# Patient Record
Sex: FEMALE | Race: WHITE | Employment: OTHER | URBAN - METROPOLITAN AREA
[De-identification: names, ages, dates, MRNs, and addresses within clinical notes are randomized per-mention and may not be internally consistent; named-entity substitution may affect disease eponyms.]

---

## 2017-01-19 ENCOUNTER — TELEPHONE (OUTPATIENT)
Dept: FAMILY MEDICINE CLINIC | Age: 77
End: 2017-01-19

## 2017-01-19 NOTE — TELEPHONE ENCOUNTER
Per Sommer Trujillo NP, pt advised that it is ok to try Melatonin OTC. Pt states that she is trying to lose weight and wanted to know if she could just do vegetables. Advised pt that she should incorporate protein, fruits and veggies. Avoid sugary, fried, processed food and decrease carbohydrates. Pt verbalized understanding.

## 2017-01-30 ENCOUNTER — TELEPHONE (OUTPATIENT)
Dept: FAMILY MEDICINE CLINIC | Age: 77
End: 2017-01-30

## 2017-01-30 NOTE — TELEPHONE ENCOUNTER
She called and was concerned that she is having a heart attack. Symptoms were pain in her heart, shortness of breath and headache. I told her to call for an ambulance to come get her now. She said she would and I asked if she needed help calling and she said no. I gave her a few min. Then called back and she said she was feeling the same and the rescue squad was on the way.

## 2017-03-03 ENCOUNTER — OFFICE VISIT (OUTPATIENT)
Dept: FAMILY MEDICINE CLINIC | Age: 77
End: 2017-03-03

## 2017-03-03 VITALS
WEIGHT: 209.6 LBS | TEMPERATURE: 97.8 F | BODY MASS INDEX: 38.34 KG/M2 | DIASTOLIC BLOOD PRESSURE: 71 MMHG | OXYGEN SATURATION: 96 % | SYSTOLIC BLOOD PRESSURE: 146 MMHG | HEART RATE: 56 BPM

## 2017-03-03 DIAGNOSIS — J02.9 SORE THROAT: Primary | ICD-10-CM

## 2017-03-03 DIAGNOSIS — J02.0 STREP THROAT: ICD-10-CM

## 2017-03-03 LAB
S PYO AG THROAT QL: POSITIVE
VALID INTERNAL CONTROL?: YES

## 2017-03-03 RX ORDER — AMOXICILLIN 875 MG/1
875 TABLET, FILM COATED ORAL 2 TIMES DAILY
Qty: 20 TAB | Refills: 0 | Status: SHIPPED | OUTPATIENT
Start: 2017-03-03 | End: 2017-03-13

## 2017-03-03 NOTE — PROGRESS NOTES
Subjective:     Chief Complaint   Patient presents with   Teresa Alarcon is a 64 y.o. female who complains of sore throat, swollen glands and left ear fullness for almost 2 days, gradually worsening since that time. History of strep throat in the past; \"I tend to get strep when I have a sore throat, usually about once per year\". She denies a history of shortness of breath and wheezing. Denies fever but has had some chills, chest pain, dizziness. Patient does not smoke cigarettes. ROS is otherwise negative. No evaluation to date. No recent travel. Sick Contacts? unknown    FLU VACCINE? UTD  Pneumonia Vaccine? UTD  Chart reviewed: immunizations are up to date and documented. Past Medical History:   Diagnosis Date    Atypical chest pain     Long h/o intermittent non-cardiac CP.  Depression with anxiety     Diabetes ([facility] [address]     GERD (gastroesophageal reflux disease)     Hypercholesteremia     Hyperlipidemia 7/3/2013    Hypertension     Inner ear dysfunction     S/P aortic valve replacement     Dr. Carroll Sanchez yearly    Vitamin D deficiency      Social History   Substance Use Topics    Smoking status: Former Smoker     Quit date: 8/27/1999    Smokeless tobacco: Never Used    Alcohol use No     Current Outpatient Prescriptions on File Prior to Visit   Medication Sig Dispense Refill    metFORMIN (GLUCOPHAGE) 500 mg tablet TAKE TWO TABLETS BY MOUTH DAILY WITH BREAKFAST 180 Tab 3    diclofenac EC (VOLTAREN) 75 mg EC tablet Take 1 Tab by mouth two (2) times a day. 20 Tab 0    lovastatin (MEVACOR) 10 mg tablet TAKE ONE TABLET BY MOUTH NIGHTLY 90 Tab 3    lisinopril (PRINIVIL, ZESTRIL) 5 mg tablet TAKE ONE TABLET BY MOUTH ONCE DAILY 90 Tab 4    PARoxetine (PAXIL) 30 mg tablet Take 1 Tab by mouth daily. 90 Tab 4    glimepiride (AMARYL) 1 mg tablet TAKE ONE TABLET BY MOUTH IN THE MORNING 90 Tab 4    clopidogrel (PLAVIX) 75 mg tablet Take 1 Tab by mouth daily.  90 Tab 4    cholecalciferol (VITAMIN D3) 1,000 unit tablet Take 1,000 Units by mouth daily.  MAGNESIUM PO Take 1 Tab by mouth daily.  calcium-cholecalciferol, d3, 600-125 mg-unit tab Take 1 Tab by mouth daily.  meclizine (ANTIVERT) 25 mg tablet Take 1 Tab by mouth three (3) times daily as needed for Dizziness. 15 Tab 0    glucose blood VI test strips (ASCENSIA AUTODISC VI, ONE TOUCH ULTRA TEST VI) strip Test daily. Diagnosis 250.00 3 Package 3    metoprolol succinate (TOPROL-XL) 25 mg XL tablet Take 25 mg by mouth daily.  Lancets misc Test twice daily. Diagnosis 250.00 3 Package 3    vitamin c-vitamin e (CRANBERRY CONCENTRATE) cap Take 1 Cap by mouth daily.  cyanocobalamin (VITAMIN B-12) 1,000 mcg tablet Take 1,000 mcg by mouth daily.  aspirin delayed-release 325 mg tablet Take 1 Tab by mouth daily. 90 Tab 1    PV W-O EDU/FERROUS FUMARATE/FA (M-VIT PO) Take 1 tablet by mouth daily.  omega-3 fatty acids-vitamin e (FISH OIL) 1,000 mg Cap Take 1 capsule by mouth daily. No current facility-administered medications on file prior to visit. Allergies   Allergen Reactions    Naldecon Unknown (comments)    Sulfa (Sulfonamide Antibiotics) Rash        Review of Systems  Pertinent items are noted in HPI. Agree with nurses note. OBJECTIVE:   Visit Vitals    /71    Pulse (!) 56    Temp 97.8 °F (36.6 °C)    Wt 209 lb 9.6 oz (95.1 kg)    SpO2 96%    BMI 38.34 kg/m2     She appears well, vital signs are as noted above   PAIN: No complaints of pain today. HEAD:  Normocephalic. Atraumatic. Non tender sinuses x 4. EYE: PERRLA. EOMs intact. Sclera anicteric without injection. No drainage or discharge. EARS: Hearing intact bilaterally. External ear canals normal without evidence of blood or swelling. Bilateral TM's intact, pearly grey with landmarks visible. No erythema or effusion. NOSE: Patent. Nasal turbinates pink. No polyps noted. No erythema. No discharge. MOUTH: mucous membranes pink and moist. Posterior pharynx normal with cobblestone appearance. No erythema, white exudate or obstruction. NECK: supple. Midline trachea. RESP: Breath sounds are symmetrical bilaterally. Unlabored without SOB. Speaking in full sentences. Clear to auscultation bilaterally anteriorly and posteriorly. No wheezes. No rales or rhonchi. Non-productive cough when elicited. CV: normal rate. Regular rhythm. S1, S2 audible. No murmur noted. No rubs, clicks or gallops noted. HEME/LYMPH: peripheral pulses palpable 2+ x 4 extremities. No peripheral edema is noted. No cervical adenopathy noted. SKIN: clean dry and intact throughout. no rashes, erythema, ecchymosis, lacerations, abrasions, suspicious moles noted      Results for orders placed or performed in visit on 03/03/17   AMB POC RAPID STREP A   Result Value Ref Range    VALID INTERNAL CONTROL POC Yes     Group A Strep Ag Positive Negative       Assessment/Plan:   Differential diagnosis and treatment options reviewed with patient who is in agreement with treatment plan as outlined below. ICD-10-CM ICD-9-CM    1. Sore throat J02.9 462 AMB POC RAPID STREP A   2. Strep throat J02.0 034.0 amoxicillin (AMOXIL) 875 mg tablet     Strep positive today. Will treat with amoxicillin. Advised to increase hydration and take medication with food. Cautioned to watch her blood sugars closer while on antibiotics. Symptomatic therapy suggested: push fluids, rest and gargle warm salt water. Call or return to clinic prn if these symptoms worsen or fail to improve as anticipated. Alternate Ibuprofen with Tylenol every 4 hours as needed for pain and discomfort. OTC nasal saline spray  2-3 sprays per nostril twice a day to clear eustachian tube and assist with post nasal drip symptoms. Encouraged nutrition, hydration and rest; avoid strenuous activity    Verbal and written instructions (see AVS) provided.   Patient expresses understanding and agreement of diagnosis and treatment plan.     F/u if symptoms do not improve or worsen

## 2017-03-03 NOTE — PATIENT INSTRUCTIONS

## 2017-03-03 NOTE — MR AVS SNAPSHOT
Visit Information Date & Time Provider Department Dept. Phone Encounter #  
 3/3/2017 10:30 AM Cassie Dias, [address] [address] [phone] [id number] Upcoming Health Maintenance Date Due Pneumococcal 65+ Low/Medium Risk (2 of 2 - PPSV23) 7/3/2014 FOOT EXAM Q1 5/11/2017 MEDICARE YEARLY EXAM 5/12/2017 HEMOGLOBIN A1C Q6M 6/21/2017 EYE EXAM RETINAL OR DILATED Q1 6/28/2017 MICROALBUMIN Q1 12/21/2017 LIPID PANEL Q1 12/21/2017 GLAUCOMA SCREENING Q2Y 6/28/2018 DTaP/Tdap/Td series (2 - Td) 10/18/2023 Allergies as of 3/3/2017  Review Complete On: 3/3/2017 By: Cassie Dias NP Severity Noted Reaction Type Reactions Naldecon High 11/07/2009    Unknown (comments) Sulfa (Sulfonamide Antibiotics) High 11/07/2009    Rash Current Immunizations  Reviewed on 12/21/2016 Name Date Influenza High Dose Vaccine PF 12/21/2016, 10/1/2014, 10/24/2013 Influenza Vaccine Split 10/9/2012, 11/1/2010 Pneumococcal Vaccine (Unspecified Type) 7/3/2009 Tdap 10/18/2013 Zoster Vaccine, Live 10/6/2009 Not reviewed this visit You Were Diagnosed With   
  
 Codes Comments Sore throat    -  Primary ICD-10-CM: J02.9 ICD-9-CM: 109 Strep throat     ICD-10-CM: J02.0 ICD-9-CM: 034.0 Vitals BP  
  
  
  
  
  
 146/71 BMI and BSA Data Body Mass Index Body Surface Area  
 38.34 kg/m 2 2.04 m 2 Preferred Pharmacy Pharmacy Name Phone [facility] [phone] Your Updated Medication List  
  
   
This list is accurate as of: 3/3/17 11:24 AM.  Always use your most recent med list.  
  
  
  
  
 amoxicillin 875 mg tablet Commonly known as:  AMOXIL Take 1 Tab by mouth two (2) times a day for 10 days. aspirin delayed-release 325 mg tablet Take 1 Tab by mouth daily. calcium-cholecalciferol (d3) 600-125 mg-unit Tab Take 1 Tab by mouth daily. cholecalciferol 1,000 unit tablet Commonly known as:  VITAMIN D3 Take 1,000 Units by mouth daily. clopidogrel 75 mg Tab Commonly known as:  PLAVIX Take 1 Tab by mouth daily. CRANBERRY CONCENTRATE Cap Generic drug:  vitamin c-vitamin e Take 1 Cap by mouth daily. diclofenac EC 75 mg EC tablet Commonly known as:  VOLTAREN Take 1 Tab by mouth two (2) times a day. FISH OIL 1,000 mg Cap Generic drug:  omega-3 fatty acids-vitamin e Take 1 capsule by mouth daily. glimepiride 1 mg tablet Commonly known as:  AMARYL  
TAKE ONE TABLET BY MOUTH IN THE MORNING  
  
 glucose blood VI test strips strip Commonly known as:  ASCENSIA AUTODISC VI, ONE TOUCH ULTRA TEST VI Test daily. Diagnosis 250.00 Lancets Misc Test twice daily. Diagnosis 250.00  
  
 lisinopril 5 mg tablet Commonly known as:  PRINIVIL, ZESTRIL  
TAKE ONE TABLET BY MOUTH ONCE DAILY lovastatin 10 mg tablet Commonly known as:  MEVACOR  
TAKE ONE TABLET BY MOUTH NIGHTLY  
  
 M-VIT PO Take 1 tablet by mouth daily. MAGNESIUM PO Take 1 Tab by mouth daily. meclizine 25 mg tablet Commonly known as:  ANTIVERT Take 1 Tab by mouth three (3) times daily as needed for Dizziness. metFORMIN 500 mg tablet Commonly known as:  GLUCOPHAGE  
TAKE TWO TABLETS BY MOUTH DAILY WITH BREAKFAST  
  
 metoprolol succinate 25 mg XL tablet Commonly known as:  TOPROL-XL Take 25 mg by mouth daily. PARoxetine 30 mg tablet Commonly known as:  PAXIL Take 1 Tab by mouth daily. VITAMIN B-12 1,000 mcg tablet Generic drug:  cyanocobalamin Take 1,000 mcg by mouth daily. Prescriptions Sent to Pharmacy Refills  
 amoxicillin (AMOXIL) 875 mg tablet 0 Sig: Take 1 Tab by mouth two (2) times a day for 10 days. Class: Normal  
 Pharmacy: [address] [place] [address] Ph #: [phone] Route: Oral  
  
We Performed the Following AMB POC RAPID STREP A [36100 CPT(R)] Patient Instructions Strep Throat: Care Instructions Your Care Instructions Strep throat is a bacterial infection that causes sudden, severe sore throat and fever. Strep throat, which is caused by bacteria called streptococcus, is treated with antibiotics. Sometimes a strep test is necessary to tell if the sore throat is caused by strep bacteria. Treatment can help ease symptoms and may prevent future problems. Follow-up care is a key part of your treatment and safety. Be sure to make and go to all appointments, and call your doctor if you are having problems. It's also a good idea to know your test results and keep a list of the medicines you take. How can you care for yourself at home? · Take your antibiotics as directed. Do not stop taking them just because you feel better. You need to take the full course of antibiotics. · Strep throat can spread to others until 24 hours after you begin taking antibiotics. During this time, you should avoid contact with other people at work or home, especially infants and children. Do not sneeze or cough on others, and wash your hands often. Keep your drinking glass and eating utensils separate from those of others, and wash these items well in hot, soapy water. · Gargle with warm salt water at least once each hour to help reduce swelling and make your throat feel better. Use 1 teaspoon of salt mixed in 8 fluid ounces of warm water. · Take an over-the-counter pain medication, such as acetaminophen (Tylenol), ibuprofen (Advil, Motrin), or naproxen (Aleve). Read and follow all instructions on the label. · Try an over-the-counter anesthetic throat spray or throat lozenges, which may help relieve throat pain. · Drink plenty of fluids. Fluids may help soothe an irritated throat. Hot fluids, such as tea or soup, may help your throat feel better. · Eat soft solids and drink plenty of clear liquids. Flavored ice pops, ice cream, scrambled eggs, sherbet, and gelatin dessert (such as Jell-O) may also soothe the throat. · Get lots of rest. 
· Do not smoke, and avoid secondhand smoke. If you need help quitting, talk to your doctor about stop-smoking programs and medicines. These can increase your chances of quitting for good. · Use a vaporizer or humidifier to add moisture to the air in your bedroom. Follow the directions for cleaning the machine. When should you call for help? Call your doctor now or seek immediate medical care if: 
· You have a new or higher fever. · You have a fever with a stiff neck or severe headache. · You have new or worse trouble swallowing. · Your sore throat gets much worse on one side. · Your pain becomes much worse on one side of your throat. Watch closely for changes in your health, and be sure to contact your doctor if: 
· You are not getting better after 2 days (48 hours). · You do not get better as expected. Where can you learn more? Go to http://rose-colleen.info/. Enter K625 in the search box to learn more about \"Strep Throat: Care Instructions. \" Current as of: July 29, 2016 Content Version: 11.1 © 6194-2326 Yahoo!. Care instructions adapted under license by [employer] (which disclaims liability or warranty for this information). If you have questions about a medical condition or this instruction, always ask your healthcare professional. [address] [place] [address] any warranty or liability for your use of this information. Introducing [facility]! Dear Beatriz Olszewski: Thank you for requesting a [employer] account. Our records indicate that you have previously registered for a [employer] account but its currently inactive. Please call our [employer] support line at [phone]. Additional Information If you have questions, please visit the Frequently Asked Questions section of the [employer] website at [email or web address] Remember, [employer] is NOT to be used for urgent needs. For medical emergencies, dial 911. Now available from your iPhone and Android! Please provide this summary of care documentation to your next provider. Your primary care clinician is listed as Estelita Benitez. If you have any questions after today's visit, please call [phone].

## 2017-03-22 ENCOUNTER — HOSPITAL ENCOUNTER (OUTPATIENT)
Dept: CT IMAGING | Age: 77
Discharge: HOME | End: 2017-03-22
Attending: UROLOGY
Payer: COMMERCIAL

## 2017-03-22 ENCOUNTER — OFFICE VISIT (OUTPATIENT)
Dept: FAMILY MEDICINE CLINIC | Age: 77
End: 2017-03-22

## 2017-03-22 VITALS
WEIGHT: 204 LBS | SYSTOLIC BLOOD PRESSURE: 134 MMHG | HEART RATE: 60 BPM | DIASTOLIC BLOOD PRESSURE: 85 MMHG | BODY MASS INDEX: 37.54 KG/M2 | TEMPERATURE: 98 F | RESPIRATION RATE: 18 BRPM | HEIGHT: 62 IN | OXYGEN SATURATION: 95 %

## 2017-03-22 DIAGNOSIS — R39.15 URGENCY OF URINATION: ICD-10-CM

## 2017-03-22 DIAGNOSIS — R33.9 URINARY RETENTION: Primary | ICD-10-CM

## 2017-03-22 DIAGNOSIS — R10.2 SUPRAPUBIC PAIN: ICD-10-CM

## 2017-03-22 DIAGNOSIS — M54.9 CVA TENDERNESS: ICD-10-CM

## 2017-03-22 DIAGNOSIS — N20.0 KIDNEY STONE: ICD-10-CM

## 2017-03-22 PROCEDURE — 74176 CT ABD & PELVIS W/O CONTRAST: CPT

## 2017-03-22 NOTE — PATIENT INSTRUCTIONS
Appointment with [facility] today at 10:40 AM medical office building one [address] at [facility].

## 2017-03-22 NOTE — MR AVS SNAPSHOT
Visit Information Date & Time Provider Department Dept. Phone Encounter #  
 3/22/2017  7:45 AM Calderon Renae NP [address] [address] [phone] [id number] Upcoming Health Maintenance Date Due Pneumococcal 65+ Low/Medium Risk (2 of 2 - PPSV23) 7/3/2014 FOOT EXAM Q1 5/11/2017 MEDICARE YEARLY EXAM 5/12/2017 HEMOGLOBIN A1C Q6M 6/21/2017 EYE EXAM RETINAL OR DILATED Q1 6/28/2017 MICROALBUMIN Q1 12/21/2017 LIPID PANEL Q1 12/21/2017 GLAUCOMA SCREENING Q2Y 6/28/2018 DTaP/Tdap/Td series (2 - Td) 10/18/2023 Allergies as of 3/22/2017  Review Complete On: 3/22/2017 By: Calderon Renae NP Severity Noted Reaction Type Reactions Naldecon High 11/07/2009    Unknown (comments) Sulfa (Sulfonamide Antibiotics) High 11/07/2009    Rash Current Immunizations  Reviewed on 12/21/2016 Name Date Influenza High Dose Vaccine PF 12/21/2016, 10/1/2014, 10/24/2013 Influenza Vaccine Split 10/9/2012, 11/1/2010 Pneumococcal Vaccine (Unspecified Type) 7/3/2009 Tdap 10/18/2013 Zoster Vaccine, Live 10/6/2009 Not reviewed this visit You Were Diagnosed With   
  
 Codes Comments Urinary retention    -  Primary ICD-10-CM: R33.9 ICD-9-CM: 788.20 Urgency of urination     ICD-10-CM: R39.15 ICD-9-CM: 664.69 Suprapubic pain     ICD-10-CM: R10.2 ICD-9-CM: 789.09   
 CVA tenderness     ICD-10-CM: M54.9 ICD-9-CM: 724.5 Vitals BP Pulse Temp Resp Height(growth percentile) Weight(growth percentile) 134/85 (BP 1 Location: Left arm, BP Patient Position: Sitting) 60 98 °F (36.7 °C) 18 5' 2\" (1.575 m) 204 lb (92.5 kg) SpO2 BMI OB Status Smoking Status 95% 37.31 kg/m2 Postmenopausal Former Smoker BMI and BSA Data Body Mass Index Body Surface Area  
 37.31 kg/m 2 2.01 m 2 Preferred Pharmacy Pharmacy Name Phone [address] [phone] Your Updated Medication List  
  
   
This list is accurate as of: 3/22/17  9:36 AM.  Always use your most recent med list.  
  
  
  
  
 aspirin delayed-release 325 mg tablet Take 1 Tab by mouth daily. calcium-cholecalciferol (d3) 600-125 mg-unit Tab Take 1 Tab by mouth daily. cholecalciferol 1,000 unit tablet Commonly known as:  VITAMIN D3 Take 1,000 Units by mouth daily. clopidogrel 75 mg Tab Commonly known as:  PLAVIX Take 1 Tab by mouth daily. CRANBERRY CONCENTRATE Cap Generic drug:  vitamin c-vitamin e Take 1 Cap by mouth daily. diclofenac EC 75 mg EC tablet Commonly known as:  VOLTAREN Take 1 Tab by mouth two (2) times a day. FISH OIL 1,000 mg Cap Generic drug:  omega-3 fatty acids-vitamin e Take 1 capsule by mouth daily. glimepiride 1 mg tablet Commonly known as:  AMARYL  
TAKE ONE TABLET BY MOUTH IN THE MORNING  
  
 glucose blood VI test strips strip Commonly known as:  ASCENSIA AUTODISC VI, ONE TOUCH ULTRA TEST VI Test daily. Diagnosis 250.00 Lancets Misc Test twice daily. Diagnosis 250.00  
  
 lisinopril 5 mg tablet Commonly known as:  PRINIVIL, ZESTRIL  
TAKE ONE TABLET BY MOUTH ONCE DAILY lovastatin 10 mg tablet Commonly known as:  MEVACOR  
TAKE ONE TABLET BY MOUTH NIGHTLY  
  
 M-VIT PO Take 1 tablet by mouth daily. MAGNESIUM PO Take 1 Tab by mouth daily. meclizine 25 mg tablet Commonly known as:  ANTIVERT Take 1 Tab by mouth three (3) times daily as needed for Dizziness. metFORMIN 500 mg tablet Commonly known as:  GLUCOPHAGE  
TAKE TWO TABLETS BY MOUTH DAILY WITH BREAKFAST  
  
 metoprolol succinate 25 mg XL tablet Commonly known as:  TOPROL-XL Take 25 mg by mouth daily. PARoxetine 30 mg tablet Commonly known as:  PAXIL Take 1 Tab by mouth daily. VITAMIN B-12 1,000 mcg tablet Generic drug:  cyanocobalamin Take 1,000 mcg by mouth daily. We Performed the Following AMB POC URINALYSIS DIP STICK AUTO W/O MICRO [10708 CPT(R)] Patient Instructions Appointment with [facility] today at 10:40 AM medical office building one [address] at [address]! Dear Emeli Diez: Thank you for requesting a [employer] account. Our records indicate that you have previously registered for a [employer] account but its currently inactive. Please call our [employer] support line at [phone]. Additional Information If you have questions, please visit the Frequently Asked Questions section of the [employer] website at [email or web address] Remember, [employer] is NOT to be used for urgent needs. For medical emergencies, dial 911. Now available from your iPhone and Android! Please provide this summary of care documentation to your next provider. Your primary care clinician is listed as Last De Los Santos. If you have any questions after today's visit, please call [phone].

## 2017-03-22 NOTE — PROGRESS NOTES
Subjective:      Chief Complaint   Patient presents with    Bladder Infection       Coral Oliveira is a 64 y.o. female that presents today with a chief complaint of urinary symptoms. Began on Friday with some dysuria and right flank/ Right sided abdominal discomfort and says she felt like she had to urinate and could not go. Then she drank a lot of cranberry juice and says that she does not have dysuria but says she has the discomfort in her side still and feels that she can urinate as she normally does. The patient admits to accompanying urgency, back-flank pain and suprapubic pain. She had some chills over the weekend but does not know if she had a fever or not. The patient denies nausea, vomiting, diarrhea, or constipation. She says she only urinated 2 times yesterday and she normally goes \"all the time. I usually have some incontinence and I have not had any of that either\". The patient has no history of recent UTIs. She was recently treated for strep throat, completed antibiotics last Monday. Patient is diabetic. She says that her Blood sugars at home have been normal, low 100s. Past Medical History:   Diagnosis Date    Atypical chest pain     Long h/o intermittent non-cardiac CP.  Depression with anxiety     Diabetes ([facility] [address]     GERD (gastroesophageal reflux disease)     Hypercholesteremia     Hyperlipidemia 7/3/2013    Hypertension     Inner ear dysfunction     S/P aortic valve replacement     Dr. Cory Bruno yearly    Vitamin D deficiency      Current Outpatient Prescriptions   Medication Sig    metFORMIN (GLUCOPHAGE) 500 mg tablet TAKE TWO TABLETS BY MOUTH DAILY WITH BREAKFAST    diclofenac EC (VOLTAREN) 75 mg EC tablet Take 1 Tab by mouth two (2) times a day.  lovastatin (MEVACOR) 10 mg tablet TAKE ONE TABLET BY MOUTH NIGHTLY    lisinopril (PRINIVIL, ZESTRIL) 5 mg tablet TAKE ONE TABLET BY MOUTH ONCE DAILY    PARoxetine (PAXIL) 30 mg tablet Take 1 Tab by mouth daily.     glimepiride (AMARYL) 1 mg tablet TAKE ONE TABLET BY MOUTH IN THE MORNING    clopidogrel (PLAVIX) 75 mg tablet Take 1 Tab by mouth daily.  cholecalciferol (VITAMIN D3) 1,000 unit tablet Take 1,000 Units by mouth daily.  MAGNESIUM PO Take 1 Tab by mouth daily.  calcium-cholecalciferol, d3, 600-125 mg-unit tab Take 1 Tab by mouth daily.  meclizine (ANTIVERT) 25 mg tablet Take 1 Tab by mouth three (3) times daily as needed for Dizziness.  glucose blood VI test strips (ASCENSIA AUTODISC VI, ONE TOUCH ULTRA TEST VI) strip Test daily. Diagnosis 250.00    metoprolol succinate (TOPROL-XL) 25 mg XL tablet Take 25 mg by mouth daily.  Lancets misc Test twice daily. Diagnosis 250.00    vitamin c-vitamin e (CRANBERRY CONCENTRATE) cap Take 1 Cap by mouth daily.  cyanocobalamin (VITAMIN B-12) 1,000 mcg tablet Take 1,000 mcg by mouth daily.  aspirin delayed-release 325 mg tablet Take 1 Tab by mouth daily.  PV W-O EDU/FERROUS FUMARATE/FA (M-VIT PO) Take 1 tablet by mouth daily.  omega-3 fatty acids-vitamin e (FISH OIL) 1,000 mg Cap Take 1 capsule by mouth daily. No current facility-administered medications for this visit. Allergies   Allergen Reactions    Naldecon Unknown (comments)    Sulfa (Sulfonamide Antibiotics) Rash     Social History   Substance Use Topics    Smoking status: Former Smoker     Quit date: 8/27/1999    Smokeless tobacco: Never Used    Alcohol use No       ROS per HPI.     Objective:     Visit Vitals    /85 (BP 1 Location: Left arm, BP Patient Position: Sitting)    Pulse 60    Temp 98 °F (36.7 °C)    Resp 18    Ht 5' 2\" (1.575 m)    Wt 204 lb (92.5 kg)    SpO2 95%    BMI 37.31 kg/m2       Skin: no pallor, normal turgor  HEENT:  Normocephalic, no conjunctival inflammation, pupils equal round and reactive to light, MMM, no oral lesions  Heart: regular rate and rhythm, +murmur (valve replacement history)   Lungs: no increased respiratory effort, clear to ausculation bilaterally  Abdomen: soft, mild suprapubic tenderness, not distended. Normal bowel sounds. No rebound or guarding. No bruits. Back: + mild costovertebral angle tenderness on right side  Extremities:no edema or cyanosis  Neuro awake, alert and oriented      Assessment/Plan:   Differential diagnosis and treatment options reviewed with patient who is in agreement with treatment plan as outlined below. ICD-10-CM ICD-9-CM    1. Urinary retention R33.9 788.20 AMB POC URINALYSIS DIP STICK AUTO W/O MICRO   2. Urgency of urination R39.15 788.63 AMB POC URINALYSIS DIP STICK AUTO W/O MICRO   3. Suprapubic pain R10.2 789.09    4. CVA tenderness M54.9 724.5      She is unable to give urine specimen here in office despite waiting for over an hour and drinking several cups of water. Called and got her an appointment with urology for further evaluation. May need bladder scan or rule out kidney stone. May need cath urine to rule out retention vs UTI. Appointment with [facility] today at 10:40 AM medical office building [address] at [facility].     Verbal and written instructions (see AVS) provided. Patient expresses understanding of diagnosis and treatment plan.

## 2017-03-29 ENCOUNTER — TELEPHONE (OUTPATIENT)
Dept: FAMILY MEDICINE CLINIC | Age: 77
End: 2017-03-29

## 2017-03-29 NOTE — TELEPHONE ENCOUNTER
Needs referral to go to the eye doctor. She does not remember which eye doctor it was, but she says he is from [facility] and has an appt at 1pm today to see him in [place] [address]. She would like this to be sent to [facility] in [address] for processing.  Patient can be reached at [phone]

## 2017-05-10 ENCOUNTER — OFFICE VISIT (OUTPATIENT)
Dept: FAMILY MEDICINE CLINIC | Age: 77
End: 2017-05-10

## 2017-05-10 VITALS
BODY MASS INDEX: 37.98 KG/M2 | SYSTOLIC BLOOD PRESSURE: 138 MMHG | DIASTOLIC BLOOD PRESSURE: 71 MMHG | HEART RATE: 71 BPM | TEMPERATURE: 98.4 F | OXYGEN SATURATION: 96 % | RESPIRATION RATE: 18 BRPM | HEIGHT: 62 IN | WEIGHT: 206.4 LBS

## 2017-05-10 DIAGNOSIS — D22.9 NEVUS: ICD-10-CM

## 2017-05-10 DIAGNOSIS — H61.23 BILATERAL IMPACTED CERUMEN: Primary | ICD-10-CM

## 2017-05-10 NOTE — PROGRESS NOTES
Joe Chirinos is a 64 y.o. female  Chief Complaint   Patient presents with    Wax in Ear    Lesion     Pigmented growth on R lateral brow     1. Have you been to an emergency room, urgent clinic, or hospitalized since your last visit? NO  If yes, where when, and reason for visit? 2. Have seen or consulted any other health care provider since your last visit? NO  Please include any pap smears or colon screening in this section  If yes, where when, and reason for visit? 6. Do you have an Advanced Directive/ Living Will in place?  YES  If yes, do we have a copy on file YES  If no, would you like information NO

## 2017-05-10 NOTE — PROGRESS NOTES
Subjective:   Opal Gentile is a 64 y.o. female who complains of ear fullness for more than 7 days. No pain. No congestion. No fever. Also wants to ask about the mole on her right eyebrow. States it has been there and looked the same for many years. Her daughter recently noted the mole and is worried. Past Medical History:   Diagnosis Date    Atypical chest pain     Long h/o intermittent non-cardiac CP.  Depression with anxiety     Diabetes ([address]     GERD (gastroesophageal reflux disease)     Hypercholesteremia     Hyperlipidemia 7/3/2013    Hypertension     Inner ear dysfunction     S/P aortic valve replacement     Dr. Kerri King yearly    Vitamin D deficiency      Social History   Substance Use Topics    Smoking status: Former Smoker     Quit date: 8/27/1999    Smokeless tobacco: Never Used    Alcohol use No     Outpatient Prescriptions Marked as Taking for the 5/10/17 encounter (Office Visit) with Lalita Quintero MD   Medication Sig Dispense Refill    metFORMIN (GLUCOPHAGE) 500 mg tablet TAKE TWO TABLETS BY MOUTH DAILY WITH BREAKFAST 180 Tab 3    diclofenac EC (VOLTAREN) 75 mg EC tablet Take 1 Tab by mouth two (2) times a day. 20 Tab 0    lovastatin (MEVACOR) 10 mg tablet TAKE ONE TABLET BY MOUTH NIGHTLY 90 Tab 3    lisinopril (PRINIVIL, ZESTRIL) 5 mg tablet TAKE ONE TABLET BY MOUTH ONCE DAILY 90 Tab 4    PARoxetine (PAXIL) 30 mg tablet Take 1 Tab by mouth daily. 90 Tab 4    glimepiride (AMARYL) 1 mg tablet TAKE ONE TABLET BY MOUTH IN THE MORNING 90 Tab 4    clopidogrel (PLAVIX) 75 mg tablet Take 1 Tab by mouth daily. 90 Tab 4    cholecalciferol (VITAMIN D3) 1,000 unit tablet Take 1,000 Units by mouth daily.  MAGNESIUM PO Take 1 Tab by mouth daily.  calcium-cholecalciferol, d3, 600-125 mg-unit tab Take 1 Tab by mouth daily.  meclizine (ANTIVERT) 25 mg tablet Take 1 Tab by mouth three (3) times daily as needed for Dizziness.  15 Tab 0    glucose blood VI test strips (ASCENSIA AUTODISC VI, ONE TOUCH ULTRA TEST VI) strip Test daily. Diagnosis 250.00 3 Package 3    metoprolol succinate (TOPROL-XL) 25 mg XL tablet Take 25 mg by mouth daily.  Lancets misc Test twice daily. Diagnosis 250.00 3 Package 3    vitamin c-vitamin e (CRANBERRY CONCENTRATE) cap Take 1 Cap by mouth daily.  cyanocobalamin (VITAMIN B-12) 1,000 mcg tablet Take 1,000 mcg by mouth daily.  aspirin delayed-release 325 mg tablet Take 1 Tab by mouth daily. 90 Tab 1    PV W-O EDU/FERROUS FUMARATE/FA (M-VIT PO) Take 1 tablet by mouth daily.  omega-3 fatty acids-vitamin e (FISH OIL) 1,000 mg Cap Take 1 capsule by mouth daily. Allergies   Allergen Reactions    Naldecon Unknown (comments)    Sulfa (Sulfonamide Antibiotics) Rash        Review of Systems  A comprehensive review of systems was negative except for that written in the HPI. Objective:     Visit Vitals    /71 (BP 1 Location: Left arm, BP Patient Position: Sitting)    Pulse 71    Temp 98.4 °F (36.9 °C) (Oral)    Resp 18    Ht 5' 2\" (1.575 m)    Wt 206 lb 6.4 oz (93.6 kg)    SpO2 96%    BMI 37.75 kg/m2     General:   alert, cooperative and no distress   Eyes: conjunctivae/scleras clear. PERRL, EOM's intact   Ears: External auditory canals with impacted cerumen   Sinuses/Nose: No maxillary or frontal tenderness. no rhinorrhea present. Mouth:  No oral lesions, mild pharyngeal erythema, no exudates   skin Small ovoid mole with symmetric borders, consistent coloring   Heart: S1 and S2 normal,no murmurs noted    Lungs: Clear to auscultation bilaterally, no increased work of breathing                    Assessment/Plan:   1. Nevus  Reassuring shape, color and size. No reported growth by patient. Recommend dermatology for skin check, but this appears benign. 2. Bilateral impacted cerumen  Lavaged today. Patient to make appt for annual wellness visit with DM and HTN follow up.     Orders Placed This Encounter    REMOVAL IMPACTED CERUMEN IRRIGATION/LVG UNILAT       Verbal and written instructions (see AVS) provided. Patient expresses understanding of diagnosis and treatment plan.

## 2017-05-10 NOTE — PATIENT INSTRUCTIONS
Earwax Blockage: Care Instructions  Your Care Instructions    Earwax is a natural substance that protects the ear canal. Normally, earwax drains from the ears and does not cause problems. Sometimes earwax builds up and hardens. Earwax blockage (also called cerumen impaction) can cause some loss of hearing and pain. When wax is tightly packed, you will need to have your doctor remove it. Follow-up care is a key part of your treatment and safety. Be sure to make and go to all appointments, and call your doctor if you are having problems. Its also a good idea to know your test results and keep a list of the medicines you take. How can you care for yourself at home? · Do not try to remove earwax with cotton swabs, fingers, or other objects. This can make the blockage worse and damage the eardrum. · If your doctor recommends that you try to remove earwax at home:  ¨ Soften and loosen the earwax with warm mineral oil. You also can try hydrogen peroxide mixed with an equal amount of room temperature water. Place 2 drops of the fluid, warmed to body temperature, in the ear two times a day for up to 5 days. ¨ Once the wax is loose and soft, all that is usually needed to remove it from the ear canal is a gentle, warm shower. Direct the water into the ear, then tip your head to let the earwax drain out. Dry your ear thoroughly with a hair dryer set on low. Hold the dryer several inches from your ear. ¨ If the warm mineral oil and shower do not work, use an over-the-counter wax softener followed by gentle flushing with an ear syringe each night for a week or two. Make sure the flushing solution is body temperature. Cool or hot fluids in the ear can cause dizziness. When should you call for help? Call your doctor now or seek immediate medical care if:  · Pus or blood drains from your ear. · Your ears are ringing or feel full. · You have a loss of hearing.   Watch closely for changes in your health, and be sure to contact your doctor if:  · You have pain or reduced hearing after 1 week of home treatment. · You have any new symptoms, such as nausea or balance problems. Where can you learn more? Go to http://rose-colleen.info/. Enter B792 in the search box to learn more about \"Earwax Blockage: Care Instructions. \"  Current as of: May 27, 2016  Content Version: 11.2  © 3880-1793 [email or web address]. Care instructions adapted under license by [employer] (which disclaims liability or warranty for this information). If you have questions about a medical condition or this instruction, always ask your healthcare professional. [address] any warranty or liability for your use of this information.

## 2017-06-12 RX ORDER — GLIMEPIRIDE 1 MG/1
TABLET ORAL
Qty: 90 TAB | Refills: 3 | Status: SHIPPED | OUTPATIENT
Start: 2017-06-12 | End: 2017-10-31 | Stop reason: SDUPTHER

## 2017-06-12 RX ORDER — CLOPIDOGREL BISULFATE 75 MG/1
TABLET ORAL
Qty: 90 TAB | Refills: 3 | Status: SHIPPED | OUTPATIENT
Start: 2017-06-12 | End: 2017-07-26 | Stop reason: SDUPTHER

## 2017-06-19 RX ORDER — METOPROLOL SUCCINATE 25 MG/1
25 TABLET, EXTENDED RELEASE ORAL DAILY
Qty: 90 TAB | Refills: 3 | Status: SHIPPED | OUTPATIENT
Start: 2017-06-19 | End: 2018-01-02 | Stop reason: SDUPTHER

## 2017-06-19 RX ORDER — CLOPIDOGREL BISULFATE 75 MG/1
75 TABLET ORAL DAILY
Qty: 90 TAB | Refills: 3 | Status: CANCELLED | OUTPATIENT
Start: 2017-06-19

## 2017-06-19 RX ORDER — DICLOFENAC SODIUM 75 MG/1
75 TABLET, DELAYED RELEASE ORAL 2 TIMES DAILY
Qty: 180 TAB | Refills: 3 | Status: CANCELLED | OUTPATIENT
Start: 2017-06-19

## 2017-07-03 ENCOUNTER — OFFICE VISIT (OUTPATIENT)
Dept: FAMILY MEDICINE CLINIC | Age: 77
End: 2017-07-03

## 2017-07-03 VITALS
TEMPERATURE: 98.3 F | RESPIRATION RATE: 16 BRPM | SYSTOLIC BLOOD PRESSURE: 110 MMHG | HEIGHT: 62 IN | DIASTOLIC BLOOD PRESSURE: 68 MMHG | HEART RATE: 76 BPM | WEIGHT: 210 LBS | OXYGEN SATURATION: 97 % | BODY MASS INDEX: 38.64 KG/M2

## 2017-07-03 DIAGNOSIS — E11.8 CONTROLLED TYPE 2 DIABETES MELLITUS WITH COMPLICATION, WITHOUT LONG-TERM CURRENT USE OF INSULIN (HCC): ICD-10-CM

## 2017-07-03 DIAGNOSIS — Z00.00 ROUTINE GENERAL MEDICAL EXAMINATION AT A HEALTH CARE FACILITY: ICD-10-CM

## 2017-07-03 DIAGNOSIS — N30.00 ACUTE CYSTITIS WITHOUT HEMATURIA: Primary | ICD-10-CM

## 2017-07-03 LAB
BILIRUB UR QL STRIP: NEGATIVE
GLUCOSE UR-MCNC: NEGATIVE MG/DL
HBA1C MFR BLD HPLC: 6 % (ref 4.8–5.6)
KETONES P FAST UR STRIP-MCNC: NEGATIVE MG/DL
PH UR STRIP: 6 [PH] (ref 4.6–8)
PROT UR QL STRIP: NEGATIVE MG/DL
SP GR UR STRIP: 1.01 (ref 1–1.03)
UA UROBILINOGEN AMB POC: NORMAL (ref 0.2–1)
URINALYSIS CLARITY POC: NORMAL
URINALYSIS COLOR POC: YELLOW
URINE BLOOD POC: NORMAL
URINE LEUKOCYTES POC: NORMAL
URINE NITRITES POC: POSITIVE

## 2017-07-03 RX ORDER — NITROFURANTOIN 25; 75 MG/1; MG/1
100 CAPSULE ORAL 2 TIMES DAILY
Qty: 14 CAP | Refills: 0 | Status: SHIPPED | OUTPATIENT
Start: 2017-07-03 | End: 2017-07-10

## 2017-07-03 NOTE — ACP (ADVANCE CARE PLANNING)
Advance Care Planning (ACP) Provider Conversation Snapshot    Date of ACP Conversation: 07/03/17  Advanced care plan is on file

## 2017-07-03 NOTE — MR AVS SNAPSHOT
Visit Information Date & Time Provider Department Dept. Phone Encounter #  
 7/3/2017  2:40 PM Xi Ruiz MD Eric Guzman [address] [facility] [phone] [id number] Upcoming Health Maintenance Date Due Pneumococcal 65+ Low/Medium Risk (2 of 2 - PPSV23) 7/3/2014 FOOT EXAM Q1 5/11/2017 MEDICARE YEARLY EXAM 5/12/2017 HEMOGLOBIN A1C Q6M 6/21/2017 INFLUENZA AGE 9 TO ADULT 8/1/2017 MICROALBUMIN Q1 12/21/2017 LIPID PANEL Q1 12/21/2017 EYE EXAM RETINAL OR DILATED Q1 3/29/2018 GLAUCOMA SCREENING Q2Y 3/29/2019 DTaP/Tdap/Td series (2 - Td) 10/18/2023 Allergies as of 7/3/2017  Review Complete On: 7/3/2017 By: Xi Ruiz MD  
  
 Severity Noted Reaction Type Reactions Naldecon High 11/07/2009    Unknown (comments) Sulfa (Sulfonamide Antibiotics) High 11/07/2009    Rash Current Immunizations  Reviewed on 12/21/2016 Name Date Influenza High Dose Vaccine PF 12/21/2016, 10/1/2014, 10/24/2013 Influenza Vaccine Split 10/9/2012, 11/1/2010 Pneumococcal Vaccine (Unspecified Type) 7/3/2009 Tdap 10/18/2013 Zoster Vaccine, Live 10/6/2009 Not reviewed this visit You Were Diagnosed With   
  
 Codes Comments Acute cystitis without hematuria    -  Primary ICD-10-CM: N30.00 ICD-9-CM: 595.0 Controlled type 2 diabetes mellitus with complication, without long-term current use of insulin (HCC)     ICD-10-CM: E11.8 ICD-9-CM: 250.90 Routine general medical examination at a health care facility     ICD-10-CM: Z00.00 ICD-9-CM: V70.0 Vitals BP Pulse Temp Resp Height(growth percentile) Weight(growth percentile) 110/68 (BP 1 Location: Left arm, BP Patient Position: Sitting) 76 98.3 °F (36.8 °C) (Oral) 16 5' 2\" (1.575 m) 210 lb (95.3 kg) SpO2 BMI OB Status Smoking Status 97% 38.41 kg/m2 Postmenopausal Former Smoker BMI and BSA Data  Body Mass Index Body Surface Area  
 38.41 kg/m 2 2.04 m 2  
  
  
 Preferred Pharmacy Pharmacy Name Phone [facility] [address], [address] [phone] Your Updated Medication List  
  
   
This list is accurate as of: 7/3/17  5:04 PM.  Always use your most recent med list.  
  
  
  
  
 aspirin delayed-release 325 mg tablet Take 1 Tab by mouth daily. calcium-cholecalciferol (d3) 600-125 mg-unit Tab Take 1 Tab by mouth daily. cholecalciferol 1,000 unit tablet Commonly known as:  VITAMIN D3 Take 1,000 Units by mouth daily. clopidogrel 75 mg Tab Commonly known as:  PLAVIX TAKE ONE TABLET BY MOUTH ONCE DAILY CRANBERRY CONCENTRATE Cap Generic drug:  vitamin c-vitamin e Take 1 Cap by mouth daily. diclofenac EC 75 mg EC tablet Commonly known as:  VOLTAREN Take 1 Tab by mouth two (2) times a day. FISH OIL 1,000 mg Cap Generic drug:  omega-3 fatty acids-vitamin e Take 1 capsule by mouth daily. glimepiride 1 mg tablet Commonly known as:  AMARYL  
TAKE ONE TABLET BY MOUTH IN THE MORNING  
  
 glucose blood VI test strips strip Commonly known as:  ASCENSIA AUTODISC VI, ONE TOUCH ULTRA TEST VI Test daily. Diagnosis 250.00 Lancets Misc Test twice daily. Diagnosis 250.00  
  
 lisinopril 5 mg tablet Commonly known as:  PRINIVIL, ZESTRIL  
TAKE ONE TABLET BY MOUTH ONCE DAILY lovastatin 10 mg tablet Commonly known as:  MEVACOR  
TAKE ONE TABLET BY MOUTH NIGHTLY  
  
 M-VIT PO Take 1 tablet by mouth daily. MAGNESIUM PO Take 1 Tab by mouth daily. meclizine 25 mg tablet Commonly known as:  ANTIVERT Take 1 Tab by mouth three (3) times daily as needed for Dizziness. metFORMIN 500 mg tablet Commonly known as:  GLUCOPHAGE  
TAKE TWO TABLETS BY MOUTH DAILY WITH BREAKFAST  
  
 metoprolol succinate 25 mg XL tablet Commonly known as:  TOPROL-XL Take 1 Tab by mouth daily. nitrofurantoin (macrocrystal-monohydrate) 100 mg capsule Commonly known as:  MACROBID Take 1 Cap by mouth two (2) times a day for 7 days. PARoxetine 30 mg tablet Commonly known as:  PAXIL Take 1 Tab by mouth daily. VITAMIN B-12 1,000 mcg tablet Generic drug:  cyanocobalamin Take 1,000 mcg by mouth daily. Prescriptions Sent to Pharmacy Refills  
 nitrofurantoin, macrocrystal-monohydrate, (MACROBID) 100 mg capsule 0 Sig: Take 1 Cap by mouth two (2) times a day for 7 days. Class: Normal  
 Pharmacy: [facility] [address] [facility], [address] [facility] Ph #: [phone] Route: Oral  
  
We Performed the Following AMB POC HEMOGLOBIN A1C [93257 CPT(R)] AMB POC URINALYSIS DIP STICK AUTO W/O MICRO [23104 CPT(R)]  DIABETES FOOT EXAM [7 Custom] Patient Instructions Medicare Part B Preventive Services Limitations Recommendation Scheduled Bone Mass Measurement 
(age 72 & older, biennial) Requires diagnosis related to osteoporosis or estrogen deficiency. Biennial benefit unless patient has history of long-term glucocorticoid tx or baseline is needed because initial test was by other method Cardiovascular Screening Blood Tests (every 5 years) Total cholesterol, HDL, Triglycerides Order as a panel if possible Colorectal Cancer Screening 
-Fecal occult blood test (annual) -Flexible sigmoidoscopy (5y) 
-Screening colonoscopy (10y) -Barium Enema Counseling to Prevent Tobacco Use (up to 8 sessions per year) - Counseling greater than 3 and up to 10 minutes - Counseling greater than 10 minutes Patients must be asymptomatic of tobacco-related conditions to receive as preventive service Diabetes Screening Tests (at least every 3 years, Medicare covers annually or at 6-month intervals for prediabetic patients) Fasting blood sugar (FBS) or glucose tolerance test (GTT) Patient must be diagnosed with one of the following: -Hypertension, Dyslipidemia, obesity, previous impaired FBS or GTT 
Or any two of the following: overweight, FH of diabetes, age ? 72, history of gestational diabetes, birth of baby weighing more than 9 pounds Diabetes Self-Management Training (DSMT) (no USPSTF recommendation) Requires referral by treating physician for patient with diabetes or renal disease. 10 hours of initial DSMT session of no less than 30 minutes each in a continuous 12-month period. 2 hours of follow-up DSMT in subsequent years. Glaucoma Screening (no USPSTF recommendation) Diabetes mellitus, family history, , age 48 or over,  American, age 72 or over Human Immunodeficiency Virus (HIV) Screening (annually for increased risk patients) HIV-1 and HIV-2 by EIA, KALIA, rapid antibody test, or oral mucosa transudate Patient must be at increased risk for HIV infection per USPSTF guidelines or pregnant. Tests covered annually for patients at increased risk. Pregnant patients may receive up to 3 test during pregnancy. Medical Nutrition Therapy (MNT) (for diabetes or renal disease not recommended schedule) Requires referral by treating physician for patient with diabetes or renal disease. Can be provided in same year as diabetes self-management training (DSMT), and CMS recommends medical nutrition therapy take place after DSMT. Up to 3 hours for initial year and 2 hours in subsequent years. Prostate Cancer Screening (annually up to age 76) - Digital rectal exam (ADARSH) - Prostate specific antigen (PSA) Annually (age 48 or over), ADARSH not paid separately when covered E/M service is provided on same date Seasonal Influenza Vaccination (annually) Pneumococcal Vaccination (once after 65) Hepatitis B Vaccinations (if medium/high risk) Medium/high risk factors:  End-stage renal disease, Hemophiliacs who received Factor VIII or IX concentrates, Clients of institutions for the mentally retarded, Persons who live in the same house as a HepB virus carrier, Homosexual men, Illicit injectable drug abusers. Screening Mammography (biennial age 54-69)? Annually (age 36 or over) Screening Pap Tests and Pelvic Examination (up to age 79 and after 79 if unknown history or abnormal study last 10 years) Every 24 months except high risk Ultrasound Screening for Abdominal Aortic Aneurysm (AAA) (once) Patient must be referred through IPPE and not have had a screening for abdominal aortic aneurysm before under Medicare. Limited to patients who meet one of the following criteria: 
- Men who are 73-68 years old and have smoked more than 100 cigarettes in their lifetime. 
-Anyone with a FH of AAA 
-Anyone recommended for screening by USPSTF Family Practice Management 2011 Health Maintenance Due Topic Date Due  Pneumococcal Vaccine (2 of 2 - PPSV23) 07/03/2014 Ivelisse Najera Diabetic Foot Care  05/11/2017 Ivelisse Njaera Annual Well Visit  05/12/2017  Hemoglobin A1C    06/21/2017 Introducing [facility]! Lalitha Humphery introduces Victorious patient portal. Now you can access parts of your medical record, email your doctor's office, and request medication refills online. 1. In your internet browser, go to [email or web address] 2. Click on the First Time User? Click Here link in the Sign In box. You will see the New Member Sign Up page. 3. Enter your Victorious Access Code exactly as it appears below. You will not need to use this code after youve completed the sign-up process. If you do not sign up before the expiration date, you must request a new code. · Victorious Access Code: [email or web address] Expires: 7/11/2017  9:29 PM 
 
4. Enter the last four digits of your Social Security Number (xxxx) and Date of Birth (mm/dd/yyyy) as indicated and click Submit. You will be taken to the next sign-up page. 5. Create a DigitalVision ID. This will be your DigitalVision login ID and cannot be changed, so think of one that is secure and easy to remember. 6. Create a DigitalVision password. You can change your password at any time. 7. Enter your Password Reset Question and Answer. This can be used at a later time if you forget your password. 8. Enter your e-mail address. You will receive e-mail notification when new information is available in [address] 9. Click Sign Up. You can now view and download portions of your medical record. 10. Click the Download Summary menu link to download a portable copy of your medical information. If you have questions, please visit the Frequently Asked Questions section of the DigitalVision website. Remember, DigitalVision is NOT to be used for urgent needs. For medical emergencies, dial 911. Now available from your iPhone and Android! Please provide this summary of care documentation to your next provider. Your primary care clinician is listed as Cristin Russo. If you have any questions after today's visit, please call [phone].

## 2017-07-03 NOTE — PROGRESS NOTES
Chief Complaint   Patient presents with    Bladder Infection     POSSIBLE      Health Maintenance reviewed

## 2017-07-03 NOTE — PROGRESS NOTES
This is a Subsequent Medicare Annual Wellness Visit providing Personalized Prevention Plan Services (PPPS)     I have reviewed the patient's medical history in detail and updated the computerized patient record. History   Natalie Menendez is a 64 y.o. female who presents for dysuria, urgency and frequency for 3 days ago. Was urinating every 30 minutes but then she cut back on her liquid intake because she was driving back from vacation and only urinated every 3 hours. No fever, no kidney pain. She is occasionally checking her blood sugar and feels like this is going well. It has been a while since her last diabetes check    Took the opportunity to complete a wellness visit    Past Medical History:   Diagnosis Date    Atypical chest pain     Long h/o intermittent non-cardiac CP.  Depression with anxiety     Diabetes ([address]     GERD (gastroesophageal reflux disease)     Hypercholesteremia     Hyperlipidemia 7/3/2013    Hypertension     Inner ear dysfunction     S/P aortic valve replacement     Dr. Fadi Reina yearly    Vitamin D deficiency       Past Surgical History:   Procedure Laterality Date    HX AORTIC VALVE REPLACEMENT  1999    Bovine valve    HX CATARACT REMOVAL      HX CHOLECYSTECTOMY  1999    HX MOHS PROCEDURES Left 2014     Current Outpatient Prescriptions   Medication Sig Dispense Refill    nitrofurantoin, macrocrystal-monohydrate, (MACROBID) 100 mg capsule Take 1 Cap by mouth two (2) times a day for 7 days. 14 Cap 0    metoprolol succinate (TOPROL-XL) 25 mg XL tablet Take 1 Tab by mouth daily. 90 Tab 3    clopidogrel (PLAVIX) 75 mg tab TAKE ONE TABLET BY MOUTH ONCE DAILY 90 Tab 3    glimepiride (AMARYL) 1 mg tablet TAKE ONE TABLET BY MOUTH IN THE MORNING 90 Tab 3    metFORMIN (GLUCOPHAGE) 500 mg tablet TAKE TWO TABLETS BY MOUTH DAILY WITH BREAKFAST 180 Tab 3    diclofenac EC (VOLTAREN) 75 mg EC tablet Take 1 Tab by mouth two (2) times a day.  20 Tab 0    lovastatin (MEVACOR) 10 mg tablet TAKE ONE TABLET BY MOUTH NIGHTLY 90 Tab 3    lisinopril (PRINIVIL, ZESTRIL) 5 mg tablet TAKE ONE TABLET BY MOUTH ONCE DAILY 90 Tab 4    PARoxetine (PAXIL) 30 mg tablet Take 1 Tab by mouth daily. 90 Tab 4    cholecalciferol (VITAMIN D3) 1,000 unit tablet Take 1,000 Units by mouth daily.  MAGNESIUM PO Take 1 Tab by mouth daily.  calcium-cholecalciferol, d3, 600-125 mg-unit tab Take 1 Tab by mouth daily.  meclizine (ANTIVERT) 25 mg tablet Take 1 Tab by mouth three (3) times daily as needed for Dizziness. 15 Tab 0    glucose blood VI test strips (ASCENSIA AUTODISC VI, ONE TOUCH ULTRA TEST VI) strip Test daily. Diagnosis 250.00 3 Package 3    Lancets misc Test twice daily. Diagnosis 250.00 3 Package 3    vitamin c-vitamin e (CRANBERRY CONCENTRATE) cap Take 1 Cap by mouth daily.  cyanocobalamin (VITAMIN B-12) 1,000 mcg tablet Take 1,000 mcg by mouth daily.  aspirin delayed-release 325 mg tablet Take 1 Tab by mouth daily. 90 Tab 1    PV W-O EDU/FERROUS FUMARATE/FA (M-VIT PO) Take 1 tablet by mouth daily.  omega-3 fatty acids-vitamin e (FISH OIL) 1,000 mg Cap Take 1 capsule by mouth daily.        Allergies   Allergen Reactions    Naldecon Unknown (comments)    Sulfa (Sulfonamide Antibiotics) Rash     Family History   Problem Relation Age of Onset    Heart Disease Mother     Heart Failure Father     Heart Failure Sister     Stroke Sister     Diabetes Brother     Heart Disease Brother      Social History   Substance Use Topics    Smoking status: Former Smoker     Quit date: 8/27/1999    Smokeless tobacco: Never Used    Alcohol use No     Patient Active Problem List   Diagnosis Code    Diabetes type 2, controlled ([facility] [address] E11.9    Essential hypertension, benign I10    Palpitations R00.2    Vitamin D deficiency E55.9    S/P aortic valve replacement Z95.2    Hyperlipidemia E78.5    Depression with anxiety F41.8    Advanced care planning/counseling discussion Z71.89 Depression Risk Factor Screening:     PHQ over the last two weeks 3/3/2017   Little interest or pleasure in doing things Not at all   Feeling down, depressed or hopeless Not at all   Total Score PHQ 2 0   Trouble falling or staying asleep, or sleeping too much -   Feeling tired or having little energy -   Poor appetite or overeating -   Feeling bad about yourself - or that you are a failure or have let yourself or your family down -   Trouble concentrating on things such as school, work, reading or watching TV -   Moving or speaking so slowly that other people could have noticed; or the opposite being so fidgety that others notice -   Thoughts of being better off dead, or hurting yourself in some way -   PHQ 9 Score -   How difficult have these problems made it for you to do your work, take care of your home and get along with others -     Alcohol Risk Factor Screening: On any occasion during the past 3 months, have you had more than 3 drinks containing alcohol? No    Do you average more than 7 drinks per week? No      Functional Ability and Level of Safety:     Hearing Loss   none    Activities of Daily Living   Self-care. Requires assistance with: no ADLs    Fall Risk     Fall Risk Assessment, last 12 mths 5/10/2017   Able to walk? Yes   Fall in past 12 months? No   Fall with injury? -   Number of falls in past 12 months -   Fall Risk Score -     Abuse Screen   Patient is not abused    Review of Systems   ROS:  As listed in HPI.  In addition:  Constitutional:   No headache, fever, fatigue, weight loss or weight gain      Eyes:   No redness, pruritis, pain, visual changes, swelling, or discharge      Ears:    No pain, loss or changes in hearing     Cardiac:    No chest pain      Resp:   No cough or shortness of breath      Neuro   No loss of consciousness, dizziness, seizure    Physical Examination     Evaluation of Cognitive Function:  Mood/affect:  happy  Appearance: age appropriate  Family member/caregiver input: Not available    Physical Exam:  Blood pressure 110/68, pulse 76, temperature 98.3 °F (36.8 °C), temperature source Oral, resp. rate 16, height 5' 2\" (1.575 m), weight 210 lb (95.3 kg), SpO2 97 %. GEN: No apparent distress. Alert and oriented and responds to all questions appropriately. LUNGS: Respirations unlabored; clear to auscultation bilaterally  CARDIOVASCULAR: Regular, rate, and rhythm without murmurs   ABDOMEN: Soft; nontender; nondistended; normoactive bowel sounds; no masses or organomegaly  NEUROLOGIC:  No focal neurologic deficits. Strength and sensation grossly intact. Coordination and gait grossly intact. Diabetic foot exam, intact microfiber    Patient Care Team:  Arianna Proctor MD as PCP - General (Internal Medicine)  Darcy Baldwin MD (Cardiology)    Advice/Referrals/Counseling   Education and counseling provided:    Looks like she is due for pneumonia 13    Health maintenance is otherwise up-to-date    Assessment/Plan     Lab:  UA: 3+ leuks and positive nitrates     UTI  History of several UTIs, some resistant bugs. All of them seem to be susceptible to Macrobid though so will start with this  Send urine for culture      Diabetes  A1c  Foot exam is remarkable for callused feet and raynaud phenomenon. But sensation is intact microfiber    Follow-up 6 months      ICD-10-CM ICD-9-CM    1. Acute cystitis without hematuria N30.00 595.0 AMB POC URINALYSIS DIP STICK AUTO W/O MICRO      nitrofurantoin, macrocrystal-monohydrate, (MACROBID) 100 mg capsule   2. Controlled type 2 diabetes mellitus with complication, without long-term current use of insulin ([facility]) E11.8 250.90 AMB POC HEMOGLOBIN A1C       DIABETES FOOT EXAM   3.  Routine general medical examination at a health care facility Z00.00 V70.0

## 2017-07-03 NOTE — PATIENT INSTRUCTIONS
Medicare Part B Preventive Services Limitations Recommendation Scheduled   Bone Mass Measurement  (age 72 & older, biennial) Requires diagnosis related to osteoporosis or estrogen deficiency. Biennial benefit unless patient has history of long-term glucocorticoid tx or baseline is needed because initial test was by other method     Cardiovascular Screening Blood Tests (every 5 years)  Total cholesterol, HDL, Triglycerides Order as a panel if possible     Colorectal Cancer Screening  -Fecal occult blood test (annual)  -Flexible sigmoidoscopy (5y)  -Screening colonoscopy (10y)  -Barium Enema      Counseling to Prevent Tobacco Use (up to 8 sessions per year)  - Counseling greater than 3 and up to 10 minutes  - Counseling greater than 10 minutes Patients must be asymptomatic of tobacco-related conditions to receive as preventive service     Diabetes Screening Tests (at least every 3 years, Medicare covers annually or at 6-month intervals for prediabetic patients)    Fasting blood sugar (FBS) or glucose tolerance test (GTT) Patient must be diagnosed with one of the following:  -Hypertension, Dyslipidemia, obesity, previous impaired FBS or GTT  Or any two of the following: overweight, FH of diabetes, age ? 72, history of gestational diabetes, birth of baby weighing more than 9 pounds     Diabetes Self-Management Training (DSMT) (no USPSTF recommendation) Requires referral by treating physician for patient with diabetes or renal disease. 10 hours of initial DSMT session of no less than 30 minutes each in a continuous 12-month period. 2 hours of follow-up DSMT in subsequent years.      Glaucoma Screening (no USPSTF recommendation) Diabetes mellitus, family history, , age 48 or over,  American, age 72 or over     Human Immunodeficiency Virus (HIV) Screening (annually for increased risk patients)  HIV-1 and HIV-2 by EIA, KALIA, rapid antibody test, or oral mucosa transudate Patient must be at increased risk for HIV infection per USPSTF guidelines or pregnant. Tests covered annually for patients at increased risk. Pregnant patients may receive up to 3 test during pregnancy. Medical Nutrition Therapy (MNT) (for diabetes or renal disease not recommended schedule) Requires referral by treating physician for patient with diabetes or renal disease. Can be provided in same year as diabetes self-management training (DSMT), and CMS recommends medical nutrition therapy take place after DSMT. Up to 3 hours for initial year and 2 hours in subsequent years. Prostate Cancer Screening (annually up to age 76)  - Digital rectal exam (ADARSH)  - Prostate specific antigen (PSA) Annually (age 48 or over), ADARSH not paid separately when covered E/M service is provided on same date     Seasonal Influenza Vaccination (annually)      Pneumococcal Vaccination (once after 72)      Hepatitis B Vaccinations (if medium/high risk) Medium/high risk factors:  End-stage renal disease,  Hemophiliacs who received Factor VIII or IX concentrates, Clients of institutions for the mentally retarded, Persons who live in the same house as a HepB virus carrier, Homosexual men, Illicit injectable drug abusers. Screening Mammography (biennial age 54-69)? Annually (age 36 or over)     Screening Pap Tests and Pelvic Examination (up to age 79 and after 79 if unknown history or abnormal study last 10 years) Every 25 months except high risk     Ultrasound Screening for Abdominal Aortic Aneurysm (AAA) (once) Patient must be referred through IPPE and not have had a screening for abdominal aortic aneurysm before under Medicare.   Limited to patients who meet one of the following criteria:  - Men who are 73-68 years old and have smoked more than 100 cigarettes in their lifetime.  -Anyone with a FH of AAA  -Anyone recommended for screening by USPSTF     Family Practice Management 2011    Health Maintenance Due   Topic Date Due    Pneumococcal Vaccine (2 of 2 - PPSV23) 07/03/2014    Diabetic Foot Care  05/11/2017    Annual Well Visit  05/12/2017    Hemoglobin A1C    06/21/2017

## 2017-07-26 ENCOUNTER — TELEPHONE (OUTPATIENT)
Dept: FAMILY MEDICINE CLINIC | Age: 77
End: 2017-07-26

## 2017-07-26 RX ORDER — CLOPIDOGREL BISULFATE 75 MG/1
75 TABLET ORAL DAILY
Qty: 90 TAB | Refills: 3 | Status: SHIPPED | OUTPATIENT
Start: 2017-07-26 | End: 2017-10-31 | Stop reason: SDUPTHER

## 2017-07-27 NOTE — TELEPHONE ENCOUNTER
Spoke with pharmacy. She states that pt has requested a script for [place] 3. Pharmacy will fax over a request for refill for Dr. Brielle Borjas to review.

## 2017-07-29 RX ORDER — PAROXETINE 30 MG/1
TABLET, FILM COATED ORAL
Qty: 90 TAB | Refills: 3 | Status: SHIPPED | OUTPATIENT
Start: 2017-07-29 | End: 2017-08-15 | Stop reason: SDUPTHER

## 2017-07-31 ENCOUNTER — TELEPHONE (OUTPATIENT)
Dept: FAMILY MEDICINE CLINIC | Age: 77
End: 2017-07-31

## 2017-08-01 ENCOUNTER — TELEPHONE (OUTPATIENT)
Dept: FAMILY MEDICINE CLINIC | Age: 77
End: 2017-08-01

## 2017-08-01 NOTE — TELEPHONE ENCOUNTER
Spoke with [address] [place] [address] and advised them to cancel the request for this medication as patient does not want it and Dr. Juve Kay states she doesn't need it either.

## 2017-08-01 NOTE — TELEPHONE ENCOUNTER
Another message left for pt to return my call. Per Dr. Betzaida Solomon she has not seen anything and believes that this may not be a true request from pt.

## 2017-08-06 RX ORDER — LISINOPRIL 5 MG/1
TABLET ORAL
Qty: 90 TAB | Refills: 3 | Status: SHIPPED | OUTPATIENT
Start: 2017-08-06 | End: 2017-08-15 | Stop reason: SDUPTHER

## 2017-08-11 ENCOUNTER — TELEPHONE (OUTPATIENT)
Dept: FAMILY MEDICINE CLINIC | Age: 77
End: 2017-08-11

## 2017-08-15 RX ORDER — LOVASTATIN 10 MG/1
TABLET ORAL
Qty: 90 TAB | Refills: 3 | Status: SHIPPED | OUTPATIENT
Start: 2017-08-15 | End: 2017-08-29 | Stop reason: SDUPTHER

## 2017-08-15 RX ORDER — LISINOPRIL 5 MG/1
TABLET ORAL
Qty: 90 TAB | Refills: 3 | Status: SHIPPED | OUTPATIENT
Start: 2017-08-15 | End: 2017-08-29 | Stop reason: SDUPTHER

## 2017-08-15 RX ORDER — PAROXETINE 30 MG/1
TABLET, FILM COATED ORAL
Qty: 90 TAB | Refills: 3 | Status: SHIPPED | OUTPATIENT
Start: 2017-08-15 | End: 2017-08-29 | Stop reason: SDUPTHER

## 2017-08-24 RX ORDER — LOVASTATIN 10 MG/1
TABLET ORAL
Qty: 90 TAB | Refills: 3 | Status: CANCELLED | OUTPATIENT
Start: 2017-08-24

## 2017-08-29 RX ORDER — LISINOPRIL 5 MG/1
TABLET ORAL
Qty: 90 TAB | Refills: 3 | Status: SHIPPED | OUTPATIENT
Start: 2017-08-29 | End: 2018-03-14 | Stop reason: SDUPTHER

## 2017-08-29 RX ORDER — PAROXETINE 30 MG/1
TABLET, FILM COATED ORAL
Qty: 90 TAB | Refills: 3 | Status: SHIPPED | OUTPATIENT
Start: 2017-08-29 | End: 2018-03-14 | Stop reason: SDUPTHER

## 2017-08-29 RX ORDER — LOVASTATIN 10 MG/1
TABLET ORAL
Qty: 90 TAB | Refills: 3 | Status: SHIPPED | OUTPATIENT
Start: 2017-08-29 | End: 2018-03-14 | Stop reason: SDUPTHER

## 2017-09-12 ENCOUNTER — APPOINTMENT (OUTPATIENT)
Dept: CT IMAGING | Age: 77
End: 2017-09-12
Attending: EMERGENCY MEDICINE
Payer: MEDICARE

## 2017-09-12 ENCOUNTER — HOSPITAL ENCOUNTER (EMERGENCY)
Age: 77
Discharge: HOME | End: 2017-09-12
Attending: EMERGENCY MEDICINE
Payer: MEDICARE

## 2017-09-12 ENCOUNTER — APPOINTMENT (OUTPATIENT)
Dept: GENERAL RADIOLOGY | Age: 77
End: 2017-09-12
Payer: MEDICARE

## 2017-09-12 VITALS
HEART RATE: 62 BPM | HEIGHT: 62 IN | DIASTOLIC BLOOD PRESSURE: 53 MMHG | TEMPERATURE: 97.5 F | SYSTOLIC BLOOD PRESSURE: 137 MMHG | OXYGEN SATURATION: 98 % | WEIGHT: 213.63 LBS | BODY MASS INDEX: 39.31 KG/M2 | RESPIRATION RATE: 14 BRPM

## 2017-09-12 DIAGNOSIS — R07.9 CHEST PAIN, UNSPECIFIED TYPE: Primary | ICD-10-CM

## 2017-09-12 DIAGNOSIS — N39.0 URINARY TRACT INFECTION WITHOUT HEMATURIA, SITE UNSPECIFIED: ICD-10-CM

## 2017-09-12 LAB
ALBUMIN SERPL-MCNC: 3.8 G/DL (ref 3.5–5)
ALBUMIN/GLOB SERPL: 1.1 {RATIO} (ref 1.1–2.2)
ALP SERPL-CCNC: 60 U/L (ref 45–117)
ALT SERPL-CCNC: 28 U/L (ref 12–78)
ANION GAP SERPL CALC-SCNC: 7 MMOL/L (ref 5–15)
APPEARANCE UR: CLEAR
AST SERPL-CCNC: 23 U/L (ref 15–37)
ATRIAL RATE: 54 BPM
BACTERIA URNS QL MICRO: NEGATIVE /HPF
BASOPHILS # BLD: 0 K/UL (ref 0–0.1)
BASOPHILS NFR BLD: 0 % (ref 0–1)
BILIRUB SERPL-MCNC: 0.3 MG/DL (ref 0.2–1)
BILIRUB UR QL: NEGATIVE
BNP SERPL-MCNC: 374 PG/ML (ref 0–450)
BUN SERPL-MCNC: 16 MG/DL (ref 6–20)
BUN/CREAT SERPL: 25 (ref 12–20)
CALCIUM SERPL-MCNC: 8.7 MG/DL (ref 8.5–10.1)
CALCULATED P AXIS, ECG09: 49 DEGREES
CALCULATED R AXIS, ECG10: -3 DEGREES
CALCULATED T AXIS, ECG11: 74 DEGREES
CHLORIDE SERPL-SCNC: 105 MMOL/L (ref 97–108)
CK SERPL-CCNC: 180 U/L (ref 26–192)
CO2 SERPL-SCNC: 27 MMOL/L (ref 21–32)
COLOR UR: ABNORMAL
CREAT SERPL-MCNC: 0.64 MG/DL (ref 0.55–1.02)
DIAGNOSIS, 93000: NORMAL
EOSINOPHIL # BLD: 0.2 K/UL (ref 0–0.4)
EOSINOPHIL NFR BLD: 4 % (ref 0–7)
EPITH CASTS URNS QL MICRO: ABNORMAL /LPF
ERYTHROCYTE [DISTWIDTH] IN BLOOD BY AUTOMATED COUNT: 13.4 % (ref 11.5–14.5)
GLOBULIN SER CALC-MCNC: 3.4 G/DL (ref 2–4)
GLUCOSE BLD STRIP.AUTO-MCNC: 82 MG/DL (ref 65–100)
GLUCOSE SERPL-MCNC: 106 MG/DL (ref 65–100)
GLUCOSE UR STRIP.AUTO-MCNC: NEGATIVE MG/DL
HCT VFR BLD AUTO: 38 % (ref 35–47)
HGB BLD-MCNC: 12.2 G/DL (ref 11.5–16)
HGB UR QL STRIP: NEGATIVE
HYALINE CASTS URNS QL MICRO: ABNORMAL /LPF (ref 0–5)
INR PPP: 1 (ref 0.9–1.1)
KETONES UR QL STRIP.AUTO: NEGATIVE MG/DL
LEUKOCYTE ESTERASE UR QL STRIP.AUTO: ABNORMAL
LYMPHOCYTES # BLD: 2.3 K/UL (ref 0.8–3.5)
LYMPHOCYTES NFR BLD: 36 % (ref 12–49)
MAGNESIUM SERPL-MCNC: 2 MG/DL (ref 1.6–2.4)
MCH RBC QN AUTO: 28 PG (ref 26–34)
MCHC RBC AUTO-ENTMCNC: 32.1 G/DL (ref 30–36.5)
MCV RBC AUTO: 87.4 FL (ref 80–99)
MONOCYTES # BLD: 0.6 K/UL (ref 0–1)
MONOCYTES NFR BLD: 10 % (ref 5–13)
NEUTS SEG # BLD: 3.3 K/UL (ref 1.8–8)
NEUTS SEG NFR BLD: 50 % (ref 32–75)
NITRITE UR QL STRIP.AUTO: POSITIVE
P-R INTERVAL, ECG05: 186 MS
PH UR STRIP: 5.5 [PH] (ref 5–8)
PLATELET # BLD AUTO: 188 K/UL (ref 150–400)
POTASSIUM SERPL-SCNC: 4.3 MMOL/L (ref 3.5–5.1)
PROT SERPL-MCNC: 7.2 G/DL (ref 6.4–8.2)
PROT UR STRIP-MCNC: NEGATIVE MG/DL
PROTHROMBIN TIME: 10 SEC (ref 9–11.1)
Q-T INTERVAL, ECG07: 442 MS
QRS DURATION, ECG06: 72 MS
QTC CALCULATION (BEZET), ECG08: 419 MS
RBC # BLD AUTO: 4.35 M/UL (ref 3.8–5.2)
RBC #/AREA URNS HPF: ABNORMAL /HPF (ref 0–5)
SERVICE CMNT-IMP: NORMAL
SODIUM SERPL-SCNC: 139 MMOL/L (ref 136–145)
SP GR UR REFRACTOMETRY: 1.01 (ref 1–1.03)
TROPONIN I SERPL-MCNC: <0.04 NG/ML
UA: UC IF INDICATED,UAUC: ABNORMAL
UROBILINOGEN UR QL STRIP.AUTO: 0.2 EU/DL (ref 0.2–1)
VENTRICULAR RATE, ECG03: 54 BPM
WBC # BLD AUTO: 6.5 K/UL (ref 3.6–11)
WBC URNS QL MICRO: ABNORMAL /HPF (ref 0–4)

## 2017-09-12 PROCEDURE — 83880 ASSAY OF NATRIURETIC PEPTIDE: CPT | Performed by: EMERGENCY MEDICINE

## 2017-09-12 PROCEDURE — 74011250636 HC RX REV CODE- 250/636: Performed by: EMERGENCY MEDICINE

## 2017-09-12 PROCEDURE — 83735 ASSAY OF MAGNESIUM: CPT | Performed by: EMERGENCY MEDICINE

## 2017-09-12 PROCEDURE — 96374 THER/PROPH/DIAG INJ IV PUSH: CPT

## 2017-09-12 PROCEDURE — 96375 TX/PRO/DX INJ NEW DRUG ADDON: CPT

## 2017-09-12 PROCEDURE — 93005 ELECTROCARDIOGRAM TRACING: CPT

## 2017-09-12 PROCEDURE — 36415 COLL VENOUS BLD VENIPUNCTURE: CPT | Performed by: EMERGENCY MEDICINE

## 2017-09-12 PROCEDURE — 85610 PROTHROMBIN TIME: CPT | Performed by: EMERGENCY MEDICINE

## 2017-09-12 PROCEDURE — 99285 EMERGENCY DEPT VISIT HI MDM: CPT

## 2017-09-12 PROCEDURE — 80053 COMPREHEN METABOLIC PANEL: CPT | Performed by: EMERGENCY MEDICINE

## 2017-09-12 PROCEDURE — 96361 HYDRATE IV INFUSION ADD-ON: CPT

## 2017-09-12 PROCEDURE — 84484 ASSAY OF TROPONIN QUANT: CPT | Performed by: EMERGENCY MEDICINE

## 2017-09-12 PROCEDURE — 70450 CT HEAD/BRAIN W/O DYE: CPT

## 2017-09-12 PROCEDURE — 81001 URINALYSIS AUTO W/SCOPE: CPT | Performed by: EMERGENCY MEDICINE

## 2017-09-12 PROCEDURE — 74011636320 HC RX REV CODE- 636/320: Performed by: EMERGENCY MEDICINE

## 2017-09-12 PROCEDURE — 71020 XR CHEST PA LAT: CPT

## 2017-09-12 PROCEDURE — 71275 CT ANGIOGRAPHY CHEST: CPT

## 2017-09-12 PROCEDURE — 82962 GLUCOSE BLOOD TEST: CPT

## 2017-09-12 PROCEDURE — 82550 ASSAY OF CK (CPK): CPT | Performed by: EMERGENCY MEDICINE

## 2017-09-12 PROCEDURE — 85025 COMPLETE CBC W/AUTO DIFF WBC: CPT | Performed by: EMERGENCY MEDICINE

## 2017-09-12 RX ORDER — ASPIRIN 325 MG
325 TABLET ORAL
Status: DISCONTINUED | OUTPATIENT
Start: 2017-09-12 | End: 2017-09-12

## 2017-09-12 RX ORDER — SODIUM CHLORIDE 9 MG/ML
50 INJECTION, SOLUTION INTRAVENOUS
Status: COMPLETED | OUTPATIENT
Start: 2017-09-12 | End: 2017-09-12

## 2017-09-12 RX ORDER — LORAZEPAM 2 MG/ML
2 INJECTION INTRAMUSCULAR ONCE
Status: COMPLETED | OUTPATIENT
Start: 2017-09-12 | End: 2017-09-12

## 2017-09-12 RX ORDER — LORAZEPAM 1 MG/1
2 TABLET ORAL
Status: DISCONTINUED | OUTPATIENT
Start: 2017-09-12 | End: 2017-09-12

## 2017-09-12 RX ORDER — MORPHINE SULFATE 2 MG/ML
4 INJECTION, SOLUTION INTRAMUSCULAR; INTRAVENOUS
Status: COMPLETED | OUTPATIENT
Start: 2017-09-12 | End: 2017-09-12

## 2017-09-12 RX ORDER — CEPHALEXIN 500 MG/1
500 CAPSULE ORAL 2 TIMES DAILY
Qty: 14 CAP | Refills: 0 | Status: SHIPPED | OUTPATIENT
Start: 2017-09-12 | End: 2017-09-12

## 2017-09-12 RX ORDER — CEPHALEXIN 500 MG/1
500 CAPSULE ORAL 2 TIMES DAILY
Qty: 14 CAP | Refills: 0 | Status: SHIPPED | OUTPATIENT
Start: 2017-09-12 | End: 2017-09-19

## 2017-09-12 RX ORDER — METOCLOPRAMIDE HYDROCHLORIDE 5 MG/ML
10 INJECTION INTRAMUSCULAR; INTRAVENOUS
Status: COMPLETED | OUTPATIENT
Start: 2017-09-12 | End: 2017-09-12

## 2017-09-12 RX ORDER — SODIUM CHLORIDE 0.9 % (FLUSH) 0.9 %
10 SYRINGE (ML) INJECTION
Status: COMPLETED | OUTPATIENT
Start: 2017-09-12 | End: 2017-09-12

## 2017-09-12 RX ADMIN — SODIUM CHLORIDE 50 ML/HR: 900 INJECTION, SOLUTION INTRAVENOUS at 15:51

## 2017-09-12 RX ADMIN — METOCLOPRAMIDE 10 MG: 5 INJECTION, SOLUTION INTRAMUSCULAR; INTRAVENOUS at 12:19

## 2017-09-12 RX ADMIN — LORAZEPAM 2 MG: 2 INJECTION INTRAMUSCULAR; INTRAVENOUS at 15:12

## 2017-09-12 RX ADMIN — MORPHINE SULFATE 4 MG: 2 INJECTION, SOLUTION INTRAMUSCULAR; INTRAVENOUS at 12:21

## 2017-09-12 RX ADMIN — Medication 10 ML: at 15:51

## 2017-09-12 RX ADMIN — IOPAMIDOL 100 ML: 755 INJECTION, SOLUTION INTRAVENOUS at 15:51

## 2017-09-12 RX ADMIN — SODIUM CHLORIDE 500 ML: 900 INJECTION, SOLUTION INTRAVENOUS at 17:06

## 2017-09-12 NOTE — ED NOTES
Patient reports she took a 325mg ASA this morning. Dr. Nikolay Koch notified and verbal orders given to discontinue ordered ASA.

## 2017-09-12 NOTE — ED NOTES
Dr. Razia Lee reviewed discharge instructions with the patient. The patient verbalized understanding. All questions and concerns were addressed. The patient is discharged via wheelchair in the care of family members with instructions and prescriptions in hand. Pt is alert and oriented x 4. Respirations are clear and unlabored.

## 2017-09-12 NOTE — ED PROVIDER NOTES
HPI Comments: Michelle Miller is a 64 y.o. female with PMhx significant for HTN, GERD and PShx for aortic valve replacement who presents ambulatory with her daughter to the ED with cc of a moderate posterior headache which has migrated into her neck with chest pain that began at 7:30 AM. Pt states that she began experiencing left sided chest pain before her headache onset. The chest pain lasted for approximately 30 minutes and is resolved in the ED. Pt described the chest pain as \"ripping pain\". She still has a current headache. She typically takes Tylenol/ASA for headaches. In the ED, pt's pressure is 173/81. Social Hx: former Tobacco, 0 EtOH, - Illicit Drugs    PCP: James Pop MD    There are no other complaints, changes or physical findings at this time. The history is provided by the patient. No  was used. Past Medical History:   Diagnosis Date    Atypical chest pain     Long h/o intermittent non-cardiac CP.  Depression with anxiety     Diabetes ([address]     GERD (gastroesophageal reflux disease)     Hypercholesteremia     Hyperlipidemia 7/3/2013    Hypertension     Inner ear dysfunction     S/P aortic valve replacement     Dr. Bc Escobar yearly    Vitamin D deficiency      Past Surgical History:   Procedure Laterality Date    HX AORTIC VALVE REPLACEMENT  1999    Bovine valve    HX CATARACT REMOVAL      HX CHOLECYSTECTOMY  1999    HX MOHS PROCEDURES Left 2014     Family History:   Problem Relation Age of Onset    Heart Disease Mother     Heart Failure Father     Heart Failure Sister     Stroke Sister     Diabetes Brother     Heart Disease Brother      Social History     Social History    Marital status:      Spouse name: N/A    Number of children: N/A    Years of education: N/A     Occupational History    Not on file.      Social History Main Topics    Smoking status: Former Smoker     Quit date: 8/27/1999    Smokeless tobacco: Never Used    Alcohol use No    Drug use: No    Sexual activity: Yes     Partners: Male     Other Topics Concern    Not on file     Social History Narrative     ALLERGIES: Naldecon and Sulfa (sulfonamide antibiotics)    Review of Systems   Constitutional: Negative for chills and fever. HENT: Negative for congestion and sore throat. Eyes: Negative for visual disturbance. Respiratory: Negative for cough and shortness of breath. Cardiovascular: Positive for chest pain (resolved). Negative for leg swelling. Gastrointestinal: Negative for abdominal pain, blood in stool, diarrhea and nausea. Endocrine: Negative for polyuria. Genitourinary: Negative for dysuria, flank pain, vaginal bleeding and vaginal discharge. Musculoskeletal: Positive for neck pain. Negative for myalgias. Skin: Negative for rash. Allergic/Immunologic: Negative for immunocompromised state. Neurological: Positive for headaches. Negative for weakness. Psychiatric/Behavioral: Negative for dysphoric mood. Vitals:    09/12/17 1515 09/12/17 1603 09/12/17 1605 09/12/17 1630   BP: 145/60 156/56  163/58   Pulse: 62  79 83   Resp: 16  10 17   Temp:       SpO2: 97%  98% 95%   Weight:       Height:              Physical Exam   Constitutional: She is oriented to person, place, and time. She appears well-developed and well-nourished. HENT:   Head: Normocephalic and atraumatic. Moist mucous membranes   Eyes: Conjunctivae are normal. Pupils are equal, round, and reactive to light. Right eye exhibits no discharge. Left eye exhibits no discharge. Neck: Normal range of motion. Neck supple. No tracheal deviation present. Cardiovascular: Normal rate and regular rhythm. Murmur heard. Systolic murmur is present with a grade of 3/6   Pulmonary/Chest: Effort normal and breath sounds normal. No respiratory distress. She has no wheezes. She has no rales. Abdominal: Soft. Bowel sounds are normal. There is no tenderness.  There is no rebound and no guarding. Musculoskeletal: Normal range of motion. She exhibits no edema, tenderness or deformity. No swelling in bilateral legs   Neurological: She is alert and oriented to person, place, and time. Skin: Skin is warm and dry. No rash noted. No erythema. Psychiatric: Her behavior is normal.   Nursing note and vitals reviewed. MDM  Number of Diagnoses or Management Options  Chest pain, unspecified type:   Urinary tract infection without hematuria, site unspecified:   Diagnosis management comments: DDx: ACS, vertebral dissection, intracranial bleeding, musculoskeletal neck and back pain, complex migraine, doubt PE       Amount and/or Complexity of Data Reviewed  Clinical lab tests: ordered and reviewed  Tests in the radiology section of CPT®: ordered and reviewed  Tests in the medicine section of CPT®: ordered and reviewed  Review and summarize past medical records: yes  Independent visualization of images, tracings, or specimens: yes    Patient Progress  Patient progress: stable    ED Course     Procedures     PROGRESS NOTE:  1:50 PM  Pt is refusing CT scan. Was too anxious, will give Benzo and attempt again. Want to rule out dissection given patient's chest pain/neck pain and headache. Can disposition to home if negative. EKG interpretation: 10:33  Rhythm: sinus bradycardia; and regular . Rate (approx.): 54; Axis: normal; NE interval: normal; QRS interval: 72 ms; QT/QTc: 442/419 ms; ST/T wave: nonspecific T wave fluttering; Other findings: no acute ischemic changes.     LABORATORY TESTS:  Recent Results (from the past 12 hour(s))   EKG, 12 LEAD, INITIAL    Collection Time: 09/12/17 10:33 AM   Result Value Ref Range    Ventricular Rate 54 BPM    Atrial Rate 54 BPM    P-R Interval 186 ms    QRS Duration 72 ms    Q-T Interval 442 ms    QTC Calculation (Bezet) 419 ms    Calculated P Axis 49 degrees    Calculated R Axis -3 degrees    Calculated T Axis 74 degrees    Diagnosis       Sinus bradycardia  Minimal voltage criteria for LVH, may be normal variant  Septal infarct , age undetermined  Abnormal ECG  When compared with ECG of 26-JAN-2015 23:06,  Vent. rate has decreased BY  52 BPM  Septal infarct is now present  T wave inversion no longer evident in Lateral leads     CBC WITH AUTOMATED DIFF    Collection Time: 09/12/17 11:51 AM   Result Value Ref Range    WBC 6.5 3.6 - 11.0 K/uL    RBC 4.35 3.80 - 5.20 M/uL    HGB 12.2 11.5 - 16.0 g/dL    HCT 38.0 35.0 - 47.0 %    MCV 87.4 80.0 - 99.0 FL    MCH 28.0 26.0 - 34.0 PG    MCHC 32.1 30.0 - 36.5 g/dL    RDW 13.4 11.5 - 14.5 %    PLATELET 547 208 - 191 K/uL    NEUTROPHILS 50 32 - 75 %    LYMPHOCYTES 36 12 - 49 %    MONOCYTES 10 5 - 13 %    EOSINOPHILS 4 0 - 7 %    BASOPHILS 0 0 - 1 %    ABS. NEUTROPHILS 3.3 1.8 - 8.0 K/UL    ABS. LYMPHOCYTES 2.3 0.8 - 3.5 K/UL    ABS. MONOCYTES 0.6 0.0 - 1.0 K/UL    ABS. EOSINOPHILS 0.2 0.0 - 0.4 K/UL    ABS. BASOPHILS 0.0 0.0 - 0.1 K/UL   METABOLIC PANEL, COMPREHENSIVE    Collection Time: 09/12/17 11:51 AM   Result Value Ref Range    Sodium 139 136 - 145 mmol/L    Potassium 4.3 3.5 - 5.1 mmol/L    Chloride 105 97 - 108 mmol/L    CO2 27 21 - 32 mmol/L    Anion gap 7 5 - 15 mmol/L    Glucose 106 (H) 65 - 100 mg/dL    BUN 16 6 - 20 MG/DL    Creatinine 0.64 0.55 - 1.02 MG/DL    BUN/Creatinine ratio 25 (H) 12 - 20      GFR est AA >60 >60 ml/min/1.73m2    GFR est non-AA >60 >60 ml/min/1.73m2    Calcium 8.7 8.5 - 10.1 MG/DL    Bilirubin, total 0.3 0.2 - 1.0 MG/DL    ALT (SGPT) 28 12 - 78 U/L    AST (SGOT) 23 15 - 37 U/L    Alk.  phosphatase 60 45 - 117 U/L    Protein, total 7.2 6.4 - 8.2 g/dL    Albumin 3.8 3.5 - 5.0 g/dL    Globulin 3.4 2.0 - 4.0 g/dL    A-G Ratio 1.1 1.1 - 2.2     TROPONIN I    Collection Time: 09/12/17 11:51 AM   Result Value Ref Range    Troponin-I, Qt. <0.04 <0.05 ng/mL   PROTHROMBIN TIME + INR    Collection Time: 09/12/17 11:51 AM   Result Value Ref Range    INR 1.0 0.9 - 1.1      Prothrombin time 10.0 9.0 - 11.1 sec   MAGNESIUM    Collection Time: 09/12/17 11:51 AM   Result Value Ref Range    Magnesium 2.0 1.6 - 2.4 mg/dL   CK W/ REFLX CKMB    Collection Time: 09/12/17 11:51 AM   Result Value Ref Range     26 - 192 U/L   NT-PRO BNP    Collection Time: 09/12/17 11:51 AM   Result Value Ref Range    NT pro- 0 - 450 PG/ML   URINALYSIS W/ REFLEX CULTURE    Collection Time: 09/12/17  2:05 PM   Result Value Ref Range    Color ORANGE      Appearance CLEAR CLEAR      Specific gravity 1.015 1.003 - 1.030      pH (UA) 5.5 5.0 - 8.0      Protein NEGATIVE  NEG mg/dL    Glucose NEGATIVE  NEG mg/dL    Ketone NEGATIVE  NEG mg/dL    Bilirubin NEGATIVE  NEG      Blood NEGATIVE  NEG      Urobilinogen 0.2 0.2 - 1.0 EU/dL    Nitrites POSITIVE (A) NEG      Leukocyte Esterase TRACE (A) NEG      UA:UC IF INDICATED CULTURE NOT INDICATED BY UA RESULT CNI      WBC 0-4 0 - 4 /hpf    RBC 0-5 0 - 5 /hpf    Epithelial cells FEW FEW /lpf    Bacteria NEGATIVE  NEG /hpf    Hyaline cast 0-2 0 - 5 /lpf     IMAGING RESULTS:  CXR Results  (Last 48 hours)               09/12/17 1127  XR CHEST PA LAT Final result    Impression:  IMPRESSION:    No acute cardiopulmonary disease radiographically. .  . Narrative:  INDICATION:  Chest Pain. EXAM: 2 VIEW CHEST RADIOGRAPH. COMPARISON: 2/29/2016. FINDINGS: Frontal and lateral views of the chest show clear lungs. . The heart,   mediastinum and pulmonary vasculature are stable status post median sternotomy. .    The bony thorax is unremarkable for age. ..               CT Results  (Last 48 hours)               09/12/17 1550  CTA CHEST W OR W WO CONT Final result    Impression:  IMPRESSION: No acute disease in the chest.       Narrative:  CT ANGIOGRAPHY CHEST. 9/12/2017 3:50 PM        INDICATION: Aortic disease, nontraumatic. Headache and posterior neck pain   starting this morning. Left-sided chest pain for 30 minutes. COMPARISON: 6/26/2015, 4/21/2012.        TECHNIQUE: CT angiography of the chest was performed after the administration of   100 cc IV contrast (Isovue 370). Coronal and sagittal, and coronal MIP   reconstructions were performed. CT dose reduction was achieved through use of a   standardized protocol tailored for this examination and automatic exposure   control for dose modulation. FINDINGS:   Post aortic valve replacement. Minimal atherosclerotic disease; the thoracic   aorta is otherwise normal. The contrast phase is also sufficient to exclude a   large central pulmonary embolism in the main, lobar, and segmental pulmonary   arteries. The heart size is at the upper limits of normal. Moderate right   coronary artery calcifications. There is minimal dependent atelectasis. Motion limits evaluation of the lungs   somewhat, though there are no large pulmonary nodules and no airspace   consolidation. The central airways are patent. No pneumothorax or pleural   effusion. A calcified left lower lobe granuloma and calcified left hilar lymph   nodes are consistent with old granulomatous disease. No thoracic   lymphadenopathy. The visualized upper abdomen is normal.           09/12/17 1336  CT HEAD WO CONT Final result    Impression:  IMPRESSION: No acute intracranial hemorrhage, mass or infarct. Narrative:      INDICATION: Headache, chest pain, fatigue. Exam: Noncontrast CT of the brain is performed with 5 mm collimation. CT dose reduction was achieved with the use of the standardized protocol   tailored for this examination and automatic exposure control for dose   modulation. Direct comparison is made to prior CT dated June 2010. FINDINGS: There is no acute intracranial hemorrhage, mass, mass effect or   herniation. Ventricular system is normal. The gray-white matter differentiation   is well-preserved. The mastoid air cells are well pneumatized.  The visualized   paranasal sinuses are normal.               MEDICATIONS GIVEN:  Medications   metoclopramide HCl (REGLAN) injection 10 mg (10 mg IntraVENous Given 9/12/17 1219)   morphine injection 4 mg (4 mg IntraVENous Given 9/12/17 1221)   0.9% sodium chloride infusion (0 mL/hr IntraVENous IV Completed 9/12/17 1637)   sodium chloride (NS) flush 10 mL (10 mL IntraVENous Given 9/12/17 1551)   iopamidol (ISOVUE-370) 76 % injection 100 mL (100 mL IntraVENous Given 9/12/17 1551)   LORazepam (ATIVAN) injection 2 mg (2 mg IntraVENous Given 9/12/17 1512)     IMPRESSION:  1. Chest pain, unspecified type    2. Urinary tract infection without hematuria, site unspecified        PLAN:  1. Current Discharge Medication List      START taking these medications    Details   cephALEXin (KEFLEX) 500 mg capsule Take 1 Cap by mouth two (2) times a day for 7 days. Qty: 14 Cap, Refills: 0         CONTINUE these medications which have NOT CHANGED    Details   glucose blood VI test strips (ASCENSIA AUTODISC VI, ONE TOUCH ULTRA TEST VI) strip Test daily. Diagnosis 250.00  Qty: 3 Package, Refills: 3    Associated Diagnoses: Diabetes mellitus type 2, controlled ([facility])      Lancets misc Test twice daily. Diagnosis 250.00  Qty: 3 Package, Refills: 3      vitamin c-vitamin e (CRANBERRY CONCENTRATE) cap Take 1 Cap by mouth daily. cyanocobalamin (VITAMIN B-12) 1,000 mcg tablet Take 1,000 mcg by mouth daily. aspirin delayed-release 325 mg tablet Take 1 Tab by mouth daily. Qty: 90 Tab, Refills: 1    Associated Diagnoses: Diabetes mellitus ([address]      PV W-O EDU/FERROUS FUMARATE/FA (M-VIT PO) Take 1 tablet by mouth daily. omega-3 fatty acids-vitamin e (FISH OIL) 1,000 mg Cap Take 1 capsule by mouth daily. Associated Diagnoses: Type II or unspecified type diabetes mellitus without mention of complication, not stated as uncontrolled; Essential hypertension, benign; HLD (hyperlipidemia); Arthritis           2.    Follow-up Information     Follow up With Details Comments [address], [place] In 3 days  [address], [address] [facility]  [phone]      [facility] EMERGENCY DEPT  If symptoms worsen [address]  [address] [facility]  [phone]        Return to ED if worse     Discharge Note:  4:53 PM  The patient has been re-evaluated and is ready for discharge. Reviewed available results with patient. Counseled patient on diagnosis and care plan. Patient has expressed understanding, and all questions have been answered. Patient agrees with plan and agrees to follow up as recommended, or return to the ED if their symptoms worsen. Discharge instructions have been provided and explained to the patient, along with reasons to return to the ED. Attestation: This note is prepared by Chaka Odonnell, acting as Scribe for DO Dana Kincaid DO: The scribe's documentation has been prepared under my direction and personally reviewed by me in its entirety. I confirm that the note above accurately reflects all work, treatment, procedures, and medical decision making performed by me.

## 2017-09-12 NOTE — ED NOTES
Pt given lean cuisine meal and eating with no difficulty noted. Dr. Ann Potts notified of pt's POC glucose of 82.

## 2017-09-12 NOTE — ED NOTES
Assumed care of pt from triage. Pt c/o headache and posterior neck pain that started this morning. Pt also reports fatigue x 2 days. Pt also reports episode of L sided chest pain that lasted approx 30 minutes PTA. Pt currently denies chest pain. Pt placed on cardiac monitor x3. VSS. Pt in position of comfort with call bell within reach and no signs of acute distress noted.

## 2017-09-12 NOTE — DISCHARGE INSTRUCTIONS
Chest Pain: Care Instructions  Your Care Instructions  There are many things that can cause chest pain. Some are not serious and will get better on their own in a few days. But some kinds of chest pain need more testing and treatment. Your doctor may have recommended a follow-up visit in the next 8 to 12 hours. If you are not getting better, you may need more tests or treatment. Even though your doctor has released you, you still need to watch for any problems. The doctor carefully checked you, but sometimes problems can develop later. If you have new symptoms or if your symptoms do not get better, get medical care right away. If you have worse or different chest pain or pressure that lasts more than 5 minutes or you passed out (lost consciousness), call 911 or seek other emergency help right away. A medical visit is only one step in your treatment. Even if you feel better, you still need to do what your doctor recommends, such as going to all suggested follow-up appointments and taking medicines exactly as directed. This will help you recover and help prevent future problems. How can you care for yourself at home? · Rest until you feel better. · Take your medicine exactly as prescribed. Call your doctor if you think you are having a problem with your medicine. · Do not drive after taking a prescription pain medicine. When should you call for help? Call 911 if:  · You passed out (lost consciousness). · You have severe difficulty breathing. · You have symptoms of a heart attack. These may include:  ¨ Chest pain or pressure, or a strange feeling in your chest.  ¨ Sweating. ¨ Shortness of breath. ¨ Nausea or vomiting. ¨ Pain, pressure, or a strange feeling in your back, neck, jaw, or upper belly or in one or both shoulders or arms. ¨ Lightheadedness or sudden weakness. ¨ A fast or irregular heartbeat.   After you call 911, the  may tell you to chew 1 adult-strength or 2 to 4 low-dose aspirin. Wait for an ambulance. Do not try to drive yourself. Call your doctor today if:  · You have any trouble breathing. · Your chest pain gets worse. · You are dizzy or lightheaded, or you feel like you may faint. · You are not getting better as expected. · You are having new or different chest pain. Where can you learn more? Go to http://rose-colleen.info/. Enter A120 in the search box to learn more about \"Chest Pain: Care Instructions. \"  Current as of: March 20, 2017  Content Version: 11.3  © 9552-9131 [employer]. Care instructions adapted under license by [employer] (which disclaims liability or warranty for this information). If you have questions about a medical condition or this instruction, always ask your healthcare professional. [address] any warranty or liability for your use of this information. Urinary Tract Infection in Female Teens: Care Instructions  Your Care Instructions    A urinary tract infection, or UTI, is a general term for an infection anywhere between the kidneys and the urethra (where urine comes out). Most UTIs are bladder infections. They often cause pain or burning when you urinate. UTIs are caused by bacteria and can be cured with antibiotics. Be sure to complete your treatment so that the infection does not get worse. Follow-up care is a key part of your treatment and safety. Be sure to make and go to all appointments, and call your doctor if you are having problems. It's also a good idea to know your test results and keep a list of the medicines you take. How can you care for yourself at home? · Take your antibiotics as directed. Do not stop taking them just because you feel better. You need to take the full course of antibiotics. · Drink extra water and other fluids for the next day or two.  This will help make the urine less concentrated and help wash out the bacteria that are causing the infection. (If you have kidney, heart, or liver disease and have to limit fluids, talk with your doctor before you increase the amount of fluids you drink.)  · Avoid drinks that are carbonated or have caffeine. They can irritate the bladder. · Urinate often. Try to empty your bladder each time. · To relieve pain, take a hot bath or lay a heating pad set on low over your lower belly or genital area. Never go to sleep with a heating pad in place. To prevent UTIs  · Drink plenty of water each day. This helps you urinate often, which clears bacteria from your system. (If you have kidney, heart, or liver disease and have to limit fluids, talk with your doctor before you increase the amount of fluids you drink.)  · Urinate when you need to. · If you are sexually active, urinate right after you have sex. · Change sanitary pads often. · Avoid douches, bubble baths, feminine hygiene sprays, and other feminine hygiene products that have deodorants. · After going to the bathroom, wipe from front to back. When should you call for help? Call your doctor now or seek immediate medical care if:  · Symptoms such as fever, chills, nausea, or vomiting get worse or appear for the first time. · You have new pain in your back just below your rib cage. This is called flank pain. · There is new blood or pus in your urine. · You have any problems with your antibiotic medicine. Watch closely for changes in your health, and be sure to contact your doctor if:  · You are not getting better after taking an antibiotic for 2 days. · Your symptoms go away but then come back. Where can you learn more? Go to http://rose-colleen.info/. Enter Y747 in the search box to learn more about \"Urinary Tract Infection in Female Teens: Care Instructions. \"  Current as of: November 28, 2016  Content Version: 11.3  © 3657-3801 [employer].  Care instructions adapted under license by [employer] (which disclaims liability or warranty for this information). If you have questions about a medical condition or this instruction, always ask your healthcare professional. [address] any warranty or liability for your use of this information.

## 2017-09-12 NOTE — ED NOTES
Pt anxious and refusing ordered CT. Dr. Jareth Degroot notified and at bedside to speak with patient.

## 2017-09-13 ENCOUNTER — HOSPITAL ENCOUNTER (EMERGENCY)
Age: 77
Discharge: HOME | End: 2017-09-13
Attending: EMERGENCY MEDICINE
Payer: MEDICARE

## 2017-09-13 VITALS
SYSTOLIC BLOOD PRESSURE: 146 MMHG | HEIGHT: 62 IN | OXYGEN SATURATION: 99 % | RESPIRATION RATE: 18 BRPM | HEART RATE: 72 BPM | BODY MASS INDEX: 38.87 KG/M2 | WEIGHT: 211.2 LBS | DIASTOLIC BLOOD PRESSURE: 73 MMHG | TEMPERATURE: 97.5 F

## 2017-09-13 DIAGNOSIS — R59.0 LYMPHADENOPATHY, ANTERIOR CERVICAL: ICD-10-CM

## 2017-09-13 DIAGNOSIS — R22.1 NECK SWELLING: Primary | ICD-10-CM

## 2017-09-13 PROCEDURE — 99282 EMERGENCY DEPT VISIT SF MDM: CPT

## 2017-09-13 NOTE — DISCHARGE INSTRUCTIONS
Swollen Lymph Nodes: Care Instructions  Your Care Instructions  Lymph nodes are small, bean-shaped glands throughout the body. They help your body fight germs and infections. Lymph nodes often swell when there is a problem such as an injury, infection, or tumor. · The nodes in your neck, under your chin, or behind your ears may swell when you have a cold or sore throat. · An injury or infection in a leg or foot can make the nodes in your groin swell. · Sometimes medicine can make lymph nodes swell, but this is rare. Treatment depends on what caused your nodes to swell. Usually the nodes return to normal size without a problem. Follow-up care is a key part of your treatment and safety. Be sure to make and go to all appointments, and call your doctor if you are having problems. Its also a good idea to know your test results and keep a list of the medicines you take. How can you care for yourself at home? · Take your medicines exactly as prescribed. Call your doctor if you think you are having a problem with your medicine. · Avoid irritation. ¨ Do not squeeze or pick at the lump. ¨ Do not stick a needle in it. · Prevent infection. Do not squeeze, drain, or puncture a painful lump. Doing this can irritate or inflame the lump, push any existing infection deeper into the skin, or cause severe bleeding. · Get extra rest. Slow down just a little from your usual routine. · Drink plenty of fluids, enough so that your urine is light yellow or clear like water. If you have kidney, heart, or liver disease and have to limit fluids, talk with your doctor before you increase the amount of fluids you drink. · Take an over-the-counter pain medicine, such as acetaminophen (Tylenol), ibuprofen (Advil, Motrin), or naproxen (Aleve). Read and follow all instructions on the label. · Do not take two or more pain medicines at the same time unless the doctor told you to.  Many pain medicines have acetaminophen, which is Tylenol. Too much acetaminophen (Tylenol) can be harmful. When should you call for help? Watch closely for changes in your health, and be sure to contact your doctor if:  · Your lymph nodes do not get smaller, or they get bigger. · The area becomes red and feels tender. · The nodes feel hard and do not move when you push on them. · You have a fever of 100° F.  · You have night sweats. · You lose weight without trying. Where can you learn more? Go to http://rose-colleen.info/. Enter C035 in the search box to learn more about \"Swollen Lymph Nodes: Care Instructions. \"  Current as of: March 3, 2017  Content Version: 11.3  © 7990-5754 [employer]. Care instructions adapted under license by [employer] (which disclaims liability or warranty for this information). If you have questions about a medical condition or this instruction, always ask your healthcare professional. [place] [address] any warranty or liability for your use of this information.

## 2017-09-13 NOTE — ED NOTES
Pt arrives to the ED for c/c of neck swelling, jaw pain and dry lips. Pt denies SOB, difficulty swallowing or tongue swelling. Pt alert, oriented X4 and ambulatory to room with no distress noted. Family at bedside and call bell within reach.

## 2017-09-13 NOTE — ED NOTES
MD Judd Kim has reviewed discharge instructions with the patient. The patient verbalized understanding. Pt discharged with written instructions. No further concerns at this time. Pt ambulatory to exit with steady gait.

## 2017-09-13 NOTE — ED PROVIDER NOTES
HPI Comments: Vibha Hale is a 64 y.o. female with PMhx significant for HTN, DM, HLD, and S/P aortic valve replacement, who presents ambulatory with family member to the ED [facility] ED for evaluation of neck swelling starting today. Pt chart, pt was seen here yesterday by Dr. Tim Bautista for a headache that resolved; pt reports he called her today to ask how she was doing and recommended she come to the ED to be seen for her new complaint of neck swelling. However, pt reports the neck swelling has already reduced since the phone call. She states yesterday she had CP that caused her to feel \"like I was about to have a heart attack,\" and Dr. Tim Bautista performed labs, EKG, and CT before discharging her. Pt specifically denies trouble swallowing, CP, URI symptoms, and SOB. Social Hx: - Tobacco (former), - EtOH, - Illicit Drugs    PCP: Arianna Proctor MD    There are no other complaints, changes or physical findings at this time. The history is provided by the patient. No  was used. Past Medical History:   Diagnosis Date    Atypical chest pain     Long h/o intermittent non-cardiac CP.     Depression with anxiety     Diabetes ([address]     GERD (gastroesophageal reflux disease)     Hypercholesteremia     Hyperlipidemia 7/3/2013    Hypertension     Inner ear dysfunction     S/P aortic valve replacement     Dr. Temo Mcmanus yearly    Vitamin D deficiency        Past Surgical History:   Procedure Laterality Date    HX AORTIC VALVE REPLACEMENT  1999    Bovine valve    HX CATARACT REMOVAL      HX CHOLECYSTECTOMY  1999    HX MOHS PROCEDURES Left 2014         Family History:   Problem Relation Age of Onset    Heart Disease Mother     Heart Failure Father     Heart Failure Sister     Stroke Sister     Diabetes Brother     Heart Disease Brother        Social History     Social History    Marital status:      Spouse name: N/A    Number of children: N/A    Years of education: N/A Occupational History    Not on file. Social History Main Topics    Smoking status: Former Smoker     Quit date: 8/27/1999    Smokeless tobacco: Never Used    Alcohol use No    Drug use: No    Sexual activity: Yes     Partners: Male     Other Topics Concern    Not on file     Social History Narrative         ALLERGIES: Naldecon and Sulfa (sulfonamide antibiotics)    Review of Systems   Constitutional: Negative for chills and fever. HENT: Negative for trouble swallowing. Respiratory: Negative. Negative for cough and shortness of breath. Cardiovascular: Negative for chest pain. Gastrointestinal: Negative for constipation, diarrhea, nausea and vomiting. Musculoskeletal:        + Neck swelling   Neurological: Negative for weakness and numbness. All other systems reviewed and are negative. Patient Vitals for the past 12 hrs:   Temp Pulse Resp BP SpO2   09/13/17 1409 97.5 °F (36.4 °C) 72 18 146/73 99 %            Physical Exam   Constitutional: She is oriented to person, place, and time. She appears well-developed and well-nourished. HENT:   Head: Normocephalic and atraumatic. Eyes: Conjunctivae and EOM are normal.   Neck: Normal range of motion. Neck supple. BL cervical lymphadenopathy. No tonsillar edema, erythema, exudate, or swelling. Cardiovascular: Normal rate and regular rhythm. Pulmonary/Chest: Effort normal and breath sounds normal. No respiratory distress. Abdominal: Soft. She exhibits no distension. There is no tenderness. Musculoskeletal: Normal range of motion. Neurological: She is alert and oriented to person, place, and time. Skin: Skin is warm and dry. Psychiatric: She has a normal mood and affect. Nursing note and vitals reviewed. MDM  Number of Diagnoses or Management Options  Lymphadenopathy, anterior cervical:   Neck swelling:   Diagnosis management comments:   Pt presents for neck swelling that has improved on its own.  Reviewed labs and imaging from yesterday which are overall normal. No signs of PTA or RPA. Likely due to lymphadenopathy. No indication for further testing as she is otherwise ok. Will have her follow up with PCP. Amount and/or Complexity of Data Reviewed  Review and summarize past medical records: yes    Patient Progress  Patient progress: stable    ED Course       Procedures      IMPRESSION:  1. Neck swelling    2. Lymphadenopathy, anterior cervical        PLAN:  1. Discharge Medication List as of 9/13/2017  4:09 PM        2. Follow-up Information     Follow up With Details Comments [facility], [address] [place] [address] [address], [address] [place] [address]  [phone]          Return to ED if worse     DISCHARGE NOTE  4:10 PM  The patient has been re-evaluated and is ready for discharge. Reviewed available results with patient. Counseled patient on diagnosis and care plan. Patient has expressed understanding, and all questions have been answered. Patient agrees with plan and agrees to follow up as recommended, or return to the ED if their symptoms worsen. Discharge instructions have been provided and explained to the patient, along with reasons to return to the ED. Attestation Note:  This note is prepared by Chau Christie, acting as Scribe for Annette Mcgowan MD.    Annette Mcgowan MD: The scribe's documentation has been prepared under my direction and personally reviewed by me in its entirety. I confirm that the note above accurately reflects all work, treatment, procedures, and medical decision making performed by me.

## 2017-09-15 ENCOUNTER — OFFICE VISIT (OUTPATIENT)
Dept: FAMILY MEDICINE CLINIC | Age: 77
End: 2017-09-15

## 2017-09-15 VITALS
DIASTOLIC BLOOD PRESSURE: 81 MMHG | RESPIRATION RATE: 12 BRPM | HEART RATE: 76 BPM | WEIGHT: 210 LBS | BODY MASS INDEX: 38.64 KG/M2 | HEIGHT: 62 IN | SYSTOLIC BLOOD PRESSURE: 157 MMHG | OXYGEN SATURATION: 96 % | TEMPERATURE: 98.1 F

## 2017-09-15 DIAGNOSIS — R00.2 PALPITATIONS: ICD-10-CM

## 2017-09-15 DIAGNOSIS — F41.8 DEPRESSION WITH ANXIETY: Primary | ICD-10-CM

## 2017-09-15 DIAGNOSIS — J06.9 URI WITH COUGH AND CONGESTION: ICD-10-CM

## 2017-09-15 DIAGNOSIS — I10 ESSENTIAL HYPERTENSION, BENIGN: ICD-10-CM

## 2017-09-15 NOTE — PROGRESS NOTES
.  Chief Complaint   Patient presents with   [facility] Follow Up    Anxiety    Headache    Nausea     . Aruna Reina

## 2017-09-15 NOTE — PROGRESS NOTES
HPI  Brenna Tavares is a 64 y.o. female who presents to follow-up from emergency room visits. Has gone twice so far this week. The first visit was because she felt like her heart was beating out of her chest as though she was having a heart attack. She was also sick to her stomach and having a headache. Had extensive workup including labs, CT head, CTA chest all of which came back normal.  Was discharged and then doctor called her the next day to see how she was doing. Told her that her throat felt like it was swollen and he recommended she come in to be seen. By the time she got to the emergency room the feeling of swelling had gone down. Tells me that she has had some congestion, sore throat for the last 2 days. Also has had urinary frequency and dysuria. She is being treated for UTI with Keflex found incidentally in the emergency room. 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 Also talked about how she feels tired a lot of the times during the day. Her sleep pattern is fairly normal, she will go to bed later depending on the day if the rest of the family is up with her. She always gets up the same time every morning. Gets 8 hours of sleep on good days and 5 hours sleep on bad days but it seems to be behavioral based on when she goes to bed (just wakes up at the same time every morning). Has a history of anxiety since her  . Saw a counselor for a little while after her  . That was 10 years ago and she always has a problem in May which is the month that he  and during the holidays. We are coming up on holidays and family is worried about anxiety    PMHx:  Past Medical History:   Diagnosis Date    Atypical chest pain     Long h/o intermittent non-cardiac CP.     Depression with anxiety     Diabetes ([address]     GERD (gastroesophageal reflux disease)     Hypercholesteremia     Hyperlipidemia 7/3/2013    Hypertension     Inner ear dysfunction     S/P aortic valve replacement     Dr. Nayan Hood yearly    Vitamin D deficiency        Meds:   Current Outpatient Prescriptions   Medication Sig Dispense Refill    cephALEXin (KEFLEX) 500 mg capsule Take 1 Cap by mouth two (2) times a day for 7 days. 14 Cap 0    PARoxetine (PAXIL) 30 mg tablet TAKE ONE TABLET BY MOUTH ONCE DAILY 90 Tab 3    lovastatin (MEVACOR) 10 mg tablet TAKE ONE TABLET BY MOUTH NIGHTLY 90 Tab 3    lisinopril (PRINIVIL, ZESTRIL) 5 mg tablet TAKE 1 TABLET BY MOUTH EVERY DAY 90 Tab 3    clopidogrel (PLAVIX) 75 mg tab Take 1 Tab by mouth daily. 90 Tab 3    metoprolol succinate (TOPROL-XL) 25 mg XL tablet Take 1 Tab by mouth daily. 90 Tab 3    glimepiride (AMARYL) 1 mg tablet TAKE ONE TABLET BY MOUTH IN THE MORNING 90 Tab 3    metFORMIN (GLUCOPHAGE) 500 mg tablet TAKE TWO TABLETS BY MOUTH DAILY WITH BREAKFAST 180 Tab 3    diclofenac EC (VOLTAREN) 75 mg EC tablet Take 1 Tab by mouth two (2) times a day. 20 Tab 0    cholecalciferol (VITAMIN D3) 1,000 unit tablet Take 1,000 Units by mouth daily.  MAGNESIUM PO Take 1 Tab by mouth daily.  calcium-cholecalciferol, d3, 600-125 mg-unit tab Take 1 Tab by mouth daily.  meclizine (ANTIVERT) 25 mg tablet Take 1 Tab by mouth three (3) times daily as needed for Dizziness. 15 Tab 0    glucose blood VI test strips (ASCENSIA AUTODISC VI, ONE TOUCH ULTRA TEST VI) strip Test daily. Diagnosis 250.00 3 Package 3    Lancets misc Test twice daily. Diagnosis 250.00 3 Package 3    vitamin c-vitamin e (CRANBERRY CONCENTRATE) cap Take 1 Cap by mouth daily.  cyanocobalamin (VITAMIN B-12) 1,000 mcg tablet Take 1,000 mcg by mouth daily.  aspirin delayed-release 325 mg tablet Take 1 Tab by mouth daily. 90 Tab 1    PV W-O EDU/FERROUS FUMARATE/FA (M-VIT PO) Take 1 tablet by mouth daily.  omega-3 fatty acids-vitamin e (FISH OIL) 1,000 mg Cap Take 1 capsule by mouth daily. Allergies:    Allergies   Allergen Reactions    Naldecon Unknown (comments)    Sulfa (Sulfonamide Antibiotics) Rash       Smoker:  History   Smoking Status    Former Smoker    Quit date: 8/27/1999   Smokeless Tobacco    Never Used       ETOH:   History   Alcohol Use No       FH:   Family History   Problem Relation Age of Onset    Heart Disease Mother     Heart Failure Father     Heart Failure Sister     Stroke Sister     Diabetes Brother     Heart Disease Brother        ROS:   As listed in HPI. In addition:  Constitutional:   No headache, fever, fatigue, weight loss or weight gain      Cardiac:    No chest pain      Resp:   No cough or shortness of breath      Neuro   No loss of consciousness, dizziness, seizures      Physical Exam:  Blood pressure 157/81, pulse 76, temperature 98.1 °F (36.7 °C), resp. rate 12, height 5' 2\" (1.575 m), weight 210 lb (95.3 kg), SpO2 96 %. GEN: No apparent distress. Alert and oriented and responds to all questions appropriately. NEUROLOGIC:  No focal neurologic deficits. Strength and sensation grossly intact. Coordination and gait grossly intact. EXT: Well perfused. No edema. SKIN: No obvious rashes. HEENT, clear tympanic membranes, mild nasal congestion with postnasal drip, lymphadenopathy  Lungs clear to auscultation  CV regular rate and rhythm  Neck has a tight trapezius bilaterally       Assessment and Plan     Depression/anxiety  Anxiety state anxiety that brought her to the emergency room with a feeling she is having a heart attack. She was sick, has both URI and UTI which could have helped a trigger this but family is worried about heightened states of anxiety going into the holidays. Ativan given in the emergency room is what made patient feel better. She is taking Paxil 30 mg and has been for several years. This is a fairly good dose for a woman her age. Reestablish with counselor, provided with list of counselors in the area.   Return if signs of anxiety becomes a problem going into this holiday season. She feels like it is getting worse every year. As needed benzo may be appropriate but I would like to see what benefit we can derive from counseling. Upper respiratory infection  Expect symptoms to last for 1 week and then turned the corner    UTI is being treated  No culture pending    Due for flu shot and pneumonia 13 but not today while she is sick      ICD-10-CM ICD-9-CM    1. Depression with anxiety F41.8 300.4 REFERRAL TO PSYCHOLOGY   2. Essential hypertension, benign I10 401.1    3. Palpitations R00.2 785.1    4. URI with cough and congestion J06.9 465.9        AVS given.  Pt expressed understanding of instructions

## 2017-09-20 ENCOUNTER — TELEPHONE (OUTPATIENT)
Dept: FAMILY MEDICINE CLINIC | Age: 77
End: 2017-09-20

## 2017-09-20 NOTE — TELEPHONE ENCOUNTER
She called to say she has a headache and wanted to know if she can take aleve. Reviewed her medications and alergies and suggested Tylenol instead. Told her she shouldn't take NSAID's.  She voiced understanding

## 2017-09-21 RX ORDER — CLOPIDOGREL BISULFATE 75 MG/1
75 TABLET ORAL DAILY
Qty: 90 TAB | Refills: 3 | Status: CANCELLED | OUTPATIENT
Start: 2017-09-21

## 2017-10-31 RX ORDER — METFORMIN HYDROCHLORIDE 500 MG/1
TABLET ORAL
Qty: 180 TAB | Refills: 3 | Status: SHIPPED | OUTPATIENT
Start: 2017-10-31 | End: 2018-03-14 | Stop reason: SDUPTHER

## 2017-10-31 RX ORDER — CLOPIDOGREL BISULFATE 75 MG/1
75 TABLET ORAL DAILY
Qty: 90 TAB | Refills: 3 | Status: SHIPPED | OUTPATIENT
Start: 2017-10-31 | End: 2017-11-30 | Stop reason: ALTCHOICE

## 2017-10-31 RX ORDER — GLIMEPIRIDE 1 MG/1
TABLET ORAL
Qty: 90 TAB | Refills: 3 | Status: SHIPPED | OUTPATIENT
Start: 2017-10-31 | End: 2018-03-14 | Stop reason: SDUPTHER

## 2017-11-27 ENCOUNTER — OFFICE VISIT (OUTPATIENT)
Dept: FAMILY MEDICINE CLINIC | Age: 77
End: 2017-11-27

## 2017-11-27 VITALS
OXYGEN SATURATION: 95 % | HEART RATE: 61 BPM | RESPIRATION RATE: 20 BRPM | DIASTOLIC BLOOD PRESSURE: 80 MMHG | SYSTOLIC BLOOD PRESSURE: 137 MMHG | HEIGHT: 62 IN | BODY MASS INDEX: 39.42 KG/M2 | WEIGHT: 214.2 LBS | TEMPERATURE: 97.6 F

## 2017-11-27 DIAGNOSIS — Z23 ENCOUNTER FOR IMMUNIZATION: ICD-10-CM

## 2017-11-27 DIAGNOSIS — N30.00 ACUTE CYSTITIS WITHOUT HEMATURIA: ICD-10-CM

## 2017-11-27 DIAGNOSIS — R30.0 DYSURIA: Primary | ICD-10-CM

## 2017-11-27 LAB
BILIRUB UR QL STRIP: NEGATIVE
GLUCOSE UR-MCNC: NORMAL MG/DL
KETONES P FAST UR STRIP-MCNC: NORMAL MG/DL
PH UR STRIP: 5.5 [PH] (ref 4.6–8)
PROT UR QL STRIP: NORMAL
SP GR UR STRIP: 1.01 (ref 1–1.03)
UA UROBILINOGEN AMB POC: NORMAL (ref 0.2–1)
URINALYSIS CLARITY POC: NORMAL
URINALYSIS COLOR POC: NORMAL
URINE BLOOD POC: NORMAL
URINE LEUKOCYTES POC: NORMAL
URINE NITRITES POC: POSITIVE

## 2017-11-27 RX ORDER — NITROFURANTOIN 25; 75 MG/1; MG/1
100 CAPSULE ORAL 2 TIMES DAILY
Qty: 14 CAP | Refills: 0 | Status: SHIPPED | OUTPATIENT
Start: 2017-11-27 | End: 2017-12-04

## 2017-11-27 NOTE — PATIENT INSTRUCTIONS
Urinary Tract Infection in Women: Care Instructions  Your Care Instructions    A urinary tract infection, or UTI, is a general term for an infection anywhere between the kidneys and the urethra (where urine comes out). Most UTIs are bladder infections. They often cause pain or burning when you urinate. UTIs are caused by bacteria and can be cured with antibiotics. Be sure to complete your treatment so that the infection goes away. Follow-up care is a key part of your treatment and safety. Be sure to make and go to all appointments, and call your doctor if you are having problems. It's also a good idea to know your test results and keep a list of the medicines you take. How can you care for yourself at home? · Take your antibiotics as directed. Do not stop taking them just because you feel better. You need to take the full course of antibiotics. · Drink extra water and other fluids for the next day or two. This may help wash out the bacteria that are causing the infection. (If you have kidney, heart, or liver disease and have to limit fluids, talk with your doctor before you increase your fluid intake.)  · Avoid drinks that are carbonated or have caffeine. They can irritate the bladder. · Urinate often. Try to empty your bladder each time. · To relieve pain, take a hot bath or lay a heating pad set on low over your lower belly or genital area. Never go to sleep with a heating pad in place. To prevent UTIs  · Drink plenty of water each day. This helps you urinate often, which clears bacteria from your system. (If you have kidney, heart, or liver disease and have to limit fluids, talk with your doctor before you increase your fluid intake.)  · Urinate when you need to. · Urinate right after you have sex. · Change sanitary pads often. · Avoid douches, bubble baths, feminine hygiene sprays, and other feminine hygiene products that have deodorants.   · After going to the bathroom, wipe from front to back.  When should you call for help? Call your doctor now or seek immediate medical care if:  ? · Symptoms such as fever, chills, nausea, or vomiting get worse or appear for the first time. ? · You have new pain in your back just below your rib cage. This is called flank pain. ? · There is new blood or pus in your urine. ? · You have any problems with your antibiotic medicine. ? Watch closely for changes in your health, and be sure to contact your doctor if:  ? · You are not getting better after taking an antibiotic for 2 days. ? · Your symptoms go away but then come back. Where can you learn more? Go to http://rose-colleen.info/. Enter P318 in the search box to learn more about \"Urinary Tract Infection in Women: Care Instructions. \"  Current as of: May 12, 2017  Content Version: 11.4  © 9653-8855 [employer]. Care instructions adapted under license by [employer] (which disclaims liability or warranty for this information). If you have questions about a medical condition or this instruction, always ask your healthcare professional. [address] any warranty or liability for your use of this information. Vaccine Information Statement    Influenza (Flu) Vaccine (Inactivated or Recombinant): What you need to know    Many Vaccine Information Statements are available in Salvadorean and other languages. See www.immunize.org/vis  Hojas de Información Sobre Vacunas están disponibles en Español y en muchos otros idiomas. Visite www.immunize.org/vis    1. Why get vaccinated? Influenza (flu) is a contagious disease that spreads around the [place] every year, usually between October and May. Flu is caused by influenza viruses, and is spread mainly by coughing, sneezing, and close contact. Anyone can get flu. Flu strikes suddenly and can last several days.  Symptoms vary by age, but can include:   fever/chills   sore throat   muscle aches   fatigue   cough   headache    runny or stuffy nose    Flu can also lead to pneumonia and blood infections, and cause diarrhea and seizures in children. If you have a medical condition, such as heart or lung disease, flu can make it worse. Flu is more dangerous for some people. Infants and young children, people 72years of age and older, pregnant women, and people with certain health conditions or a weakened immune system are at greatest risk. Each year thousands of people in the [facility] die from flu, and many more are hospitalized. Flu vaccine can:   keep you from getting flu,   make flu less severe if you do get it, and   keep you from spreading flu to your family and other people. 2. Inactivated and recombinant flu vaccines    A dose of flu vaccine is recommended every flu season. Children 6 months through 6years of age may need two doses during the same flu season. Everyone else needs only one dose each flu season. Some inactivated flu vaccines contain a very small amount of a mercury-based preservative called thimerosal. Studies have not shown thimerosal in vaccines to be harmful, but flu vaccines that do not contain thimerosal are available. There is no live flu virus in flu shots. They cannot cause the flu. There are many flu viruses, and they are always changing. Each year a new flu vaccine is made to protect against three or four viruses that are likely to cause disease in the upcoming flu season. But even when the vaccine doesnt exactly match these viruses, it may still provide some protection    Flu vaccine cannot prevent:   flu that is caused by a virus not covered by the vaccine, or   illnesses that look like flu but are not. It takes about 2 weeks for protection to develop after vaccination, and protection lasts through the flu season.      3. Some people should not get this vaccine    Tell the person who is giving you the vaccine:     If you have any severe, life-threatening allergies. If you ever had a life-threatening allergic reaction after a dose of flu vaccine, or have a severe allergy to any part of this vaccine, you may be advised not to get vaccinated. Most, but not all, types of flu vaccine contain a small amount of egg protein.  If you ever had Guillain-Barré Syndrome (also called GBS). Some people with a history of GBS should not get this vaccine. This should be discussed with your doctor.  If you are not feeling well. It is usually okay to get flu vaccine when you have a mild illness, but you might be asked to come back when you feel better. 4. Risks of a vaccine reaction    With any medicine, including vaccines, there is a chance of reactions. These are usually mild and go away on their own, but serious reactions are also possible. Most people who get a flu shot do not have any problems with it. Minor problems following a flu shot include:    soreness, redness, or swelling where the shot was given     hoarseness   sore, red or itchy eyes   cough   fever   aches   headache   itching   fatigue  If these problems occur, they usually begin soon after the shot and last 1 or 2 days. More serious problems following a flu shot can include the following:     There may be a small increased risk of Guillain-Barré Syndrome (GBS) after inactivated flu vaccine. This risk has been estimated at 1 or 2 additional cases per million people vaccinated. This is much lower than the risk of severe complications from flu, which can be prevented by flu vaccine.  Young children who get the flu shot along with pneumococcal vaccine (PCV13) and/or DTaP vaccine at the same time might be slightly more likely to have a seizure caused by fever. Ask your doctor for more information. Tell your doctor if a child who is getting flu vaccine has ever had a seizure.      Problems that could happen after any injected vaccine:      People sometimes faint after a medical procedure, including vaccination. Sitting or lying down for about 15 minutes can help prevent fainting, and injuries caused by a fall. Tell your doctor if you feel dizzy, or have vision changes or ringing in the ears.  Some people get severe pain in the shoulder and have difficulty moving the arm where a shot was given. This happens very rarely.  Any medication can cause a severe allergic reaction. Such reactions from a vaccine are very rare, estimated at about 1 in a million doses, and would happen within a few minutes to a few hours after the vaccination. As with any medicine, there is a very remote chance of a vaccine causing a serious injury or death. The safety of vaccines is always being monitored. For more information, visit: www.cdc.gov/vaccinesafety/    5. What if there is a serious reaction? What should I look for?  Look for anything that concerns you, such as signs of a severe allergic reaction, very high fever, or unusual behavior. Signs of a severe allergic reaction can include hives, swelling of the face and throat, difficulty breathing, a fast heartbeat, dizziness, and weakness - usually within a few minutes to a few hours after the vaccination. What should I do?  If you think it is a severe allergic reaction or other emergency that cant wait, call 9-1-1 and get the person to the nearest hospital. Otherwise, call your doctor.  Reactions should be reported to the Vaccine Adverse Event Reporting System (VAERS). Your doctor should file this report, or you can do it yourself through  the VAERS web site at www.vaers. hhs.gov, or by calling 5-466.986.1687. VAERS does not give medical advice.     6. The National Vaccine Injury Compensation Program    The National Vaccine Injury Compensation Program (VICP) is a federal program that was created to compensate people who may have been injured by certain vaccines. Persons who believe they may have been injured by a vaccine can learn about the program and about filing a claim by calling 5-889.979.2016 or visiting the [facility] website at www.[facility].gov/vaccinecompensation. There is a time limit to file a claim for compensation. 7. How can I learn more?  Ask your healthcare provider. He or she can give you the vaccine package insert or suggest other sources of information.  Call your local or state health department.  Contact the Centers for Disease Control and Prevention (CDC):  - Call 8-881.690.3821 (1-800-CDC-INFO) or  - Visit CDCs website at www.cdc.gov/flu    Vaccine Information Statement   Inactivated Influenza Vaccine   8/7/2015  [address] GALILEO Mendez [id number]    Department of Health and Human Services  Centers for Disease Control and Prevention    Office Use Only

## 2017-11-27 NOTE — MR AVS SNAPSHOT
Visit Information Date & Time Provider Department Dept. Phone Encounter #  
 11/27/2017  2:15 PM Ward Davis, [address] [address] [phone] [id number] Upcoming Health Maintenance Date Due Pneumococcal 65+ Low/Medium Risk (2 of 2 - PCV13) 7/3/2010 Influenza Age 5 to Adult 8/1/2017 MICROALBUMIN Q1 12/21/2017 LIPID PANEL Q1 12/21/2017 HEMOGLOBIN A1C Q6M 1/3/2018 EYE EXAM RETINAL OR DILATED Q1 3/29/2018 FOOT EXAM Q1 7/3/2018 MEDICARE YEARLY EXAM 7/4/2018 GLAUCOMA SCREENING Q2Y 3/29/2019 DTaP/Tdap/Td series (2 - Td) 10/18/2023 Allergies as of 11/27/2017  Review Complete On: 11/27/2017 By: Ward Davis NP Severity Noted Reaction Type Reactions Naldecon High 11/07/2009    Unknown (comments) Sulfa (Sulfonamide Antibiotics) High 11/07/2009    Rash Current Immunizations  Reviewed on 9/15/2017 Name Date Influenza High Dose Vaccine PF  Incomplete, 12/21/2016, 10/1/2014, 10/24/2013 Influenza Vaccine Split 10/9/2012, 11/1/2010 Pneumococcal Polysaccharide (PPSV-23) 7/3/2009 Tdap 10/18/2013 Zoster Vaccine, Live 10/6/2009 Not reviewed this visit You Were Diagnosed With   
  
 Codes Comments Dysuria    -  Primary ICD-10-CM: R30.0 ICD-9-CM: 468. 1 Acute cystitis without hematuria     ICD-10-CM: N30.00 ICD-9-CM: 595.0 Encounter for immunization     ICD-10-CM: N04 ICD-9-CM: V03.89 Vitals BP Pulse Temp Resp Height(growth percentile) Weight(growth percentile) 137/80 (BP 1 Location: Right arm, BP Patient Position: Sitting) 61 97.6 °F (36.4 °C) (Oral) 20 5' 2\" (1.575 m) 214 lb 3.2 oz (97.2 kg) SpO2 BMI OB Status Smoking Status 95% 39.18 kg/m2 Postmenopausal Former Smoker Vitals History BMI and BSA Data Body Mass Index Body Surface Area  
 39.18 kg/m 2 2.06 m 2 Preferred Pharmacy Pharmacy Name Phone [facility] [address] [phone] Your Updated Medication List  
  
   
This list is accurate as of: 11/27/17  3:25 PM.  Always use your most recent med list.  
  
  
  
  
 aspirin delayed-release 325 mg tablet Take 1 Tab by mouth daily. calcium-cholecalciferol (d3) 600-125 mg-unit Tab Take 1 Tab by mouth daily. cholecalciferol 1,000 unit tablet Commonly known as:  VITAMIN D3 Take 1,000 Units by mouth daily. clopidogrel 75 mg Tab Commonly known as:  PLAVIX Take 1 Tab by mouth daily. CRANBERRY CONCENTRATE Cap Generic drug:  vitamin c-vitamin e Take 1 Cap by mouth daily. FISH OIL 1,000 mg Cap Generic drug:  omega-3 fatty acids-vitamin e Take 1 capsule by mouth daily. glimepiride 1 mg tablet Commonly known as:  AMARYL  
TAKE ONE TABLET BY MOUTH IN THE MORNING  
  
 glucose blood VI test strips strip Commonly known as:  ASCENSIA AUTODISC VI, ONE TOUCH ULTRA TEST VI Test daily. Diagnosis 250.00 Lancets Misc Test twice daily. Diagnosis 250.00  
  
 lisinopril 5 mg tablet Commonly known as:  PRINIVIL, ZESTRIL  
TAKE 1 TABLET BY MOUTH EVERY DAY  
  
 lovastatin 10 mg tablet Commonly known as:  MEVACOR  
TAKE ONE TABLET BY MOUTH NIGHTLY  
  
 M-VIT PO Take 1 tablet by mouth daily. MAGNESIUM PO Take 1 Tab by mouth daily. meclizine 25 mg tablet Commonly known as:  ANTIVERT Take 1 Tab by mouth three (3) times daily as needed for Dizziness. metFORMIN 500 mg tablet Commonly known as:  GLUCOPHAGE  
TAKE TWO TABLETS BY MOUTH DAILY WITH BREAKFAST  
  
 metoprolol succinate 25 mg XL tablet Commonly known as:  TOPROL-XL Take 1 Tab by mouth daily. nitrofurantoin (macrocrystal-monohydrate) 100 mg capsule Commonly known as:  MACROBID Take 1 Cap by mouth two (2) times a day for 7 days. PARoxetine 30 mg tablet Commonly known as:  PAXIL TAKE ONE TABLET BY MOUTH ONCE DAILY  
  
 VITAMIN B-12 1,000 mcg tablet Generic drug:  cyanocobalamin Take 1,000 mcg by mouth daily. Prescriptions Sent to Pharmacy Refills  
 nitrofurantoin, macrocrystal-monohydrate, (MACROBID) 100 mg capsule 0 Sig: Take 1 Cap by mouth two (2) times a day for 7 days. Class: Normal  
 Pharmacy: [address] #: [phone] Route: Oral  
  
We Performed the Following ADMIN INFLUENZA VIRUS VAC [ [facility]] AMB POC URINALYSIS DIP STICK AUTO W/O MICRO [89519 CPT(R)] CULTURE, URINE [id number] CPT(R)] INFLUENZA VIRUS VACCINE, HIGH DOSE SEASONAL, PRESERVATIVE FREE [68031 CPT(R)] Patient Instructions Urinary Tract Infection in Women: Care Instructions Your Care Instructions A urinary tract infection, or UTI, is a general term for an infection anywhere between the kidneys and the urethra (where urine comes out). Most UTIs are bladder infections. They often cause pain or burning when you urinate. UTIs are caused by bacteria and can be cured with antibiotics. Be sure to complete your treatment so that the infection goes away. Follow-up care is a key part of your treatment and safety. Be sure to make and go to all appointments, and call your doctor if you are having problems. It's also a good idea to know your test results and keep a list of the medicines you take. How can you care for yourself at home? · Take your antibiotics as directed. Do not stop taking them just because you feel better. You need to take the full course of antibiotics. · Drink extra water and other fluids for the next day or two. This may help wash out the bacteria that are causing the infection. (If you have kidney, heart, or liver disease and have to limit fluids, talk with your doctor before you increase your fluid intake.) · Avoid drinks that are carbonated or have caffeine. They can irritate the bladder. · Urinate often. Try to empty your bladder each time. · To relieve pain, take a hot bath or lay a heating pad set on low over your lower belly or genital area. Never go to sleep with a heating pad in place. To prevent UTIs · Drink plenty of water each day. This helps you urinate often, which clears bacteria from your system. (If you have kidney, heart, or liver disease and have to limit fluids, talk with your doctor before you increase your fluid intake.) · Urinate when you need to. · Urinate right after you have sex. · Change sanitary pads often. · Avoid douches, bubble baths, feminine hygiene sprays, and other feminine hygiene products that have deodorants. · After going to the bathroom, wipe from front to back. When should you call for help? Call your doctor now or seek immediate medical care if: 
? · Symptoms such as fever, chills, nausea, or vomiting get worse or appear for the first time. ? · You have new pain in your back just below your rib cage. This is called flank pain. ? · There is new blood or pus in your urine. ? · You have any problems with your antibiotic medicine. ? Watch closely for changes in your health, and be sure to contact your doctor if: 
? · You are not getting better after taking an antibiotic for 2 days. ? · Your symptoms go away but then come back. Where can you learn more? Go to http://rose-colleen.info/. Enter A479 in the search box to learn more about \"Urinary Tract Infection in Women: Care Instructions. \" Current as of: May 12, 2017 Content Version: 11.4 © 1032-2239 [employer]. Care instructions adapted under license by [employer] (which disclaims liability or warranty for this information).  If you have questions about a medical condition or this instruction, always ask your healthcare professional. [employer] disclaims any warranty or liability for your use of this information. Vaccine Information Statement Influenza (Flu) Vaccine (Inactivated or Recombinant): What you need to know Many Vaccine Information Statements are available in Divehi and other languages. See www.immunize.org/vis Hojas de Información Sobre Vacunas están disponibles en Español y en muchos otros idiomas. Visite www.immunize.org/vis 1. Why get vaccinated? Influenza (flu) is a contagious disease that spreads around the [facility] every year, usually between October and May. Flu is caused by influenza viruses, and is spread mainly by coughing, sneezing, and close contact. Anyone can get flu. Flu strikes suddenly and can last several days. Symptoms vary by age, but can include: 
 fever/chills  sore throat  muscle aches  fatigue  cough  headache  runny or stuffy nose Flu can also lead to pneumonia and blood infections, and cause diarrhea and seizures in children. If you have a medical condition, such as heart or lung disease, flu can make it worse. Flu is more dangerous for some people. Infants and young children, people 72years of age and older, pregnant women, and people with certain health conditions or a weakened immune system are at greatest risk. Each year thousands of people in the [facility] die from flu, and many more are hospitalized. Flu vaccine can: 
 keep you from getting flu, 
 make flu less severe if you do get it, and 
 keep you from spreading flu to your family and other people. 2. Inactivated and recombinant flu vaccines A dose of flu vaccine is recommended every flu season. Children 6 months through 6years of age may need two doses during the same flu season. Everyone else needs only one dose each flu season.   
 
 
Some inactivated flu vaccines contain a very small amount of a mercury-based preservative called thimerosal. Studies have not shown thimerosal in vaccines to be harmful, but flu vaccines that do not contain thimerosal are available. There is no live flu virus in flu shots. They cannot cause the flu. There are many flu viruses, and they are always changing. Each year a new flu vaccine is made to protect against three or four viruses that are likely to cause disease in the upcoming flu season. But even when the vaccine doesnt exactly match these viruses, it may still provide some protection Flu vaccine cannot prevent: 
 flu that is caused by a virus not covered by the vaccine, or 
 illnesses that look like flu but are not. It takes about 2 weeks for protection to develop after vaccination, and protection lasts through the flu season. 3. Some people should not get this vaccine Tell the person who is giving you the vaccine:  If you have any severe, life-threatening allergies. If you ever had a life-threatening allergic reaction after a dose of flu vaccine, or have a severe allergy to any part of this vaccine, you may be advised not to get vaccinated. Most, but not all, types of flu vaccine contain a small amount of egg protein.  If you ever had Guillain-Barré Syndrome (also called GBS). Some people with a history of GBS should not get this vaccine. This should be discussed with your doctor.  If you are not feeling well. It is usually okay to get flu vaccine when you have a mild illness, but you might be asked to come back when you feel better. 4. Risks of a vaccine reaction With any medicine, including vaccines, there is a chance of reactions. These are usually mild and go away on their own, but serious reactions are also possible. Most people who get a flu shot do not have any problems with it. Minor problems following a flu shot include:  
 soreness, redness, or swelling where the shot was given  hoarseness  sore, red or itchy eyes  cough  fever  aches  headache  itching  fatigue If these problems occur, they usually begin soon after the shot and last 1 or 2 days. More serious problems following a flu shot can include the following:  There may be a small increased risk of Guillain-Barré Syndrome (GBS) after inactivated flu vaccine. This risk has been estimated at 1 or 2 additional cases per million people vaccinated. This is much lower than the risk of severe complications from flu, which can be prevented by flu vaccine.  Young children who get the flu shot along with pneumococcal vaccine (PCV13) and/or DTaP vaccine at the same time might be slightly more likely to have a seizure caused by fever. Ask your doctor for more information. Tell your doctor if a child who is getting flu vaccine has ever had a seizure. Problems that could happen after any injected vaccine:  People sometimes faint after a medical procedure, including vaccination. Sitting or lying down for about 15 minutes can help prevent fainting, and injuries caused by a fall. Tell your doctor if you feel dizzy, or have vision changes or ringing in the ears.  Some people get severe pain in the shoulder and have difficulty moving the arm where a shot was given. This happens very rarely.  Any medication can cause a severe allergic reaction. Such reactions from a vaccine are very rare, estimated at about 1 in a million doses, and would happen within a few minutes to a few hours after the vaccination. As with any medicine, there is a very remote chance of a vaccine causing a serious injury or death. The safety of vaccines is always being monitored. For more information, visit: www.cdc.gov/vaccinesafety/ 
 
5. What if there is a serious reaction? What should I look for?  Look for anything that concerns you, such as signs of a severe allergic reaction, very high fever, or unusual behavior.  
 
Signs of a severe allergic reaction can include hives, swelling of the face and throat, difficulty breathing, a fast heartbeat, dizziness, and weakness  usually within a few minutes to a few hours after the vaccination. What should I do?  If you think it is a severe allergic reaction or other emergency that cant wait, call 9-1-1 and get the person to the nearest hospital. Otherwise, call your doctor.  Reactions should be reported to the Vaccine Adverse Event Reporting System (VAERS). Your doctor should file this report, or you can do it yourself through  the VAERS web site at www.vaers. [facility].gov, or by calling 7-832.293.7273. VAERS does not give medical advice. 6. The National Vaccine Injury Compensation Program 
 
The [facility] Vaccine Injury Compensation Program (VICP) is a federal program that was created to compensate people who may have been injured by certain vaccines. Persons who believe they may have been injured by a vaccine can learn about the program and about filing a claim by calling 6-930.408.5279 or visiting the [employer] website at www.[facility].gov/vaccinecompensation. There is a time limit to file a claim for compensation. 7. How can I learn more?  Ask your healthcare provider. He or she can give you the vaccine package insert or suggest other sources of information.  Call your local or state health department.  Contact the Centers for Disease Control and Prevention (CDC): 
- Call 9-824.377.7273 (1-800-CDC-INFO) or 
- Visit CDCs website at www.cdc.gov/flu Vaccine Information Statement Inactivated Influenza Vaccine 8/7/2015 
[address] [facility] High [id number] Department of [facility] and [employer] Centers for Disease Control and Prevention Office Use Only Introducing [facility]! [facility] introduces WHObyYOU patient portal. Now you can access parts of your medical record, email your doctor's office, and request medication refills online. 1. In your internet browser, go to [email or web address] 2. Click on the First Time User? Click Here link in the Sign In box. You will see the New Member Sign Up page. 3. Enter your [employer] Access Code exactly as it appears below. You will not need to use this code after youve completed the sign-up process. If you do not sign up before the expiration date, you must request a new code. · Beauty Booked Access Code: [facility] Expires: 12/11/2017  9:43 AM 
 
4. Enter the last four digits of your Social Security Number (xxxx) and Date of Birth (mm/dd/yyyy) as indicated and click Submit. You will be taken to the next sign-up page. 5. Create a Beauty Booked ID. This will be your [employer] Booked login ID and cannot be changed, so think of one that is secure and easy to remember. 6. Create a [employer] password. You can change your password at any time. 7. Enter your Password Reset Question and Answer. This can be used at a later time if you forget your password. 8. Enter your e-mail address. You will receive e-mail notification when new information is available in [address] 9. Click Sign Up. You can now view and download portions of your medical record. 10. Click the Download Summary menu link to download a portable copy of your medical information. If you have questions, please visit the Frequently Asked Questions section of the [employer] website. Remember, [employer] is NOT to be used for urgent needs. For medical emergencies, dial 911. Now available from your iPhone and Android! Please provide this summary of care documentation to your next provider. Your primary care clinician is listed as Glenn Carballo. If you have any questions after today's visit, please call [phone].

## 2017-11-27 NOTE — PROGRESS NOTES
Chief Complaint   Patient presents with    Dysuria     1. Have you been to the ER, urgent care clinic since your last visit? Hospitalized since your last visit? No    2. Have you seen or consulted any other health care providers outside of the [address] [place] [address] since your last visit? Include any pap smears or colon screening. No     Painful urination started last night. Pt states that she took Azo 2 tablets this morning.        Flu vaccine given today per Wolyoni Daily, NP.

## 2017-11-27 NOTE — PROGRESS NOTES
Subjective:      Chief Complaint   Patient presents with    Dysuria       Taz Bowers is a 76 y.o. female that presents today with a chief complaint of dysuria that began on 11/23/17. The patient admits to accompanying urgency, frequency, fever (on Thanksgiving day but does not know how high it was and has not had fever since), suprapubic pain and nausea . The patient denies vomiting, and diarrhea. Took Azo last night and this AM for urinary symptoms. Diabetes:  Controlled with PO medications. Blood sugars usually 120s but says since Thanksgiving, has had some elevated reading around 180s. Past Medical History:   Diagnosis Date    Atypical chest pain     Long h/o intermittent non-cardiac CP.  Depression with anxiety     Diabetes ([address]     GERD (gastroesophageal reflux disease)     Hypercholesteremia     Hyperlipidemia 7/3/2013    Hypertension     Inner ear dysfunction     S/P aortic valve replacement     Dr. Evita Kaur yearly    Vitamin D deficiency      Current Outpatient Prescriptions   Medication Sig    glimepiride (AMARYL) 1 mg tablet TAKE ONE TABLET BY MOUTH IN THE MORNING    metFORMIN (GLUCOPHAGE) 500 mg tablet TAKE TWO TABLETS BY MOUTH DAILY WITH BREAKFAST    lovastatin (MEVACOR) 10 mg tablet TAKE ONE TABLET BY MOUTH NIGHTLY    lisinopril (PRINIVIL, ZESTRIL) 5 mg tablet TAKE 1 TABLET BY MOUTH EVERY DAY    metoprolol succinate (TOPROL-XL) 25 mg XL tablet Take 1 Tab by mouth daily.  cholecalciferol (VITAMIN D3) 1,000 unit tablet Take 1,000 Units by mouth daily.  MAGNESIUM PO Take 1 Tab by mouth daily.  calcium-cholecalciferol, d3, 600-125 mg-unit tab Take 1 Tab by mouth daily.  meclizine (ANTIVERT) 25 mg tablet Take 1 Tab by mouth three (3) times daily as needed for Dizziness.  glucose blood VI test strips (ASCENSIA AUTODISC VI, ONE TOUCH ULTRA TEST VI) strip Test daily. Diagnosis 250.00    Lancets misc Test twice daily.  Diagnosis 250.00    vitamin c-vitamin e (CRANBERRY CONCENTRATE) cap Take 1 Cap by mouth daily.  cyanocobalamin (VITAMIN B-12) 1,000 mcg tablet Take 1,000 mcg by mouth daily.  aspirin delayed-release 325 mg tablet Take 1 Tab by mouth daily.  PV W-O EDU/FERROUS FUMARATE/FA (M-VIT PO) Take 1 tablet by mouth daily.  omega-3 fatty acids-vitamin e (FISH OIL) 1,000 mg Cap Take 1 capsule by mouth daily.  clopidogrel (PLAVIX) 75 mg tab Take 1 Tab by mouth daily.  PARoxetine (PAXIL) 30 mg tablet TAKE ONE TABLET BY MOUTH ONCE DAILY     No current facility-administered medications for this visit. Allergies   Allergen Reactions    Naldecon Unknown (comments)    Sulfa (Sulfonamide Antibiotics) Rash     Social History   Substance Use Topics    Smoking status: Former Smoker     Quit date: 8/27/1999    Smokeless tobacco: Never Used    Alcohol use No       ROS per HPI. Objective:     Visit Vitals    /80 (BP 1 Location: Right arm, BP Patient Position: Sitting)    Pulse 61    Temp 97.6 °F (36.4 °C) (Oral)    Resp 20    Ht 5' 2\" (1.575 m)    Wt 214 lb 3.2 oz (97.2 kg)    SpO2 95%    BMI 39.18 kg/m2       Skin: no pallor, normal turgor  HEENT:  Normocephalic, no conjunctival inflammation, pupils equal round and reactive to light, MMM, no oral lesions  Heart: regular rate and rhythm, no murmurs, rubs or gallops  Lungs: no increased respiratory effort, clear to ausculation bilaterally  Abdomen: soft, mild suprapubic tenderness, not distended. Normal bowel sounds. No rebound or guarding. No bruits.   Back: no costovertebral angle tenderness  Extremities:no edema or cyanosis  Neuro awake, alert and oriented    Results for orders placed or performed in visit on 11/27/17   AMB POC URINALYSIS DIP STICK AUTO W/O MICRO     Status: None   Result Value Ref Range Status    Color (UA POC) Red  Final    Clarity (UA POC) Turbid  Final    Glucose (UA POC) Trace Negative Final    Bilirubin (UA POC) Negative Negative Final    Ketones (UA POC) Trace Negative Final    Specific gravity (UA POC) 1.015 1.001 - 1.035 Final    Blood (UA POC) 3+ Negative Final    pH (UA POC) 5.5 4.6 - 8.0 Final    Protein (UA POC) 2+ Negative Final    Urobilinogen (UA POC) 2 mg/dL 0.2 - 1 Final    Nitrites (UA POC) Positive Negative Final    Leukocyte esterase (UA POC) 3+ Negative Final   Results for orders placed or performed in visit on 02/04/10   AMB POC URINALYSIS DIP STICK AUTO W/ MICRO     Status: Abnormal   Result Value Ref Range Status    Color (UA POC) Yellow      Clarity (UA POC) Cloudy      pH (UA POC) 5.0 4.6 - 8.0     Specific gravity (UA POC) 1.020 1.001 - 1.035     Glucose (UA POC) Negative Negative     Ketones (UA POC) Trace Negative     Blood (UA POC) Trace Negative     Bilirubin (UA POC) Negative Negative     Nitrites (UA POC) Positive Negative     Leukocyte esterase (UA POC) 4+ Negative     Protein (UA POC) trace Negative mg/dL     Creatinine n mg/dL     Protein-Creat Ratio n           Assessment/plan:   Differential diagnosis and treatment options reviewed with patient who is in agreement with treatment plan as outlined below. ICD-10-CM ICD-9-CM    1. Dysuria R30.0 788.1 AMB POC URINALYSIS DIP STICK AUTO W/O MICRO      nitrofurantoin, macrocrystal-monohydrate, (MACROBID) 100 mg capsule   2. Acute cystitis without hematuria N30.00 595.0 nitrofurantoin, macrocrystal-monohydrate, (MACROBID) 100 mg capsule      CULTURE, URINE   3. Encounter for immunization Z23 V03.89 INFLUENZA VIRUS VACCINE, HIGH DOSE SEASONAL, PRESERVATIVE FREE      ADMIN INFLUENZA VIRUS VAC     Will send urine for culture. Antibiotic prescribed for UTI, will call with culture results. Increase water intake. Flu shot today, no fever and she should have given her risks for infection. VIS discussed and given in AVS.    Advised to go to ER if signs of urosepsis (fever, AMS, inability to keep fluids down).   Encouraged to watch blood sugars closely at home, call if blood sugars remain elevated. Follow up one month for routine check up and labs. Verbal and written instructions (see AVS) provided. Patient expresses understanding of diagnosis and treatment plan.

## 2017-11-28 ENCOUNTER — TELEPHONE (OUTPATIENT)
Dept: FAMILY MEDICINE CLINIC | Age: 77
End: 2017-11-28

## 2017-11-28 NOTE — TELEPHONE ENCOUNTER
Cora Duke from [facility] calling to check on status of order that was faxed [phone].  She can be reached at  [phone]

## 2017-11-29 ENCOUNTER — TELEPHONE (OUTPATIENT)
Dept: FAMILY MEDICINE CLINIC | Age: 77
End: 2017-11-29

## 2017-11-29 LAB
BACTERIA UR CULT: ABNORMAL
BACTERIA UR CULT: ABNORMAL

## 2017-11-30 ENCOUNTER — PATIENT OUTREACH (OUTPATIENT)
Dept: FAMILY MEDICINE CLINIC | Age: 77
End: 2017-11-30

## 2017-11-30 NOTE — PROGRESS NOTES
Please let her know that her UC shows that the bacteria causing her UTI is susceptible to the antibiotic that I put her on and she should complete entire antibiotic.   Thanks  Milton Johnson FNP

## 2017-11-30 NOTE — PROGRESS NOTES
Spoke with daughter Ross Perez who called to state Dr Christoph Dao had discontinued patient's plavix on 10/25/17. Call placed to Dr Anisa Coppola nurse and consult note was faxed to office. Dr Madie Arvizu reviewed and she gave verbaal order to remove plavix from medication list.

## 2017-11-30 NOTE — TELEPHONE ENCOUNTER
Daughter calling to inform our office that we will be receiving a request form [facility] for a list of her current medications.

## 2017-12-19 ENCOUNTER — TELEPHONE (OUTPATIENT)
Dept: FAMILY MEDICINE CLINIC | Age: 77
End: 2017-12-19

## 2017-12-19 NOTE — TELEPHONE ENCOUNTER
Message left for pt, that Malaika Jay is not in the office today but to call back if I can help her, otherwise I will send message to Malaika Jay.

## 2017-12-21 NOTE — TELEPHONE ENCOUNTER
Patient had received Plavix in mail and she wanted to verify if she should take. She thought Dr Jenny Adan had discontinued in October visit. Reveiwed consult note from Dr Jenny Adan and it has been discontinued. Advised patient and daughter Jeronimo Wallis of this. Both verbalized understanding.

## 2017-12-27 ENCOUNTER — OFFICE VISIT (OUTPATIENT)
Dept: FAMILY MEDICINE CLINIC | Age: 77
End: 2017-12-27

## 2017-12-27 ENCOUNTER — TELEPHONE (OUTPATIENT)
Dept: FAMILY MEDICINE CLINIC | Age: 77
End: 2017-12-27

## 2017-12-27 VITALS
SYSTOLIC BLOOD PRESSURE: 187 MMHG | DIASTOLIC BLOOD PRESSURE: 88 MMHG | RESPIRATION RATE: 12 BRPM | TEMPERATURE: 97.7 F | HEIGHT: 62 IN | WEIGHT: 219 LBS | BODY MASS INDEX: 40.3 KG/M2 | OXYGEN SATURATION: 97 % | HEART RATE: 51 BPM

## 2017-12-27 DIAGNOSIS — F41.8 DEPRESSION WITH ANXIETY: ICD-10-CM

## 2017-12-27 DIAGNOSIS — R41.3 MEMORY PROBLEM: Primary | ICD-10-CM

## 2017-12-27 DIAGNOSIS — I10 ESSENTIAL HYPERTENSION, BENIGN: ICD-10-CM

## 2017-12-27 NOTE — TELEPHONE ENCOUNTER
Patient has not been taking lisinopril. Doesn't look like she has been taking her paxil. Taking pro biotics, garcina cambogina, and cinnamon.

## 2017-12-27 NOTE — PROGRESS NOTES
MARKY Barroso is a 76 y.o. female who presents with her daughter because of concern about her memory. Apparently starting about 2 months ago she has been getting the impression that she is not remembering as well as she used to. Specific example she gave me is that she forgets where she puts things. She will forget her medications. She will forget what day it is. She perseverates about insurance problems. Apparently she has been trying to find cheaper insurance and has called around and apparently had for pending policies and canceled her old insurance but has no recollection of doing this. Daughter only found out when she received notification of the cancellation. Daughter notices the patient is aggravated more often, she will get confused about her medications for instance she took Plavix for a year after her doctor told her to stop. This might have been an innocent mistake, i.e. cardiology stops medication but PCP auto refills. Patient gets annoyed with family members when they try to help with her medications. I have seen patient in the past because of concern about anxiety. In fact the last time I saw her in September she volunteered that she was worried about becoming anxious during the winter because it reminds her of when her   8 years ago. Has a primary on the holidays and the month of May which is the month that he . Has seen a counselor in the past but has not recently. PMHx:  Past Medical History:   Diagnosis Date    Atypical chest pain     Long h/o intermittent non-cardiac CP.     Depression with anxiety     Diabetes ([address]     GERD (gastroesophageal reflux disease)     Hypercholesteremia     Hyperlipidemia 7/3/2013    Hypertension     Inner ear dysfunction     S/P aortic valve replacement     Dr. Amelia Baker yearly    Vitamin D deficiency        Meds:   Current Outpatient Prescriptions   Medication Sig Dispense Refill    glimepiride (AMARYL) 1 mg tablet TAKE ONE TABLET BY MOUTH IN THE MORNING 90 Tab 3    metFORMIN (GLUCOPHAGE) 500 mg tablet TAKE TWO TABLETS BY MOUTH DAILY WITH BREAKFAST 180 Tab 3    PARoxetine (PAXIL) 30 mg tablet TAKE ONE TABLET BY MOUTH ONCE DAILY 90 Tab 3    lovastatin (MEVACOR) 10 mg tablet TAKE ONE TABLET BY MOUTH NIGHTLY 90 Tab 3    lisinopril (PRINIVIL, ZESTRIL) 5 mg tablet TAKE 1 TABLET BY MOUTH EVERY DAY 90 Tab 3    cholecalciferol (VITAMIN D3) 1,000 unit tablet Take 1,000 Units by mouth daily.  MAGNESIUM PO Take 1 Tab by mouth daily.  calcium-cholecalciferol, d3, 600-125 mg-unit tab Take 1 Tab by mouth daily.  meclizine (ANTIVERT) 25 mg tablet Take 1 Tab by mouth three (3) times daily as needed for Dizziness. 15 Tab 0    glucose blood VI test strips (ASCENSIA AUTODISC VI, ONE TOUCH ULTRA TEST VI) strip Test daily. Diagnosis 250.00 3 Package 3    Lancets misc Test twice daily. Diagnosis 250.00 3 Package 3    vitamin c-vitamin e (CRANBERRY CONCENTRATE) cap Take 1 Cap by mouth daily.  cyanocobalamin (VITAMIN B-12) 1,000 mcg tablet Take 1,000 mcg by mouth daily.  aspirin delayed-release 325 mg tablet Take 1 Tab by mouth daily. 90 Tab 1    PV W-O EDU/FERROUS FUMARATE/FA (M-VIT PO) Take 1 tablet by mouth daily.  omega-3 fatty acids-vitamin e (FISH OIL) 1,000 mg Cap Take 1 capsule by mouth daily.  metoprolol succinate (TOPROL-XL) 25 mg XL tablet Take 1 Tab by mouth daily. 90 Tab 3       Allergies: Allergies   Allergen Reactions    Naldecon Unknown (comments)    Sulfa (Sulfonamide Antibiotics) Rash       Smoker:  History   Smoking Status    Former Smoker    Quit date: 8/27/1999   Smokeless Tobacco    Never Used       ETOH:   History   Alcohol Use No       FH:   Family History   Problem Relation Age of Onset    Heart Disease Mother     Heart Failure Father     Heart Failure Sister     Stroke Sister     Diabetes Brother     Heart Disease Brother        ROS:   As listed in HPI.  In addition:  Constitutional:   No headache, fever, fatigue, weight loss or weight gain      Cardiac:    No chest pain      Resp:   No cough or shortness of breath      Neuro   No loss of consciousness, dizziness, seizures      Physical Exam:  Blood pressure 187/88, pulse (!) 51, temperature 97.7 °F (36.5 °C), resp. rate 12, height 5' 2\" (1.575 m), weight 219 lb (99.3 kg), SpO2 97 %. GEN: No apparent distress. Alert and oriented and responds to all questions appropriately. NEUROLOGIC:  No focal neurologic deficits. Strength and sensation grossly intact. Coordination and gait grossly intact. EXT: Well perfused. No edema. SKIN: No obvious rashes. [place] cognitive assessment, she did quite well on visual spatial/executive tasks. Her immediate recall of 5 words was okay (4/5) but delayed recall was only 1/5. Did not do a very good job with serial sevens (although she did do the math right, just could not grasp the concept. Word fluency was good. Had trouble with any task that required hearing me clearly (patient is hard of hearing)  Generous scoring gave her a 25/30 which is a ride on the border. This test has a high sensitivity but specificity of the for cognitive issue       Assessment and Plan     Impression from the [facility] cognitive assessment is that her executive function is fine but concentration is lacking. This led to a discussion about her anxiety and depression and what might have changed acutely 2 months ago. Apparently she helps to care for her grandchild. She has a daughter who has serious mental health problems. His grandchild was diagnosed with autism about 2 months ago which has been concerning her. This could explain the perseveration over insurance. I am also not convinced that she is taking her medications. Just some of the things that she said.   I attest daughter with confirm me what medications patient is actually taking, she later called back and said that she is not taking her lisinopril and is not taking her Paxil. Probably stop this medication a few months ago about the same time that the symptoms started. I would like to see her back in 1 month after she started the Paxil. She was complaining of leg pain. 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 Knee joint is stable. Joint line is nontender. Not actually bothering her. Only seems to bother her when she walks for about an hour the store and then it will hurt her for a day and half and then get better. I recommend physical therapy for her arthritis and patellar arthritis      ICD-10-CM ICD-9-CM    1. Memory problem R41.3 780.93    2. Depression with anxiety F41.8 300.4    3. Essential hypertension, benign I10 401.1        AVS given. Pt expressed understanding of instructions    This was a 45-minute visit. Greater than 50% of the time was spent counseling the patient on cognitive function, memory, anxiety and depression.

## 2017-12-27 NOTE — MR AVS SNAPSHOT
Visit Information Date & Time Provider Department Dept. Phone Encounter #  
 12/27/2017 10:00 AM Richy Meza MD [address] [facility] [phone] [id number] Upcoming Health Maintenance Date Due Pneumococcal 65+ Low/Medium Risk (2 of 2 - PCV13) 7/3/2010 MICROALBUMIN Q1 12/21/2017 LIPID PANEL Q1 12/21/2017 HEMOGLOBIN A1C Q6M 1/3/2018 EYE EXAM RETINAL OR DILATED Q1 3/29/2018 FOOT EXAM Q1 7/3/2018 MEDICARE YEARLY EXAM 7/4/2018 GLAUCOMA SCREENING Q2Y 3/29/2019 DTaP/Tdap/Td series (2 - Td) 10/18/2023 Allergies as of 12/27/2017  Review Complete On: 12/27/2017 By: Jason Paz Severity Noted Reaction Type Reactions Naldecon High 11/07/2009    Unknown (comments) Sulfa (Sulfonamide Antibiotics) High 11/07/2009    Rash Current Immunizations  Reviewed on 9/15/2017 Name Date Influenza High Dose Vaccine PF 11/27/2017  3:33 PM, 12/21/2016, 10/1/2014, 10/24/2013 Influenza Vaccine Split 10/9/2012, 11/1/2010 Pneumococcal Polysaccharide (PPSV-23) 7/3/2009 Tdap 10/18/2013 Zoster Vaccine, Live 10/6/2009 Not reviewed this visit Vitals BP Pulse Temp Resp Height(growth percentile) Weight(growth percentile) 187/88 (!) 51 97.7 °F (36.5 °C) 12 5' 2\" (1.575 m) 219 lb (99.3 kg) SpO2 BMI OB Status Smoking Status 97% 40.06 kg/m2 Postmenopausal Former Smoker Vitals History BMI and BSA Data Body Mass Index Body Surface Area 40.06 kg/m 2 2.08 m 2 Preferred Pharmacy Pharmacy Name Phone [facility] [address] [place] [address] Drive [phone] Your Updated Medication List  
  
   
This list is accurate as of: 12/27/17 11:37 AM.  Always use your most recent med list.  
  
  
  
  
 aspirin delayed-release 325 mg tablet Take 1 Tab by mouth daily. calcium-cholecalciferol (d3) 600-125 mg-unit Tab Take 1 Tab by mouth daily. cholecalciferol 1,000 unit tablet Commonly known as:  VITAMIN D3 Take 1,000 Units by mouth daily. CRANBERRY CONCENTRATE Cap Generic drug:  vitamin c-vitamin e Take 1 Cap by mouth daily. FISH OIL 1,000 mg Cap Generic drug:  omega-3 fatty acids-vitamin e Take 1 capsule by mouth daily. glimepiride 1 mg tablet Commonly known as:  AMARYL  
TAKE ONE TABLET BY MOUTH IN THE MORNING  
  
 glucose blood VI test strips strip Commonly known as:  ASCENSIA AUTODISC VI, ONE TOUCH ULTRA TEST VI Test daily. Diagnosis 250.00 Lancets Misc Test twice daily. Diagnosis 250.00  
  
 lisinopril 5 mg tablet Commonly known as:  PRINIVIL, ZESTRIL  
TAKE 1 TABLET BY MOUTH EVERY DAY  
  
 lovastatin 10 mg tablet Commonly known as:  MEVACOR  
TAKE ONE TABLET BY MOUTH NIGHTLY  
  
 M-VIT PO Take 1 tablet by mouth daily. MAGNESIUM PO Take 1 Tab by mouth daily. meclizine 25 mg tablet Commonly known as:  ANTIVERT Take 1 Tab by mouth three (3) times daily as needed for Dizziness. metFORMIN 500 mg tablet Commonly known as:  GLUCOPHAGE  
TAKE TWO TABLETS BY MOUTH DAILY WITH BREAKFAST  
  
 metoprolol succinate 25 mg XL tablet Commonly known as:  TOPROL-XL Take 1 Tab by mouth daily. PARoxetine 30 mg tablet Commonly known as:  PAXIL TAKE ONE TABLET BY MOUTH ONCE DAILY  
  
 VITAMIN B-12 1,000 mcg tablet Generic drug:  cyanocobalamin Take 1,000 mcg by mouth daily. Introducing [facility]! [facility] introduces [employer] patient portal. Now you can access parts of your medical record, email your doctor's office, and request medication refills online. 1. In your internet browser, go to [email or web address] 2. Click on the First Time User? Click Here link in the Sign In box. You will see the New Member Sign Up page. 3. Enter your [employer] Access Code exactly as it appears below.  You will not need to use this code after youve completed the sign-up process. If you do not sign up before the expiration date, you must request a new code. · [email or web address] Access Code: [email or web address] Expires: 3/27/2018 11:37 AM 
 
4. Enter the last four digits of your Social Security Number (xxxx) and Date of Birth (mm/dd/yyyy) as indicated and click Submit. You will be taken to the next sign-up page. 5. Create a [email or web address] ID. This will be your [email or web address] login ID and cannot be changed, so think of one that is secure and easy to remember. 6. Create a [email or web address] password. You can change your password at any time. 7. Enter your Password Reset Question and Answer. This can be used at a later time if you forget your password. 8. Enter your e-mail address. You will receive e-mail notification when new information is available in [address]. 9. Click Sign Up. You can now view and download portions of your medical record. 10. Click the Download Summary menu link to download a portable copy of your medical information. If you have questions, please visit the Frequently Asked Questions section of the [email or web address] website. Remember, [email or web address] is NOT to be used for urgent needs. For medical emergencies, dial 911. Now available from your iPhone and Android! Please provide this summary of care documentation to your next provider. Your primary care clinician is listed as Gisselle Ashby. If you have any questions after today's visit, please call [phone].

## 2018-01-02 RX ORDER — METOPROLOL SUCCINATE 25 MG/1
25 TABLET, EXTENDED RELEASE ORAL DAILY
Qty: 90 TAB | Refills: 3 | Status: SHIPPED | OUTPATIENT
Start: 2018-01-02 | End: 2018-03-14 | Stop reason: SDUPTHER

## 2018-01-04 RX ORDER — METOPROLOL SUCCINATE 25 MG/1
25 TABLET, EXTENDED RELEASE ORAL DAILY
Qty: 90 TAB | Refills: 3 | Status: CANCELLED | OUTPATIENT
Start: 2018-01-04

## 2018-02-13 ENCOUNTER — TELEPHONE (OUTPATIENT)
Dept: FAMILY MEDICINE CLINIC | Age: 78
End: 2018-02-13

## 2018-02-13 ENCOUNTER — OFFICE VISIT (OUTPATIENT)
Dept: FAMILY MEDICINE CLINIC | Age: 78
End: 2018-02-13

## 2018-02-13 VITALS
SYSTOLIC BLOOD PRESSURE: 172 MMHG | RESPIRATION RATE: 18 BRPM | BODY MASS INDEX: 39.01 KG/M2 | WEIGHT: 212 LBS | HEART RATE: 68 BPM | TEMPERATURE: 98.1 F | HEIGHT: 62 IN | OXYGEN SATURATION: 95 % | DIASTOLIC BLOOD PRESSURE: 72 MMHG

## 2018-02-13 DIAGNOSIS — E11.8 CONTROLLED TYPE 2 DIABETES MELLITUS WITH COMPLICATION, WITHOUT LONG-TERM CURRENT USE OF INSULIN (HCC): Primary | ICD-10-CM

## 2018-02-13 DIAGNOSIS — F41.8 DEPRESSION WITH ANXIETY: ICD-10-CM

## 2018-02-13 DIAGNOSIS — Z95.2 S/P AORTIC VALVE REPLACEMENT: ICD-10-CM

## 2018-02-13 DIAGNOSIS — E78.2 MIXED HYPERLIPIDEMIA: ICD-10-CM

## 2018-02-13 DIAGNOSIS — Z23 ENCOUNTER FOR IMMUNIZATION: ICD-10-CM

## 2018-02-13 DIAGNOSIS — I10 ESSENTIAL HYPERTENSION, BENIGN: ICD-10-CM

## 2018-02-13 LAB
ALBUMIN UR QL STRIP: 30 MG/L
CREATININE, URINE POC: 100 MG/DL
HBA1C MFR BLD HPLC: 6.2 % (ref 4.8–5.6)
MICROALBUMIN/CREAT RATIO POC: <30 MG/G

## 2018-02-13 RX ORDER — MELOXICAM 15 MG/1
15 TABLET ORAL DAILY
Qty: 90 TAB | Refills: 0 | Status: SHIPPED | OUTPATIENT
Start: 2018-02-13 | End: 2018-03-14 | Stop reason: SDUPTHER

## 2018-02-13 NOTE — PROGRESS NOTES
Prevnar Immunization administered 2/13/2018 by Price Hardin with patients consent. Patient tolerated procedure well. No reactions noted. Vis given.

## 2018-02-13 NOTE — PATIENT INSTRUCTIONS
Start daily meloxicam, stop taking over the counter advil. Tell PT that your right leg hurts after therapy. Vaccine Information Statement     Pneumococcal Conjugate Vaccine (PCV13): What You Need to Know    Many Vaccine Information Statements are available in [place] and other languages. See www.immunize.org/vis. Hojas de información Sobre Vacunas están disponibles en español y en muchos otros idiomas. Visite www.immunize.org/vis. 1. Why get vaccinated? Vaccination can protect both children and adults from pneumococcal disease. Pneumococcal disease is caused by bacteria that can spread from person to person through close contact. It can cause ear infections, and it can also lead to more serious infections of the:   Lungs (pneumonia),   Blood (bacteremia), and   Covering of the brain and spinal cord (meningitis). Pneumococcal pneumonia is most common among adults. Pneumococcal meningitis can cause deafness and brain damage, and it kills about 1 child in 10 who get it. Anyone can get pneumococcal disease, but children under 3years of age and adults 72 years and older, people with certain medical conditions, and cigarette smokers are at the highest risk. Before there was a vaccine, the [facility] saw:   more than 700 cases of meningitis,   about 13,000 blood infections,   about 5 million ear infections, and   about 200 deaths  in children under 5 each year from pneumococcal disease. Since vaccine became available, severe pneumococcal disease in these children has fallen by 88%. About 18,000 older adults die of pneumococcal disease each year in the [place]. Treatment of pneumococcal infections with penicillin and other drugs is not as effective as it used to be, because some strains of the disease have become resistant to these drugs. This makes prevention of the disease, through vaccination, even more important.     2. PCV13 vaccine    Pneumococcal conjugate vaccine (called PCV13) protects against 13 types of pneumococcal bacteria. PCV13 is routinely given to children at 2, 4, 6, and 1515 months of age. It is also recommended for children and adults 3to 59years of age with certain health conditions, and for all adults 72years of age and older. Your doctor can give you details. 3. Some people should not get this vaccine    Anyone who has ever had a life-threatening allergic reaction to a dose of this vaccine, to an earlier pneumococcal vaccine called PCV7, or to any vaccine containing diphtheria toxoid (for example, DTaP), should not get PCV13. Anyone with a severe allergy to any component of PCV13 should not get the vaccine. Tell your doctor if the person being vaccinated has any severe allergies. If the person scheduled for vaccination is not feeling well, your healthcare provider might decide to reschedule the shot on another day. 4. Risks of a vaccine reaction    With any medicine, including vaccines, there is a chance of reactions. These are usually mild and go away on their own, but serious reactions are also possible. Problems reported following PCV13 varied by age and dose in the series. The most common problems reported among children were:    About half became drowsy after the shot, had a temporary loss of appetite, or had redness or tenderness where the shot was given.  About 1 out of 3 had swelling where the shot was given.  About 1 out of 3 had a mild fever, and about 1 in 20 had a fever over 102.2°F.   Up to about 8 out of 10 became fussy or irritable. Adults have reported pain, redness, and swelling where the shot was given; also mild fever, fatigue, headache, chills, or muscle pain. [place] Rife children who get PCV13 along with inactivated flu vaccine at the same time may be at increased risk for seizures caused by fever. Ask your doctor for more information.      Problems that could happen after any vaccine:     People sometimes faint after a medical procedure, including vaccination. Sitting or lying down for about 15 minutes can help prevent fainting, and injuries caused by a fall. Tell your doctor if you feel dizzy, or have vision changes or ringing in the ears.  Some older children and adults get severe pain in the shoulder and have difficulty moving the arm where a shot was given. This happens very rarely.  Any medication can cause a severe allergic reaction. Such reactions from a vaccine are very rare, estimated at about 1 in a million doses, and would happen within a few minutes to a few hours after the vaccination. As with any medicine, there is a very small chance of a vaccine causing a serious injury or death. The safety of vaccines is always being monitored. For more information, visit: www.cdc.gov/vaccinesafety/     5. What if there is a serious reaction? What should I look for?  Look for anything that concerns you, such as signs of a severe allergic reaction, very high fever, or unusual behavior. Signs of a severe allergic reaction can include hives, swelling of the face and throat, difficulty breathing, a fast heartbeat, dizziness, and weakness  usually within a few minutes to a few hours after the vaccination. What should I do?  If you think it is a severe allergic reaction or other emergency that cant wait, call 9-1-1 or get the person to the nearest hospital. Otherwise, call your doctor. Reactions should be reported to the Vaccine Adverse Event Reporting System (VAERS). Your doctor should file this report, or you can do it yourself through the VAERS web site at www.vaers. hhs.gov, or by calling 1-963.755.2724. VAERS does not give medical advice. 6. The National Vaccine Injury Compensation Program    The [facility] Vaccine Injury Compensation Program (VICP) is a federal program that was created to compensate people who may have been injured by certain vaccines.     Persons who believe they may have been injured by a vaccine can learn about the program and about filing a claim by calling 1-655.171.7021 or visiting the [address] [place] [employer] website at www.[facility].gov/vaccinecompensation. There is a time limit to file a claim for compensation. 7. How can I learn more?  Ask your healthcare provider. He or she can give you the vaccine package insert or suggest other sources of information.  Call your local or state health department.  Contact the Centers for Disease Control and Prevention (CDC):  - Call 5-377.881.7985 (1-800-CDC-INFO) or  - Visit CDCs website at www.cdc.gov/vaccines    Vaccine Information Statement   PCV13 Vaccine   11/5/2015   42 GAL[address] oCta [id number]    Department of Health and Human Services  Centers for Disease Control and Prevention    Office Use Only

## 2018-02-13 NOTE — PROGRESS NOTES
HPI:  76 y.o.  presents for follow up appointment. Right leg pain - has been seeing PT for past month and states knee is no better - in fact may be worse. No history of surgery. Was being treated for arthritis. Patient Active Problem List    Diagnosis    Hyperlipidemia Takes medication at night as directed, no muscle aches. Tries to watch diet.  Depression with anxiety    Vitamin D deficiency    S/P aortic valve replacement     Dr. Juliana Grey,  Open procedure in 2012, transcatheter redo in 2015.  Diabetes type 2, controlled ([address] Takes medications as directed. No polyuria/polydipsia. No vision changes. No unintentional weight loss. Checks feet, and no sores noted. No tingling or numbness in feet.  Essential hypertension, benign -No home monitoring. Compliant with medication. No shortness of breath, no chest pain, no vision change, no headache, no lower extremity edema.  Palpitations - Denies orthopnea, PND, light headedness, palpitations, or dyspnea on exertion. Weight has been stable. Past Medical History:   Diagnosis Date    Atypical chest pain     Long h/o intermittent non-cardiac CP.  Depression with anxiety     Diabetes ([address]     GERD (gastroesophageal reflux disease)     Hypercholesteremia     Hyperlipidemia 7/3/2013    Hypertension     Inner ear dysfunction     S/P aortic valve replacement     Dr. Juliana Grey yearly    Vitamin D deficiency        Social History   Substance Use Topics    Smoking status: Former Smoker     Quit date: 8/27/1999    Smokeless tobacco: Never Used    Alcohol use No       Outpatient Prescriptions Marked as Taking for the 2/13/18 encounter (Office Visit) with Juanita Hills MD   Medication Sig Dispense Refill    meloxicam (MOBIC) 15 mg tablet Take 1 Tab by mouth daily. 90 Tab 0    metoprolol succinate (TOPROL-XL) 25 mg XL tablet Take 1 Tab by mouth daily.  90 Tab 3    glimepiride (AMARYL) 1 mg tablet TAKE ONE TABLET BY MOUTH IN THE MORNING 90 Tab 3    metFORMIN (GLUCOPHAGE) 500 mg tablet TAKE TWO TABLETS BY MOUTH DAILY WITH BREAKFAST 180 Tab 3    lovastatin (MEVACOR) 10 mg tablet TAKE ONE TABLET BY MOUTH NIGHTLY 90 Tab 3    lisinopril (PRINIVIL, ZESTRIL) 5 mg tablet TAKE 1 TABLET BY MOUTH EVERY DAY 90 Tab 3    cholecalciferol (VITAMIN D3) 1,000 unit tablet Take 1,000 Units by mouth daily.  MAGNESIUM PO Take 1 Tab by mouth daily.  calcium-cholecalciferol, d3, 600-125 mg-unit tab Take 1 Tab by mouth daily.  meclizine (ANTIVERT) 25 mg tablet Take 1 Tab by mouth three (3) times daily as needed for Dizziness. 15 Tab 0    glucose blood VI test strips (ASCENSIA AUTODISC VI, ONE TOUCH ULTRA TEST VI) strip Test daily. Diagnosis 250.00 3 Package 3    Lancets misc Test twice daily. Diagnosis 250.00 3 Package 3    vitamin c-vitamin e (CRANBERRY CONCENTRATE) cap Take 1 Cap by mouth daily.  cyanocobalamin (VITAMIN B-12) 1,000 mcg tablet Take 1,000 mcg by mouth daily.  aspirin delayed-release 325 mg tablet Take 1 Tab by mouth daily. 90 Tab 1    PV W-O EDU/FERROUS FUMARATE/FA (M-VIT PO) Take 1 tablet by mouth daily.  omega-3 fatty acids-vitamin e (FISH OIL) 1,000 mg Cap Take 1 capsule by mouth daily. Allergies   Allergen Reactions    Naldecon Unknown (comments)    Sulfa (Sulfonamide Antibiotics) Rash       ROS:  ROS negative except as per HPI.       PE:  Visit Vitals    /72 (BP 1 Location: Left arm, BP Patient Position: Sitting)    Pulse 68    Temp 98.1 °F (36.7 °C) (Oral)    Resp 18    Ht 5' 2\" (1.575 m)    Wt 212 lb (96.2 kg)    SpO2 95%    BMI 38.78 kg/m2     Gen: alert, oriented, no acute distress  Head: normocephalic, atraumatic  Ears: external auditory canals clear, TMs without erythema or effusion  Eyes: pupils equal round reactive to light, sclera clear, conjunctiva clear  Oral: moist mucus membranes, no oral lesions, no pharyngeal inflammation or exudate  Neck: symmetric normal sized thyroid, no carotid bruits, no jugular vein distention  Resp: no increase work of breathing, lungs clear to ausculation bilaterally, no wheezing, rales or rhonchi  CV: S1, S2 normal.  No murmurs, rubs, or gallops. Abd: soft, not tender, not distended. No hepatosplenomegaly. Normal bowel sounds. Neuro: cranial nerves intact, normal strength and movement in all extremities, reflexes and sensation intact and symmetric. Skin: no lesion or rash  Extremities: no cyanosis or edema    A1C=6.2%    Assessment/Plan:    1. Controlled type 2 diabetes mellitus with complication, without long-term current use of insulin (HCC)  At goal on current medications  - AMB POC HEMOGLOBIN A1C  - AMB POC URINE, MICROALBUMIN, SEMIQUANT (3 RESULTS)  - METABOLIC PANEL, COMPREHENSIVE  - LIPID PANEL  - TSH 3RD GENERATION  - CBC WITH AUTOMATED DIFF    2. Essential hypertension, benign  Elevated today, but did not take medications and is currently in pain in right leg. Will return on medicine for further evaluation. 3. S/P aortic valve replacement  Continue care by cardiology. 4. Mixed hyperlipidemia  No changes pending labs    5. Depression with anxiety  At goal on current medications    6. Encounter for immunization  - PNEUMOCOCCAL CONJ VACCINE 13 VALENT IM (Age 48 and over)  - ADMIN PNEUMOCOCCAL VACCINE  (MEDICARE ONLY)    7.  Leg pain  Start meloxicam daily, follow-up with orthopedist as may need to consider knee transplant. Health Maintenance reviewed - updated. Orders Placed This Encounter    PNEUMOCOCCAL CONJ VACCINE 13 VALENT IM (Age 48 and over)    METABOLIC PANEL, COMPREHENSIVE    LIPID PANEL    TSH 3RD GENERATION    CBC WITH AUTOMATED DIFF    CVD REPORT    DIABETES PATIENT EDUCATION    AMB POC HEMOGLOBIN A1C    AMB POC URINE, MICROALBUMIN, SEMIQUANT (3 RESULTS)    ADMIN PNEUMOCOCCAL VACCINE  (MEDICARE ONLY)    meloxicam (MOBIC) 15 mg tablet     Sig: Take 1 Tab by mouth daily.      Dispense:  90 Tab Refill:  0       There are no discontinued medications. Current Outpatient Prescriptions   Medication Sig Dispense Refill    meloxicam (MOBIC) 15 mg tablet Take 1 Tab by mouth daily. 90 Tab 0    metoprolol succinate (TOPROL-XL) 25 mg XL tablet Take 1 Tab by mouth daily. 90 Tab 3    glimepiride (AMARYL) 1 mg tablet TAKE ONE TABLET BY MOUTH IN THE MORNING 90 Tab 3    metFORMIN (GLUCOPHAGE) 500 mg tablet TAKE TWO TABLETS BY MOUTH DAILY WITH BREAKFAST 180 Tab 3    lovastatin (MEVACOR) 10 mg tablet TAKE ONE TABLET BY MOUTH NIGHTLY 90 Tab 3    lisinopril (PRINIVIL, ZESTRIL) 5 mg tablet TAKE 1 TABLET BY MOUTH EVERY DAY 90 Tab 3    cholecalciferol (VITAMIN D3) 1,000 unit tablet Take 1,000 Units by mouth daily.  MAGNESIUM PO Take 1 Tab by mouth daily.  calcium-cholecalciferol, d3, 600-125 mg-unit tab Take 1 Tab by mouth daily.  meclizine (ANTIVERT) 25 mg tablet Take 1 Tab by mouth three (3) times daily as needed for Dizziness. 15 Tab 0    glucose blood VI test strips (ASCENSIA AUTODISC VI, ONE TOUCH ULTRA TEST VI) strip Test daily. Diagnosis 250.00 3 Package 3    Lancets misc Test twice daily. Diagnosis 250.00 3 Package 3    vitamin c-vitamin e (CRANBERRY CONCENTRATE) cap Take 1 Cap by mouth daily.  cyanocobalamin (VITAMIN B-12) 1,000 mcg tablet Take 1,000 mcg by mouth daily.  aspirin delayed-release 325 mg tablet Take 1 Tab by mouth daily. 90 Tab 1    PV W-O EDU/FERROUS FUMARATE/FA (M-VIT PO) Take 1 tablet by mouth daily.  omega-3 fatty acids-vitamin e (FISH OIL) 1,000 mg Cap Take 1 capsule by mouth daily.  PARoxetine (PAXIL) 30 mg tablet TAKE ONE TABLET BY MOUTH ONCE DAILY 90 Tab 3       Recommended healthy diet low in carbohydrates, fats, sodium and cholesterol. Recommended regular cardiovascular exercise 3-6 times per week for 30-60 minutes daily. Verbal and written instructions (see AVS) provided.   Patient expresses understanding of diagnosis and treatment plan.

## 2018-02-13 NOTE — MR AVS SNAPSHOT
[facility] [place] 
 
 
 [address] [place] [address] 
[phone] Patient: Sanjay Bruno MRN:  [id number] Visit Information Date & Time Provider Department Dept. Phone Encounter #  
 2/13/2018  2:20 PM Ashlyn Monge, [address] [facility] [phone] [id number] Upcoming Health Maintenance Date Due Pneumococcal 65+ Low/Medium Risk (2 of 2 - PCV13) 7/3/2010 MICROALBUMIN Q1 12/21/2017 LIPID PANEL Q1 12/21/2017 HEMOGLOBIN A1C Q6M 1/3/2018 EYE EXAM RETINAL OR DILATED Q1 3/29/2018 FOOT EXAM Q1 7/3/2018 MEDICARE YEARLY EXAM 7/4/2018 GLAUCOMA SCREENING Q2Y 3/29/2019 DTaP/Tdap/Td series (2 - Td) 10/18/2023 Allergies as of 2/13/2018  Review Complete On: 2/13/2018 By: Ashlyn Monge MD  
  
 Severity Noted Reaction Type Reactions Naldecon High 11/07/2009    Unknown (comments) Sulfa (Sulfonamide Antibiotics) High 11/07/2009    Rash Current Immunizations  Reviewed on 9/15/2017 Name Date Influenza High Dose Vaccine PF 11/27/2017  3:33 PM, 12/21/2016, 10/1/2014, 10/24/2013 Influenza Vaccine Split 10/9/2012, 11/1/2010 Pneumococcal Conjugate (PCV-13)  Incomplete Pneumococcal Polysaccharide (PPSV-23) 7/3/2009 Tdap 10/18/2013 Zoster Vaccine, Live 10/6/2009 Not reviewed this visit You Were Diagnosed With   
  
 Codes Comments Controlled type 2 diabetes mellitus with complication, without long-term current use of insulin ([facility] [address]    -  Primary ICD-10-CM: E11.8 ICD-9-CM: 250.90 Essential hypertension, benign     ICD-10-CM: I10 
ICD-9-CM: 401.1 S/P aortic valve replacement     ICD-10-CM: Z95.2 ICD-9-CM: V43.3 Mixed hyperlipidemia     ICD-10-CM: E78.2 ICD-9-CM: 272.2 Depression with anxiety     ICD-10-CM: F41.8 ICD-9-CM: 300.4 Encounter for immunization     ICD-10-CM: X59 ICD-9-CM: V03.89 Vitals BP Pulse Temp Resp Height(growth percentile) Weight(growth percentile) 172/72 (BP 1 Location: Left arm, BP Patient Position: Sitting) 68 98.1 °F (36.7 °C) (Oral) 18 5' 2\" (1.575 m) 212 lb (96.2 kg) SpO2 BMI OB Status Smoking Status 95% 38.78 kg/m2 Postmenopausal Former Smoker BMI and BSA Data Body Mass Index Body Surface Area 38.78 kg/m 2 2.05 m 2 Preferred Pharmacy Pharmacy Name Phone [facility] [address] [place] [address] 6[address] [facility] [address] [phone] Your Updated Medication List  
  
   
This list is accurate as of: 2/13/18  3:05 PM.  Always use your most recent med list.  
  
  
  
  
 aspirin delayed-release 325 mg tablet Take 1 Tab by mouth daily. calcium-cholecalciferol (d3) 600-125 mg-unit Tab Take 1 Tab by mouth daily. cholecalciferol 1,000 unit tablet Commonly known as:  VITAMIN D3 Take 1,000 Units by mouth daily. CRANBERRY CONCENTRATE Cap Generic drug:  vitamin c-vitamin e Take 1 Cap by mouth daily. FISH OIL 1,000 mg Cap Generic drug:  omega-3 fatty acids-vitamin e Take 1 capsule by mouth daily. glimepiride 1 mg tablet Commonly known as:  AMARYL  
TAKE ONE TABLET BY MOUTH IN THE MORNING  
  
 glucose blood VI test strips strip Commonly known as:  ASCENSIA AUTODISC VI, ONE TOUCH ULTRA TEST VI Test daily. Diagnosis 250.00 Lancets Misc Test twice daily. Diagnosis 250.00  
  
 lisinopril 5 mg tablet Commonly known as:  PRINIVIL, ZESTRIL  
TAKE 1 TABLET BY MOUTH EVERY DAY  
  
 lovastatin 10 mg tablet Commonly known as:  MEVACOR  
TAKE ONE TABLET BY MOUTH NIGHTLY  
  
 M-VIT PO Take 1 tablet by mouth daily. MAGNESIUM PO Take 1 Tab by mouth daily. meclizine 25 mg tablet Commonly known as:  ANTIVERT Take 1 Tab by mouth three (3) times daily as needed for Dizziness. meloxicam 15 mg tablet Commonly known as:  MOBIC Take 1 Tab by mouth daily. metFORMIN 500 mg tablet Commonly known as:  GLUCOPHAGE  
TAKE TWO TABLETS BY MOUTH DAILY WITH BREAKFAST  
  
 metoprolol succinate 25 mg XL tablet Commonly known as:  TOPROL-XL Take 1 Tab by mouth daily. PARoxetine 30 mg tablet Commonly known as:  PAXIL TAKE ONE TABLET BY MOUTH ONCE DAILY  
  
 VITAMIN B-12 1,000 mcg tablet Generic drug:  cyanocobalamin Take 1,000 mcg by mouth daily. Prescriptions Sent to Pharmacy Refills  
 meloxicam (MOBIC) 15 mg tablet 0 Sig: Take 1 Tab by mouth daily. Class: Normal  
 Pharmacy: [address] [place] [address], [address] [place] [address] #: [phone] Route: Oral  
  
We Performed the Following ADMIN PNEUMOCOCCAL VACCINE [ [facility]] AMB POC HEMOGLOBIN A1C [19910 CPT(R)] AMB POC URINE, MICROALBUMIN, SEMIQUANT (3 RESULTS) [61506 CPT(R)] CBC WITH AUTOMATED DIFF [88372 CPT(R)] LIPID PANEL [85744 CPT(R)] METABOLIC PANEL, COMPREHENSIVE [58179 CPT(R)] PNEUMOCOCCAL CONJ VACCINE 13 VALENT IM X0141964 CPT(R)] TSH 3RD GENERATION [68640 CPT(R)] Patient Instructions Start daily meloxicam, stop taking over the counter advil. Tell PT that your right leg hurts after therapy. Vaccine Information Statement Pneumococcal Conjugate Vaccine (PCV13): What You Need to Know Many Vaccine Information Statements are available in Azeri and other languages. See www.immunize.org/vis. Hojas de información Sobre Vacunas están disponibles en español y en muchos otros idiomas. Visite www.immunize.org/vis. 1. Why get vaccinated? Vaccination can protect both children and adults from pneumococcal disease. Pneumococcal disease is caused by bacteria that can spread from person to person through close contact. It can cause ear infections, and it can also lead to more serious infections of the: 
 Lungs (pneumonia),  Blood (bacteremia), and 
  Covering of the brain and spinal cord (meningitis). Pneumococcal pneumonia is most common among adults. Pneumococcal meningitis can cause deafness and brain damage, and it kills about 1 child in 10 who get it. Anyone can get pneumococcal disease, but children under 3years of age and adults 72 years and older, people with certain medical conditions, and cigarette smokers are at the highest risk. Before there was a vaccine, the [facility] saw: 
 more than 700 cases of meningitis, 
 about 13,000 blood infections, 
 about 5 million ear infections, and 
 about 200 deaths 
in children under 5 each year from pneumococcal disease. Since vaccine became available, severe pneumococcal disease in these children has fallen by 88%. About 18,000 older adults die of pneumococcal disease each year in the [place]. Treatment of pneumococcal infections with penicillin and other drugs is not as effective as it used to be, because some strains of the disease have become resistant to these drugs. This makes prevention of the disease, through vaccination, even more important. 2. PCV13 vaccine Pneumococcal conjugate vaccine (called PCV13) protects against 13 types of pneumococcal bacteria. PCV13 is routinely given to children at 2, 4, 6, and 1515 months of age. It is also recommended for children and adults 3to 59years of age with certain health conditions, and for all adults 72years of age and older. Your doctor can give you details. 3. Some people should not get this vaccine Anyone who has ever had a life-threatening allergic reaction to a dose of this vaccine, to an earlier pneumococcal vaccine called PCV7, or to any vaccine containing diphtheria toxoid (for example, DTaP), should not get PCV13. Anyone with a severe allergy to any component of PCV13 should not get the vaccine. Tell your doctor if the person being vaccinated has any severe allergies. If the person scheduled for vaccination is not feeling well, your healthcare provider might decide to reschedule the shot on another day. 4. Risks of a vaccine reaction With any medicine, including vaccines, there is a chance of reactions. These are usually mild and go away on their own, but serious reactions are also possible. Problems reported following PCV13 varied by age and dose in the series. The most common problems reported among children were:  About half became drowsy after the shot, had a temporary loss of appetite, or had redness or tenderness where the shot was given.  About 1 out of 3 had swelling where the shot was given.  About 1 out of 3 had a mild fever, and about 1 in 20 had a fever over 102.2°F. 
 Up to about 8 out of 10 became fussy or irritable. Adults have reported pain, redness, and swelling where the shot was given; also mild fever, fatigue, headache, chills, or muscle pain. Marni Apa children who get PCV13 along with inactivated flu vaccine at the same time may be at increased risk for seizures caused by fever. Ask your doctor for more information. Problems that could happen after any vaccine:  People sometimes faint after a medical procedure, including vaccination. Sitting or lying down for about 15 minutes can help prevent fainting, and injuries caused by a fall. Tell your doctor if you feel dizzy, or have vision changes or ringing in the ears.  Some older children and adults get severe pain in the shoulder and have difficulty moving the arm where a shot was given. This happens very rarely.  Any medication can cause a severe allergic reaction. Such reactions from a vaccine are very rare, estimated at about 1 in a million doses, and would happen within a few minutes to a few hours after the vaccination. As with any medicine, there is a very small chance of a vaccine causing a serious injury or death. The safety of vaccines is always being monitored. For more information, visit: www.cdc.gov/vaccinesafety/  
 
5. What if there is a serious reaction? What should I look for?  Look for anything that concerns you, such as signs of a severe allergic reaction, very high fever, or unusual behavior. Signs of a severe allergic reaction can include hives, swelling of the face and throat, difficulty breathing, a fast heartbeat, dizziness, and weakness  usually within a few minutes to a few hours after the vaccination. What should I do?  If you think it is a severe allergic reaction or other emergency that cant wait, call 9-1-1 or get the person to the nearest hospital. Otherwise, call your doctor. Reactions should be reported to the Vaccine Adverse Event Reporting System (VAERS). Your doctor should file this report, or you can do it yourself through the VAERS web site at www.vaers. [employer].gov, or by calling 1-146.226.4333. VAERS does not give medical advice. 6. The National Vaccine Injury Compensation Program 
 
The [facility] Vaccine Injury Compensation Program (VICP) is a federal program that was created to compensate people who may have been injured by certain vaccines. Persons who believe they may have been injured by a vaccine can learn about the program and about filing a claim by calling 3-912.861.5146 or visiting the [address] website at www.[facility].gov/vaccinecompensation. There is a time limit to file a claim for compensation. 7. How can I learn more?  Ask your healthcare provider. He or she can give you the vaccine package insert or suggest other sources of information.  Call your local or state health department.  Contact the Centers for Disease Control and Prevention (CDC): 
- Call 6-514.151.7114 (1-800-CDC-INFO) or 
- Visit CDCs website at www.cdc.gov/vaccines Vaccine Information Statement PCV13 Vaccine 11/5/2015  
42 GALILEO Morales [id number] Department of Health and [employer] Centers for Disease Control and Prevention Office Use Only Introducing [facility] [place] [facility]! Yamileth Virgie introduces [employer] patient portal. Now you can access parts of your medical record, email your doctor's office, and request medication refills online. 1. In your internet browser, go to [email or web address]. [email or web address] 2. Click on the First Time User? Click Here link in the Sign In box. You will see the New Member Sign Up page. 3. Enter your [employer] Access Code exactly as it appears below. You will not need to use this code after youve completed the sign-up process. If you do not sign up before the expiration date, you must request a new code. · [employer] Access Code: [email or web address] Expires: 3/27/2018 11:37 AM 
 
4. Enter the last four digits of your Social Security Number (xxxx) and Date of Birth (mm/dd/yyyy) as indicated and click Submit. You will be taken to the next sign-up page. 5. Create a [employer] ID. This will be your [employer] login ID and cannot be changed, so think of one that is secure and easy to remember. 6. Create a [employer] password. You can change your password at any time. 7. Enter your Password Reset Question and Answer. This can be used at a later time if you forget your password. 8. Enter your e-mail address. You will receive e-mail notification when new information is available in [address] 9. Click Sign Up. You can now view and download portions of your medical record. 10. Click the Download Summary menu link to download a portable copy of your medical information. If you have questions, please visit the Frequently Asked Questions section of the [employer] website. Remember, [employer] is NOT to be used for urgent needs. For medical emergencies, dial 911. Now available from your iPhone and Android! Please provide this summary of care documentation to your next provider. Your primary care clinician is listed as Bev Catherine. If you have any questions after today's visit, please call [phone].

## 2018-02-14 LAB
ALBUMIN SERPL-MCNC: 4.6 G/DL (ref 3.5–4.8)
ALBUMIN/GLOB SERPL: 1.9 {RATIO} (ref 1.2–2.2)
ALP SERPL-CCNC: 61 IU/L (ref 39–117)
ALT SERPL-CCNC: 20 IU/L (ref 0–32)
AST SERPL-CCNC: 26 IU/L (ref 0–40)
BASOPHILS # BLD AUTO: 0 X10E3/UL (ref 0–0.2)
BASOPHILS NFR BLD AUTO: 0 %
BILIRUB SERPL-MCNC: 0.3 MG/DL (ref 0–1.2)
BUN SERPL-MCNC: 26 MG/DL (ref 8–27)
BUN/CREAT SERPL: 46 (ref 12–28)
CALCIUM SERPL-MCNC: 9.3 MG/DL (ref 8.7–10.3)
CHLORIDE SERPL-SCNC: 101 MMOL/L (ref 96–106)
CHOLEST SERPL-MCNC: 151 MG/DL (ref 100–199)
CO2 SERPL-SCNC: 27 MMOL/L (ref 18–29)
CREAT SERPL-MCNC: 0.57 MG/DL (ref 0.57–1)
EOSINOPHIL # BLD AUTO: 0.4 X10E3/UL (ref 0–0.4)
EOSINOPHIL NFR BLD AUTO: 3 %
ERYTHROCYTE [DISTWIDTH] IN BLOOD BY AUTOMATED COUNT: 13.6 % (ref 12.3–15.4)
GFR SERPLBLD CREATININE-BSD FMLA CKD-EPI: 102 ML/MIN/1.73
GFR SERPLBLD CREATININE-BSD FMLA CKD-EPI: 88 ML/MIN/1.73
GLOBULIN SER CALC-MCNC: 2.4 G/DL (ref 1.5–4.5)
GLUCOSE SERPL-MCNC: 98 MG/DL (ref 65–99)
HCT VFR BLD AUTO: 39.2 % (ref 34–46.6)
HDLC SERPL-MCNC: 62 MG/DL
HGB BLD-MCNC: 12.9 G/DL (ref 11.1–15.9)
IMM GRANULOCYTES # BLD: 0 X10E3/UL (ref 0–0.1)
IMM GRANULOCYTES NFR BLD: 0 %
INTERPRETATION, 910389: NORMAL
LDLC SERPL CALC-MCNC: 33 MG/DL (ref 0–99)
LYMPHOCYTES # BLD AUTO: 3.5 X10E3/UL (ref 0.7–3.1)
LYMPHOCYTES NFR BLD AUTO: 32 %
Lab: NORMAL
MCH RBC QN AUTO: 28.3 PG (ref 26.6–33)
MCHC RBC AUTO-ENTMCNC: 32.9 G/DL (ref 31.5–35.7)
MCV RBC AUTO: 86 FL (ref 79–97)
MONOCYTES # BLD AUTO: 0.8 X10E3/UL (ref 0.1–0.9)
MONOCYTES NFR BLD AUTO: 7 %
NEUTROPHILS # BLD AUTO: 6.4 X10E3/UL (ref 1.4–7)
NEUTROPHILS NFR BLD AUTO: 58 %
PLATELET # BLD AUTO: 228 X10E3/UL (ref 150–379)
POTASSIUM SERPL-SCNC: 4.5 MMOL/L (ref 3.5–5.2)
PROT SERPL-MCNC: 7 G/DL (ref 6–8.5)
RBC # BLD AUTO: 4.56 X10E6/UL (ref 3.77–5.28)
SODIUM SERPL-SCNC: 143 MMOL/L (ref 134–144)
TRIGL SERPL-MCNC: 281 MG/DL (ref 0–149)
TSH SERPL DL<=0.005 MIU/L-ACNC: 2.93 UIU/ML (ref 0.45–4.5)
VLDLC SERPL CALC-MCNC: 56 MG/DL (ref 5–40)
WBC # BLD AUTO: 11 X10E3/UL (ref 3.4–10.8)

## 2018-03-15 RX ORDER — METFORMIN HYDROCHLORIDE 500 MG/1
TABLET ORAL
Qty: 180 TAB | Refills: 3 | Status: SHIPPED | OUTPATIENT
Start: 2018-03-15 | End: 2018-06-05 | Stop reason: SDUPTHER

## 2018-03-15 RX ORDER — METOPROLOL SUCCINATE 25 MG/1
25 TABLET, EXTENDED RELEASE ORAL DAILY
Qty: 90 TAB | Refills: 3 | Status: SHIPPED | OUTPATIENT
Start: 2018-03-15 | End: 2018-08-22 | Stop reason: SDUPTHER

## 2018-03-15 RX ORDER — LISINOPRIL 5 MG/1
TABLET ORAL
Qty: 90 TAB | Refills: 3 | Status: SHIPPED | OUTPATIENT
Start: 2018-03-15 | End: 2018-05-09

## 2018-03-15 RX ORDER — LOVASTATIN 10 MG/1
TABLET ORAL
Qty: 90 TAB | Refills: 3 | Status: SHIPPED | OUTPATIENT
Start: 2018-03-15

## 2018-03-15 RX ORDER — MELOXICAM 15 MG/1
15 TABLET ORAL DAILY
Qty: 90 TAB | Refills: 3 | Status: SHIPPED | OUTPATIENT
Start: 2018-03-15 | End: 2018-04-19 | Stop reason: SDUPTHER

## 2018-03-15 RX ORDER — GLIMEPIRIDE 1 MG/1
TABLET ORAL
Qty: 90 TAB | Refills: 3 | Status: SHIPPED | OUTPATIENT
Start: 2018-03-15 | End: 2018-06-05 | Stop reason: SDUPTHER

## 2018-03-15 RX ORDER — PAROXETINE 30 MG/1
TABLET, FILM COATED ORAL
Qty: 90 TAB | Refills: 3 | Status: SHIPPED | OUTPATIENT
Start: 2018-03-15 | End: 2018-10-15 | Stop reason: SDUPTHER

## 2018-03-16 ENCOUNTER — TELEPHONE (OUTPATIENT)
Dept: FAMILY MEDICINE CLINIC | Age: 78
End: 2018-03-16

## 2018-03-16 NOTE — TELEPHONE ENCOUNTER
Patient's daughter calling to get new pt script faxed to [address] [facility] for her back issues.

## 2018-04-06 ENCOUNTER — OFFICE VISIT (OUTPATIENT)
Dept: FAMILY MEDICINE CLINIC | Age: 78
End: 2018-04-06

## 2018-04-06 VITALS
DIASTOLIC BLOOD PRESSURE: 78 MMHG | RESPIRATION RATE: 20 BRPM | SYSTOLIC BLOOD PRESSURE: 144 MMHG | HEIGHT: 62 IN | OXYGEN SATURATION: 96 % | WEIGHT: 212.2 LBS | BODY MASS INDEX: 39.05 KG/M2 | HEART RATE: 96 BPM | TEMPERATURE: 98.1 F

## 2018-04-06 DIAGNOSIS — R82.998 LEUKOCYTES IN URINE: ICD-10-CM

## 2018-04-06 DIAGNOSIS — R82.90 ABNORMAL FINDING ON URINALYSIS: ICD-10-CM

## 2018-04-06 DIAGNOSIS — M54.6 ACUTE RIGHT-SIDED THORACIC BACK PAIN: ICD-10-CM

## 2018-04-06 DIAGNOSIS — M54.9 UPPER BACK PAIN ON RIGHT SIDE: Primary | ICD-10-CM

## 2018-04-06 LAB
BILIRUB UR QL STRIP: NEGATIVE
GLUCOSE UR-MCNC: NEGATIVE MG/DL
KETONES P FAST UR STRIP-MCNC: NEGATIVE MG/DL
PH UR STRIP: 7 [PH] (ref 4.6–8)
PROT UR QL STRIP: NEGATIVE
SP GR UR STRIP: 1.02 (ref 1–1.03)
UA UROBILINOGEN AMB POC: NORMAL (ref 0.2–1)
URINALYSIS CLARITY POC: NORMAL
URINALYSIS COLOR POC: YELLOW
URINE BLOOD POC: NEGATIVE
URINE LEUKOCYTES POC: NORMAL
URINE NITRITES POC: NEGATIVE

## 2018-04-06 RX ORDER — CLOPIDOGREL BISULFATE 75 MG/1
TABLET ORAL
COMMUNITY
Start: 2018-03-12 | End: 2018-05-09 | Stop reason: CLARIF

## 2018-04-06 NOTE — PROGRESS NOTES
Chief Complaint   Patient presents with    Flank Pain     SINCE YESTERDAY; DENIES INJURY     1. Have you been to the ER, urgent care clinic since your last visit? Hospitalized since your last visit? No    2. Have you seen or consulted any other health care providers outside of the [address] [place] [address] since your last visit? Include any pap smears or colon screening.  No     Health Maintenance Due   Topic Date Due    EYE EXAM RETINAL OR DILATED Q1  03/29/2018     Eye exam (Dr. Parks Score)

## 2018-04-06 NOTE — MR AVS SNAPSHOT
[address] [facility] 
 
 
 [address] [facility] 
[phone] Patient: Jaky Oliva MRN:  [id number] Visit Information Date & Time Provider Department Dept. Phone Encounter #  
 4/6/2018  2:15 PM Feliciano Hernandez, [address] [address] [phone] [id number] Upcoming Health Maintenance Date Due  
 EYE EXAM RETINAL OR DILATED Q1 3/29/2018 FOOT EXAM Q1 7/3/2018 MEDICARE YEARLY EXAM 7/4/2018 HEMOGLOBIN A1C Q6M 8/13/2018 MICROALBUMIN Q1 2/13/2019 LIPID PANEL Q1 2/13/2019 GLAUCOMA SCREENING Q2Y 3/29/2019 DTaP/Tdap/Td series (2 - Td) 10/18/2023 Allergies as of 4/6/2018  Review Complete On: 4/6/2018 By: Feliciano Hernandez NP Severity Noted Reaction Type Reactions Naldecon High 11/07/2009    Unknown (comments) Sulfa (Sulfonamide Antibiotics) High 11/07/2009    Rash Current Immunizations  Reviewed on 2/13/2018 Name Date Influenza High Dose Vaccine PF 11/27/2017  3:33 PM, 12/21/2016, 10/1/2014, 10/24/2013 Influenza Vaccine Split 10/9/2012, 11/1/2010 Pneumococcal Conjugate (PCV-13) 2/13/2018 Pneumococcal Polysaccharide (PPSV-23) 7/3/2009 Tdap 10/18/2013 Zoster Vaccine, Live 10/6/2009 Not reviewed this visit You Were Diagnosed With   
  
 Codes Comments Upper back pain on right side    -  Primary ICD-10-CM: M54.9 ICD-9-CM: 724.5 Acute right-sided thoracic back pain     ICD-10-CM: M54.6 ICD-9-CM: 724.1 Abnormal finding on urinalysis     ICD-10-CM: R82.90 ICD-9-CM: 791.9 Leukocytes in urine     ICD-10-CM: R82.99 
ICD-9-CM: 791.7 Vitals BP Pulse Temp Resp Height(growth percentile) Weight(growth percentile) 144/78 (BP 1 Location: Right arm, BP Patient Position: Sitting) 96 98.1 °F (36.7 °C) (Oral) 20 5' 2\" (1.575 m) 212 lb 3.2 oz (96.3 kg) SpO2 BMI OB Status Smoking Status 96% 38.81 kg/m2 Postmenopausal Former Smoker Vitals History BMI and BSA Data Body Mass Index Body Surface Area  
 38.81 kg/m 2 2.05 m 2 Preferred Pharmacy Pharmacy Name Phone [facility], [address] Your Updated Medication List  
  
   
This list is accurate as of 4/6/18  3:27 PM.  Always use your most recent med list.  
  
  
  
  
 aspirin delayed-release 325 mg tablet Take 1 Tab by mouth daily. calcium-cholecalciferol (d3) 600-125 mg-unit Tab Take 1 Tab by mouth daily. clopidogrel 75 mg Tab Commonly known as:  PLAVIX CRANBERRY CONCENTRATE Cap Generic drug:  vitamin c-vitamin e Take 1 Cap by mouth daily. FISH OIL 1,000 mg Cap Generic drug:  omega-3 fatty acids-vitamin e Take 1 capsule by mouth daily. glimepiride 1 mg tablet Commonly known as:  AMARYL  
TAKE ONE TABLET BY MOUTH IN THE MORNING  
  
 glucose blood VI test strips strip Commonly known as:  ASCENSIA AUTODISC VI, ONE TOUCH ULTRA TEST VI Test daily. Diagnosis 250.00 Lancets Misc Test twice daily. Diagnosis 250.00  
  
 lisinopril 5 mg tablet Commonly known as:  PRINIVIL, ZESTRIL  
TAKE 1 TABLET BY MOUTH EVERY DAY  
  
 lovastatin 10 mg tablet Commonly known as:  MEVACOR  
TAKE ONE TABLET BY MOUTH NIGHTLY  
  
 M-VIT PO Take 1 tablet by mouth daily. MAGNESIUM PO Take 1 Tab by mouth daily. meclizine 25 mg tablet Commonly known as:  ANTIVERT Take 1 Tab by mouth three (3) times daily as needed for Dizziness. meloxicam 15 mg tablet Commonly known as:  MOBIC Take 1 Tab by mouth daily. metFORMIN 500 mg tablet Commonly known as:  GLUCOPHAGE  
TAKE TWO TABLETS BY MOUTH DAILY WITH BREAKFAST  
  
 metoprolol succinate 25 mg XL tablet Commonly known as:  TOPROL-XL Take 1 Tab by mouth daily. NIACIN (NIACINAMIDE) PO Take  by mouth. PARoxetine 30 mg tablet Commonly known as:  PAXIL TAKE ONE TABLET BY MOUTH ONCE DAILY  
  
 VITAMIN B-12 1,000 mcg tablet Generic drug:  cyanocobalamin Take 1,000 mcg by mouth daily. We Performed the Following AMB POC URINALYSIS DIP STICK AUTO W/O MICRO [83333 CPT(R)] CULTURE, URINE [id number] CPT(R)] Patient Instructions Back Stretches: Exercises Your Care Instructions Here are some examples of exercises for stretching your back. Start each exercise slowly. Ease off the exercise if you start to have pain. Your doctor or physical therapist will tell you when you can start these exercises and which ones will work best for you. How to do the exercises Overhead stretch 1. Stand comfortably with your feet shoulder-width apart. 2. Looking straight ahead, raise both arms over your head and reach toward the ceiling. Do not allow your head to tilt back. 3. Hold for 15 to 30 seconds, then lower your arms to your sides. 4. Repeat 2 to 4 times. Side stretch 1. Stand comfortably with your feet shoulder-width apart. 2. Raise one arm over your head, and then lean to the other side. 3. Slide your hand down your leg as you let the weight of your arm gently stretch your side muscles. Hold for 15 to 30 seconds. 4. Repeat 2 to 4 times on each side. Press-up 1. Lie on your stomach, supporting your body with your forearms. 2. Press your elbows down into the floor to raise your upper back. As you do this, relax your stomach muscles and allow your back to arch without using your back muscles. As your press up, do not let your hips or pelvis come off the floor. 3. Hold for 15 to 30 seconds, then relax. 4. Repeat 2 to 4 times. Relax and rest 
 
1. Lie on your back with a rolled towel under your neck and a pillow under your knees. Extend your arms comfortably to your sides. 2. Relax and breathe normally. 3. Remain in this position for about 10 minutes. 4. If you can, do this 2 or 3 times each day. Follow-up care is a key part of your treatment and safety. Be sure to make and go to all appointments, and call your doctor if you are having problems. It's also a good idea to know your test results and keep a list of the medicines you take. Where can you learn more? Go to http://rose-colleen.info/. Enter N682 in the search box to learn more about \"Back Stretches: Exercises. \" Current as of: March 21, 2017 Content Version: 11.4 © 8404-5186 [employer]. Care instructions adapted under license by [employer] (which disclaims liability or warranty for this information). If you have questions about a medical condition or this instruction, always ask your healthcare professional. [address] any warranty or liability for your use of this information. Healthy Upper Back: Exercises Your Care Instructions Here are some examples of exercises for your upper back. Start each exercise slowly. Ease off the exercise if you start to have pain. Your doctor or physical therapist will tell you when you can start these exercises and which ones will work best for you. How to do the exercises Lower neck and upper back stretch 5. Stretch your arms out in front of your body. Clasp one hand on top of your other hand. 6. Gently reach out so that you feel your shoulder blades stretching away from each other. 7. Gently bend your head forward. 8. Hold for 15 to 30 seconds. 9. Repeat 2 to 4 times. Midback stretch If you have knee pain, do not do this exercise. 5. Kneel on the floor, and sit back on your ankles. 6. Lean forward, place your hands on the floor, and stretch your arms out in front of you. Rest your head between your arms. 7. Gently push your chest toward the floor, reaching as far in front of you as possible. 8. Hold for 15 to 30 seconds. 9. Repeat 2 to 4 times. Shoulder rolls 5. Sit comfortably with your feet shoulder-width apart. You can also do this exercise while standing. 6. Roll your shoulders up, then back, and then down in a smooth, circular motion. 7. Repeat 2 to 4 times. Wall push-up 5. Stand against a wall with your feet about 12 to 24 inches back from the wall. If you feel any pain when you do this exercise, stand closer to the wall. 6. Place your hands on the wall slightly wider apart than your shoulders, and lean forward. 7. Gently lean your body toward the wall. Then push back to your starting position. Keep the motion smooth and controlled. 8. Repeat 8 to 12 times. Resisted shoulder blade squeeze For this exercise, you will need elastic exercise material, such as surgical tubing or Thera-Band. 1. Sit or stand, holding the band in both hands in front of you. Keep your elbows close to your sides, bent at a 90-degree angle. Your palms should face up. 2. Squeeze your shoulder blades together, and move your arms to the outside, stretching the band. Be sure to keep your elbows at your sides while you do this. 3. Relax. 4. Repeat 8 to 12 times. Resisted rows For this exercise, you will need elastic exercise material, such as surgical tubing or Thera-Band. 1. Put the band around a solid object, such as a bedpost, at about waist level. Hold one end of the band in each hand. 2. With your elbows at your sides and bent to 90 degrees, pull the band back to move your shoulder blades toward each other. Return to the starting position. 3. Repeat 8 to 12 times. Follow-up care is a key part of your treatment and safety. Be sure to make and go to all appointments, and call your doctor if you are having problems. It's also a good idea to know your test results and keep a list of the medicines you take. Where can you learn more? Go to http://rose-colleen.info/. Enter E303 in the search box to learn more about \"Healthy Upper Back: Exercises. \" 
 Current as of: March 21, 2017 Content Version: 11.4 © 5413-2300 Healthwise, hiyalife. Care instructions adapted under license by [employer] (which disclaims liability or warranty for this information). If you have questions about a medical condition or this instruction, always ask your healthcare professional. [address] any warranty or liability for your use of this information. Introducing [facility]! [facility] introduces Affine patient portal. Now you can access parts of your medical record, email your doctor's office, and request medication refills online. 1. In your internet browser, go to [email or web address] 2. Click on the First Time User? Click Here link in the Sign In box. You will see the New Member Sign Up page. 3. Enter your Affine Access Code exactly as it appears below. You will not need to use this code after youve completed the sign-up process. If you do not sign up before the expiration date, you must request a new code. · Affine Access Code: [email or web address] Expires: 7/5/2018  2:29 PM 
 
4. Enter the last four digits of your Social Security Number (xxxx) and Date of Birth (mm/dd/yyyy) as indicated and click Submit. You will be taken to the next sign-up page. 5. Create a Affine ID. This will be your Affine login ID and cannot be changed, so think of one that is secure and easy to remember. 6. Create a Affine password. You can change your password at any time. 7. Enter your Password Reset Question and Answer. This can be used at a later time if you forget your password. 8. Enter your e-mail address. You will receive e-mail notification when new information is available in [address] 9. Click Sign Up. You can now view and download portions of your medical record. 10. Click the Download Summary menu link to download a portable copy of your medical information. If you have questions, please visit the Frequently Asked Questions section of the [employer]t website. Remember, Pixplit is NOT to be used for urgent needs. For medical emergencies, dial 911. Now available from your iPhone and Android! Please provide this summary of care documentation to your next provider. Your primary care clinician is listed as Macho Barron. If you have any questions after today's visit, please call [phone].

## 2018-04-06 NOTE — PROGRESS NOTES
Subjective:     Chief Complaint   Patient presents with    Flank Pain     SINCE YESTERDAY; DENIES INJURY        HPI:  Diana Herring is a 76 y.o. female here today for sudden onset or right upper mid back pain. She says that she was driving to [place] [address] and had sudden onset of sharp pain in mid right upper back that lasted about two minutes. She did not have any shortness of breath, no N/V/D. Denies any CP or coughing. She has not had any change in activity lately. No fever. No falls or other injury recalled. No rashes. Described the initial pain as sharp and then the lower pain is dull ache. She says that the pain is gone now but does have a little discomfort in right mid/lower back now. Thinks that it may be her arthritis. No hospital, ER or specialist visits since last primary care visit except as noted above. Past Medical History:   Diagnosis Date    Atypical chest pain     Long h/o intermittent non-cardiac CP.  Depression with anxiety     Diabetes ([address]     GERD (gastroesophageal reflux disease)     Hypercholesteremia     Hyperlipidemia 7/3/2013    Hypertension     Inner ear dysfunction     S/P aortic valve replacement     Dr. Kendal Espinoza yearly    Vitamin D deficiency        Social History   Substance Use Topics    Smoking status: Former Smoker     Quit date: 8/27/1999    Smokeless tobacco: Never Used    Alcohol use No       Outpatient Prescriptions Marked as Taking for the 4/6/18 encounter (Office Visit) with Brina Castillo NP   Medication Sig Dispense Refill    NIACIN, NIACINAMIDE, PO Take  by mouth.  lovastatin (MEVACOR) 10 mg tablet TAKE ONE TABLET BY MOUTH NIGHTLY 90 Tab 3    PARoxetine (PAXIL) 30 mg tablet TAKE ONE TABLET BY MOUTH ONCE DAILY 90 Tab 3    lisinopril (PRINIVIL, ZESTRIL) 5 mg tablet TAKE 1 TABLET BY MOUTH EVERY DAY 90 Tab 3    meloxicam (MOBIC) 15 mg tablet Take 1 Tab by mouth daily.  90 Tab 3    glimepiride (AMARYL) 1 mg tablet TAKE ONE TABLET BY MOUTH IN THE MORNING 90 Tab 3    metFORMIN (GLUCOPHAGE) 500 mg tablet TAKE TWO TABLETS BY MOUTH DAILY WITH BREAKFAST 180 Tab 3    metoprolol succinate (TOPROL-XL) 25 mg XL tablet Take 1 Tab by mouth daily. 90 Tab 3    MAGNESIUM PO Take 1 Tab by mouth daily.  calcium-cholecalciferol, d3, 600-125 mg-unit tab Take 1 Tab by mouth daily.  meclizine (ANTIVERT) 25 mg tablet Take 1 Tab by mouth three (3) times daily as needed for Dizziness. 15 Tab 0    glucose blood VI test strips (ASCENSIA AUTODISC VI, ONE TOUCH ULTRA TEST VI) strip Test daily. Diagnosis 250.00 3 Package 3    Lancets misc Test twice daily. Diagnosis 250.00 3 Package 3    vitamin c-vitamin e (CRANBERRY CONCENTRATE) cap Take 1 Cap by mouth daily.  cyanocobalamin (VITAMIN B-12) 1,000 mcg tablet Take 1,000 mcg by mouth daily.  aspirin delayed-release 325 mg tablet Take 1 Tab by mouth daily. 90 Tab 1    PV W-O EDU/FERROUS FUMARATE/FA (M-VIT PO) Take 1 tablet by mouth daily.  omega-3 fatty acids-vitamin e (FISH OIL) 1,000 mg Cap Take 1 capsule by mouth daily. Allergies   Allergen Reactions    Naldecon Unknown (comments)    Sulfa (Sulfonamide Antibiotics) Rash           ROS:  Gen: no fatigue, no fever, no chills, no unexplained weight loss or weight gain  Eyes: no excessive tearing, itching, or discharge  Nose: no rhinorrhea, no sinus pain  Mouth: no oral lesions, no sore throat, no difficulty swallowing  Resp: no shortness of breath, no wheezing, no cough  CV: no chest pain, no orthopnea, no paroxysmal nocturnal dyspnea, no lower extremity edema, no palpitations  Abd: no nausea, no heartburn, no diarrhea, no constipation, no abdominal pain  Neuro: no headaches, no syncope or presyncopal episodes  Endo: no polyuria, no polydipsia.     : no hematuria, no dysuria, no frequency, no incontinence  Heme: no lymphadenopathy, no easy bruising or bleeding, no night sweats  MSK: no joint pain or swelling    PE:  Visit Vitals    /78 (BP 1 Location: Right arm, BP Patient Position: Sitting)    Pulse 96    Temp 98.1 °F (36.7 °C) (Oral)    Resp 20    Ht 5' 2\" (1.575 m)    Wt 212 lb 3.2 oz (96.3 kg)    SpO2 96%    BMI 38.81 kg/m2     Gen: alert, oriented, no acute distress  Head: normocephalic, atraumatic  Ears: external auditory canals clear, TMs without erythema or effusion  Eyes: pupils equal round reactive to light, sclera clear, conjunctiva clear  Oral: moist mucus membranes, no oral lesions, no pharyngeal inflammation or exudate  Neck: symmetric normal sized thyroid, no carotid bruits, no jugular vein distention  Resp: no increase work of breathing, lungs clear to ausculation bilaterally, no wheezing, rales or rhonchi  CV: S1, S2 normal.  No murmurs, rubs, or gallops. Abd: soft, not tender, not distended. No hepatosplenomegaly. Normal bowel sounds. No hernias. No abdominal or renal bruits. Neuro: cranial nerves intact, normal strength and movement in all extremities, reflexes and sensation intact and symmetric. Skin: no lesion or rash  Extremities: no cyanosis or edema  Back:  Mild tenderness mid/lower right back at paraspinal muscle area. No spinal tenderness. Full ROM of back and shoulder and neck.      Results for orders placed or performed in visit on 04/06/18   AMB POC URINALYSIS DIP STICK AUTO W/O MICRO   Result Value Ref Range    Color (UA POC) Yellow     Clarity (UA POC) Cloudy     Glucose (UA POC) Negative Negative    Bilirubin (UA POC) Negative Negative    Ketones (UA POC) Negative Negative    Specific gravity (UA POC) 1.020 1.001 - 1.035    Blood (UA POC) Negative Negative    pH (UA POC) 7.0 4.6 - 8.0    Protein (UA POC) Negative Negative    Urobilinogen (UA POC) 0.2 mg/dL 0.2 - 1    Nitrites (UA POC) Negative Negative    Leukocyte esterase (UA POC) 1+ Negative       Assessment/Plan:  Differential diagnosis and treatment options reviewed with patient who is in agreement with treatment plan as outlined below.    ICD-10-CM ICD-9-CM    1. Upper back pain on right side M54.9 724.5    2. Acute right-sided thoracic back pain M54.6 724.1 AMB POC URINALYSIS DIP STICK AUTO W/O MICRO   3. Abnormal finding on urinalysis R82.90 791.9 CULTURE, URINE   4. Leukocytes in urine R82.99 791.7 CULTURE, URINE     Rotate tylenol and motrin for pain and use icy hot or biofreeze on muscle of concern. Discussed home stretches. Call on Monday if worsening. Increase water intake. UA sent for UC, has history of UTI and has 1+leukocyte    Discussed BMI and healthy weight. Encouraged patient to work to implement changes including diet high in raw fruits and vegetables, lean protein and good fats. Limit refined, processed carbohydrates and sugar. Encouraged regular exercise. Recommended regular cardiovascular exercise 3-6 times per week for 30-60 minutes daily. I have discussed the diagnosis with the patient and the intended plan as seen in the above orders. The patient has received an after-visit summary and questions were answered concerning future plans. I have discussed medication side effects and warnings with the patient as well. The patient verbalizes understanding and agreement with the plan.

## 2018-04-06 NOTE — PATIENT INSTRUCTIONS
Back Stretches: Exercises  Your Care Instructions  Here are some examples of exercises for stretching your back. Start each exercise slowly. Ease off the exercise if you start to have pain. Your doctor or physical therapist will tell you when you can start these exercises and which ones will work best for you. How to do the exercises  Overhead stretch    1. Stand comfortably with your feet shoulder-width apart. 2. Looking straight ahead, raise both arms over your head and reach toward the ceiling. Do not allow your head to tilt back. 3. Hold for 15 to 30 seconds, then lower your arms to your sides. 4. Repeat 2 to 4 times. Side stretch    1. Stand comfortably with your feet shoulder-width apart. 2. Raise one arm over your head, and then lean to the other side. 3. Slide your hand down your leg as you let the weight of your arm gently stretch your side muscles. Hold for 15 to 30 seconds. 4. Repeat 2 to 4 times on each side. Press-up    1. Lie on your stomach, supporting your body with your forearms. 2. Press your elbows down into the floor to raise your upper back. As you do this, relax your stomach muscles and allow your back to arch without using your back muscles. As your press up, do not let your hips or pelvis come off the floor. 3. Hold for 15 to 30 seconds, then relax. 4. Repeat 2 to 4 times. Relax and rest    1. Lie on your back with a rolled towel under your neck and a pillow under your knees. Extend your arms comfortably to your sides. 2. Relax and breathe normally. 3. Remain in this position for about 10 minutes. 4. If you can, do this 2 or 3 times each day. Follow-up care is a key part of your treatment and safety. Be sure to make and go to all appointments, and call your doctor if you are having problems. It's also a good idea to know your test results and keep a list of the medicines you take. Where can you learn more? Go to http://rose-colleen.info/.   Enter L849 in the search box to learn more about \"Back Stretches: Exercises. \"  Current as of: March 21, 2017  Content Version: 11.4  © 0253-5769 [employer]. Care instructions adapted under license by [employer] (which disclaims liability or warranty for this information). If you have questions about a medical condition or this instruction, always ask your healthcare professional. [address] any warranty or liability for your use of this information. Healthy Upper Back: Exercises  Your Care Instructions  Here are some examples of exercises for your upper back. Start each exercise slowly. Ease off the exercise if you start to have pain. Your doctor or physical therapist will tell you when you can start these exercises and which ones will work best for you. How to do the exercises  Lower neck and upper back stretch    5. Stretch your arms out in front of your body. Clasp one hand on top of your other hand. 6. Gently reach out so that you feel your shoulder blades stretching away from each other. 7. Gently bend your head forward. 8. Hold for 15 to 30 seconds. 9. Repeat 2 to 4 times. Midback stretch    If you have knee pain, do not do this exercise. 5. Kneel on the floor, and sit back on your ankles. 6. Lean forward, place your hands on the floor, and stretch your arms out in front of you. Rest your head between your arms. 7. Gently push your chest toward the floor, reaching as far in front of you as possible. 8. Hold for 15 to 30 seconds. 9. Repeat 2 to 4 times. Shoulder rolls    5. Sit comfortably with your feet shoulder-width apart. You can also do this exercise while standing. 6. Roll your shoulders up, then back, and then down in a smooth, circular motion. 7. Repeat 2 to 4 times. Wall push-up    5. Stand against a wall with your feet about 12 to 24 inches back from the wall.  If you feel any pain when you do this exercise, stand closer to the wall.  6. Place your hands on the wall slightly wider apart than your shoulders, and lean forward. 7. Gently lean your body toward the wall. Then push back to your starting position. Keep the motion smooth and controlled. 8. Repeat 8 to 12 times. Resisted shoulder blade squeeze    For this exercise, you will need elastic exercise material, such as surgical tubing or Thera-Band. 1. Sit or stand, holding the band in both hands in front of you. Keep your elbows close to your sides, bent at a 90-degree angle. Your palms should face up. 2. Squeeze your shoulder blades together, and move your arms to the outside, stretching the band. Be sure to keep your elbows at your sides while you do this. 3. Relax. 4. Repeat 8 to 12 times. Resisted rows    For this exercise, you will need elastic exercise material, such as surgical tubing or Thera-Band. 1. Put the band around a solid object, such as a bedpost, at about waist level. Hold one end of the band in each hand. 2. With your elbows at your sides and bent to 90 degrees, pull the band back to move your shoulder blades toward each other. Return to the starting position. 3. Repeat 8 to 12 times. Follow-up care is a key part of your treatment and safety. Be sure to make and go to all appointments, and call your doctor if you are having problems. It's also a good idea to know your test results and keep a list of the medicines you take. Where can you learn more? Go to http://rose-colleen.info/. Enter A429 in the search box to learn more about \"Healthy Upper Back: Exercises. \"  Current as of: March 21, 2017  Content Version: 11.4  © 0520-9964 [employer]. Care instructions adapted under license by [employer] (which disclaims liability or warranty for this information).  If you have questions about a medical condition or this instruction, always ask your healthcare professional. Preet Cohn disclaims any warranty or liability for your use of this information.

## 2018-04-07 LAB — BACTERIA UR CULT: NORMAL

## 2018-04-10 NOTE — PROGRESS NOTES
UC back. Advise patient to complete antibiotic as prescribed.    Thanks  [facility]

## 2018-04-11 NOTE — PROGRESS NOTES
Ms. Brigitte Hickman notified Andres Lopez NP said UC back. Advise patient to complete antibiotic as prescribed.  She voiced understanding

## 2018-04-12 ENCOUNTER — TELEPHONE (OUTPATIENT)
Dept: FAMILY MEDICINE CLINIC | Age: 78
End: 2018-04-12

## 2018-04-12 RX ORDER — NITROFURANTOIN 25; 75 MG/1; MG/1
100 CAPSULE ORAL 2 TIMES DAILY
Qty: 10 CAP | Refills: 0 | Status: SHIPPED | OUTPATIENT
Start: 2018-04-12 | End: 2018-04-17

## 2018-04-12 NOTE — TELEPHONE ENCOUNTER
Patient notified yesterday to take antibiotic for UTI. Doesn't look like one was prescribed. Please clarify.

## 2018-04-12 NOTE — TELEPHONE ENCOUNTER
Message from [facility]    Pt is stating that she does not have any UTI medication, and she would like an Rx for UTI.  Best contact number [phone]

## 2018-04-20 RX ORDER — MELOXICAM 15 MG/1
TABLET ORAL
Qty: 90 TAB | Refills: 0 | Status: SHIPPED | OUTPATIENT
Start: 2018-04-20 | End: 2018-05-09

## 2018-05-09 ENCOUNTER — HOSPITAL ENCOUNTER (EMERGENCY)
Age: 78
Discharge: HOME | End: 2018-05-09
Attending: EMERGENCY MEDICINE
Payer: MEDICARE

## 2018-05-09 VITALS
OXYGEN SATURATION: 97 % | TEMPERATURE: 97.7 F | DIASTOLIC BLOOD PRESSURE: 90 MMHG | RESPIRATION RATE: 16 BRPM | WEIGHT: 209.22 LBS | HEIGHT: 63 IN | BODY MASS INDEX: 37.07 KG/M2 | SYSTOLIC BLOOD PRESSURE: 181 MMHG | HEART RATE: 72 BPM

## 2018-05-09 DIAGNOSIS — R03.0 ELEVATED BLOOD PRESSURE READING: Primary | ICD-10-CM

## 2018-05-09 LAB
ALBUMIN SERPL-MCNC: 4.1 G/DL (ref 3.5–5)
ALBUMIN/GLOB SERPL: 1.2 {RATIO} (ref 1.1–2.2)
ALP SERPL-CCNC: 65 U/L (ref 45–117)
ALT SERPL-CCNC: 36 U/L (ref 12–78)
ANION GAP SERPL CALC-SCNC: 8 MMOL/L (ref 5–15)
AST SERPL-CCNC: 33 U/L (ref 15–37)
BASOPHILS # BLD: 0 K/UL (ref 0–0.1)
BASOPHILS NFR BLD: 0 % (ref 0–1)
BILIRUB SERPL-MCNC: 0.5 MG/DL (ref 0.2–1)
BNP SERPL-MCNC: 672 PG/ML (ref 0–450)
BUN SERPL-MCNC: 20 MG/DL (ref 6–20)
BUN/CREAT SERPL: 31 (ref 12–20)
CALCIUM SERPL-MCNC: 9.5 MG/DL (ref 8.5–10.1)
CHLORIDE SERPL-SCNC: 103 MMOL/L (ref 97–108)
CK MB CFR SERPL CALC: 4 % (ref 0–2.5)
CK MB SERPL-MCNC: 10.6 NG/ML (ref 5–25)
CK SERPL-CCNC: 266 U/L (ref 26–192)
CO2 SERPL-SCNC: 29 MMOL/L (ref 21–32)
CREAT SERPL-MCNC: 0.65 MG/DL (ref 0.55–1.02)
DIFFERENTIAL METHOD BLD: NORMAL
EOSINOPHIL # BLD: 0.3 K/UL (ref 0–0.4)
EOSINOPHIL NFR BLD: 3 % (ref 0–7)
ERYTHROCYTE [DISTWIDTH] IN BLOOD BY AUTOMATED COUNT: 13 % (ref 11.5–14.5)
GLOBULIN SER CALC-MCNC: 3.5 G/DL (ref 2–4)
GLUCOSE SERPL-MCNC: 106 MG/DL (ref 65–100)
HCT VFR BLD AUTO: 38.7 % (ref 35–47)
HGB BLD-MCNC: 12.8 G/DL (ref 11.5–16)
IMM GRANULOCYTES # BLD: 0 K/UL (ref 0–0.04)
IMM GRANULOCYTES NFR BLD AUTO: 0 % (ref 0–0.5)
LYMPHOCYTES # BLD: 1.9 K/UL (ref 0.8–3.5)
LYMPHOCYTES NFR BLD: 24 % (ref 12–49)
MCH RBC QN AUTO: 28.9 PG (ref 26–34)
MCHC RBC AUTO-ENTMCNC: 33.1 G/DL (ref 30–36.5)
MCV RBC AUTO: 87.4 FL (ref 80–99)
MONOCYTES # BLD: 1 K/UL (ref 0–1)
MONOCYTES NFR BLD: 12 % (ref 5–13)
NEUTS SEG # BLD: 4.7 K/UL (ref 1.8–8)
NEUTS SEG NFR BLD: 60 % (ref 32–75)
NRBC # BLD: 0 K/UL (ref 0–0.01)
NRBC BLD-RTO: 0 PER 100 WBC
PLATELET # BLD AUTO: 187 K/UL (ref 150–400)
PMV BLD AUTO: 10.3 FL (ref 8.9–12.9)
POTASSIUM SERPL-SCNC: 4 MMOL/L (ref 3.5–5.1)
PROT SERPL-MCNC: 7.6 G/DL (ref 6.4–8.2)
RBC # BLD AUTO: 4.43 M/UL (ref 3.8–5.2)
SODIUM SERPL-SCNC: 140 MMOL/L (ref 136–145)
TROPONIN I SERPL-MCNC: <0.04 NG/ML
WBC # BLD AUTO: 7.9 K/UL (ref 3.6–11)

## 2018-05-09 PROCEDURE — 80053 COMPREHEN METABOLIC PANEL: CPT | Performed by: EMERGENCY MEDICINE

## 2018-05-09 PROCEDURE — 99285 EMERGENCY DEPT VISIT HI MDM: CPT

## 2018-05-09 PROCEDURE — 82550 ASSAY OF CK (CPK): CPT | Performed by: EMERGENCY MEDICINE

## 2018-05-09 PROCEDURE — 82553 CREATINE MB FRACTION: CPT | Performed by: EMERGENCY MEDICINE

## 2018-05-09 PROCEDURE — 93005 ELECTROCARDIOGRAM TRACING: CPT

## 2018-05-09 PROCEDURE — 74011250637 HC RX REV CODE- 250/637: Performed by: EMERGENCY MEDICINE

## 2018-05-09 PROCEDURE — 85025 COMPLETE CBC W/AUTO DIFF WBC: CPT | Performed by: EMERGENCY MEDICINE

## 2018-05-09 PROCEDURE — 83880 ASSAY OF NATRIURETIC PEPTIDE: CPT | Performed by: EMERGENCY MEDICINE

## 2018-05-09 PROCEDURE — 84484 ASSAY OF TROPONIN QUANT: CPT | Performed by: EMERGENCY MEDICINE

## 2018-05-09 PROCEDURE — 36415 COLL VENOUS BLD VENIPUNCTURE: CPT | Performed by: EMERGENCY MEDICINE

## 2018-05-09 RX ORDER — IBUPROFEN 400 MG/1
400 TABLET ORAL ONCE
Status: COMPLETED | OUTPATIENT
Start: 2018-05-09 | End: 2018-05-09

## 2018-05-09 RX ORDER — LISINOPRIL 10 MG/1
10 TABLET ORAL DAILY
Qty: 30 TAB | Refills: 0 | Status: SHIPPED | OUTPATIENT
Start: 2018-05-09 | End: 2018-05-19

## 2018-05-09 RX ORDER — HYDROCODONE BITARTRATE AND ACETAMINOPHEN 5; 325 MG/1; MG/1
1 TABLET ORAL ONCE
Status: COMPLETED | OUTPATIENT
Start: 2018-05-09 | End: 2018-05-09

## 2018-05-09 RX ORDER — TRAMADOL HYDROCHLORIDE 50 MG/1
50 TABLET ORAL
Qty: 12 TAB | Refills: 0 | Status: SHIPPED | OUTPATIENT
Start: 2018-05-09 | End: 2019-01-02

## 2018-05-09 RX ADMIN — IBUPROFEN 400 MG: 400 TABLET, FILM COATED ORAL at 22:12

## 2018-05-09 RX ADMIN — HYDROCODONE BITARTRATE AND ACETAMINOPHEN 1 TABLET: 5; 325 TABLET ORAL at 22:11

## 2018-05-10 LAB
ATRIAL RATE: 73 BPM
CALCULATED P AXIS, ECG09: 65 DEGREES
CALCULATED R AXIS, ECG10: 20 DEGREES
CALCULATED T AXIS, ECG11: 73 DEGREES
DIAGNOSIS, 93000: NORMAL
P-R INTERVAL, ECG05: 182 MS
Q-T INTERVAL, ECG07: 390 MS
QRS DURATION, ECG06: 82 MS
QTC CALCULATION (BEZET), ECG08: 429 MS
VENTRICULAR RATE, ECG03: 73 BPM

## 2018-05-10 NOTE — ED NOTES
Patient discharged and given discharge instructions by Anurag Strange MD. Patient had an opportunity to ask questions. Patient verbalized understanding of discharge instructions. Patient d/c from ED ambulatory, discharge instructions and prescriptions in hand. Patient accompanied by daughter.

## 2018-05-10 NOTE — ED PROVIDER NOTES
EMERGENCY DEPARTMENT HISTORY AND PHYSICAL EXAM        Date: 5/9/2018  Patient Name: Myron Sloan    History of Presenting Illness     Chief Complaint   Patient presents with    Hypertension     She went to [facility] for shoulder pain but her BP was high so they sent her over here.  Shoulder Pain     Right shoulder pain today that radiates to her back. History Provided By: Patient and Patient's Daughter    HPI: Myron Sloan, 76 y.o. female with PMHx significant for HTN, DM, HLD, HCL, presents ambulatory to the ED with cc of persistent, productive cough with associated right shoulder pain that radiates to her back x5 days. Of note, the pt was seen at [address] [place] [address] today where she was diagnosed with a UTI and Bronchitis. Pt had a clear CXR and a UA positive for WBC, negative for nitrates. Pt was referred for further evaluation at the ED for elevated blood pressure. Pt denies any sxs secondary to her elevated blood pressure. Pt endorses compliance with her daily medications, including 5 mg of Lisinopril and 25 mg of Metoprolol. Pt reports hx of arthritis, in which she takes Ibuprofen for chronic pain daily; she was taking stronger prescription medication for her pain of which she has finished leading to an increase in her chronic pain. Pt denies any recent injuries or trauma. Pt specifically denies any headache, leg swelling, vision changes, fever, chills, congestion, shortness of breath, chest pain, abdominal pain, nausea, vomiting, diarrhea, dysuria, or urinary frequency as well as weakness of numbness of the arm. PMHx: Significant for HTN, DM, HLD, GERD, HCL  PSHx: Significant for aortic valve replacement (Bovine), cholecystectomy  Social Hx: -tobacco (former), -EtOH, -Illicit Drugs       PCP: Alfredo Calzada MD   Cardiology: Clifford Padron MD    There are no other complaints, changes, or physical findings at this time.     Current Outpatient Prescriptions   Medication Sig Dispense Refill    lisinopril (PRINIVIL) 10 mg tablet Take 1 Tab by mouth daily for 10 days. 30 Tab 0    traMADol (ULTRAM) 50 mg tablet Take 1 Tab by mouth every six (6) hours as needed for Pain for up to 12 doses. Max Daily Amount: 200 mg. 12 Tab 0    NIACIN, NIACINAMIDE, PO Take  by mouth.  lovastatin (MEVACOR) 10 mg tablet TAKE ONE TABLET BY MOUTH NIGHTLY 90 Tab 3    PARoxetine (PAXIL) 30 mg tablet TAKE ONE TABLET BY MOUTH ONCE DAILY 90 Tab 3    glimepiride (AMARYL) 1 mg tablet TAKE ONE TABLET BY MOUTH IN THE MORNING 90 Tab 3    metFORMIN (GLUCOPHAGE) 500 mg tablet TAKE TWO TABLETS BY MOUTH DAILY WITH BREAKFAST 180 Tab 3    metoprolol succinate (TOPROL-XL) 25 mg XL tablet Take 1 Tab by mouth daily. 90 Tab 3    MAGNESIUM PO Take 1 Tab by mouth daily.  calcium-cholecalciferol, d3, 600-125 mg-unit tab Take 1 Tab by mouth daily.  glucose blood VI test strips (ASCENSIA AUTODISC VI, ONE TOUCH ULTRA TEST VI) strip Test daily. Diagnosis 250.00 3 Package 3    Lancets misc Test twice daily. Diagnosis 250.00 3 Package 3    vitamin c-vitamin e (CRANBERRY CONCENTRATE) cap Take 1 Cap by mouth daily.  cyanocobalamin (VITAMIN B-12) 1,000 mcg tablet Take 1,000 mcg by mouth daily.  aspirin delayed-release 325 mg tablet Take 1 Tab by mouth daily. 90 Tab 1    PV W-O EDU/FERROUS FUMARATE/FA (M-VIT PO) Take 1 tablet by mouth daily.  omega-3 fatty acids-vitamin e (FISH OIL) 1,000 mg Cap Take 1 capsule by mouth daily. Past History     Past Medical History:  Past Medical History:   Diagnosis Date    Atypical chest pain     Long h/o intermittent non-cardiac CP.     Depression with anxiety     Diabetes ([address]     GERD (gastroesophageal reflux disease)     Hypercholesteremia     Hyperlipidemia 7/3/2013    Hypertension     Inner ear dysfunction     S/P aortic valve replacement     Dr. Al Mancia yearly    Vitamin D deficiency        Past Surgical History:  Past Surgical History:   Procedure Laterality Date    HX AORTIC VALVE REPLACEMENT  1999    Bovine valve    HX CATARACT REMOVAL      HX CHOLECYSTECTOMY  1999    HX MOHS PROCEDURES Left 2014       Family History:  Family History   Problem Relation Age of Onset    Heart Disease Mother     Heart Failure Father     Heart Failure Sister     Stroke Sister     Diabetes Brother     Heart Disease Brother        Social History:  Social History   Substance Use Topics    Smoking status: Former Smoker     Quit date: 8/27/1999    Smokeless tobacco: Never Used    Alcohol use No       Allergies: Allergies   Allergen Reactions    Naldecon Unknown (comments)    Sulfa (Sulfonamide Antibiotics) Rash         Review of Systems   Review of Systems   Constitutional: Negative for activity change, appetite change, chills, fever and unexpected weight change. HENT: Negative for congestion. Eyes: Negative for pain and visual disturbance. Respiratory: Negative for cough and shortness of breath. Cardiovascular: Negative for chest pain and leg swelling. Hypertension    Gastrointestinal: Negative for abdominal pain, diarrhea, nausea and vomiting. Genitourinary: Negative for dysuria. Musculoskeletal: Positive for back pain and myalgias (R shoulder). Skin: Negative for rash. Neurological: Negative for headaches. Physical Exam   Physical Exam   Constitutional: She is oriented to person, place, and time. She appears well-developed and well-nourished. Well appearing elderly female, [place], in minimal distress    HENT:   Head: Normocephalic and atraumatic. Mouth/Throat: Oropharynx is clear and moist.   Eyes: Conjunctivae and EOM are normal. Pupils are equal, round, and reactive to light. Right eye exhibits no discharge. Left eye exhibits no discharge. Neck: Normal range of motion. Neck supple. Cardiovascular: Normal rate, regular rhythm and normal heart sounds. No murmur heard. Pulmonary/Chest: Effort normal and breath sounds normal. No respiratory distress.  She has no wheezes. She has no rales. Abdominal: Soft. Bowel sounds are normal. She exhibits no distension. There is no tenderness. Musculoskeletal: Normal range of motion. She exhibits no edema or tenderness. Increased pain, ROM rt arm   Neurological: She is alert and oriented to person, place, and time. No cranial nerve deficit. She exhibits normal muscle tone. Skin: Skin is warm and dry. No rash noted. She is not diaphoretic. Nursing note and vitals reviewed. Diagnostic Study Results     Labs -     Recent Results (from the past 12 hour(s))   EKG, 12 LEAD, INITIAL    Collection Time: 05/09/18  8:53 PM   Result Value Ref Range    Ventricular Rate 73 BPM    Atrial Rate 73 BPM    P-R Interval 182 ms    QRS Duration 82 ms    Q-T Interval 390 ms    QTC Calculation (Bezet) 429 ms    Calculated P Axis 65 degrees    Calculated R Axis 20 degrees    Calculated T Axis 73 degrees    Diagnosis       Normal sinus rhythm  Septal infarct (cited on or before 09-MAY-2018)  When compared with ECG of 12-SEP-2017 10:33,  No significant change was found     CBC WITH AUTOMATED DIFF    Collection Time: 05/09/18  9:33 PM   Result Value Ref Range    WBC 7.9 3.6 - 11.0 K/uL    RBC 4.43 3.80 - 5.20 M/uL    HGB 12.8 11.5 - 16.0 g/dL    HCT 38.7 35.0 - 47.0 %    MCV 87.4 80.0 - 99.0 FL    MCH 28.9 26.0 - 34.0 PG    MCHC 33.1 30.0 - 36.5 g/dL    RDW 13.0 11.5 - 14.5 %    PLATELET 313 150 - 852 K/uL    MPV 10.3 8.9 - 12.9 FL    NRBC 0.0 0  WBC    ABSOLUTE NRBC 0.00 0.00 - 0.01 K/uL    NEUTROPHILS 60 32 - 75 %    LYMPHOCYTES 24 12 - 49 %    MONOCYTES 12 5 - 13 %    EOSINOPHILS 3 0 - 7 %    BASOPHILS 0 0 - 1 %    IMMATURE GRANULOCYTES 0 0.0 - 0.5 %    ABS. NEUTROPHILS 4.7 1.8 - 8.0 K/UL    ABS. LYMPHOCYTES 1.9 0.8 - 3.5 K/UL    ABS. MONOCYTES 1.0 0.0 - 1.0 K/UL    ABS. EOSINOPHILS 0.3 0.0 - 0.4 K/UL    ABS. BASOPHILS 0.0 0.0 - 0.1 K/UL    ABS. IMM.  GRANS. 0.0 0.00 - 0.04 K/UL    DF AUTOMATED     METABOLIC PANEL, COMPREHENSIVE Collection Time: 05/09/18  9:33 PM   Result Value Ref Range    Sodium 140 136 - 145 mmol/L    Potassium 4.0 3.5 - 5.1 mmol/L    Chloride 103 97 - 108 mmol/L    CO2 29 21 - 32 mmol/L    Anion gap 8 5 - 15 mmol/L    Glucose 106 (H) 65 - 100 mg/dL    BUN 20 6 - 20 MG/DL    Creatinine 0.65 0.55 - 1.02 MG/DL    BUN/Creatinine ratio 31 (H) 12 - 20      GFR est AA >60 >60 ml/min/1.73m2    GFR est non-AA >60 >60 ml/min/1.73m2    Calcium 9.5 8.5 - 10.1 MG/DL    Bilirubin, total 0.5 0.2 - 1.0 MG/DL    ALT (SGPT) 36 12 - 78 U/L    AST (SGOT) 33 15 - 37 U/L    Alk. phosphatase 65 45 - 117 U/L    Protein, total 7.6 6.4 - 8.2 g/dL    Albumin 4.1 3.5 - 5.0 g/dL    Globulin 3.5 2.0 - 4.0 g/dL    A-G Ratio 1.2 1.1 - 2.2     TROPONIN I    Collection Time: 05/09/18  9:33 PM   Result Value Ref Range    Troponin-I, Qt. <0.04 <0.05 ng/mL   CK W/ REFLX CKMB    Collection Time: 05/09/18  9:33 PM   Result Value Ref Range     (H) 26 - 192 U/L   NT-PRO BNP    Collection Time: 05/09/18  9:33 PM   Result Value Ref Range    NT pro- (H) 0 - 450 PG/ML   CK-MB,QUANT. Collection Time: 05/09/18  9:33 PM   Result Value Ref Range    CK - MB 10.6 (H) <3.6 NG/ML    CK-MB Index 4.0 (H) 0 - 2.5         Radiologic Studies -   No orders to display     CT Results  (Last 48 hours)    None        CXR Results  (Last 48 hours)    None            Medical Decision Making   I am the first provider for this patient. I reviewed the vital signs, available nursing notes, past medical history, past surgical history, family history and social history. Vital Signs-Reviewed the patient's vital signs.   Patient Vitals for the past 12 hrs:   Temp Pulse Resp BP SpO2   05/09/18 2301 - 72 16 - 97 %   05/09/18 2300 - 64 17 181/90 98 %   05/09/18 2240 - 65 13 (!) 205/69 97 %   05/09/18 2200 - 70 14 185/74 97 %   05/09/18 2145 - 68 18 187/80 97 %   05/09/18 2130 - 69 17 (!) 203/74 95 %   05/09/18 2115 - 72 18 198/53 98 %   05/09/18 2046 97.7 °F (36.5 °C) 73 14 (!) 210/97 97 %       Pulse Oximetry Analysis - 97% on RA    Cardiac Monitor:   Rate: 73 bpm  Rhythm: Normal Sinus Rhythm      EKG interpretation: (Preliminary) 8:53 PM  Rhythm: normal sinus rhythm; and regular . Rate (approx.): 73; Axis: normal; KS interval: normal; QRS interval: normal ; ST/T wave: normal; Other findings: non-ischemic. Written by Demetrius Fernandez, ED Scribe, as dictated by Jack Ochoa MD.    Records Reviewed: Nursing Notes, Old Medical Records, Previous electrocardiograms and Previous Laboratory Studies    Provider Notes (Medical Decision Making):   Pt brought for asymptomatic hypertension. Right shoulder pain likely worsening blood pressure elevation. Low suspicion ACS, dissection, as pain appears to be musculoskeletal.      ED Course:   Initial assessment performed. The patients presenting problems have been discussed, and they are in agreement with the care plan formulated and outlined with them. I have encouraged them to ask questions as they arise throughout their visit. Progress note:  10:56 PM  Pt noted to be ready for discharge. Updated pt and/or family on all final lab findings. Will follow up as instructed. All questions have been answered, pt voiced understanding and agreement with plan. Specific return precautions provided as well as instructions to return to the ED should sx worsen at any time. Vital signs stable for discharge. Written by Demetrius Fernandez, ED Scribe, as dictated by Jack Ochoa MD    Critical Care Time:   None    Disposition:  Discharge Note:  10:56 PM  The pt is ready for discharge. The pt's signs, symptoms, diagnosis, and discharge instructions have been discussed and pt has conveyed their understanding. The pt is to follow up as recommended or return to ER should their symptoms worsen. Plan has been discussed and pt is in agreement. PLAN:  1.    Discharge Medication List as of 5/9/2018 10:49 PM      START taking these medications    Details traMADol (ULTRAM) 50 mg tablet Take 1 Tab by mouth every six (6) hours as needed for Pain for up to 12 doses. Max Daily Amount: 200 mg., Print, Disp-12 Tab, R-0         CONTINUE these medications which have CHANGED    Details   lisinopril (PRINIVIL) 10 mg tablet Take 1 Tab by mouth daily for 10 days. , Print, Disp-30 Tab, R-0         CONTINUE these medications which have NOT CHANGED    Details   NIACIN, NIACINAMIDE, PO Take  by mouth., Historical Med      lovastatin (MEVACOR) 10 mg tablet TAKE ONE TABLET BY MOUTH NIGHTLY, Normal, Disp-90 Tab, R-3      PARoxetine (PAXIL) 30 mg tablet TAKE ONE TABLET BY MOUTH ONCE DAILY, Normal, Disp-90 Tab, R-3      glimepiride (AMARYL) 1 mg tablet TAKE ONE TABLET BY MOUTH IN THE MORNING, Normal, Disp-90 Tab, R-3      metFORMIN (GLUCOPHAGE) 500 mg tablet TAKE TWO TABLETS BY MOUTH DAILY WITH BREAKFAST, Normal, Disp-180 Tab, R-3      metoprolol succinate (TOPROL-XL) 25 mg XL tablet Take 1 Tab by mouth daily. , Normal, Disp-90 Tab, R-3      MAGNESIUM PO Take 1 Tab by mouth daily. , Historical Med      calcium-cholecalciferol, d3, 600-125 mg-unit tab Take 1 Tab by mouth daily. , Historical Med      glucose blood VI test strips (ASCENSIA AUTODISC VI, ONE TOUCH ULTRA TEST VI) strip Test daily. Diagnosis 250.00, Normal, Disp-3 Package, R-3      Lancets misc Test twice daily. Diagnosis 250.00, Print, Disp-3 Package, R-3      vitamin c-vitamin e (CRANBERRY CONCENTRATE) cap Take 1 Cap by mouth daily. , Historical Med      cyanocobalamin (VITAMIN B-12) 1,000 mcg tablet Take 1,000 mcg by mouth daily. , Historical Med      aspirin delayed-release 325 mg tablet Take 1 Tab by mouth daily. , No Print, Disp-90 Tab, R-1      PV W-O EDU/FERROUS FUMARATE/FA (M-VIT PO) Take 1 tablet by mouth daily. , Historical Med      omega-3 fatty acids-vitamin e (FISH OIL) 1,000 mg Cap Take 1 capsule by mouth daily. , Historical Med         STOP taking these medications       meloxicam (MOBIC) 15 mg tablet Comments: Reason for Stopping:         meclizine (ANTIVERT) 25 mg tablet Comments:   Reason for Stoppin.   Follow-up Information     Follow up With Details Comments Contact Info    [facility] EMERGENCY DEPT  If symptoms worsen [address] [facility] [address]  [phone]        Return to ED if worse     Diagnosis     Clinical Impression:   1. Elevated blood pressure reading        Attestations: This note is prepared by Mehreen Perez, acting as Scribe for Vera Ram MD      The scribe's documentation has been prepared under my direction and personally reviewed by me in its entirety. I confirm that the note above accurately reflects all work, treatment, procedures, and medical decision making performed by me. Vera Ram MD        This note will not be viewable in [address]

## 2018-05-10 NOTE — ED NOTES
Patient presents to ED with C/O diarrhea, right shoulder and upper back pain that started 12 today while sitting on the couch. The pt denies SOB, N/V, urinary symptoms, and dizziness. Patient is A&Ox3, side rails up, call bell w/in reach, and aware of plan of care. The patient is in NAD. Daughter at the bedside.

## 2018-05-10 NOTE — DISCHARGE INSTRUCTIONS
Elevated Blood Pressure: Care Instructions  Your Care Instructions  Blood pressure is a measure of how hard the blood pushes against the walls of your arteries. It's normal for blood pressure to go up and down throughout the day. But if it stays up over time, you have high blood pressure. Two numbers tell you your blood pressure. The first number is the systolic pressure. It shows how hard the blood pushes when your heart is pumping. The second number is the diastolic pressure. It shows how hard the blood pushes between heartbeats, when your heart is relaxed and filling with blood. An ideal blood pressure in adults is less than 120/80 (say \"120 over 80\"). High blood pressure is 140/90 or higher. You have high blood pressure if your top number is 140 or higher or your bottom number is 90 or higher, or both. The main test for high blood pressure is simple, fast, and painless. To diagnose high blood pressure, your doctor will test your blood pressure at different times. After testing your blood pressure, your doctor may ask you to test it again when you are home. If you are diagnosed with high blood pressure, you can work with your doctor to make a long-term plan to manage it. Follow-up care is a key part of your treatment and safety. Be sure to make and go to all appointments, and call your doctor if you are having problems. It's also a good idea to know your test results and keep a list of the medicines you take. How can you care for yourself at home? · Do not smoke. Smoking increases your risk for heart attack and stroke. If you need help quitting, talk to your doctor about stop-smoking programs and medicines. These can increase your chances of quitting for good. · Stay at a healthy weight. · Try to limit how much sodium you eat to less than 2,300 milligrams (mg) a day. Your doctor may ask you to try to eat less than 1,500 mg a day. · Be physically active.  Get at least 30 minutes of exercise on most days of the week. Walking is a good choice. You also may want to do other activities, such as running, swimming, cycling, or playing tennis or team sports. · Avoid or limit alcohol. Talk to your doctor about whether you can drink any alcohol. · Eat plenty of fruits, vegetables, and low-fat dairy products. Eat less saturated and total fats. · Learn how to check your blood pressure at home. When should you call for help? Call your doctor now or seek immediate medical care if:  ? · Your blood pressure is much higher than normal (such as 180/110 or higher). ? · You think high blood pressure is causing symptoms such as:  ¨ Severe headache. ¨ Blurry vision. ? Watch closely for changes in your health, and be sure to contact your doctor if:  ? · You do not get better as expected. Where can you learn more? Go to http://roseOneloudr Productionscolleen.info/. Enter R658 in the search box to learn more about \"Elevated Blood Pressure: Care Instructions. \"  Current as of: September 21, 2016  Content Version: 11.4  © 0376-2308 [employer]. Care instructions adapted under license by [employer] (which disclaims liability or warranty for this information). If you have questions about a medical condition or this instruction, always ask your healthcare professional. [address] any warranty or liability for your use of this information. Shoulder Arthritis: Exercises  Your Care Instructions  Here are some examples of typical rehabilitation exercises for your condition. Start each exercise slowly. Ease off the exercise if you start to have pain. Your doctor or physical therapist will tell you when you can start these exercises and which ones will work best for you. How to do the exercises  Shoulder flexion (lying down)    To make a wand for this exercise, use a piece of PVC pipe or a broom handle with the broom removed.  Make the wand about a foot wider than your shoulders. 1. Lie on your back, holding a wand with both hands. Your palms should face down as you hold the wand. 2. Keeping your elbows straight, slowly raise your arms over your head. Raise them until you feel a stretch in your shoulders, upper back, and chest.  3. Hold for 15 to 30 seconds. 4. Repeat 2 to 4 times. Shoulder rotation (lying down)    To make a wand for this exercise, use a piece of PVC pipe or a broom handle with the broom removed. Make the wand about a foot wider than your shoulders. 1. Lie on your back. Hold a wand with both hands with your elbows bent and palms up. 2. Keep your elbows close to your body, and move the wand across your body toward the sore arm. 3. Hold for 8 to 12 seconds. 4. Repeat 2 to 4 times. Shoulder internal rotation with towel    1. Hold a towel above and behind your head with the arm that is not sore. 2. With your sore arm, reach behind your back and grasp the towel. 3. With the arm above your head, pull the towel upward. Do this until you feel a stretch on the front and outside of your sore shoulder. 4. Hold 15 to 30 seconds. 5. Repeat 2 to 4 times. Shoulder blade squeeze    1. Stand with your arms at your sides, and squeeze your shoulder blades together. Do not raise your shoulders up as you squeeze. 2. Hold 6 seconds. 3. Repeat 8 to 12 times. Resisted rows    For this exercise, you will need elastic exercise material, such as surgical tubing or Thera-Band. 1. Put the band around a solid object at about waist level. (A bedpost will work well.) Each hand should hold an end of the band. 2. With your elbows at your sides and bent to 90 degrees, pull the band back. Your shoulder blades should move toward each other. Return to the starting position. 3. Repeat 8 to 12 times. External rotator strengthening exercise    1. Start by tying a piece of elastic exercise material to a doorknob. You can use surgical tubing or Thera-Band.  (You may also hold one end of the band in each hand.)  2. Stand or sit with your shoulder relaxed and your elbow bent 90 degrees. Your upper arm should rest comfortably against your side. Squeeze a rolled towel between your elbow and your body for comfort. This will help keep your arm at your side. 3. Hold one end of the elastic band with the hand of the painful arm. 4. Start with your forearm across your belly. Slowly rotate the forearm out away from your body. Keep your elbow and upper arm tucked against the towel roll or the side of your body until you begin to feel tightness in your shoulder. Slowly move your arm back to where you started. 5. Repeat 8 to 12 times. Internal rotator strengthening exercise    1. Start by tying a piece of elastic exercise material to a doorknob. You can use surgical tubing or Thera-Band. 2. Stand or sit with your shoulder relaxed and your elbow bent 90 degrees. Your upper arm should rest comfortably against your side. Squeeze a rolled towel between your elbow and your body for comfort. This will help keep your arm at your side. 3. Hold one end of the elastic band in the hand of the painful arm. 4. Slowly rotate your forearm toward your body until it touches your belly. Slowly move it back to where you started. 5. Keep your elbow and upper arm firmly tucked against the towel roll or at your side. 6. Repeat 8 to 12 times. Pendulum swing    If you have pain in your back, do not do this exercise. 1. Hold on to a table or the back of a chair with your good arm. Then bend forward a little and let your sore arm hang straight down. This exercise does not use the arm muscles. Rather, use your legs and your hips to create movement that makes your arm swing freely. 2. Use the movement from your hips and legs to guide the slightly swinging arm back and forth like a pendulum (or elephant trunk). Then guide it in circles that start small (about the size of a dinner plate).  Make the circles a bit larger each day, as your pain allows. 3. Do this exercise for 5 minutes, 5 to 7 times each day. 4. As you have less pain, try bending over a little farther to do this exercise. This will increase the amount of movement at your shoulder. Follow-up care is a key part of your treatment and safety. Be sure to make and go to all appointments, and call your doctor if you are having problems. It's also a good idea to know your test results and keep a list of the medicines you take. Where can you learn more? Go to http://rose-colleen.info/. Enter H562 in the search box to learn more about \"Shoulder Arthritis: Exercises. \"  Current as of: March 21, 2017  Content Version: 11.4  © 1383-6482 [employer]. Care instructions adapted under license by [employer] (which disclaims liability or warranty for this information). If you have questions about a medical condition or this instruction, always ask your healthcare professional. [address] any warranty or liability for your use of this information.

## 2018-05-15 ENCOUNTER — OFFICE VISIT (OUTPATIENT)
Dept: FAMILY MEDICINE CLINIC | Age: 78
End: 2018-05-15

## 2018-05-15 VITALS
SYSTOLIC BLOOD PRESSURE: 131 MMHG | TEMPERATURE: 98.3 F | WEIGHT: 201 LBS | BODY MASS INDEX: 35.61 KG/M2 | RESPIRATION RATE: 14 BRPM | DIASTOLIC BLOOD PRESSURE: 75 MMHG | OXYGEN SATURATION: 96 % | HEART RATE: 53 BPM | HEIGHT: 63 IN

## 2018-05-15 DIAGNOSIS — M75.101 ROTATOR CUFF SYNDROME OF RIGHT SHOULDER: Primary | ICD-10-CM

## 2018-05-15 DIAGNOSIS — I10 ESSENTIAL HYPERTENSION, BENIGN: ICD-10-CM

## 2018-05-15 RX ORDER — DOXYCYCLINE 100 MG/1
CAPSULE ORAL
COMMUNITY
Start: 2018-05-09 | End: 2019-01-02 | Stop reason: ALTCHOICE

## 2018-05-15 NOTE — MR AVS SNAPSHOT
[address] [facility], [place], operated by [facility] 
 
 
 [address] 
[phone] Patient: Shaye Sams MRN:  [id number] Visit Information Date & Time Provider Department Dept. Phone Encounter #  
 5/15/2018  1:00 PM Erich Cornell MD [address] [address] [facility] [phone] [id number] Upcoming Health Maintenance Date Due  
 FOOT EXAM Q1 7/3/2018 MEDICARE YEARLY EXAM 7/4/2018 Influenza Age 5 to Adult 8/1/2018 HEMOGLOBIN A1C Q6M 8/13/2018 MICROALBUMIN Q1 2/13/2019 LIPID PANEL Q1 2/13/2019 EYE EXAM RETINAL OR DILATED Q1 3/28/2019 GLAUCOMA SCREENING Q2Y 3/28/2020 DTaP/Tdap/Td series (2 - Td) 10/18/2023 Allergies as of 5/15/2018  Review Complete On: 5/9/2018 By: Beatrice Jaeger RN Severity Noted Reaction Type Reactions Naldecon High 11/07/2009    Unknown (comments) Sulfa (Sulfonamide Antibiotics) High 11/07/2009    Rash Current Immunizations  Reviewed on 2/13/2018 Name Date Influenza High Dose Vaccine PF 11/27/2017  3:33 PM, 12/21/2016, 10/1/2014, 10/24/2013 Influenza Vaccine Split 10/9/2012, 11/1/2010 Pneumococcal Conjugate (PCV-13) 2/13/2018 Pneumococcal Polysaccharide (PPSV-23) 7/3/2009 Tdap 10/18/2013 Zoster Vaccine, Live 10/6/2009 Not reviewed this visit You Were Diagnosed With   
  
 Codes Comments Rotator cuff syndrome of right shoulder    -  Primary ICD-10-CM: M75.101 ICD-9-CM: 726.10 Essential hypertension, benign     ICD-10-CM: I10 
ICD-9-CM: 401.1 Vitals BP Pulse Temp Resp Height(growth percentile) Weight(growth percentile) 131/75 (BP 1 Location: Left arm, BP Patient Position: Sitting) (!) 53 98.3 °F (36.8 °C) 14 5' 2.5\" (1.588 m) 201 lb (91.2 kg) SpO2 BMI OB Status Smoking Status 96% 36.18 kg/m2 Postmenopausal Former Smoker BMI and BSA Data Body Mass Index Body Surface Area  
 36.18 kg/m 2 2.01 m 2 Preferred Pharmacy Pharmacy Name Phone [facility], [place] - [address] [phone] Your Updated Medication List  
  
   
This list is accurate as of 5/15/18  1:32 PM.  Always use your most recent med list.  
  
  
  
  
 aspirin delayed-release 325 mg tablet Take 1 Tab by mouth daily. calcium-cholecalciferol (d3) 600-125 mg-unit Tab Take 1 Tab by mouth daily. CRANBERRY CONCENTRATE Cap Generic drug:  vitamin c-vitamin e Take 1 Cap by mouth daily. doxycycline 100 mg capsule Commonly known as:  VIBRAMYCIN  
  
 FISH OIL 1,000 mg Cap Generic drug:  omega-3 fatty acids-vitamin e Take 1 capsule by mouth daily. glimepiride 1 mg tablet Commonly known as:  AMARYL  
TAKE ONE TABLET BY MOUTH IN THE MORNING  
  
 glucose blood VI test strips strip Commonly known as:  ASCENSIA AUTODISC VI, ONE TOUCH ULTRA TEST VI Test daily. Diagnosis 250.00 Lancets Misc Test twice daily. Diagnosis 250.00  
  
 lisinopril 10 mg tablet Commonly known as:  PRINIVIL Take 1 Tab by mouth daily for 10 days. lovastatin 10 mg tablet Commonly known as:  MEVACOR  
TAKE ONE TABLET BY MOUTH NIGHTLY  
  
 M-VIT PO Take 1 tablet by mouth daily. MAGNESIUM PO Take 1 Tab by mouth daily. metFORMIN 500 mg tablet Commonly known as:  GLUCOPHAGE  
TAKE TWO TABLETS BY MOUTH DAILY WITH BREAKFAST  
  
 metoprolol succinate 25 mg XL tablet Commonly known as:  TOPROL-XL Take 1 Tab by mouth daily. NIACIN (NIACINAMIDE) PO Take  by mouth. PARoxetine 30 mg tablet Commonly known as:  PAXIL TAKE ONE TABLET BY MOUTH ONCE DAILY  
  
 traMADol 50 mg tablet Commonly known as:  ULTRAM  
Take 1 Tab by mouth every six (6) hours as needed for Pain for up to 12 doses. Max Daily Amount: 200 mg. VITAMIN B-12 1,000 mcg tablet Generic drug:  cyanocobalamin Take 1,000 mcg by mouth daily. We Performed the Following REFERRAL TO ORTHOPEDICS [AIL034 Custom] Referral Information Referral ID Referred By Referred To  
  
 [id number], [address] [facility] [address] [place], [address] Phone: [phone] Visits Status Start Date End Date 1 New Request 5/15/18 5/15/19 If your referral has a status of pending review or denied, additional information will be sent to support the outcome of this decision. Introducing [facility]! [employer] introduces InterResolve patient portal. Now you can access parts of your medical record, email your doctor's office, and request medication refills online. 1. In your internet browser, go to [email or web address] 2. Click on the First Time User? Click Here link in the Sign In box. You will see the New Member Sign Up page. 3. Enter your InterResolve Access Code exactly as it appears below. You will not need to use this code after youve completed the sign-up process. If you do not sign up before the expiration date, you must request a new code. · InterResolve Access Code: [email or web address] Expires: 7/5/2018  2:29 PM 
 
4. Enter the last four digits of your Social Security Number (xxxx) and Date of Birth (mm/dd/yyyy) as indicated and click Submit. You will be taken to the next sign-up page. 5. Create a InterResolve ID. This will be your InterResolve login ID and cannot be changed, so think of one that is secure and easy to remember. 6. Create a InterResolve password. You can change your password at any time. 7. Enter your Password Reset Question and Answer. This can be used at a later time if you forget your password. 8. Enter your e-mail address. You will receive e-mail notification when new information is available in [address] 9. Click Sign Up. You can now view and download portions of your medical record. 10. Click the Download Summary menu link to download a portable copy of your medical information. If you have questions, please visit the Frequently Asked Questions section of the [employer] website. Remember, Handprint is NOT to be used for urgent needs. For medical emergencies, dial 911. Now available from your iPhone and Android! Please provide this summary of care documentation to your next provider. Your primary care clinician is listed as Cristin Russo. If you have any questions after today's visit, please call [phone].

## 2018-05-15 NOTE — PROGRESS NOTES
MARKY Oliveira is a 76 y.o. female who Presents follow-up on emergency room visit. She was seen last week for shoulder pain and elevated blood pressure. She evidently developed shoulder pain while sitting on the couch and was taken to better med. Her blood pressure was very elevated and as a result she was forwarded to the emergency room. She was treated with Norco and Motrin with relief of her shoulder pain. Did not require anything for her blood pressure. Told to follow-up on her blood pressure. Labs were unremarkable. She has had bilateral shoulder replacements. Has been bothered by the right shoulder on a almost daily basis for the last year. She feels like this is worsening. She does not recall when she had the shoulder replaced but thinks it was decades ago. Activities are worse in her shoulder are \"digging holes\" and sweeping the floor    PMHx:  Past Medical History:   Diagnosis Date    Atypical chest pain     Long h/o intermittent non-cardiac CP.  Depression with anxiety     Diabetes ([facility] [address]     GERD (gastroesophageal reflux disease)     Hypercholesteremia     Hyperlipidemia 7/3/2013    Hypertension     Inner ear dysfunction     S/P aortic valve replacement     Dr. Donahue Blinks yearly    Vitamin D deficiency        Meds:   Current Outpatient Prescriptions   Medication Sig Dispense Refill    doxycycline (VIBRAMYCIN) 100 mg capsule       lisinopril (PRINIVIL) 10 mg tablet Take 1 Tab by mouth daily for 10 days. 30 Tab 0    traMADol (ULTRAM) 50 mg tablet Take 1 Tab by mouth every six (6) hours as needed for Pain for up to 12 doses. Max Daily Amount: 200 mg. 12 Tab 0    NIACIN, NIACINAMIDE, PO Take  by mouth.       lovastatin (MEVACOR) 10 mg tablet TAKE ONE TABLET BY MOUTH NIGHTLY 90 Tab 3    PARoxetine (PAXIL) 30 mg tablet TAKE ONE TABLET BY MOUTH ONCE DAILY 90 Tab 3    glimepiride (AMARYL) 1 mg tablet TAKE ONE TABLET BY MOUTH IN THE MORNING 90 Tab 3    metFORMIN (GLUCOPHAGE) 500 mg tablet TAKE TWO TABLETS BY MOUTH DAILY WITH BREAKFAST 180 Tab 3    metoprolol succinate (TOPROL-XL) 25 mg XL tablet Take 1 Tab by mouth daily. 90 Tab 3    MAGNESIUM PO Take 1 Tab by mouth daily.  calcium-cholecalciferol, d3, 600-125 mg-unit tab Take 1 Tab by mouth daily.  glucose blood VI test strips (ASCENSIA AUTODISC VI, ONE TOUCH ULTRA TEST VI) strip Test daily. Diagnosis 250.00 3 Package 3    Lancets misc Test twice daily. Diagnosis 250.00 3 Package 3    vitamin c-vitamin e (CRANBERRY CONCENTRATE) cap Take 1 Cap by mouth daily.  cyanocobalamin (VITAMIN B-12) 1,000 mcg tablet Take 1,000 mcg by mouth daily.  aspirin delayed-release 325 mg tablet Take 1 Tab by mouth daily. 90 Tab 1    PV W-O EDU/FERROUS FUMARATE/FA (M-VIT PO) Take 1 tablet by mouth daily.  omega-3 fatty acids-vitamin e (FISH OIL) 1,000 mg Cap Take 1 capsule by mouth daily. Allergies: Allergies   Allergen Reactions    Naldecon Unknown (comments)    Sulfa (Sulfonamide Antibiotics) Rash       Smoker:  History   Smoking Status    Former Smoker    Quit date: 8/27/1999   Smokeless Tobacco    Never Used       ETOH:   History   Alcohol Use No       FH:   Family History   Problem Relation Age of Onset    Heart Disease Mother     Heart Failure Father     Heart Failure Sister     Stroke Sister     Diabetes Brother     Heart Disease Brother        ROS:   As listed in HPI. In addition:  Constitutional:   No headache, fever, fatigue, weight loss or weight gain      Cardiac:    No chest pain      Resp:   No cough or shortness of breath      Neuro   No loss of consciousness, dizziness, seizures      Physical Exam:  Blood pressure 131/75, pulse (!) 53, temperature 98.3 °F (36.8 °C), resp. rate 14, height 5' 2.5\" (1.588 m), weight 201 lb (91.2 kg), SpO2 96 %. GEN: No apparent distress. Alert and oriented and responds to all questions appropriately. NEUROLOGIC:  No focal neurologic deficits.  Strength and sensation grossly intact. Coordination and gait grossly intact. EXT: Well perfused. No edema. SKIN: No obvious rashes. Musculoskeletal, pain localized to the lateral aspect of the right shoulder. There is a full range of motion of the shoulder. There is pain on exam of the supraspinatus strength. Supraspinatus has 4/5 limited by pain. Rest the rotator cuff is intact       Assessment and Plan     Supraspinatus pain of the right shoulder.   joint replacement estimated to be decades ago although she cannot recall the date. Will refer her back to Ortho for the shoulder pain that has been going on for over a year and is affecting her daily activities. The tramadol for 2 days but did not like it will take it off her list.  Taking Motrin every morning in lieu of aspirin. Please take an aspirin if you want but only take Motrin as needed. Hypertensive urgency now resolved. Blood pressure is normal today. No change in medications. Labs are unremarkable emergency room. No need to repeat that today. Follow-up in 3 months for her six-month follow-up chronic medical conditions      ICD-10-CM ICD-9-CM    1. Rotator cuff syndrome of right shoulder M75.101 726.10 REFERRAL TO ORTHOPEDICS   2. Essential hypertension, benign I10 401.1        AVS given.  Pt expressed understanding of instructions

## 2018-06-06 RX ORDER — GLIMEPIRIDE 1 MG/1
TABLET ORAL
Qty: 90 TAB | Refills: 3 | Status: SHIPPED | OUTPATIENT
Start: 2018-06-06 | End: 2019-01-02 | Stop reason: ALTCHOICE

## 2018-06-06 RX ORDER — METFORMIN HYDROCHLORIDE 500 MG/1
TABLET ORAL
Qty: 180 TAB | Refills: 3 | Status: SHIPPED | OUTPATIENT
Start: 2018-06-06 | End: 2019-09-26 | Stop reason: SDUPTHER

## 2018-06-06 RX ORDER — CLOPIDOGREL BISULFATE 75 MG/1
TABLET ORAL
Qty: 90 TAB | Refills: 3 | Status: SHIPPED | OUTPATIENT
Start: 2018-06-06 | End: 2018-10-10

## 2018-08-16 ENCOUNTER — OFFICE VISIT (OUTPATIENT)
Dept: FAMILY MEDICINE CLINIC | Age: 78
End: 2018-08-16

## 2018-08-16 VITALS
HEIGHT: 62 IN | SYSTOLIC BLOOD PRESSURE: 151 MMHG | DIASTOLIC BLOOD PRESSURE: 77 MMHG | RESPIRATION RATE: 18 BRPM | WEIGHT: 215 LBS | TEMPERATURE: 98.2 F | HEART RATE: 63 BPM | BODY MASS INDEX: 39.56 KG/M2 | OXYGEN SATURATION: 96 %

## 2018-08-16 DIAGNOSIS — E78.2 MIXED HYPERLIPIDEMIA: ICD-10-CM

## 2018-08-16 DIAGNOSIS — M54.50 CHRONIC LEFT-SIDED LOW BACK PAIN WITHOUT SCIATICA: ICD-10-CM

## 2018-08-16 DIAGNOSIS — E11.8 CONTROLLED TYPE 2 DIABETES MELLITUS WITH COMPLICATION, WITHOUT LONG-TERM CURRENT USE OF INSULIN (HCC): ICD-10-CM

## 2018-08-16 DIAGNOSIS — I10 ESSENTIAL HYPERTENSION, BENIGN: ICD-10-CM

## 2018-08-16 DIAGNOSIS — Z00.00 MEDICARE ANNUAL WELLNESS VISIT, SUBSEQUENT: Primary | ICD-10-CM

## 2018-08-16 DIAGNOSIS — Z95.2 S/P AORTIC VALVE REPLACEMENT: ICD-10-CM

## 2018-08-16 DIAGNOSIS — M48.061 SPINAL STENOSIS OF LUMBAR REGION WITHOUT NEUROGENIC CLAUDICATION: ICD-10-CM

## 2018-08-16 DIAGNOSIS — E66.01 SEVERE OBESITY (BMI 35.0-39.9): ICD-10-CM

## 2018-08-16 DIAGNOSIS — G89.29 CHRONIC LEFT-SIDED LOW BACK PAIN WITHOUT SCIATICA: ICD-10-CM

## 2018-08-16 DIAGNOSIS — F41.8 DEPRESSION WITH ANXIETY: ICD-10-CM

## 2018-08-16 RX ORDER — LISINOPRIL 5 MG/1
TABLET ORAL
COMMUNITY
Start: 2018-07-09 | End: 2018-10-15 | Stop reason: SDUPTHER

## 2018-08-16 RX ORDER — MIRABEGRON 25 MG/1
25 TABLET, FILM COATED, EXTENDED RELEASE ORAL DAILY
COMMUNITY
Start: 2018-08-11 | End: 2021-02-05 | Stop reason: SDUPTHER

## 2018-08-16 RX ORDER — MELOXICAM 15 MG/1
15 TABLET ORAL DAILY
COMMUNITY
Start: 2018-07-09 | End: 2019-01-08

## 2018-08-16 NOTE — MR AVS SNAPSHOT
[facility] 
 
 
 [address] [place] [address] 
[phone] Patient: Manfred Viramontes MRN:  [id number] Visit Information Date & Time Provider Department Dept. Phone Encounter #  
 8/16/2018 10:15 AM Abeba Uribe, [address] [facility] [phone] [id number] Follow-up Instructions Return in about 3 months (around 11/16/2018). Upcoming Health Maintenance Date Due  
 FOOT EXAM Q1 7/3/2018 MEDICARE YEARLY EXAM 7/4/2018 Influenza Age 5 to Adult 8/1/2018 HEMOGLOBIN A1C Q6M 8/13/2018 MICROALBUMIN Q1 2/13/2019 LIPID PANEL Q1 2/13/2019 EYE EXAM RETINAL OR DILATED Q1 3/28/2019 GLAUCOMA SCREENING Q2Y 3/28/2020 DTaP/Tdap/Td series (2 - Td) 10/18/2023 Allergies as of 8/16/2018  Review Complete On: 8/16/2018 By: Abeba Uribe MD  
  
 Severity Noted Reaction Type Reactions Naldecon High 11/07/2009    Unknown (comments) Sulfa (Sulfonamide Antibiotics) High 11/07/2009    Rash Current Immunizations  Reviewed on 2/13/2018 Name Date Influenza High Dose Vaccine PF 11/27/2017  3:33 PM, 12/21/2016, 10/1/2014, 10/24/2013 Influenza Vaccine Split 10/9/2012, 11/1/2010 Pneumococcal Conjugate (PCV-13) 2/13/2018 Pneumococcal Polysaccharide (PPSV-23) 7/3/2009 Tdap 10/18/2013 Zoster Vaccine, Live 10/6/2009 Not reviewed this visit You Were Diagnosed With   
  
 Codes Comments Medicare annual wellness visit, subsequent    -  Primary ICD-10-CM: Z00.00 ICD-9-CM: V70.0 Essential hypertension, benign     ICD-10-CM: I10 
ICD-9-CM: 401.1 Mixed hyperlipidemia     ICD-10-CM: E78.2 ICD-9-CM: 272.2 S/P aortic valve replacement     ICD-10-CM: Z95.2 ICD-9-CM: V43.3 Severe obesity (BMI 35.0-39.9) (HCC)     ICD-10-CM: E66.01 
ICD-9-CM: 278.01  Controlled type 2 diabetes mellitus with complication, without long-term current use of insulin ([facility] [address]     ICD-10-CM: E11.8 ICD-9-CM: 250.90 Depression with anxiety     ICD-10-CM: F41.8 ICD-9-CM: 300.4 Chronic left-sided low back pain without sciatica     ICD-10-CM: M54.5, G89.29 ICD-9-CM: 724.2, 338.29 Spinal stenosis of lumbar region without neurogenic claudication     ICD-10-CM: M48.061 
ICD-9-CM: 724.02 Vitals BP Pulse Temp Resp Height(growth percentile) Weight(growth percentile) 151/77 (BP 1 Location: Left arm, BP Patient Position: Sitting) 63 98.2 °F (36.8 °C) (Oral) 18 5' 2\" (1.575 m) 215 lb (97.5 kg) SpO2 BMI OB Status Smoking Status 96% 39.32 kg/m2 Postmenopausal Former Smoker Vitals History BMI and BSA Data Body Mass Index Body Surface Area  
 39.32 kg/m 2 2.07 m 2 Preferred Pharmacy Pharmacy Name Phone [address] [facility] [phone] Your Updated Medication List  
  
   
This list is accurate as of 8/16/18 10:58 AM.  Always use your most recent med list.  
  
  
  
  
 aspirin delayed-release 325 mg tablet Take 1 Tab by mouth daily. calcium-cholecalciferol (d3) 600-125 mg-unit Tab Take 1 Tab by mouth daily. clopidogrel 75 mg Tab Commonly known as:  PLAVIX TAKE 1 TABLET EVERY DAY  
  
 CRANBERRY CONCENTRATE Cap Generic drug:  vitamin c-vitamin e Take 1 Cap by mouth daily. doxycycline 100 mg capsule Commonly known as:  VIBRAMYCIN  
  
 FISH OIL 1,000 mg Cap Generic drug:  omega-3 fatty acids-vitamin e Take 1 capsule by mouth daily. glimepiride 1 mg tablet Commonly known as:  AMARYL  
TAKE ONE TABLET BY MOUTH IN THE MORNING  
  
 glucose blood VI test strips strip Commonly known as:  ASCENSIA AUTODISC VI, ONE TOUCH ULTRA TEST VI Test daily. Diagnosis 250.00 Lancets Misc Test twice daily. Diagnosis 250.00  
  
 lisinopril 5 mg tablet Commonly known as:  Josie Hines  
  
 lovastatin 10 mg tablet Commonly known as:  MEVACOR  
TAKE ONE TABLET BY MOUTH NIGHTLY  
  
 M-VIT PO Take 1 tablet by mouth daily. MAGNESIUM PO Take 1 Tab by mouth daily. meloxicam 15 mg tablet Commonly known as:  MOBIC  
  
 metFORMIN 500 mg tablet Commonly known as:  GLUCOPHAGE  
TAKE TWO TABLETS BY MOUTH DAILY WITH BREAKFAST  
  
 metoprolol succinate 25 mg XL tablet Commonly known as:  TOPROL-XL Take 1 Tab by mouth daily. MYRBETRIQ 25 mg ER tablet Generic drug:  mirabegron ER  
  
 NIACIN (NIACINAMIDE) PO Take  by mouth. PARoxetine 30 mg tablet Commonly known as:  PAXIL TAKE ONE TABLET BY MOUTH ONCE DAILY  
  
 traMADol 50 mg tablet Commonly known as:  ULTRAM  
Take 1 Tab by mouth every six (6) hours as needed for Pain for up to 12 doses. Max Daily Amount: 200 mg. VITAMIN B-12 1,000 mcg tablet Generic drug:  cyanocobalamin Take 1,000 mcg by mouth daily. We Performed the Following CBC WITH AUTOMATED DIFF [52773 CPT(R)] HEMOGLOBIN A1C WITH EAG [33533 CPT(R)] LIPID PANEL [00113 CPT(R)] METABOLIC PANEL, COMPREHENSIVE [17010 CPT(R)] REFERRAL TO ORTHOPEDICS [SAL750 Custom] TSH 3RD GENERATION [40189 CPT(R)] Follow-up Instructions Return in about 3 months (around 11/16/2018). Referral Information Referral ID Referred By Referred To  
  
 [id number] ANTONIO JUAREZ   
   [facility] [address] [facility], [address] Phone: [phone] Visits Status Start Date End Date 1 New Request 8/16/18 8/16/19 If your referral has a status of pending review or denied, additional information will be sent to support the outcome of this decision. Patient Instructions The best way to stay healthy is to live a healthy lifestyle. A healthy lifestyle includes regular exercise, eating a well-balanced diet, keeping a healthy weight and not smoking. Regular physical exams and screening tests are another important way to take care of yourself. Preventive exams provided by health care providers can find health problems early when treatment works best and can keep you from getting certain diseases or illnesses. Preventive services include exams, lab tests, screenings, shots, monitoring and information to help you take care of your own health. All people over 65 should have a pneumonia shot. Pneumonia shots are usually only needed once in a lifetime unless your doctor decides differently. In addition to your physical exam, some screening tests are recommended: 
 
All people over 65 should have a yearly flu shot. People over 65 are at medium to high risk for Hepatitis B. Three shots are needed for complete protection. Bone mass measurement (dexa scan) is recommended every two years. Diabetes Mellitus screening is recommended every year. Glaucoma is an eye disease caused by high pressure in the eye. An eye exam is recommended every year. Cardiovascular screening tests that check your cholesterol and other blood fat (lipid) levels are recommended every five years. Colorectal Cancer screening tests help to find pre-cancerous polyps (growths in the colon) so they can be removed before they turn into cancer. Tests ordered for screening depend on your personal and family history risk factors. Prostate Cancer Screening (annually up to age 76) Screening for breast cancer is recommended yearly with a Mammogram. 
 
Screening for cervical and vaginal cancer is recommended with a pelvic and Pap test every two years. However if you have had an abnormal pap in the past  three years or at high risk for cervical or vaginal cancer Medicare will cover a pap test and a pelvic exam every year. Here is a list of your current Health Maintenance items with a due date: 
Health Maintenance Due Topic Date Due  
    
    
  Flu Vaccine  08/01/2018 Introducing [facility]! Robbin De Los Santos introduces [employer] patient portal. Now you can access parts of your medical record, email your doctor's office, and request medication refills online. 1. In your internet browser, go to [email or web address] 2. Click on the First Time User? Click Here link in the Sign In box. You will see the New Member Sign Up page. 3. Enter your [employer] Access Code exactly as it appears below. You will not need to use this code after youve completed the sign-up process. If you do not sign up before the expiration date, you must request a new code. · [employer] Access Code: [email or web address] Expires: 11/14/2018 10:08 AM 
 
4. Enter the last four digits of your Social Security Number (xxxx) and Date of Birth (mm/dd/yyyy) as indicated and click Submit. You will be taken to the next sign-up page. 5. Create a [employer] ID. This will be your [employer] login ID and cannot be changed, so think of one that is secure and easy to remember. 6. Create a [employer] password. You can change your password at any time. 7. Enter your Password Reset Question and Answer. This can be used at a later time if you forget your password. 8. Enter your e-mail address. You will receive e-mail notification when new information is available in [address] 9. Click Sign Up. You can now view and download portions of your medical record. 10. Click the Download Summary menu link to download a portable copy of your medical information. If you have questions, please visit the Frequently Asked Questions section of the [employer] website. Remember, [employer] is NOT to be used for urgent needs. For medical emergencies, dial 911. Now available from your iPhone and Android! Please provide this summary of care documentation to your next provider. Your primary care clinician is listed as Indira Lindsay  If you have any questions after today's visit, please call [phone].

## 2018-08-16 NOTE — PATIENT INSTRUCTIONS
The best way to stay healthy is to live a healthy lifestyle. A healthy lifestyle includes regular exercise, eating a well-balanced diet, keeping a healthy weight and not smoking. Regular physical exams and screening tests are another important way to take care of yourself. Preventive exams provided by health care providers can find health problems early when treatment works best and can keep you from getting certain diseases or illnesses. Preventive services include exams, lab tests, screenings, shots, monitoring and information to help you take care of your own health. All people over 65 should have a pneumonia shot. Pneumonia shots are usually only needed once in a lifetime unless your doctor decides differently. In addition to your physical exam, some screening tests are recommended:    All people over 65 should have a yearly flu shot. People over 65 are at medium to high risk for Hepatitis B. Three shots are needed for complete protection. Bone mass measurement (dexa scan) is recommended every two years. Diabetes Mellitus screening is recommended every year. Glaucoma is an eye disease caused by high pressure in the eye. An eye exam is recommended every year. Cardiovascular screening tests that check your cholesterol and other blood fat (lipid) levels are recommended every five years. Colorectal Cancer screening tests help to find pre-cancerous polyps (growths in the colon) so they can be removed before they turn into cancer. Tests ordered for screening depend on your personal and family history risk factors. Prostate Cancer Screening (annually up to age 76)    Screening for breast cancer is recommended yearly with a Mammogram.    Screening for cervical and vaginal cancer is recommended with a pelvic and Pap test every two years.  However if you have had an abnormal pap in the past  three years or at high risk for cervical or vaginal cancer Medicare will cover a pap test and a pelvic exam every year.      Here is a list of your current Health Maintenance items with a due date:  Health Maintenance Due   Topic Date Due              Flu Vaccine  08/01/2018

## 2018-08-16 NOTE — PROGRESS NOTES
This is a Subsequent Medicare Annual Wellness Exam (AWV) (Performed 12 months after IPPE or effective date of Medicare Part B enrollment)    I have reviewed the patient's medical history in detail and updated the computerized patient record. History     Past Medical History:   Diagnosis Date    Atypical chest pain     Long h/o intermittent non-cardiac CP.  Depression with anxiety     Diabetes ([address]     GERD (gastroesophageal reflux disease)     Hypercholesteremia     Hyperlipidemia 7/3/2013    Hypertension     Inner ear dysfunction     S/P aortic valve replacement     Dr. Armando Gutierres yearly    Vitamin D deficiency       Past Surgical History:   Procedure Laterality Date    HX AORTIC VALVE REPLACEMENT  1999    Bovine valve    HX CATARACT REMOVAL      HX CHOLECYSTECTOMY  1999    HX MOHS PROCEDURES Left 2014     Current Outpatient Prescriptions   Medication Sig Dispense Refill    lisinopril (PRINIVIL, ZESTRIL) 5 mg tablet       MYRBETRIQ 25 mg ER tablet       metFORMIN (GLUCOPHAGE) 500 mg tablet TAKE TWO TABLETS BY MOUTH DAILY WITH BREAKFAST (Patient taking differently: 1/2 pill daily) 180 Tab 3    clopidogrel (PLAVIX) 75 mg tab TAKE 1 TABLET EVERY DAY 90 Tab 3    glimepiride (AMARYL) 1 mg tablet TAKE ONE TABLET BY MOUTH IN THE MORNING 90 Tab 3    doxycycline (VIBRAMYCIN) 100 mg capsule       traMADol (ULTRAM) 50 mg tablet Take 1 Tab by mouth every six (6) hours as needed for Pain for up to 12 doses. Max Daily Amount: 200 mg. 12 Tab 0    NIACIN, NIACINAMIDE, PO Take  by mouth.  lovastatin (MEVACOR) 10 mg tablet TAKE ONE TABLET BY MOUTH NIGHTLY 90 Tab 3    PARoxetine (PAXIL) 30 mg tablet TAKE ONE TABLET BY MOUTH ONCE DAILY 90 Tab 3    metoprolol succinate (TOPROL-XL) 25 mg XL tablet Take 1 Tab by mouth daily. 90 Tab 3    MAGNESIUM PO Take 1 Tab by mouth daily.  calcium-cholecalciferol, d3, 600-125 mg-unit tab Take 1 Tab by mouth daily.       glucose blood VI test strips (ASCENSIA AUTODISC VI, ONE TOUCH ULTRA TEST VI) strip Test daily. Diagnosis 250.00 3 Package 3    Lancets misc Test twice daily. Diagnosis 250.00 3 Package 3    vitamin c-vitamin e (CRANBERRY CONCENTRATE) cap Take 1 Cap by mouth daily.  cyanocobalamin (VITAMIN B-12) 1,000 mcg tablet Take 1,000 mcg by mouth daily.  aspirin delayed-release 325 mg tablet Take 1 Tab by mouth daily. 90 Tab 1    PV W-O EDU/FERROUS FUMARATE/FA (M-VIT PO) Take 1 tablet by mouth daily.  omega-3 fatty acids-vitamin e (FISH OIL) 1,000 mg Cap Take 1 capsule by mouth daily.  meloxicam (MOBIC) 15 mg tablet        Allergies   Allergen Reactions    Naldecon Unknown (comments)    Sulfa (Sulfonamide Antibiotics) Rash     Family History   Problem Relation Age of Onset    Heart Disease Mother     Heart Failure Father     Heart Failure Sister     Stroke Sister     Diabetes Brother     Heart Disease Brother      Social History   Substance Use Topics    Smoking status: Former Smoker     Quit date: 8/27/1999    Smokeless tobacco: Never Used    Alcohol use No     Patient Active Problem List    Diagnosis    Severe obesity (BMI 35.0-39.9) ([facility]) - weight up 14 pounds    Hyperlipidemia - Takes medication at night as directed, no muscle aches. Tries to watch diet.  Depression with anxiety -states she's taking paxil and sleeping and eating well    Vitamin D deficiency    S/P aortic valve replacement     Dr. Lisa Rice,  Open procedure in 2012, transcatheter redo in 2015. No light headed or dizzy spells. Denies orthopnea, PND, light headedness, palpitations, or dyspnea on exertion. Weight has been stable.  Diabetes type 2, controlled ([address] - not checking BG    Essential hypertension, benign - No home monitoring. Compliant with medication. No shortness of breath, no chest pain, no vision change, no headache, no lower extremity edema.      Palpitations - sometimes per patient     Also complains of low back pain, left side not radiating down her leg. Sometimes uses a brace with good relief that she got over the counter. People have been calling her to get another brace but she has said no. Push mows lawn, babysits toddler. Has tried heat and cold without relief. No leg weakness. Tried PT last year and \"it hurt\" per patient. MRI 2013 with mild stenosis. Depression Follow up Screening:     PHQ over the last two weeks 8/16/2018   Little interest or pleasure in doing things Not at all   Feeling down, depressed, irritable, or hopeless Not at all   Total Score PHQ 2 0   Trouble falling or staying asleep, or sleeping too much More than half the days   Feeling tired or having little energy Not at all   Poor appetite, weight loss, or overeating Not at all   Feeling bad about yourself - or that you are a failure or have let yourself or your family down Not at all   Trouble concentrating on things such as school, work, reading, or watching TV Not at all   Moving or speaking so slowly that other people could have noticed; or the opposite being so fidgety that others notice Not at all   Thoughts of being better off dead, or hurting yourself in some way Not at all   PHQ 9 Score 2   How difficult have these problems made it for you to do your work, take care of your home and get along with others Not difficult at all     Alcohol Risk Factor Screening: You do not drink alcohol or very rarely. Functional Ability and Level of Safety:   Hearing Loss  The patient has hearing aids but not wearing them    Activities of Daily Living  The home contains: no safety equipment. Patient does total self care    Fall Risk  Fall Risk Assessment, last 12 mths 5/15/2018   Able to walk? Yes   Fall in past 12 months?  No   Fall with injury? -   Number of falls in past 12 months -   Fall Risk Score -       Abuse Screen  Patient is not abused    Cognitive Screening   Evaluation of Cognitive Function:  Has your family/caregiver stated any concerns about your memory: no.  Babysits her grandchildren daily. PE:  Visit Vitals    /77 (BP 1 Location: Left arm, BP Patient Position: Sitting)    Pulse 63    Temp 98.2 °F (36.8 °C) (Oral)    Resp 18    Ht 5' 2\" (1.575 m)    Wt 215 lb (97.5 kg)    SpO2 96%    BMI 39.32 kg/m2     Gen: alert, oriented, no acute distress - hard of hearing, poor historian  Head: normocephalic, atraumatic  Ears: external auditory canals clear, TMs without erythema or effusion  Eyes: pupils equal round reactive to light, sclera clear, conjunctiva clear  Oral: moist mucus membranes, no oral lesions, no pharyngeal inflammation or exudate  Neck: symmetric normal sized thyroid, no carotid bruits, no jugular vein distention  Resp: no increase work of breathing, lungs clear to ausculation bilaterally, no wheezing, rales or rhonchi  CV: S1, S2 normal.  No murmurs, rubs, or gallops. Abd: soft, not tender, not distended. No hepatosplenomegaly. Normal bowel sounds. Neuro: cranial nerves intact, normal strength and movement in all extremities, reflexes and sensation intact and symmetric. Skin: no lesion or rash  Extremities: no cyanosis or edema      Patient Care Team   Patient Care Team:  Beatris Ho MD as PCP - General (Internal Medicine)  Sena Lim MD (Cardiology)    Assessment/Plan   Education and counseling provided:  Are appropriate based on today's review and evaluation  End-of-Life planning (with patient's consent)  Pneumococcal Vaccine  Influenza Vaccine  Appropriate cancer screening tests    Diagnoses and all orders for this visit:    1. Medicare annual wellness visit, subsequent  Age appropriate Health Maintenance activities reviewed with patient and updated. 2. Essential hypertension, benign  Elevated today, but not clear that she's taking meds as directed. 3. Mixed hyperlipidemia  -     CBC WITH AUTOMATED DIFF  -     METABOLIC PANEL, COMPREHENSIVE  -     LIPID PANEL  -     TSH 3RD GENERATION    4.  S/P aortic valve replacement  Continue follow up per cardiology    5. Severe obesity (BMI 35.0-39.9) ([facility] [address]    6. Controlled type 2 diabetes mellitus with complication, without long-term current use of insulin (HCC)  A1C today, I suspect it will be elevated given patient's recent manipulation of medication and lack of monitoring    7. Depression with anxiety  Stable on current medications    8. Lumbar stenosis  Refuses PT, unclear if she's taking meloxicam. REfer to DR Kurtis Spicer. Health Maintenance Due   Topic Date Due    FOOT EXAM Q1  07/03/2018    MEDICARE YEARLY EXAM  07/04/2018    Influenza Age 9 to Adult  08/01/2018    HEMOGLOBIN A1C Q6M  08/13/2018         Recommended healthy diet low in carbohydrates, fats, sodium and cholesterol. Recommended regular cardiovascular exercise 3-6 times per week for 30-60 minutes daily. Verbal and written instructions (see AVS) provided. Patient expresses understanding of diagnosis and treatment plan.

## 2018-08-17 LAB
ALBUMIN SERPL-MCNC: 4.3 G/DL (ref 3.5–4.8)
ALBUMIN/GLOB SERPL: 1.9 {RATIO} (ref 1.2–2.2)
ALP SERPL-CCNC: 54 IU/L (ref 39–117)
ALT SERPL-CCNC: 23 IU/L (ref 0–32)
AST SERPL-CCNC: 27 IU/L (ref 0–40)
BASOPHILS # BLD AUTO: 0 X10E3/UL (ref 0–0.2)
BASOPHILS NFR BLD AUTO: 0 %
BILIRUB SERPL-MCNC: 0.2 MG/DL (ref 0–1.2)
BUN SERPL-MCNC: 17 MG/DL (ref 8–27)
BUN/CREAT SERPL: 25 (ref 12–28)
CALCIUM SERPL-MCNC: 9.4 MG/DL (ref 8.7–10.3)
CHLORIDE SERPL-SCNC: 103 MMOL/L (ref 96–106)
CHOLEST SERPL-MCNC: 143 MG/DL (ref 100–199)
CO2 SERPL-SCNC: 24 MMOL/L (ref 20–29)
CREAT SERPL-MCNC: 0.68 MG/DL (ref 0.57–1)
EOSINOPHIL # BLD AUTO: 0.2 X10E3/UL (ref 0–0.4)
EOSINOPHIL NFR BLD AUTO: 2 %
ERYTHROCYTE [DISTWIDTH] IN BLOOD BY AUTOMATED COUNT: 12.9 % (ref 12.3–15.4)
EST. AVERAGE GLUCOSE BLD GHB EST-MCNC: 154 MG/DL
GLOBULIN SER CALC-MCNC: 2.3 G/DL (ref 1.5–4.5)
GLUCOSE SERPL-MCNC: 95 MG/DL (ref 65–99)
HBA1C MFR BLD: 7 % (ref 4.8–5.6)
HCT VFR BLD AUTO: 35.6 % (ref 34–46.6)
HDLC SERPL-MCNC: 68 MG/DL
HGB BLD-MCNC: 11.7 G/DL (ref 11.1–15.9)
IMM GRANULOCYTES # BLD: 0 X10E3/UL (ref 0–0.1)
IMM GRANULOCYTES NFR BLD: 0 %
INTERPRETATION, 910389: NORMAL
LDLC SERPL CALC-MCNC: 57 MG/DL (ref 0–99)
LYMPHOCYTES # BLD AUTO: 2.9 X10E3/UL (ref 0.7–3.1)
LYMPHOCYTES NFR BLD AUTO: 30 %
MCH RBC QN AUTO: 28.6 PG (ref 26.6–33)
MCHC RBC AUTO-ENTMCNC: 32.9 G/DL (ref 31.5–35.7)
MCV RBC AUTO: 87 FL (ref 79–97)
MONOCYTES # BLD AUTO: 0.9 X10E3/UL (ref 0.1–0.9)
MONOCYTES NFR BLD AUTO: 10 %
NEUTROPHILS # BLD AUTO: 5.6 X10E3/UL (ref 1.4–7)
NEUTROPHILS NFR BLD AUTO: 58 %
PDF IMAGE, 910387: NORMAL
PLATELET # BLD AUTO: 176 X10E3/UL (ref 150–379)
POTASSIUM SERPL-SCNC: 4.6 MMOL/L (ref 3.5–5.2)
PROT SERPL-MCNC: 6.6 G/DL (ref 6–8.5)
RBC # BLD AUTO: 4.09 X10E6/UL (ref 3.77–5.28)
SODIUM SERPL-SCNC: 141 MMOL/L (ref 134–144)
TRIGL SERPL-MCNC: 89 MG/DL (ref 0–149)
TSH SERPL DL<=0.005 MIU/L-ACNC: 3.22 UIU/ML (ref 0.45–4.5)
VLDLC SERPL CALC-MCNC: 18 MG/DL (ref 5–40)
WBC # BLD AUTO: 9.6 X10E3/UL (ref 3.4–10.8)

## 2018-08-22 ENCOUNTER — TELEPHONE (OUTPATIENT)
Dept: FAMILY MEDICINE CLINIC | Age: 78
End: 2018-08-22

## 2018-08-22 DIAGNOSIS — M54.9 BACK PAIN, UNSPECIFIED BACK LOCATION, UNSPECIFIED BACK PAIN LATERALITY, UNSPECIFIED CHRONICITY: Primary | ICD-10-CM

## 2018-08-22 RX ORDER — METOPROLOL SUCCINATE 25 MG/1
TABLET, EXTENDED RELEASE ORAL
Qty: 90 TAB | Refills: 3 | Status: SHIPPED | OUTPATIENT
Start: 2018-08-22 | End: 2021-04-28

## 2018-08-22 NOTE — TELEPHONE ENCOUNTER
Pt called and requested an xray of her back. She would like to go this afternoon. Told her that I will discuss with Dr. Delfino Brady and call her back.

## 2018-09-17 ENCOUNTER — TELEPHONE (OUTPATIENT)
Dept: FAMILY MEDICINE CLINIC | Age: 78
End: 2018-09-17

## 2018-09-17 DIAGNOSIS — R41.3 MEMORY PROBLEM: Primary | ICD-10-CM

## 2018-09-17 NOTE — TELEPHONE ENCOUNTER
Called and spoke to pt's daughter. She would like for the pt to be seen by Dr. Beulah Coyle for her memory problems. She would like for our office to make the referral. Is it ok to refer?

## 2018-09-18 ENCOUNTER — TELEPHONE (OUTPATIENT)
Dept: FAMILY MEDICINE CLINIC | Age: 78
End: 2018-09-18

## 2018-09-18 NOTE — TELEPHONE ENCOUNTER
Eveline Pedersen is calling from Dr Harrel Aschoff office they need demographics and most recent office note faxed to [phone]

## 2018-09-18 NOTE — TELEPHONE ENCOUNTER
She denied any  Memory problems at her last appt. .  Not sure who she was referred to in the past but I have placed referral for Dr Sonny Bledsoe - she can call and make appt.

## 2018-10-02 ENCOUNTER — TELEPHONE (OUTPATIENT)
Dept: FAMILY MEDICINE CLINIC | Age: 78
End: 2018-10-02

## 2018-10-10 ENCOUNTER — APPOINTMENT (OUTPATIENT)
Dept: GENERAL RADIOLOGY | Age: 78
End: 2018-10-10
Attending: EMERGENCY MEDICINE
Payer: MEDICARE

## 2018-10-10 ENCOUNTER — OFFICE VISIT (OUTPATIENT)
Dept: FAMILY MEDICINE CLINIC | Age: 78
End: 2018-10-10

## 2018-10-10 ENCOUNTER — HOSPITAL ENCOUNTER (EMERGENCY)
Age: 78
Discharge: HOME | End: 2018-10-10
Attending: EMERGENCY MEDICINE
Payer: MEDICARE

## 2018-10-10 VITALS
RESPIRATION RATE: 14 BRPM | BODY MASS INDEX: 40.12 KG/M2 | HEIGHT: 62 IN | HEART RATE: 43 BPM | TEMPERATURE: 97.3 F | DIASTOLIC BLOOD PRESSURE: 70 MMHG | OXYGEN SATURATION: 99 % | WEIGHT: 218 LBS | SYSTOLIC BLOOD PRESSURE: 117 MMHG

## 2018-10-10 VITALS
WEIGHT: 215 LBS | BODY MASS INDEX: 38.09 KG/M2 | HEIGHT: 63 IN | HEART RATE: 58 BPM | DIASTOLIC BLOOD PRESSURE: 73 MMHG | TEMPERATURE: 98.1 F | OXYGEN SATURATION: 96 % | SYSTOLIC BLOOD PRESSURE: 190 MMHG | RESPIRATION RATE: 16 BRPM

## 2018-10-10 DIAGNOSIS — R00.1 BRADYCARDIA: Primary | ICD-10-CM

## 2018-10-10 DIAGNOSIS — R42 VERTIGO: Primary | ICD-10-CM

## 2018-10-10 LAB
ALBUMIN SERPL-MCNC: 3.6 G/DL (ref 3.5–5)
ALBUMIN/GLOB SERPL: 1.2 {RATIO} (ref 1.1–2.2)
ALP SERPL-CCNC: 49 U/L (ref 45–117)
ALT SERPL-CCNC: 36 U/L (ref 12–78)
ANION GAP SERPL CALC-SCNC: 2 MMOL/L (ref 5–15)
APPEARANCE UR: ABNORMAL
AST SERPL-CCNC: 31 U/L (ref 15–37)
BACTERIA URNS QL MICRO: NEGATIVE /HPF
BASOPHILS # BLD: 0 K/UL (ref 0–0.1)
BASOPHILS NFR BLD: 1 % (ref 0–1)
BILIRUB SERPL-MCNC: 0.4 MG/DL (ref 0.2–1)
BILIRUB UR QL: NEGATIVE
BUN SERPL-MCNC: 22 MG/DL (ref 6–20)
BUN/CREAT SERPL: 37 (ref 12–20)
CALCIUM SERPL-MCNC: 8.5 MG/DL (ref 8.5–10.1)
CHLORIDE SERPL-SCNC: 104 MMOL/L (ref 97–108)
CO2 SERPL-SCNC: 30 MMOL/L (ref 21–32)
COLOR UR: ABNORMAL
CREAT SERPL-MCNC: 0.6 MG/DL (ref 0.55–1.02)
DIFFERENTIAL METHOD BLD: ABNORMAL
EOSINOPHIL # BLD: 0.3 K/UL (ref 0–0.4)
EOSINOPHIL NFR BLD: 4 % (ref 0–7)
EPITH CASTS URNS QL MICRO: ABNORMAL /LPF
ERYTHROCYTE [DISTWIDTH] IN BLOOD BY AUTOMATED COUNT: 12.9 % (ref 11.5–14.5)
GLOBULIN SER CALC-MCNC: 3 G/DL (ref 2–4)
GLUCOSE SERPL-MCNC: 124 MG/DL (ref 65–100)
GLUCOSE UR STRIP.AUTO-MCNC: NEGATIVE MG/DL
HCT VFR BLD AUTO: 34.7 % (ref 35–47)
HGB BLD-MCNC: 11.3 G/DL (ref 11.5–16)
HGB UR QL STRIP: NEGATIVE
IMM GRANULOCYTES # BLD: 0 K/UL (ref 0–0.04)
IMM GRANULOCYTES NFR BLD AUTO: 0 % (ref 0–0.5)
INR PPP: 1 (ref 0.9–1.1)
KETONES UR QL STRIP.AUTO: NEGATIVE MG/DL
LEUKOCYTE ESTERASE UR QL STRIP.AUTO: ABNORMAL
LYMPHOCYTES # BLD: 2.6 K/UL (ref 0.8–3.5)
LYMPHOCYTES NFR BLD: 30 % (ref 12–49)
MAGNESIUM SERPL-MCNC: 2.1 MG/DL (ref 1.6–2.4)
MCH RBC QN AUTO: 28.9 PG (ref 26–34)
MCHC RBC AUTO-ENTMCNC: 32.6 G/DL (ref 30–36.5)
MCV RBC AUTO: 88.7 FL (ref 80–99)
MONOCYTES # BLD: 0.9 K/UL (ref 0–1)
MONOCYTES NFR BLD: 11 % (ref 5–13)
NEUTS SEG # BLD: 4.8 K/UL (ref 1.8–8)
NEUTS SEG NFR BLD: 55 % (ref 32–75)
NITRITE UR QL STRIP.AUTO: NEGATIVE
NRBC # BLD: 0 K/UL (ref 0–0.01)
NRBC BLD-RTO: 0 PER 100 WBC
PH UR STRIP: 6.5 [PH] (ref 5–8)
PLATELET # BLD AUTO: 172 K/UL (ref 150–400)
PMV BLD AUTO: 10.4 FL (ref 8.9–12.9)
POTASSIUM SERPL-SCNC: 4.6 MMOL/L (ref 3.5–5.1)
PROT SERPL-MCNC: 6.6 G/DL (ref 6.4–8.2)
PROT UR STRIP-MCNC: NEGATIVE MG/DL
PROTHROMBIN TIME: 10.4 SEC (ref 9–11.1)
RBC # BLD AUTO: 3.91 M/UL (ref 3.8–5.2)
RBC #/AREA URNS HPF: ABNORMAL /HPF (ref 0–5)
SODIUM SERPL-SCNC: 136 MMOL/L (ref 136–145)
SP GR UR REFRACTOMETRY: 1.01 (ref 1–1.03)
TROPONIN I SERPL-MCNC: <0.05 NG/ML
UROBILINOGEN UR QL STRIP.AUTO: 0.2 EU/DL (ref 0.2–1)
WBC # BLD AUTO: 8.7 K/UL (ref 3.6–11)
WBC URNS QL MICRO: ABNORMAL /HPF (ref 0–4)

## 2018-10-10 PROCEDURE — 84484 ASSAY OF TROPONIN QUANT: CPT | Performed by: EMERGENCY MEDICINE

## 2018-10-10 PROCEDURE — 71045 X-RAY EXAM CHEST 1 VIEW: CPT

## 2018-10-10 PROCEDURE — 99285 EMERGENCY DEPT VISIT HI MDM: CPT

## 2018-10-10 PROCEDURE — 85025 COMPLETE CBC W/AUTO DIFF WBC: CPT | Performed by: EMERGENCY MEDICINE

## 2018-10-10 PROCEDURE — 93005 ELECTROCARDIOGRAM TRACING: CPT

## 2018-10-10 PROCEDURE — 36415 COLL VENOUS BLD VENIPUNCTURE: CPT | Performed by: EMERGENCY MEDICINE

## 2018-10-10 PROCEDURE — 74011250636 HC RX REV CODE- 250/636: Performed by: EMERGENCY MEDICINE

## 2018-10-10 PROCEDURE — 96360 HYDRATION IV INFUSION INIT: CPT

## 2018-10-10 PROCEDURE — 81001 URINALYSIS AUTO W/SCOPE: CPT | Performed by: EMERGENCY MEDICINE

## 2018-10-10 PROCEDURE — 80053 COMPREHEN METABOLIC PANEL: CPT | Performed by: EMERGENCY MEDICINE

## 2018-10-10 PROCEDURE — 83735 ASSAY OF MAGNESIUM: CPT | Performed by: EMERGENCY MEDICINE

## 2018-10-10 PROCEDURE — 85610 PROTHROMBIN TIME: CPT | Performed by: EMERGENCY MEDICINE

## 2018-10-10 PROCEDURE — 96361 HYDRATE IV INFUSION ADD-ON: CPT

## 2018-10-10 RX ORDER — SODIUM CHLORIDE 0.9 % (FLUSH) 0.9 %
5-10 SYRINGE (ML) INJECTION AS NEEDED
Status: DISCONTINUED | OUTPATIENT
Start: 2018-10-10 | End: 2018-10-10 | Stop reason: HOSPADM

## 2018-10-10 RX ORDER — MECLIZINE HYDROCHLORIDE 25 MG/1
25 TABLET ORAL
Qty: 20 TAB | Refills: 0 | Status: SHIPPED | OUTPATIENT
Start: 2018-10-10 | End: 2018-10-10

## 2018-10-10 RX ORDER — MECLIZINE HYDROCHLORIDE 25 MG/1
25 TABLET ORAL
Qty: 20 TAB | Refills: 0 | Status: SHIPPED | OUTPATIENT
Start: 2018-10-10 | End: 2021-04-28

## 2018-10-10 RX ORDER — MECLIZINE HCL 12.5 MG 12.5 MG/1
25 TABLET ORAL
Status: COMPLETED | OUTPATIENT
Start: 2018-10-10 | End: 2018-10-10

## 2018-10-10 RX ORDER — SODIUM CHLORIDE 0.9 % (FLUSH) 0.9 %
5-10 SYRINGE (ML) INJECTION EVERY 8 HOURS
Status: DISCONTINUED | OUTPATIENT
Start: 2018-10-10 | End: 2018-10-10 | Stop reason: HOSPADM

## 2018-10-10 RX ADMIN — MECLIZINE 25 MG: 12.5 TABLET ORAL at 13:04

## 2018-10-10 RX ADMIN — SODIUM CHLORIDE 1000 ML: 900 INJECTION, SOLUTION INTRAVENOUS at 11:50

## 2018-10-10 NOTE — DISCHARGE INSTRUCTIONS
Dizziness: Care Instructions  Your Care Instructions  Dizziness is the feeling of unsteadiness or fuzziness in your head. It is different than having vertigo, which is a feeling that the room is spinning or that you are moving or falling. It is also different from lightheadedness, which is the feeling that you are about to faint. It can be hard to know what causes dizziness. Some people feel dizzy when they have migraine headaches. Sometimes bouts of flu can make you feel dizzy. Some medical conditions, such as heart problems or high blood pressure, can make you feel dizzy. Many medicines can cause dizziness, including medicines for high blood pressure, pain, or anxiety. If a medicine causes your symptoms, your doctor may recommend that you stop or change the medicine. If it is a problem with your heart, you may need medicine to help your heart work better. If there is no clear reason for your symptoms, your doctor may suggest watching and waiting for a while to see if the dizziness goes away on its own. Follow-up care is a key part of your treatment and safety. Be sure to make and go to all appointments, and call your doctor if you are having problems. It's also a good idea to know your test results and keep a list of the medicines you take. How can you care for yourself at home? · If your doctor recommends or prescribes medicine, take it exactly as directed. Call your doctor if you think you are having a problem with your medicine. · Do not drive while you feel dizzy. · Try to prevent falls. Steps you can take include:  ¨ Using nonskid mats, adding grab bars near the tub, and using night-lights. ¨ Clearing your home so that walkways are free of anything you might trip on. ¨ Letting family and friends know that you have been feeling dizzy. This will help them know how to help you. When should you call for help? Call 911 anytime you think you may need emergency care.  For example, call if:    · You passed out (lost consciousness).     · You have dizziness along with symptoms of a heart attack. These may include:  ¨ Chest pain or pressure, or a strange feeling in the chest.  ¨ Sweating. ¨ Shortness of breath. ¨ Nausea or vomiting. ¨ Pain, pressure, or a strange feeling in the back, neck, jaw, or upper belly or in one or both shoulders or arms. ¨ Lightheadedness or sudden weakness. ¨ A fast or irregular heartbeat.     · You have symptoms of a stroke. These may include:  ¨ Sudden numbness, tingling, weakness, or loss of movement in your face, arm, or leg, especially on only one side of your body. ¨ Sudden vision changes. ¨ Sudden trouble speaking. ¨ Sudden confusion or trouble understanding simple statements. ¨ Sudden problems with walking or balance. ¨ A sudden, severe headache that is different from past headaches.    Call your doctor now or seek immediate medical care if:    · You feel dizzy and have a fever, headache, or ringing in your ears.     · You have new or increased nausea and vomiting.     · Your dizziness does not go away or comes back.    Watch closely for changes in your health, and be sure to contact your doctor if:    · You do not get better as expected. Where can you learn more? Go to http://rose-colleen.info/. Enter X734 in the search box to learn more about \"Dizziness: Care Instructions. \"  Current as of: November 20, 2017  Content Version: 11.8  © 2812-2635 [employer]. Care instructions adapted under license by [employer] (which disclaims liability or warranty for this information). If you have questions about a medical condition or this instruction, always ask your healthcare professional. [place] [address] any warranty or liability for your use of this information.          Vertigo: Care Instructions  Your Care Instructions    Vertigo is the feeling that you or your surroundings are moving when there is no actual movement. It is often described as a feeling of spinning, whirling, falling, or tilting. Vertigo may make you vomit or feel nauseated. You may have trouble standing or walking and may lose your balance. Vertigo is often related to an inner ear problem, but it can have other more serious causes. If vertigo continues, you may need more tests to find its cause. Follow-up care is a key part of your treatment and safety. Be sure to make and go to all appointments, and call your doctor if you are having problems. It's also a good idea to know your test results and keep a list of the medicines you take. How can you care for yourself at home? · Do not lie flat on your back. Prop yourself up slightly. This may reduce the spinning feeling. Keep your eyes open. · Move slowly so that you do not fall. · If your doctor recommends medicine, take it exactly as directed. · Do not drive while you are having vertigo. Certain exercises, called Ahmadi-Daroff exercises, can help decrease vertigo. To do Ahmadi-Daroff exercises:  · Sit on the edge of a bed or sofa and quickly lie down on the side that causes the worst vertigo. Lie on your side with your ear down. · Stay in this position for at least 30 seconds or until the vertigo goes away. · Sit up. If this causes vertigo, wait for it to stop. · Repeat the procedure on the other side. · Repeat this 10 times. Do these exercises 2 times a day until the vertigo is gone. When should you call for help? Call 911 anytime you think you may need emergency care. For example, call if:    · You passed out (lost consciousness).     · You have symptoms of a stroke. These may include:  ¨ Sudden numbness, tingling, weakness, or loss of movement in your face, arm, or leg, especially on only one side of your body. ¨ Sudden vision changes. ¨ Sudden trouble speaking. ¨ Sudden confusion or trouble understanding simple statements. ¨ Sudden problems with walking or balance.   ¨ A sudden, severe headache that is different from past headaches.    Call your doctor now or seek immediate medical care if:    · Vertigo occurs with a fever, a headache, or ringing in your ears.     · You have new or increased nausea and vomiting.    Watch closely for changes in your health, and be sure to contact your doctor if:    · Vertigo gets worse or happens more often.     · Vertigo has not gotten better after 2 weeks. Where can you learn more? Go to http://rose-colleen.info/. Enter A905 in the search box to learn more about \"Vertigo: Care Instructions. \"  Current as of: March 28, 2018  Content Version: 11.8  © 3944-3507 [employer]. Care instructions adapted under license by [employer] (which disclaims liability or warranty for this information). If you have questions about a medical condition or this instruction, always ask your healthcare professional. [place] [address] any warranty or liability for your use of this information.

## 2018-10-10 NOTE — PROGRESS NOTES
Chief Complaint   Patient presents with    Shortness of Breath    Dizziness    Nausea     Patient came into office with complaints of sob, dizziness, and shaky. Patient needed assistant getting to exam room.

## 2018-10-10 NOTE — ED PROVIDER NOTES
EMERGENCY DEPARTMENT HISTORY AND PHYSICAL EXAM 
 
 
Date: 10/10/2018 Patient Name: Dagmar Stephenson History of Presenting Illness Chief Complaint Patient presents with  Dizziness  
  pt arrives from EMS with dizzines History Provided By: Patient and Patient's Daughter HPI: Dagmar Stephenson, [de-identified] y.o. female with PMHx significant for HTN, DM, HLD, anxiety, and depression, presents ambulatory to the ED with cc of intermittent episodes of dizziness x 1 day. Pt reports each time she stands she feels dizzy, however this improves with rest. She states she went to see her PCP this morning, but was referred to the ED due to concern for bradycardia. While at PCP's office pt's heart rate fluctuated in the upper 40's. Pt is otherwise without complaints. She specifically denies any fevers, chills, nausea, vomiting, chest pain, shortness of breath, headache, rash, diarrhea, sweating or weight loss. There are no other complaints, changes, or physical findings at this time. PCP: Ese Castillo MD 
 
Current Facility-Administered Medications Medication Dose Route Frequency Provider Last Rate Last Dose  sodium chloride (NS) flush 5-10 mL  5-10 mL IntraVENous Q8H Gale Lanes, MD      
 sodium chloride (NS) flush 5-10 mL  5-10 mL IntraVENous PRN Gale Lanes, MD      
 
Current Outpatient Prescriptions Medication Sig Dispense Refill  meclizine (ANTIVERT) 25 mg tablet Take 1 Tab by mouth three (3) times daily as needed for Dizziness. 20 Tab 0  
 metoprolol succinate (TOPROL-XL) 25 mg XL tablet TAKE 1 TABLET EVERY DAY 90 Tab 3  
 lisinopril (PRINIVIL, ZESTRIL) 5 mg tablet  MYRBETRIQ 25 mg ER tablet  meloxicam (MOBIC) 15 mg tablet  metFORMIN (GLUCOPHAGE) 500 mg tablet TAKE TWO TABLETS BY MOUTH DAILY WITH BREAKFAST (Patient taking differently: 1/2 pill daily) 180 Tab 3  
 glimepiride (AMARYL) 1 mg tablet TAKE ONE TABLET BY MOUTH IN THE MORNING 90 Tab 3  
  doxycycline (VIBRAMYCIN) 100 mg capsule  traMADol (ULTRAM) 50 mg tablet Take 1 Tab by mouth every six (6) hours as needed for Pain for up to 12 doses. Max Daily Amount: 200 mg. 12 Tab 0  
 NIACIN, NIACINAMIDE, PO Take  by mouth.  lovastatin (MEVACOR) 10 mg tablet TAKE ONE TABLET BY MOUTH NIGHTLY 90 Tab 3  
 PARoxetine (PAXIL) 30 mg tablet TAKE ONE TABLET BY MOUTH ONCE DAILY 90 Tab 3  
 MAGNESIUM PO Take 1 Tab by mouth daily.  calcium-cholecalciferol, d3, 600-125 mg-unit tab Take 1 Tab by mouth daily.  glucose blood VI test strips (ASCENSIA AUTODISC VI, ONE TOUCH ULTRA TEST VI) strip Test daily. Diagnosis 250.00 3 Package 3  Lancets misc Test twice daily. Diagnosis 250.00 3 Package 3  
 vitamin c-vitamin e (CRANBERRY CONCENTRATE) cap Take 1 Cap by mouth daily.  cyanocobalamin (VITAMIN B-12) 1,000 mcg tablet Take 1,000 mcg by mouth daily.  aspirin delayed-release 325 mg tablet Take 1 Tab by mouth daily. 90 Tab 1  PV W-O EDU/FERROUS FUMARATE/FA (M-VIT PO) Take 1 tablet by mouth daily.  omega-3 fatty acids-vitamin e (FISH OIL) 1,000 mg Cap Take 1 capsule by mouth daily. Past History Past Medical History: 
Past Medical History:  
Diagnosis Date  Atypical chest pain Long h/o intermittent non-cardiac CP.  Depression with anxiety  Diabetes ([facility] [address]  GERD (gastroesophageal reflux disease)  Hypercholesteremia  Hyperlipidemia 7/3/2013  Hypertension  Inner ear dysfunction  S/P aortic valve replacement Dr. Olivia Michael yearly  Vitamin D deficiency Past Surgical History: 
Past Surgical History:  
Procedure Laterality Date [address] Bovine valve  HX CATARACT REMOVAL    
 HX CHOLECYSTECTOMY  1999  HX MOHS PROCEDURES Left 2014 Family History: 
Family History Problem Relation Age of Onset  Heart Disease Mother  Heart Failure Father  Heart Failure Sister  Stroke Sister  Diabetes Brother  Heart Disease Brother Social History: 
Social History Substance Use Topics  Smoking status: Former Smoker Quit date: 8/27/1999  Smokeless tobacco: Never Used  Alcohol use No  
 
 
Allergies: Allergies Allergen Reactions  Naldecon Unknown (comments)  Sulfa (Sulfonamide Antibiotics) Rash Review of Systems Review of Systems Constitutional: Negative. Negative for activity change, appetite change, chills, diaphoresis, fatigue, fever and unexpected weight change. HENT: Negative. Negative for congestion, hearing loss, rhinorrhea, sneezing and voice change. Eyes: Negative. Negative for pain and visual disturbance. Respiratory: Negative. Negative for apnea, cough, choking, chest tightness and shortness of breath. Cardiovascular: Negative. Negative for chest pain and palpitations. Gastrointestinal: Negative. Negative for abdominal distention, abdominal pain, blood in stool, diarrhea, nausea and vomiting. Genitourinary: Negative. Negative for difficulty urinating, flank pain, frequency and urgency. No discharge Musculoskeletal: Negative. Negative for arthralgias, back pain, myalgias and neck stiffness. Skin: Negative. Negative for color change and rash. Neurological: Positive for dizziness. Negative for seizures, syncope, speech difficulty, weakness, numbness and headaches. Hematological: Negative for adenopathy. Psychiatric/Behavioral: Negative. Negative for agitation, behavioral problems, dysphoric mood and suicidal ideas. The patient is not nervous/anxious. Physical Exam  
Physical Exam  
Constitutional: She is oriented to person, place, and time. She appears well-developed and well-nourished. No distress. HENT:  
Head: Normocephalic and atraumatic. Mouth/Throat: Oropharynx is clear and moist. Mucous membranes are dry. No oropharyngeal exudate. Eyes: Conjunctivae and EOM are normal. Pupils are equal, round, and reactive to light. Right eye exhibits no discharge. Left eye exhibits no discharge. Neck: Normal range of motion. Neck supple. Cardiovascular: Regular rhythm and intact distal pulses. Bradycardia present. Exam reveals no gallop and no friction rub. No murmur heard. Pulmonary/Chest: Effort normal and breath sounds normal. No respiratory distress. She has no wheezes. She has no rales. She exhibits no tenderness. Abdominal: Soft. Bowel sounds are normal. She exhibits no distension and no mass. There is no tenderness. There is no rebound and no guarding. Musculoskeletal: Normal range of motion. She exhibits no edema. Lymphadenopathy:  
  She has no cervical adenopathy. Neurological: She is alert and oriented to person, place, and time. No cranial nerve deficit. Coordination normal.  
Skin: Skin is warm and dry. No rash noted. No erythema. Psychiatric: She has a normal mood and affect. Nursing note and vitals reviewed. Diagnostic Study Results Labs - Recent Results (from the past 12 hour(s)) CBC WITH AUTOMATED DIFF Collection Time: 10/10/18 10:57 AM  
Result Value Ref Range WBC 8.7 3.6 - 11.0 K/uL  
 RBC 3.91 3.80 - 5.20 M/uL  
 HGB 11.3 (L) 11.5 - 16.0 g/dL HCT 34.7 (L) 35.0 - 47.0 % MCV 88.7 80.0 - 99.0 FL  
 MCH 28.9 26.0 - 34.0 PG  
 MCHC 32.6 30.0 - 36.5 g/dL  
 RDW 12.9 11.5 - 14.5 % PLATELET 991 367 - 883 K/uL MPV 10.4 8.9 - 12.9 FL  
 NRBC 0.0 0  WBC ABSOLUTE NRBC 0.00 0.00 - 0.01 K/uL NEUTROPHILS 55 32 - 75 % LYMPHOCYTES 30 12 - 49 % MONOCYTES 11 5 - 13 % EOSINOPHILS 4 0 - 7 % BASOPHILS 1 0 - 1 % IMMATURE GRANULOCYTES 0 0.0 - 0.5 % ABS. NEUTROPHILS 4.8 1.8 - 8.0 K/UL  
 ABS. LYMPHOCYTES 2.6 0.8 - 3.5 K/UL  
 ABS. MONOCYTES 0.9 0.0 - 1.0 K/UL  
 ABS. EOSINOPHILS 0.3 0.0 - 0.4 K/UL  
 ABS. BASOPHILS 0.0 0.0 - 0.1 K/UL  
 ABS. IMM. GRANS. 0.0 0.00 - 0.04 K/UL DF AUTOMATED METABOLIC PANEL, COMPREHENSIVE Collection Time: 10/10/18 10:57 AM  
Result Value Ref Range Sodium 136 136 - 145 mmol/L Potassium 4.6 3.5 - 5.1 mmol/L Chloride 104 97 - 108 mmol/L  
 CO2 30 21 - 32 mmol/L Anion gap 2 (L) 5 - 15 mmol/L Glucose 124 (H) 65 - 100 mg/dL BUN 22 (H) 6 - 20 MG/DL Creatinine 0.60 0.55 - 1.02 MG/DL  
 BUN/Creatinine ratio 37 (H) 12 - 20 GFR est AA >60 >60 ml/min/1.73m2 GFR est non-AA >60 >60 ml/min/1.73m2 Calcium 8.5 8.5 - 10.1 MG/DL Bilirubin, total 0.4 0.2 - 1.0 MG/DL  
 ALT (SGPT) 36 12 - 78 U/L  
 AST (SGOT) 31 15 - 37 U/L Alk. phosphatase 49 45 - 117 U/L Protein, total 6.6 6.4 - 8.2 g/dL Albumin 3.6 3.5 - 5.0 g/dL Globulin 3.0 2.0 - 4.0 g/dL A-G Ratio 1.2 1.1 - 2.2 MAGNESIUM Collection Time: 10/10/18 10:57 AM  
Result Value Ref Range Magnesium 2.1 1.6 - 2.4 mg/dL PROTHROMBIN TIME + INR Collection Time: 10/10/18 10:57 AM  
Result Value Ref Range INR 1.0 0.9 - 1.1 Prothrombin time 10.4 9.0 - 11.1 sec TROPONIN I Collection Time: 10/10/18 10:57 AM  
Result Value Ref Range Troponin-I, Qt. <0.05 <0.05 ng/mL EKG, 12 LEAD, INITIAL Collection Time: 10/10/18 11:00 AM  
Result Value Ref Range Ventricular Rate 48 BPM  
 Atrial Rate 48 BPM  
 P-R Interval 176 ms QRS Duration 74 ms Q-T Interval 468 ms QTC Calculation (Bezet) 418 ms Calculated P Axis 53 degrees Calculated R Axis 2 degrees Calculated T Axis 47 degrees Diagnosis Sinus bradycardia When compared with ECG of 09-MAY-2018 20:53, 
Vent. rate has decreased BY  25 BPM 
  
URINALYSIS W/MICROSCOPIC Collection Time: 10/10/18 11:33 AM  
Result Value Ref Range Color YELLOW/STRAW Appearance CLOUDY (A) CLEAR Specific gravity 1.007 1.003 - 1.030    
 pH (UA) 6.5 5.0 - 8.0 Protein NEGATIVE  NEG mg/dL Glucose NEGATIVE  NEG mg/dL Ketone NEGATIVE  NEG mg/dL  Bilirubin NEGATIVE  NEG    
 Blood NEGATIVE  NEG Urobilinogen 0.2 0.2 - 1.0 EU/dL Nitrites NEGATIVE  NEG Leukocyte Esterase SMALL (A) NEG    
 WBC 0-4 0 - 4 /hpf  
 RBC 0-5 0 - 5 /hpf Epithelial cells FEW FEW /lpf Bacteria NEGATIVE  NEG /hpf Radiologic Studies - CXR Results  (Last 48 hours) 10/10/18 1115  XR CHEST PORT Final result Impression:  IMPRESSION:  
1. No acute process Narrative:  EXAM:  XR CHEST PORT INDICATION:  Chest Pain, dizziness since this morning COMPARISON:  9/12/2017 FINDINGS: A portable AP radiograph of the chest was obtained at 1106 hours. The  
patient is on a cardiac monitor. The heart size is normal in this patient status  
post median sternotomy. The lungs are clear. There are bilateral cervical ribs. Patient status post aortic valve replacement. Visualized osseous structures are  
unremarkable. Patient status post left shoulder surgery. Medical Decision Making I am the first provider for this patient. I reviewed the vital signs, available nursing notes, past medical history, past surgical history, family history and social history. Vital Signs-Reviewed the patient's vital signs. Patient Vitals for the past 12 hrs: 
 Temp Pulse Resp BP SpO2  
10/10/18 1300 - (!) 50 17 195/83 98 % 10/10/18 1230 - (!) 48 15 195/63 96 % 10/10/18 1200 - (!) 47 14 176/86 97 % 10/10/18 1116 - (!) 48 12 171/66 95 % 10/10/18 1100 - (!) 47 12 138/58 99 % 10/10/18 1040 98.1 °F (36.7 °C) (!) 47 14 145/58 99 % Pulse Oximetry Analysis - 99% on RA Cardiac Monitor:  
Rate: 47 bpm 
Rhythm: sinus bradycardia EKG interpretation: (Preliminary) [address] Rhythm: sinus bradycardia; and regular . Rate (approx.): 48; Axis: normal; DC interval: normal; QRS interval: normal ; ST/T wave: normal. 
Written by GARETT Nayak, as dictated by Martha Santana. Rebekah Lee MD. Records Reviewed: Nursing Notes, Old Medical Records, Previous electrocardiograms, Ambulance Run Sheet, Previous Radiology Studies and Previous Laboratory Studies Provider Notes (Medical Decision Making): DDx: dehydration, electrolyte abnormality, UTI, acute renal failure, vertigo ED Course:  
Initial assessment performed. The patients presenting problems have been discussed, and they are in agreement with the care plan formulated and outlined with them. I have encouraged them to ask questions as they arise throughout their visit. 12:28 PM 
I have just reevaluated the patient. I have reviewed Her vital signs and determined there is currently no worsening in their condition or physical exam.  Results have been reviewed with them and their questions have been answered. We will continue to review further results as they come available. Will treat with a dose of Meclizine. 2:35 PM 
The patient states that their symptoms have resolved and they feel much better. There are no other new complaints at this time. Her questions have been answered. We are awaiting final results and those will be reviewed with them when they become available. Critical Care Time: 0 Disposition: 
2:36 PM 
Randal Jhaveri's  results have been reviewed with her. She has been counseled regarding her diagnosis. She verbally conveys understanding and agreement of the signs, symptoms, diagnosis, treatment and prognosis and additionally agrees to follow up as recommended with Dr. Julienne Treviño MD in 24 - 48 hours. She also agrees with the care-plan and conveys that all of her questions have been answered. I have also put together some discharge instructions for her that include: 1) educational information regarding their diagnosis, 2) how to care for their diagnosis at home, as well a 3) list of reasons why they would want to return to the ED prior to their follow-up appointment, should their condition change. PLAN: 
1. Discharge home Current Discharge Medication List  
  
 START taking these medications Details  
meclizine (ANTIVERT) 25 mg tablet Take 1 Tab by mouth three (3) times daily as needed for Dizziness. Qty: 20 Tab, Refills: 0  
  
  
 
2. Follow-up Information Follow up With Details Comments Contact Info Claudio Jimenez MD Call in 2 days As needed, If symptoms worsen [address] [facility] [address] [place] [address] 
[phone] Return to ED if worse Diagnosis Clinical Impression: 1. Vertigo Attestations: This note is prepared by Jonna Pool, acting as Scribe for Luis Angel Dior. Neo Doyle, [address] Noe Doyle MD: The scribe's documentation has been prepared under my direction and personally reviewed by me in its entirety. I confirm that the note above accurately reflects all work, treatment, procedures, and medical decision making performed by me.

## 2018-10-10 NOTE — PROGRESS NOTES
Subjective:     Bassam Okeefe is a [de-identified] y.o. female who presents today with the following:  Chief Complaint   Patient presents with    Shortness of Breath    Dizziness    Nausea     Patient with PMH AV replacement in 1999, HTN, XOL, DM2 presents today with c/o feeling dizzy and nauseated and tired since she woke up this morning. States she had some breakfast and sat down and then experienced short episode of whole body shaking which went away on its own after a few minutes. After that she felt extremely thirsty and so got up to get some water, but then felt very dizzy. Had some blurry vision at that time too. She drank water and sat back down and felt better overall. She had a few dizzy spells while seated but did not fall or black out. She then had her daughter bring her to the office further evaluation. Denies chest pain, but endorses shortness of breath. Pts cardiologist is Dr. Li Belcher, last echo was in 2016 which was relatively normal overall.      ROS:  Gen: denies fever, chills, fatigue, weight loss, weight gain  HEENT:denies blurry vision, nasal congestion, sore throat  Resp: denies dypsnea, cough, wheezing  CV: denies chest pain, palpitations, lower extremity edema  Abd: denies nausea, vomiting, diarrhea, constipation  Neuro: denies numbness/tingling  Endo: denies polyuria, polydipsia, heat/cold intolerance  Heme: no lymphadenopathy    Allergies   Allergen Reactions    Naldecon Unknown (comments)    Sulfa (Sulfonamide Antibiotics) Rash         Current Outpatient Prescriptions:     metoprolol succinate (TOPROL-XL) 25 mg XL tablet, TAKE 1 TABLET EVERY DAY, Disp: 90 Tab, Rfl: 3    lisinopril (PRINIVIL, ZESTRIL) 5 mg tablet, , Disp: , Rfl:     MYRBETRIQ 25 mg ER tablet, , Disp: , Rfl:     meloxicam (MOBIC) 15 mg tablet, , Disp: , Rfl:     metFORMIN (GLUCOPHAGE) 500 mg tablet, TAKE TWO TABLETS BY MOUTH DAILY WITH BREAKFAST (Patient taking differently: 1/2 pill daily), Disp: 180 Tab, Rfl: 3   clopidogrel (PLAVIX) 75 mg tab, TAKE 1 TABLET EVERY DAY, Disp: 90 Tab, Rfl: 3    glimepiride (AMARYL) 1 mg tablet, TAKE ONE TABLET BY MOUTH IN THE MORNING, Disp: 90 Tab, Rfl: 3    doxycycline (VIBRAMYCIN) 100 mg capsule, , Disp: , Rfl:     traMADol (ULTRAM) 50 mg tablet, Take 1 Tab by mouth every six (6) hours as needed for Pain for up to 12 doses. Max Daily Amount: 200 mg., Disp: 12 Tab, Rfl: 0    NIACIN, NIACINAMIDE, PO, Take  by mouth., Disp: , Rfl:     lovastatin (MEVACOR) 10 mg tablet, TAKE ONE TABLET BY MOUTH NIGHTLY, Disp: 90 Tab, Rfl: 3    PARoxetine (PAXIL) 30 mg tablet, TAKE ONE TABLET BY MOUTH ONCE DAILY, Disp: 90 Tab, Rfl: 3    MAGNESIUM PO, Take 1 Tab by mouth daily. , Disp: , Rfl:     calcium-cholecalciferol, d3, 600-125 mg-unit tab, Take 1 Tab by mouth daily. , Disp: , Rfl:     glucose blood VI test strips (ASCENSIA AUTODISC VI, ONE TOUCH ULTRA TEST VI) strip, Test daily. Diagnosis 250.00, Disp: 3 Package, Rfl: 3    Lancets misc, Test twice daily. Diagnosis 250.00, Disp: 3 Package, Rfl: 3    vitamin c-vitamin e (CRANBERRY CONCENTRATE) cap, Take 1 Cap by mouth daily. , Disp: , Rfl:     cyanocobalamin (VITAMIN B-12) 1,000 mcg tablet, Take 1,000 mcg by mouth daily. , Disp: , Rfl:     aspirin delayed-release 325 mg tablet, Take 1 Tab by mouth daily. , Disp: 90 Tab, Rfl: 1    PV W-O EDU/FERROUS FUMARATE/FA (M-VIT PO), Take 1 tablet by mouth daily. , Disp: , Rfl:     omega-3 fatty acids-vitamin e (FISH OIL) 1,000 mg Cap, Take 1 capsule by mouth daily. , Disp: , Rfl:     Past Medical History:   Diagnosis Date    Atypical chest pain     Long h/o intermittent non-cardiac CP.     Depression with anxiety     Diabetes ([facility] [address]     GERD (gastroesophageal reflux disease)     Hypercholesteremia     Hyperlipidemia 7/3/2013    Hypertension     Inner ear dysfunction     S/P aortic valve replacement     Dr. Carr Bras yearly    Vitamin D deficiency        Past Surgical History:   Procedure Laterality Date    HX AORTIC VALVE REPLACEMENT  1999    Bovine valve    HX CATARACT REMOVAL      HX CHOLECYSTECTOMY  1999    HX MOHS PROCEDURES Left 2014       History   Smoking Status    Former Smoker    Quit date: 8/27/1999   Smokeless Tobacco    Never Used       Social History     Social History    Marital status:      Spouse name: N/A    Number of children: N/A    Years of education: N/A     Social History Main Topics    Smoking status: Former Smoker     Quit date: 8/27/1999    Smokeless tobacco: Never Used    Alcohol use No    Drug use: No    Sexual activity: Yes     Partners: Male     Other Topics Concern    None     Social History Narrative       Family History   Problem Relation Age of Onset    Heart Disease Mother     Heart Failure Father     Heart Failure Sister     Stroke Sister     Diabetes Brother     Heart Disease Brother          Objective:     Visit Vitals    /70 (BP 1 Location: Right arm, BP Patient Position: Sitting)    Pulse (!) 43    Temp 97.3 °F (36.3 °C) (Oral)    Resp 14    Ht 5' 2\" (1.575 m)    Wt 218 lb (98.9 kg)    SpO2 99%    BMI 39.87 kg/m2     Gen: alert, oriented, no acute distress  Head: normocephalic, atraumatic  Eyes:sclera clear, conjunctiva clear  Oral: moist mucus membranes, no oral lesions, no pharyngeal exudate, no pharyngeal erythema  Neck: symmetric normal sized thyroid, no carotid bruits, no JVD  Resp: Normal work of breathing, lungs CTAB, no w/r/r  CV: S1, S2 normal.  No murmurs, rubs, or gallops. Abd:  Normal bowel sounds. Soft, not tender, not distended. Skin: no rash             Extremities: no edema    EKG: sinus bradycardia. Assessment/ Plan:   Diagnoses and all orders for this visit:    1. Bradycardia  -     AMB POC EKG ROUTINE W/ 12 LEADS, INTER & REP     Symptomatic bradycardia. O2 sats normal, pt not reporting chest pain or dizziness at this time. Will send to ER for further work up and evaluation.        Verbal and written instructions (see AVS) provided.  Patient expresses understanding of diagnosis and treatment plan. Kurtis Anderson.  Byron Walters MD

## 2018-10-10 NOTE — ED TRIAGE NOTES
Pt arrives from EMS with co dizziness started this morning,  Pt went to PCP, then came to ER. Pt states, \"At one point everything got all dalton\". Pt denies fall, CP. PT a/ox4, family at bedside, monitor x4.

## 2018-10-10 NOTE — ED NOTES
Patient discharged by Dr. Santino Grajeda. Patient provided with discharge instructions Rx and instructions on follow up care. Patient out of ED ambulatory accompanied by family.

## 2018-10-11 LAB
ATRIAL RATE: 48 BPM
CALCULATED P AXIS, ECG09: 53 DEGREES
CALCULATED R AXIS, ECG10: 2 DEGREES
CALCULATED T AXIS, ECG11: 47 DEGREES
DIAGNOSIS, 93000: NORMAL
P-R INTERVAL, ECG05: 176 MS
Q-T INTERVAL, ECG07: 468 MS
QRS DURATION, ECG06: 74 MS
QTC CALCULATION (BEZET), ECG08: 418 MS
VENTRICULAR RATE, ECG03: 48 BPM

## 2018-10-11 NOTE — PROGRESS NOTES
Please call patient and have her schedule a follow up in our office for dizziness and with her cardiologist (Dr. Susan Herring)  for bradycardia. Was sent to ER yesterday 10/10 for these symptoms but discharged home.

## 2018-10-15 RX ORDER — PAROXETINE 30 MG/1
TABLET, FILM COATED ORAL
Qty: 90 TAB | Refills: 3 | Status: SHIPPED | OUTPATIENT
Start: 2018-10-15 | End: 2020-01-07 | Stop reason: SDUPTHER

## 2018-10-15 RX ORDER — LISINOPRIL 5 MG/1
TABLET ORAL
Qty: 90 TAB | Refills: 3 | Status: SHIPPED | OUTPATIENT
Start: 2018-10-15 | End: 2019-11-04 | Stop reason: SDUPTHER

## 2018-10-16 ENCOUNTER — TELEPHONE (OUTPATIENT)
Dept: FAMILY MEDICINE CLINIC | Age: 78
End: 2018-10-16

## 2018-10-16 NOTE — TELEPHONE ENCOUNTER
Spoke to pt. She has an appt with Dr. Wilbur Dyer tomorrow. Appt to see Dr. Ivana Vargas on 10/30/18. Pt could not come any sooner due to lack of transportation. ,

## 2018-10-16 NOTE — PROGRESS NOTES
Called pt. She has an appt with Dr. Edgardo Barahona tomorrow. She stated she could not come here this week or next due to no transportation. Appt scheduled with Dr. Ernesto Sequeira 10/30/18 @ 8:30am. She also stated she has an appt with a \"memory doctor\" next week.

## 2018-10-17 ENCOUNTER — TELEPHONE (OUTPATIENT)
Dept: FAMILY MEDICINE CLINIC | Age: 78
End: 2018-10-17

## 2018-10-24 ENCOUNTER — TELEPHONE (OUTPATIENT)
Dept: FAMILY MEDICINE CLINIC | Age: 78
End: 2018-10-24

## 2018-10-24 NOTE — TELEPHONE ENCOUNTER
Called pt. She did not know who called and there are no notes that anyone called. Advised a follow up appt. She stated she will call back to schedule.

## 2018-11-07 ENCOUNTER — TELEPHONE (OUTPATIENT)
Dept: FAMILY MEDICINE CLINIC | Age: 78
End: 2018-11-07

## 2018-11-07 NOTE — TELEPHONE ENCOUNTER
[facility] called and clarified vaccines with me do to patient stating she needed a pneumovax 23.  Clarified she didn't need it

## 2018-11-14 ENCOUNTER — TELEPHONE (OUTPATIENT)
Dept: FAMILY MEDICINE CLINIC | Age: 78
End: 2018-11-14

## 2018-11-16 NOTE — TELEPHONE ENCOUNTER
Patient verified her name and . Patient states she received her Influenza vaccine from [facility] in [place] last month. Vaccine entered in chart.

## 2019-01-02 ENCOUNTER — OFFICE VISIT (OUTPATIENT)
Dept: FAMILY MEDICINE CLINIC | Age: 79
End: 2019-01-02

## 2019-01-02 VITALS
WEIGHT: 213 LBS | HEART RATE: 80 BPM | BODY MASS INDEX: 37.74 KG/M2 | OXYGEN SATURATION: 95 % | HEIGHT: 63 IN | RESPIRATION RATE: 25 BRPM | SYSTOLIC BLOOD PRESSURE: 122 MMHG | TEMPERATURE: 97.9 F | DIASTOLIC BLOOD PRESSURE: 75 MMHG

## 2019-01-02 DIAGNOSIS — R53.1 WEAKNESS: ICD-10-CM

## 2019-01-02 DIAGNOSIS — Z95.2 S/P AORTIC VALVE REPLACEMENT: ICD-10-CM

## 2019-01-02 DIAGNOSIS — F43.23 ADJUSTMENT DISORDER WITH MIXED ANXIETY AND DEPRESSED MOOD: ICD-10-CM

## 2019-01-02 DIAGNOSIS — E11.8 CONTROLLED TYPE 2 DIABETES MELLITUS WITH COMPLICATION, WITHOUT LONG-TERM CURRENT USE OF INSULIN (HCC): ICD-10-CM

## 2019-01-02 DIAGNOSIS — R42 DIZZINESS: ICD-10-CM

## 2019-01-02 DIAGNOSIS — R06.00 DYSPNEA, UNSPECIFIED TYPE: ICD-10-CM

## 2019-01-02 DIAGNOSIS — R53.83 FATIGUE, UNSPECIFIED TYPE: Primary | ICD-10-CM

## 2019-01-02 LAB
BILIRUB UR QL STRIP: NEGATIVE
GLUCOSE POC: 181 MG/DL (ref 70–110)
GLUCOSE UR-MCNC: NEGATIVE MG/DL
KETONES P FAST UR STRIP-MCNC: NEGATIVE MG/DL
PH UR STRIP: 6 [PH] (ref 4.6–8)
PROT UR QL STRIP: NEGATIVE
SP GR UR STRIP: 1.01 (ref 1–1.03)
UA UROBILINOGEN AMB POC: NORMAL (ref 0.2–1)
URINALYSIS CLARITY POC: CLEAR
URINALYSIS COLOR POC: YELLOW
URINE BLOOD POC: NEGATIVE
URINE LEUKOCYTES POC: NORMAL
URINE NITRITES POC: NEGATIVE

## 2019-01-02 RX ORDER — LORAZEPAM 0.5 MG/1
0.5 TABLET ORAL
Qty: 60 TAB | Refills: 0 | Status: SHIPPED | OUTPATIENT
Start: 2019-01-02 | End: 2019-01-08

## 2019-01-02 RX ORDER — CLOPIDOGREL BISULFATE 75 MG/1
75 TABLET ORAL
COMMUNITY
End: 2021-04-28

## 2019-01-02 RX ORDER — CLOPIDOGREL BISULFATE 75 MG/1
75 TABLET ORAL DAILY
COMMUNITY
Start: 2015-03-06 | End: 2019-01-02 | Stop reason: ALTCHOICE

## 2019-01-02 NOTE — PATIENT INSTRUCTIONS
Use lorazepam three times a day as needed for panic/anxiety. Do not take it with tramadol or meclizine. You should hear something about your labs tomorrow. If not, please call us.

## 2019-01-02 NOTE — PROGRESS NOTES
Chief Complaint   Patient presents with    Dizziness            1. Have you been to the ER, urgent care clinic since your last visit? Hospitalized since your last visit? [facility]- for blockage in [place] same week of thanksgiving     2. Have you seen or consulted any other health care providers outside of the [address] [place] [address] since your last visit? Include any pap smears or colon screening.   Yes, Dr. Izabela Bourne

## 2019-01-02 NOTE — PROGRESS NOTES
[de-identified] with multiple medical problems. Chart review and history from patient and her daughters reveal that since last seen 4 months ago:    Had in stent restenosis in left carotid. When for balloon angioplasty at [facility]. Sent to [id number] [facility] ER for symptomatic bradycardia 10/10/18. Labs and imaging stable. Has since followed up with Dr Filomena El cardiology. Saw Dr Keyana Bernal, neurology for memory problems. Had an essentially normal MRI. neuropsych testing was ordered, I have no results. Had a sleep study which was negative on . Recently a lot of family stress - son in law and two brothers have passed away over the last 2 weeks! Son-in-law was 56 yo and had metastatic stomach cancer   passed earlier this year and two brothers have . Despite all of this, she's been taking most of her medications as directed. Yesterday she seemed pale and shaky, and got short of breath. Today, she's having difficulty ambulating down the hallway without dyspnea. She has no cough or fever. She has no swollen or painful extremities. She denies orthopnea, chest pain or palpitations. Daughters say in the past, when patient lost her , she had dyspnea on several occasions and ended up in the ER. They also state patient has not slept in days and is not eating - had low blood sugar yesterday. Past Medical History:   Diagnosis Date    Atypical chest pain     Long h/o intermittent non-cardiac CP.     Depression with anxiety     Diabetes ([address]     GERD (gastroesophageal reflux disease)     Hypercholesteremia     Hyperlipidemia 7/3/2013    Hypertension     Inner ear dysfunction     S/P aortic valve replacement     Dr. Filomena El yearly    Vitamin D deficiency      Visit Vitals  /75 (BP 1 Location: Left arm, BP Patient Position: Sitting)   Pulse 80   Temp 97.9 °F (36.6 °C) (Oral)   Resp 25   Ht 5' 3\" (1.6 m)   Wt 213 lb (96.6 kg)   SpO2 95%   BMI 37.73 kg/m²     Gen: alert, pale, comfortable when sitting, dyspneic with activity. HEENT: Clear conjunctiva and clear sclera, PERRL, EOMI, moist mucous membranes. Lungs: no increased respiratory effort, clear to ausculation bilaterally  Heart: regular rate and rhythm, 3/6 murmur  Psych: sad, confused, seems older than stated age  Extremities:no edema or cyanosis    A/P:  [de-identified] with multiple medical problems and profound social stressors causing grief reaction. Will check labs today to look for any underlying cause of weakness/dyspnea. Patient and daughters want to avoid the ER. They feel her symptoms are largely related to grief. I advised her to avoid activity today pending lab results and she will have close follow up with my NP. I debriefed her on the case since today is my last day here. NP will follow up labs tomorrow. Daughters will take to ER if she worsens in any way or develops chest pain. Daughters will manage medications. Will hold glimepiride and give prn ativan. ICD-10-CM ICD-9-CM    1. Fatigue, unspecified type R53.83 780.79 CBC WITH AUTOMATED DIFF      METABOLIC PANEL, COMPREHENSIVE      HEMOGLOBIN A1C WITH EAG      AMB POC URINALYSIS DIP STICK AUTO W/O MICRO      TSH 3RD GENERATION      D DIMER   2. Dizziness R42 780.4 CBC WITH AUTOMATED DIFF      METABOLIC PANEL, COMPREHENSIVE      HEMOGLOBIN A1C WITH EAG      AMB POC URINALYSIS DIP STICK AUTO W/O MICRO      TSH 3RD GENERATION      CULTURE, URINE      D DIMER   3. Weakness R53.1 780.79 CBC WITH AUTOMATED DIFF      METABOLIC PANEL, COMPREHENSIVE      HEMOGLOBIN A1C WITH EAG      AMB POC URINALYSIS DIP STICK AUTO W/O MICRO      TSH 3RD GENERATION   4. S/P aortic valve replacement Z95.2 V43.3 NT-PRO BNP   5. Controlled type 2 diabetes mellitus with complication, without long-term current use of insulin ([facility]) E11.8 250.90 AMB POC GLUCOSE BLOOD, BY GLUCOSE MONITORING DEVICE   6. Dyspnea, unspecified type R06.00 786.09 D DIMER   7.  Adjustment disorder with mixed anxiety and depressed mood F43.23 309.28 LORazepam (ATIVAN) 0.5 mg tablet     Spent >20 minutes face to face counseling patient.

## 2019-01-03 ENCOUNTER — HOSPITAL ENCOUNTER (INPATIENT)
Age: 79
LOS: 4 days | Discharge: HOME | DRG: 378 | End: 2019-01-08
Attending: EMERGENCY MEDICINE | Admitting: INTERNAL MEDICINE
Payer: MEDICARE

## 2019-01-03 ENCOUNTER — TELEPHONE (OUTPATIENT)
Dept: FAMILY MEDICINE CLINIC | Age: 79
End: 2019-01-03

## 2019-01-03 DIAGNOSIS — D64.9 ANEMIA, UNSPECIFIED TYPE: Primary | ICD-10-CM

## 2019-01-03 DIAGNOSIS — K92.2 GASTROINTESTINAL HEMORRHAGE, UNSPECIFIED GASTROINTESTINAL HEMORRHAGE TYPE: ICD-10-CM

## 2019-01-03 LAB
ALBUMIN SERPL-MCNC: 3.8 G/DL (ref 3.5–5)
ALBUMIN SERPL-MCNC: 4.2 G/DL (ref 3.5–4.7)
ALBUMIN/GLOB SERPL: 1.2 {RATIO} (ref 1.1–2.2)
ALBUMIN/GLOB SERPL: 1.8 {RATIO} (ref 1.2–2.2)
ALP SERPL-CCNC: 45 IU/L (ref 39–117)
ALP SERPL-CCNC: 49 U/L (ref 45–117)
ALT SERPL-CCNC: 20 IU/L (ref 0–32)
ALT SERPL-CCNC: 26 U/L (ref 12–78)
ANION GAP SERPL CALC-SCNC: 7 MMOL/L (ref 5–15)
APTT PPP: 21.5 SEC (ref 22.1–32)
AST SERPL-CCNC: 20 U/L (ref 15–37)
AST SERPL-CCNC: 21 IU/L (ref 0–40)
BASOPHILS # BLD AUTO: 0.1 X10E3/UL (ref 0–0.2)
BASOPHILS # BLD: 0 K/UL (ref 0–0.1)
BASOPHILS NFR BLD AUTO: 0 %
BASOPHILS NFR BLD: 0 % (ref 0–1)
BILIRUB SERPL-MCNC: 0.2 MG/DL (ref 0.2–1)
BILIRUB SERPL-MCNC: <0.2 MG/DL (ref 0–1.2)
BUN SERPL-MCNC: 24 MG/DL (ref 6–20)
BUN SERPL-MCNC: 36 MG/DL (ref 8–27)
BUN/CREAT SERPL: 31 (ref 12–20)
BUN/CREAT SERPL: 46 (ref 12–28)
CALCIUM SERPL-MCNC: 8.4 MG/DL (ref 8.5–10.1)
CALCIUM SERPL-MCNC: 9 MG/DL (ref 8.7–10.3)
CHLORIDE SERPL-SCNC: 104 MMOL/L (ref 96–106)
CHLORIDE SERPL-SCNC: 106 MMOL/L (ref 97–108)
CK SERPL-CCNC: 130 U/L (ref 26–192)
CO2 SERPL-SCNC: 20 MMOL/L (ref 20–29)
CO2 SERPL-SCNC: 25 MMOL/L (ref 21–32)
COMMENT, HOLDF: NORMAL
CREAT SERPL-MCNC: 0.78 MG/DL (ref 0.55–1.02)
CREAT SERPL-MCNC: 0.79 MG/DL (ref 0.57–1)
D DIMER PPP FEU-MCNC: 0.53 MG/L FEU (ref 0–0.49)
DIFFERENTIAL METHOD BLD: ABNORMAL
EOSINOPHIL # BLD AUTO: 0.1 X10E3/UL (ref 0–0.4)
EOSINOPHIL # BLD: 0.3 K/UL (ref 0–0.4)
EOSINOPHIL NFR BLD AUTO: 1 %
EOSINOPHIL NFR BLD: 2 % (ref 0–7)
ERYTHROCYTE [DISTWIDTH] IN BLOOD BY AUTOMATED COUNT: 14 % (ref 11.5–14.5)
ERYTHROCYTE [DISTWIDTH] IN BLOOD BY AUTOMATED COUNT: 14.2 % (ref 12.3–15.4)
EST. AVERAGE GLUCOSE BLD GHB EST-MCNC: 131 MG/DL
GLOBULIN SER CALC-MCNC: 2.4 G/DL (ref 1.5–4.5)
GLOBULIN SER CALC-MCNC: 3.2 G/DL (ref 2–4)
GLUCOSE SERPL-MCNC: 150 MG/DL (ref 65–100)
GLUCOSE SERPL-MCNC: 203 MG/DL (ref 65–99)
HBA1C MFR BLD: 6.2 % (ref 4.8–5.6)
HCT VFR BLD AUTO: 21.4 % (ref 35–47)
HCT VFR BLD AUTO: 23 % (ref 34–46.6)
HGB BLD-MCNC: 6.4 G/DL (ref 11.5–16)
HGB BLD-MCNC: 7.3 G/DL (ref 11.1–15.9)
IMM GRANULOCYTES # BLD: 0 K/UL (ref 0–0.04)
IMM GRANULOCYTES # BLD: 0 X10E3/UL (ref 0–0.1)
IMM GRANULOCYTES NFR BLD AUTO: 0 % (ref 0–0.5)
IMM GRANULOCYTES NFR BLD: 0 %
INR PPP: 1 (ref 0.9–1.1)
LYMPHOCYTES # BLD AUTO: 2.8 X10E3/UL (ref 0.7–3.1)
LYMPHOCYTES # BLD: 3.9 K/UL (ref 0.8–3.5)
LYMPHOCYTES NFR BLD AUTO: 17 %
LYMPHOCYTES NFR BLD: 29 % (ref 12–49)
MAGNESIUM SERPL-MCNC: 2.4 MG/DL (ref 1.6–2.4)
MCH RBC QN AUTO: 28.9 PG (ref 26.6–33)
MCH RBC QN AUTO: 29 PG (ref 26–34)
MCHC RBC AUTO-ENTMCNC: 29.9 G/DL (ref 30–36.5)
MCHC RBC AUTO-ENTMCNC: 31.7 G/DL (ref 31.5–35.7)
MCV RBC AUTO: 91 FL (ref 79–97)
MCV RBC AUTO: 96.8 FL (ref 80–99)
MONOCYTES # BLD AUTO: 1 X10E3/UL (ref 0.1–0.9)
MONOCYTES # BLD: 0.9 K/UL (ref 0–1)
MONOCYTES NFR BLD AUTO: 6 %
MONOCYTES NFR BLD: 7 % (ref 5–13)
NEUTROPHILS # BLD AUTO: 12.7 X10E3/UL (ref 1.4–7)
NEUTROPHILS NFR BLD AUTO: 76 %
NEUTS SEG # BLD: 8.3 K/UL (ref 1.8–8)
NEUTS SEG NFR BLD: 62 % (ref 32–75)
NRBC # BLD: 0.03 K/UL (ref 0–0.01)
NRBC BLD-RTO: 0.2 PER 100 WBC
NT-PROBNP SERPL-MCNC: 345 PG/ML (ref 0–738)
PLATELET # BLD AUTO: 232 K/UL (ref 150–400)
PLATELET # BLD AUTO: 274 X10E3/UL (ref 150–379)
PMV BLD AUTO: 10.8 FL (ref 8.9–12.9)
POTASSIUM SERPL-SCNC: 4.6 MMOL/L (ref 3.5–5.1)
POTASSIUM SERPL-SCNC: 5.6 MMOL/L (ref 3.5–5.2)
PROT SERPL-MCNC: 6.6 G/DL (ref 6–8.5)
PROT SERPL-MCNC: 7 G/DL (ref 6.4–8.2)
PROTHROMBIN TIME: 9.8 SEC (ref 9–11.1)
RBC # BLD AUTO: 2.21 M/UL (ref 3.8–5.2)
RBC # BLD AUTO: 2.53 X10E6/UL (ref 3.77–5.28)
RBC MORPH BLD: ABNORMAL
SAMPLES BEING HELD,HOLD: NORMAL
SODIUM SERPL-SCNC: 138 MMOL/L (ref 136–145)
SODIUM SERPL-SCNC: 140 MMOL/L (ref 134–144)
THERAPEUTIC RANGE,PTTT: ABNORMAL SECS (ref 58–77)
TROPONIN I SERPL-MCNC: 0.06 NG/ML
TSH SERPL DL<=0.005 MIU/L-ACNC: 4.28 UIU/ML (ref 0.45–4.5)
WBC # BLD AUTO: 13.4 K/UL (ref 3.6–11)
WBC # BLD AUTO: 16.8 X10E3/UL (ref 3.4–10.8)

## 2019-01-03 PROCEDURE — 86920 COMPATIBILITY TEST SPIN: CPT

## 2019-01-03 PROCEDURE — 80053 COMPREHEN METABOLIC PANEL: CPT

## 2019-01-03 PROCEDURE — 85025 COMPLETE CBC W/AUTO DIFF WBC: CPT

## 2019-01-03 PROCEDURE — 36415 COLL VENOUS BLD VENIPUNCTURE: CPT

## 2019-01-03 PROCEDURE — 82550 ASSAY OF CK (CPK): CPT

## 2019-01-03 PROCEDURE — 99285 EMERGENCY DEPT VISIT HI MDM: CPT

## 2019-01-03 PROCEDURE — 85730 THROMBOPLASTIN TIME PARTIAL: CPT

## 2019-01-03 PROCEDURE — 86900 BLOOD TYPING SEROLOGIC ABO: CPT

## 2019-01-03 PROCEDURE — 93005 ELECTROCARDIOGRAM TRACING: CPT

## 2019-01-03 PROCEDURE — 85610 PROTHROMBIN TIME: CPT

## 2019-01-03 PROCEDURE — 83735 ASSAY OF MAGNESIUM: CPT

## 2019-01-03 PROCEDURE — 84484 ASSAY OF TROPONIN QUANT: CPT

## 2019-01-03 RX ORDER — SODIUM CHLORIDE 9 MG/ML
250 INJECTION, SOLUTION INTRAVENOUS AS NEEDED
Status: DISCONTINUED | OUTPATIENT
Start: 2019-01-03 | End: 2019-01-08 | Stop reason: HOSPADM

## 2019-01-03 NOTE — TELEPHONE ENCOUNTER
Called patients daughter Lois. Release form verified. Patients name and  verified. Lois states \" we gave my Mother Lorazepam and she started talking a lot. It was like she woke up and was still in a dream. After taking lorazepam she said her stomach was hurting. She slept for about 2 hours. The pharmacist told us not to give it to her any more\". Told Lois message would be routed to Dr. Rickey Boudreaux. She verbalized understanding and will not give her Mother Lorazepam until she receives response.

## 2019-01-03 NOTE — TELEPHONE ENCOUNTER
Ms. Claire Burton daughter Dino Lee stop by the office for a chat. She is very anxious about her mother. She tried Lorazepam to help her sleep. She mentions sleep for 2 hours but then woke up in any sort of delirium. This has gotten better as the day goes on but is still \"peeling imaginary potatoes\" in the living room. She is under 24-hour supervision. Labs reviewed with daughter. She has a relatively acute anemia hemoglobin is 7.3. WBC 16 with a neutrophilia. D-dimer is equivocal and proBNP is unremarkable     the presenting complaint yesterday was \"pale and shaky and short of breath\". This makes me believe the anemia is very acute. She has a history of gastric ulcers. She is taking Plavix. I reviewed these results with daughter. I explained that I believe she has had a recent GI bleed and that she has very little reserve for a repeat bleed I believe that her anemia is contributing to several of her symptoms and probably not helping her level of anxiety. I think that she should be taken to the emergency room this evening. Daughter hesitates because she believes that the mother will refuse to go to a hospital because of the recent family health issues including several family members dying in hospitals including MR Lul SIMON Lucian Braun. I suggest [facility] then    Please go to emergency room. She needs to be evaluated for transfusion and possible GI bleed.   It would also be wise to evaluate her for psychiatric admission

## 2019-01-03 NOTE — TELEPHONE ENCOUNTER
Daughter is calling stating med pt received yesterday isn't working states she was talking not making since and she didn't get but  they are hoping something else can be called in for her.  She can be reached at [phone]

## 2019-01-04 PROBLEM — K92.2 GI BLEED: Status: ACTIVE | Noted: 2019-01-04

## 2019-01-04 LAB
ALBUMIN SERPL-MCNC: 3.6 G/DL (ref 3.5–5)
ALBUMIN/GLOB SERPL: 1.2 {RATIO} (ref 1.1–2.2)
ALP SERPL-CCNC: 45 U/L (ref 45–117)
ALT SERPL-CCNC: 24 U/L (ref 12–78)
ANION GAP SERPL CALC-SCNC: 6 MMOL/L (ref 5–15)
AST SERPL-CCNC: 23 U/L (ref 15–37)
ATRIAL RATE: 75 BPM
BACTERIA UR CULT: NORMAL
BASOPHILS # BLD: 0.1 K/UL (ref 0–0.1)
BASOPHILS NFR BLD: 0 % (ref 0–1)
BILIRUB SERPL-MCNC: 0.6 MG/DL (ref 0.2–1)
BUN SERPL-MCNC: 19 MG/DL (ref 6–20)
BUN/CREAT SERPL: 25 (ref 12–20)
CALCIUM SERPL-MCNC: 8.5 MG/DL (ref 8.5–10.1)
CALCULATED P AXIS, ECG09: 55 DEGREES
CALCULATED R AXIS, ECG10: -2 DEGREES
CALCULATED T AXIS, ECG11: 157 DEGREES
CHLORIDE SERPL-SCNC: 109 MMOL/L (ref 97–108)
CO2 SERPL-SCNC: 24 MMOL/L (ref 21–32)
CREAT SERPL-MCNC: 0.76 MG/DL (ref 0.55–1.02)
DIAGNOSIS, 93000: NORMAL
DIFFERENTIAL METHOD BLD: ABNORMAL
EOSINOPHIL # BLD: 0.3 K/UL (ref 0–0.4)
EOSINOPHIL NFR BLD: 2 % (ref 0–7)
ERYTHROCYTE [DISTWIDTH] IN BLOOD BY AUTOMATED COUNT: 15.9 % (ref 11.5–14.5)
GLOBULIN SER CALC-MCNC: 3.1 G/DL (ref 2–4)
GLUCOSE BLD STRIP.AUTO-MCNC: 131 MG/DL (ref 65–100)
GLUCOSE BLD STRIP.AUTO-MCNC: 151 MG/DL (ref 65–100)
GLUCOSE BLD STRIP.AUTO-MCNC: 152 MG/DL (ref 65–100)
GLUCOSE BLD STRIP.AUTO-MCNC: 167 MG/DL (ref 65–100)
GLUCOSE SERPL-MCNC: 154 MG/DL (ref 65–100)
HCT VFR BLD AUTO: 21.8 % (ref 35–47)
HCT VFR BLD AUTO: 24.5 % (ref 35–47)
HCT VFR BLD AUTO: 24.6 % (ref 35–47)
HGB BLD-MCNC: 6.6 G/DL (ref 11.5–16)
HGB BLD-MCNC: 7.3 G/DL (ref 11.5–16)
HGB BLD-MCNC: 7.3 G/DL (ref 11.5–16)
IMM GRANULOCYTES # BLD: 0 K/UL (ref 0–0.04)
IMM GRANULOCYTES NFR BLD AUTO: 0 % (ref 0–0.5)
LYMPHOCYTES # BLD: 3.5 K/UL (ref 0.8–3.5)
LYMPHOCYTES NFR BLD: 29 % (ref 12–49)
MCH RBC QN AUTO: 28.1 PG (ref 26–34)
MCHC RBC AUTO-ENTMCNC: 29.8 G/DL (ref 30–36.5)
MCV RBC AUTO: 94.2 FL (ref 80–99)
MONOCYTES # BLD: 1.2 K/UL (ref 0–1)
MONOCYTES NFR BLD: 10 % (ref 5–13)
NEUTS SEG # BLD: 7 K/UL (ref 1.8–8)
NEUTS SEG NFR BLD: 58 % (ref 32–75)
NRBC # BLD: 0.03 K/UL (ref 0–0.01)
NRBC BLD-RTO: 0.3 PER 100 WBC
P-R INTERVAL, ECG05: 180 MS
PLATELET # BLD AUTO: 206 K/UL (ref 150–400)
PMV BLD AUTO: 10.8 FL (ref 8.9–12.9)
POTASSIUM SERPL-SCNC: 4.3 MMOL/L (ref 3.5–5.1)
PROT SERPL-MCNC: 6.7 G/DL (ref 6.4–8.2)
Q-T INTERVAL, ECG07: 342 MS
QRS DURATION, ECG06: 82 MS
QTC CALCULATION (BEZET), ECG08: 381 MS
RBC # BLD AUTO: 2.6 M/UL (ref 3.8–5.2)
SERVICE CMNT-IMP: ABNORMAL
SODIUM SERPL-SCNC: 139 MMOL/L (ref 136–145)
TROPONIN I SERPL-MCNC: 0.05 NG/ML
TROPONIN I SERPL-MCNC: <0.05 NG/ML
VENTRICULAR RATE, ECG03: 75 BPM
WBC # BLD AUTO: 11.9 K/UL (ref 3.6–11)

## 2019-01-04 PROCEDURE — 65660000000 HC RM CCU STEPDOWN

## 2019-01-04 PROCEDURE — 85018 HEMOGLOBIN: CPT

## 2019-01-04 PROCEDURE — 30233N1 TRANSFUSION OF NONAUTOLOGOUS RED BLOOD CELLS INTO PERIPHERAL VEIN, PERCUTANEOUS APPROACH: ICD-10-PCS | Performed by: INTERNAL MEDICINE

## 2019-01-04 PROCEDURE — 82962 GLUCOSE BLOOD TEST: CPT

## 2019-01-04 PROCEDURE — 36430 TRANSFUSION BLD/BLD COMPNT: CPT

## 2019-01-04 PROCEDURE — 74011250636 HC RX REV CODE- 250/636: Performed by: INTERNAL MEDICINE

## 2019-01-04 PROCEDURE — 84484 ASSAY OF TROPONIN QUANT: CPT

## 2019-01-04 PROCEDURE — 36415 COLL VENOUS BLD VENIPUNCTURE: CPT

## 2019-01-04 PROCEDURE — 80053 COMPREHEN METABOLIC PANEL: CPT

## 2019-01-04 PROCEDURE — 85025 COMPLETE CBC W/AUTO DIFF WBC: CPT

## 2019-01-04 PROCEDURE — C9113 INJ PANTOPRAZOLE SODIUM, VIA: HCPCS | Performed by: INTERNAL MEDICINE

## 2019-01-04 PROCEDURE — P9016 RBC LEUKOCYTES REDUCED: HCPCS

## 2019-01-04 PROCEDURE — 74011636637 HC RX REV CODE- 636/637: Performed by: INTERNAL MEDICINE

## 2019-01-04 PROCEDURE — 74011250637 HC RX REV CODE- 250/637: Performed by: INTERNAL MEDICINE

## 2019-01-04 RX ORDER — INSULIN LISPRO 100 [IU]/ML
INJECTION, SOLUTION INTRAVENOUS; SUBCUTANEOUS
Status: DISCONTINUED | OUTPATIENT
Start: 2019-01-04 | End: 2019-01-08 | Stop reason: HOSPADM

## 2019-01-04 RX ORDER — PAROXETINE HYDROCHLORIDE 20 MG/1
30 TABLET, FILM COATED ORAL DAILY
Status: DISCONTINUED | OUTPATIENT
Start: 2019-01-04 | End: 2019-01-08 | Stop reason: HOSPADM

## 2019-01-04 RX ORDER — DEXTROSE 50 % IN WATER (D50W) INTRAVENOUS SYRINGE
12.5-25 AS NEEDED
Status: DISCONTINUED | OUTPATIENT
Start: 2019-01-04 | End: 2019-01-08 | Stop reason: HOSPADM

## 2019-01-04 RX ORDER — GLIMEPIRIDE 1 MG/1
1 TABLET ORAL
COMMUNITY
End: 2019-10-30 | Stop reason: SDUPTHER

## 2019-01-04 RX ORDER — METOPROLOL SUCCINATE 25 MG/1
25 TABLET, EXTENDED RELEASE ORAL DAILY
Status: DISCONTINUED | OUTPATIENT
Start: 2019-01-04 | End: 2019-01-08 | Stop reason: HOSPADM

## 2019-01-04 RX ORDER — OXYBUTYNIN CHLORIDE 5 MG/1
5 TABLET, EXTENDED RELEASE ORAL DAILY
Status: DISCONTINUED | OUTPATIENT
Start: 2019-01-04 | End: 2019-01-08 | Stop reason: HOSPADM

## 2019-01-04 RX ORDER — SODIUM CHLORIDE 0.9 % (FLUSH) 0.9 %
5-10 SYRINGE (ML) INJECTION EVERY 8 HOURS
Status: DISCONTINUED | OUTPATIENT
Start: 2019-01-04 | End: 2019-01-08 | Stop reason: HOSPADM

## 2019-01-04 RX ORDER — SODIUM CHLORIDE 9 MG/ML
50 INJECTION, SOLUTION INTRAVENOUS CONTINUOUS
Status: DISCONTINUED | OUTPATIENT
Start: 2019-01-04 | End: 2019-01-06

## 2019-01-04 RX ORDER — SODIUM CHLORIDE 9 MG/ML
250 INJECTION, SOLUTION INTRAVENOUS AS NEEDED
Status: DISCONTINUED | OUTPATIENT
Start: 2019-01-04 | End: 2019-01-08 | Stop reason: HOSPADM

## 2019-01-04 RX ORDER — MAGNESIUM SULFATE 100 %
4 CRYSTALS MISCELLANEOUS AS NEEDED
Status: DISCONTINUED | OUTPATIENT
Start: 2019-01-04 | End: 2019-01-08 | Stop reason: HOSPADM

## 2019-01-04 RX ORDER — SODIUM CHLORIDE 0.9 % (FLUSH) 0.9 %
5-10 SYRINGE (ML) INJECTION AS NEEDED
Status: DISCONTINUED | OUTPATIENT
Start: 2019-01-04 | End: 2019-01-08 | Stop reason: HOSPADM

## 2019-01-04 RX ORDER — ACETAMINOPHEN 325 MG/1
325 TABLET ORAL
COMMUNITY

## 2019-01-04 RX ORDER — IBUPROFEN 200 MG
200 TABLET ORAL
COMMUNITY
End: 2019-01-08

## 2019-01-04 RX ORDER — PANTOPRAZOLE SODIUM 40 MG/10ML
40 INJECTION, POWDER, LYOPHILIZED, FOR SOLUTION INTRAVENOUS EVERY 12 HOURS
Status: DISCONTINUED | OUTPATIENT
Start: 2019-01-04 | End: 2019-01-08 | Stop reason: HOSPADM

## 2019-01-04 RX ORDER — TIZANIDINE 4 MG/1
4 TABLET ORAL
COMMUNITY
End: 2021-04-28

## 2019-01-04 RX ADMIN — Medication 10 ML: at 07:24

## 2019-01-04 RX ADMIN — INSULIN LISPRO 2 UNITS: 100 INJECTION, SOLUTION INTRAVENOUS; SUBCUTANEOUS at 17:52

## 2019-01-04 RX ADMIN — Medication 10 ML: at 22:12

## 2019-01-04 RX ADMIN — METOPROLOL SUCCINATE 25 MG: 50 TABLET, EXTENDED RELEASE ORAL at 10:17

## 2019-01-04 RX ADMIN — INSULIN LISPRO 2 UNITS: 100 INJECTION, SOLUTION INTRAVENOUS; SUBCUTANEOUS at 07:38

## 2019-01-04 RX ADMIN — SODIUM CHLORIDE 50 ML/HR: 900 INJECTION, SOLUTION INTRAVENOUS at 07:24

## 2019-01-04 RX ADMIN — PANTOPRAZOLE SODIUM 40 MG: 40 INJECTION, POWDER, FOR SOLUTION INTRAVENOUS at 22:12

## 2019-01-04 RX ADMIN — OXYBUTYNIN CHLORIDE 5 MG: 5 TABLET, EXTENDED RELEASE ORAL at 12:29

## 2019-01-04 RX ADMIN — Medication 10 ML: at 13:10

## 2019-01-04 RX ADMIN — PANTOPRAZOLE SODIUM 40 MG: 40 INJECTION, POWDER, FOR SOLUTION INTRAVENOUS at 10:17

## 2019-01-04 RX ADMIN — PAROXETINE HYDROCHLORIDE 30 MG: 20 TABLET, FILM COATED ORAL at 10:16

## 2019-01-04 NOTE — PROGRESS NOTES
Admission Medication Reconciliation: 
 
Information obtained from: pt, family member, home list, Rx Query Significant PMH/Disease States:  
Past Medical History:  
Diagnosis Date  Atypical chest pain Long h/o intermittent non-cardiac CP.  Depression with anxiety  Diabetes ([address]  GERD (gastroesophageal reflux disease)  Hypercholesteremia  Hyperlipidemia 7/3/2013  Hypertension  Inner ear dysfunction  S/P aortic valve replacement Dr. Kendal Espinoza yearly  Vitamin D deficiency Chief Complaint for this Admission:  abnormal lab results Allergies:  Naldecon and Sulfa (sulfonamide antibiotics) Prior to Admission Medications:  
Prior to Admission Medications Prescriptions Last Dose Informant Patient Reported? Taking? LORazepam (ATIVAN) 0.5 mg tablet 1/2/2019 at pm  No Yes Sig: Take 1 Tab by mouth three (3) times daily as needed for Anxiety. Max Daily Amount: 1.5 mg. MYRBETRIQ 25 mg ER tablet 1/3/2019 at pm  Yes Yes Sig: Take 25 mg by mouth daily. PARoxetine (PAXIL) 30 mg tablet 1/3/2019 at am  No Yes Sig: TAKE 1 TABLET EVERY DAY  
PV W-O EDU/FERROUS FUMARATE/FA (M-VIT PO) 1/3/2019 at am  Yes Yes Sig: Take 1 tablet by mouth daily. acetaminophen (TYLENOL) 325 mg tablet   Yes Yes Sig: Take 325 mg by mouth every four (4) hours as needed for Pain. aspirin delayed-release 325 mg tablet 1/3/2019 at am  No Yes Sig: Take 1 Tab by mouth daily. clopidogrel (PLAVIX) 75 mg tab 1/3/2019 at am  Yes Yes Sig: Take 75 mg by mouth.  
glimepiride (AMARYL) 1 mg tablet 1/3/2019 at am  Yes Yes Sig: Take 1 mg by mouth Daily (before breakfast). ibuprofen (ADVIL) 200 mg tablet   Yes Yes Sig: Take 200 mg by mouth every eight (8) hours as needed. lisinopril (PRINIVIL, ZESTRIL) 5 mg tablet 1/3/2019 at am  No Yes Sig: TAKE 1 TABLET EVERY DAY  
lovastatin (MEVACOR) 10 mg tablet 1/2/2019 at pm  No Yes Sig: TAKE ONE TABLET BY MOUTH NIGHTLY meclizine (ANTIVERT) 25 mg tablet 1/2/2019  No Yes Sig: Take 1 Tab by mouth three (3) times daily as needed for Dizziness. meloxicam (MOBIC) 15 mg tablet 1/4/2019 at am  Yes Yes Sig: Take 15 mg by mouth daily. metFORMIN (GLUCOPHAGE) 500 mg tablet 1/3/2019 at am  No Yes Sig: TAKE TWO TABLETS BY MOUTH DAILY WITH BREAKFAST  
metoprolol succinate (TOPROL-XL) 25 mg XL tablet 1/3/2019 at am  No Yes Sig: TAKE 1 TABLET EVERY DAY  
tiZANidine (ZANAFLEX) 4 mg tablet 12/14/2018  Yes Yes Sig: Take 4 mg by mouth three (3) times daily as needed. Facility-Administered Medications: None Comments/Recommendations: Reviewed medications w/ pt and family member who presents a list from home. Rx Query was also used. (1) Add: 
- glimepiride - tylenol 
- advil 
- tizanidine (2) Remove: 
- test strip 
- lancet (3) Change: 
- metformin (pt reported taking two tabs of 500 mg in the morning) Per family member, pt has taken all morning medications yesterday (1/3). Pt only took one dose of ativan on 1/2, reported feeling very dizzy. Reviewed medication allergies w/ pt: 
(1) sulfa: rash 
(2) Naldecon: rash Denzel Florez PharmD candidate

## 2019-01-04 NOTE — ED TRIAGE NOTES
Triage Note: Patient has been having some chest pain for the past 2 days and saw physician and was put on Ativan which caused some adverse reaction. Patient was discovered to have anemia today from blood wok that was done. Patient has had multiple losses in the last month which caused the Ativan to be ordered

## 2019-01-04 NOTE — CONSULTS
[address]    Pedro Avila  MR#: [id number]  : 1940  ACCOUNT #: [de-identified]   DATE OF SERVICE: 2019    REFERRING PHYSICIAN:  Dr. Estelita Boyce. This consult was requested since the patient presented to the hospital with shortness of breath and had slightly elevated troponins. The patient was found to have a very low hemoglobin which dropped from yesterday to its current level of 6.4. The patient has dementia, but her daughter indicates the patient has had melena in her stools 2-3 days ago. The patient does not recall this, but she denies any abdominal pain or back pain, any diarrhea, nausea, vomiting. She denies chest pain as well. PAST MEDICAL HISTORY:  The patient's cardiac history includes hypertension and aortic valve stenosis treated with open aortic valve replacement in  followed by a TAVR valve in valve in , post 2015 TAVR ejection fraction normalized. She has never had coronary disease. Her last catheterization in  demonstrated no significant disease. She has carotid artery disease and a stent in her left internal carotid artery in  and had balloon angioplasty only of in-stent restenosis in her left internal carotid in 2018. At that time, she was placed on aspirin and Plavix. REVIEW OF SYSTEMS:  Otherwise unremarkable. MEDICATIONS:  List at home is as follows:  Glimepiride 1 mg daily, Zanaflex 4 mg 3 times a day, Tylenol 325 four times a day as needed, ibuprofen 200 mg every 8 hours as needed, lorazepam 0.5 tablets 3 times a day as needed, clopidogrel 75 mg daily, lisinopril 5 mg daily, Antivert 25 mg 3 times a day, meloxicam 15 mg daily,  metformin 500 mg 2 tablets at breakfast, lovastatin 10 mg nightly, aspirin 325 daily, iron supplements 1 tablet daily, paroxetine 1 tablet 30 mg daily, metoprolol succinate 25 mg daily, Myrbetriq 25 mg daily. ALLERGIES:  SULFONAMIDE ANTIBIOTICS AND NALDECON.     PHYSICAL EXAMINATION:  GENERAL:  This is a well-developed, alert, elderly woman in no distress. Her daughter is at bedside during the exam.  VITAL SIGNS:  Temperature 97.5, pulse 80, respirations 19, blood pressure 122/48, sats 100% on room air. HEENT:  Unremarkable. NECK:  Supple, no adenopathy, no neck vein distention. CHEST WALL:  Nontender. LUNGS:  Decreased breath sounds but clear. HEART:  Harsh systolic murmur in second right and left intercostal space, left lower sternal border and apex. No thrills, lifts, or heaves. ABDOMEN:  Soft, nontender, no bruits. NEUROLOGIC:  The patient is awake, alert and appropriate. Some issues with memory. EXTREMITIES:  No significant edema. Pedal pulses are palpable. LABORATORY DATA:  Reflect a blood loss anemia, hemoglobin obtained yesterday of 6.4, platelet count of 172. Chemistry:  BUN and creatinine are normal.  She had an NT-proBNP on 01/02 which was 345. Her troponin was 0.06 at 10:50 last night, 0.05 at 7 o'clock this morning. EKG demonstrates normal sinus rhythm with left ventricular hypertrophy with T-wave inversion in the anterolateral leads compared to the 10/10/2018 tracing. IMPRESSION:    1. This patient has acute blood loss anemia, gastrointestinal source, likely upper gastrointestinal.  2.  She has slightly elevated troponin, which is lower on the 2nd lab draw several hours later. The patient does not complain of chest pain and does not have signs and symptoms of a myocardial infarction. She has already received at least 1 unit of packed cells. PLAN:  GI consult has been requested. 1.  From a cardiac standpoint, agree with current management. 2.  No need for coronary angiography at this time. 3.  Further recommendations to follow.     Thank you for this referral.      MD FABRIZIO Perea / [place]  D: 01/04/2019 12:13     T: 01/04/2019 13:10  JOB #: [id number]

## 2019-01-04 NOTE — CONSULTS
Misa Rodríguez [address]  217 [facility] 140 [facility], [address]  [phone]                     GI CONSULTATION NOTE  Matthieu Ozuna, AGACNJefferson Healthcare Hospital  Work Cell: [phone]      NAME:  Frederic Brenner   :   1940   MRN:   [id number]       Referring Provider: Dr. Natalia Kumar Date: 2019     Chief Complaint: \"My doctor told me to come in.\"    History of Present Illness:  Patient is a [de-identified] y.o. who is seen in consultation at the request of Dr. Samanta Bingham for GI bleed. Ms. Anurag Strange has a PMH as detailed below. She presented to the ER at referral of PCP for low hemoglobin. Pt reports having exertional SOB and intermittent CP over the past several months. She thought this was due to \"putting on a couple of pounds. \" She saw PCP and Hgb found to be 7.3. She denies any abdominal pain, n/v or diarrhea. She has history of constipation for which she takes Dulcolax PRN with relief. She reports having a \"black stool\" 2 days ago. She takes  mg and Plavix daily given history of AVR. Last dose of these was yesterday. She also takes Mobic PRN. She reports distant history of ulcer disease which was diagnosed via endoscopy. No recent EGD. She has never had a colonoscopy. No known FH of colon cancer. PMH:  Past Medical History:   Diagnosis Date    Atypical chest pain     Long h/o intermittent non-cardiac CP.  Depression with anxiety     Diabetes ([address]     GERD (gastroesophageal reflux disease)     Hypercholesteremia     Hyperlipidemia 7/3/2013    Hypertension     Inner ear dysfunction     S/P aortic valve replacement     Dr. Leary Economy yearly    Vitamin D deficiency        PSH:  Past Surgical History:   Procedure Laterality Date    HX AORTIC VALVE REPLACEMENT      Bovine valve    HX CATARACT REMOVAL      HX CHOLECYSTECTOMY      HX MOHS PROCEDURES Left        Allergies:   Allergies   Allergen Reactions    Naldecon Unknown (comments)    Sulfa (Sulfonamide Antibiotics) Rash Home Medications:  Prior to Admission Medications   Prescriptions Last Dose Informant Patient Reported? Taking? LORazepam (ATIVAN) 0.5 mg tablet 1/2/2019 at pm  No Yes   Sig: Take 1 Tab by mouth three (3) times daily as needed for Anxiety. Max Daily Amount: 1.5 mg. MYRBETRIQ 25 mg ER tablet 1/3/2019 at pm  Yes Yes   Sig: Take 25 mg by mouth daily. PARoxetine (PAXIL) 30 mg tablet 1/3/2019 at am  No Yes   Sig: TAKE 1 TABLET EVERY DAY   PV W-O EDU/FERROUS FUMARATE/FA (M-VIT PO) 1/3/2019 at am  Yes Yes   Sig: Take 1 tablet by mouth daily. acetaminophen (TYLENOL) 325 mg tablet   Yes Yes   Sig: Take 325 mg by mouth every four (4) hours as needed for Pain. aspirin delayed-release 325 mg tablet 1/3/2019 at am  No Yes   Sig: Take 1 Tab by mouth daily. clopidogrel (PLAVIX) 75 mg tab 1/3/2019 at am  Yes Yes   Sig: Take 75 mg by mouth.   glimepiride (AMARYL) 1 mg tablet 1/3/2019 at am  Yes Yes   Sig: Take 1 mg by mouth Daily (before breakfast). ibuprofen (ADVIL) 200 mg tablet   Yes Yes   Sig: Take 200 mg by mouth every eight (8) hours as needed. lisinopril (PRINIVIL, ZESTRIL) 5 mg tablet 1/3/2019 at am  No Yes   Sig: TAKE 1 TABLET EVERY DAY   lovastatin (MEVACOR) 10 mg tablet 1/2/2019 at pm  No Yes   Sig: TAKE ONE TABLET BY MOUTH NIGHTLY   meclizine (ANTIVERT) 25 mg tablet 1/2/2019  No Yes   Sig: Take 1 Tab by mouth three (3) times daily as needed for Dizziness. meloxicam (MOBIC) 15 mg tablet 1/4/2019 at am  Yes Yes   Sig: Take 15 mg by mouth daily. metFORMIN (GLUCOPHAGE) 500 mg tablet 1/3/2019 at am  No Yes   Sig: TAKE TWO TABLETS BY MOUTH DAILY WITH BREAKFAST   metoprolol succinate (TOPROL-XL) 25 mg XL tablet 1/3/2019 at am  No Yes   Sig: TAKE 1 TABLET EVERY DAY   tiZANidine (ZANAFLEX) 4 mg tablet 12/14/2018  Yes Yes   Sig: Take 4 mg by mouth three (3) times daily as needed.       Facility-Administered Medications: None       Hospital Medications:  Current Facility-Administered Medications Medication Dose Route Frequency    sodium chloride (NS) flush 5-10 mL  5-10 mL IntraVENous Q8H    sodium chloride (NS) flush 5-10 mL  5-10 mL IntraVENous PRN    0.9% sodium chloride infusion  50 mL/hr IntraVENous CONTINUOUS    pantoprazole (PROTONIX) injection 40 mg  40 mg IntraVENous Q12H    metoprolol succinate (TOPROL-XL) XL tablet 25 mg  25 mg Oral DAILY    . PHARMACY TO SUBSTITUTE PER PROTOCOL (Reordered from: MYRBETRIQ 25 mg ER tablet)    Per Protocol    PARoxetine (PAXIL) tablet 30 mg  30 mg Oral DAILY    0.9% sodium chloride infusion 250 mL  250 mL IntraVENous PRN    insulin lispro (HUMALOG) injection   SubCUTAneous AC&HS    glucose chewable tablet 16 g  4 Tab Oral PRN    dextrose (D50W) injection syrg 12.5-25 g  12.5-25 g IntraVENous PRN    glucagon (GLUCAGEN) injection 1 mg  1 mg IntraMUSCular PRN    0.9% sodium chloride infusion 250 mL  250 mL IntraVENous PRN     Current Outpatient Medications   Medication Sig    glimepiride (AMARYL) 1 mg tablet Take 1 mg by mouth Daily (before breakfast).  tiZANidine (ZANAFLEX) 4 mg tablet Take 4 mg by mouth three (3) times daily as needed.  acetaminophen (TYLENOL) 325 mg tablet Take 325 mg by mouth every four (4) hours as needed for Pain.  ibuprofen (ADVIL) 200 mg tablet Take 200 mg by mouth every eight (8) hours as needed.  LORazepam (ATIVAN) 0.5 mg tablet Take 1 Tab by mouth three (3) times daily as needed for Anxiety. Max Daily Amount: 1.5 mg.    clopidogrel (PLAVIX) 75 mg tab Take 75 mg by mouth.  lisinopril (PRINIVIL, ZESTRIL) 5 mg tablet TAKE 1 TABLET EVERY DAY    PARoxetine (PAXIL) 30 mg tablet TAKE 1 TABLET EVERY DAY    meclizine (ANTIVERT) 25 mg tablet Take 1 Tab by mouth three (3) times daily as needed for Dizziness.  metoprolol succinate (TOPROL-XL) 25 mg XL tablet TAKE 1 TABLET EVERY DAY    MYRBETRIQ 25 mg ER tablet Take 25 mg by mouth daily.  meloxicam (MOBIC) 15 mg tablet Take 15 mg by mouth daily.     metFORMIN (GLUCOPHAGE) 500 mg tablet TAKE TWO TABLETS BY MOUTH DAILY WITH BREAKFAST    lovastatin (MEVACOR) 10 mg tablet TAKE ONE TABLET BY MOUTH NIGHTLY    aspirin delayed-release 325 mg tablet Take 1 Tab by mouth daily.  PV W-O EDU/FERROUS FUMARATE/FA (M-VIT PO) Take 1 tablet by mouth daily. Social History:  Social History     Tobacco Use    Smoking status: Former Smoker     Last attempt to quit: 1999     Years since quittin.3    Smokeless tobacco: Never Used   Substance Use Topics    Alcohol use: No       Family History:  Family History   Problem Relation Age of Onset    Heart Disease Mother     Heart Failure Father     Heart Failure Sister     Stroke Sister     Diabetes Brother     Heart Disease Brother        Review of Systems:    Constitutional: negative fever, negative chills, negative weight loss  Eyes:   negative visual changes  ENT:   negative sore throat, tongue or lip swelling  Respiratory:  negative cough, negative dyspnea  Cards:  negative for chest pain, palpitations, lower extremity edema  GI:   See HPI  :  negative for frequency, dysuria  Integument:  negative for rash and pruritus  Heme:  negative for easy bruising and gum/nose bleeding  Musculoskel: negative for myalgias, back pain and muscle weakness  Neuro: negative for headaches, dizziness, vertigo  Psych:  negative for feelings of anxiety, depression      Objective:     Patient Vitals for the past 8 hrs:   BP Temp Pulse Resp SpO2   19 0556 134/44 98.4 °F (36.9 °C) 74 20 99 %   19 0440 134/69 97.5 °F (36.4 °C) 90 17 98 %   19 0340 133/55 99.1 °F (37.3 °C) 84 21 97 %   19 0240 118/49 98.7 °F (37.1 °C) 89 19 100 %   19 0225 130/50 98.7 °F (37.1 °C) 85 18 99 %   19 0210 117/82 98.6 °F (37 °C) 84 15 100 %   19 0155 124/48 98.7 °F (37.1 °C) 82 17 94 %     No intake/output data recorded. No intake/output data recorded. PHYSICAL EXAM:  General: WD, Obese.  Alert, cooperative, no acute distress.    HEENT: NC, Atraumatic. PERRLA, EOMI. Anicteric sclerae. Lungs:  CTA Bilaterally. No Wheezing/Rhonchi/Rales. Heart:  Regular rate and rhythm, No murmur, No Rubs, No Gallops  Abdomen: Soft, non-distended, non-tender.  +Bowel sounds, no HSM  Extremities: No c/c/e  Neurologic:  Alert and oriented X 3. No acute neurological distress. Psych:   Good insight. Not anxious nor agitated. Data Review     Recent Labs     01/04/19 0707 01/03/19 1950   WBC 11.9* 13.4*   HGB 7.3* 6.4*   HCT 24.5* 21.4*    232     Recent Labs     01/04/19 0707 01/03/19 1950    138   K 4.3 4.6   * 106   CO2 24 25   BUN 19 24*   CREA 0.76 0.78   * 150*   CA 8.5 8.4*     Recent Labs     01/04/19 0707 01/03/19 1950   SGOT 23 20   AP 45 49   TP 6.7 7.0   ALB 3.6 3.8   GLOB 3.1 3.2     Recent Labs     01/03/19 1950   INR 1.0   PTP 9.8   APTT 21.5*        Imaging studies reviewed      Assessment:   1. Anemia - with reported melena and distant history of ulcer disease. Differentials include recurrent ulcer, erosion, gastritis, esophagitis, AVMs, neoplasia, etc. Cannot completely r/o potential colonic etiology as well. 2. Type 2 DM  3. History of AVR - on anti-coagulation    Patient Active Problem List   Diagnosis Code    Diabetes type 2, controlled ([facility] [address] E11.9    Essential hypertension, benign I10    Palpitations R00.2    Vitamin D deficiency E55.9    S/P aortic valve replacement Z95.2    Hyperlipidemia E78.5    Depression with anxiety F41.8    Severe obesity (BMI 35.0-39. 9) E66.01    GI bleed K92.2              Plan:   -PPI BID  -Recommend EGD/colonoscopy - can be completed as outpatient once stabilized and clearance to hold Plavix is obtained from Cardiologist   -Monitor H&H, transfuse as needed  -Discussed with Dr. Cornell Lopez  -Will follow along with you  -Thank you kindly for allowing us to participate in the care of this patient

## 2019-01-04 NOTE — ED PROVIDER NOTES
[de-identified] y.o. female with past medical history significant for HTN, diabetes, atypical chest pain, aortic valve replacement, hypercholesteremia, hyperlipidemia, vitamin D deficiency, depression with anxiety, and GERD who presents from home with chief complaint of blood in stool. Pt is s/p stent restenosis of the left carotid ~2 months ago. Pt saw her PCP yesterday who took blood work and urine samples. She was called today and informed that her HGB was 7.3 and was sent to the ED for evaluation. Per relative, the pt experienced incontinence yesterday and she noted dark blood in her stool. Pt has also been experiencing SOB and relative has noted that she becomes winded when walks and \"turned white\" yesterday\" while ambulating. Family reports that the pt has some short term memory loss but they have noticed worsened acute confusion over the past couple of days. The pt has had 3 relatives pass over the past month so they originally contributed her confusion and lack of sleep to grief. Pt's relative also notes that she experienced a near-syncopal episode 2 nights ago while getting up out of bed to use the restroom. Pt reports h/o aortic valve replacement. Pt has a h/o ulcers. No h/o blood in stool. No h/o cancer or AAA. Pt denies fever or chills. There are no other acute medical concerns at this time. Full history, physical exam, and ROS unable to be obtained due to:  confusion. Social hx: former tobacco smoker (quit 20 years ago); no EtOH use PCP: Jeannette Flores MD 
 
Note written by Radha Richard, as dictated by Shantal Dent MD 9:44 PM 
 
 
The history is provided by the patient and a relative. No  was used. Past Medical History:  
Diagnosis Date  Atypical chest pain Long h/o intermittent non-cardiac CP.  Depression with anxiety  Diabetes ([address]  GERD (gastroesophageal reflux disease)  Hypercholesteremia  Hyperlipidemia 7/3/2013  Hypertension  Inner ear dysfunction  S/P aortic valve replacement Dr. Tse Rater yearly  Vitamin D deficiency Past Surgical History:  
Procedure Laterality Date  50 Bovine valve  HX CATARACT REMOVAL    
 HX CHOLECYSTECTOMY    HX MOHS PROCEDURES Left 2014 Family History:  
Problem Relation Age of Onset  Heart Disease Mother  Heart Failure Father  Heart Failure Sister  Stroke Sister  Diabetes Brother  Heart Disease Brother Social History Socioeconomic History  Marital status:  Spouse name: Not on file  Number of children: Not on file  Years of education: Not on file  Highest education level: Not on file Social Needs  Financial resource strain: Not on file  Food insecurity - worry: Not on file  Food insecurity - inability: Not on file  Transportation needs - medical: Not on file  Transportation needs - non-medical: Not on file Occupational History  Not on file Tobacco Use  Smoking status: Former Smoker Last attempt to quit: 1999 Years since quittin.3  Smokeless tobacco: Never Used Substance and Sexual Activity  Alcohol use: No  
 Drug use: No  
 Sexual activity: Yes  
  Partners: Male Other Topics Concern  Not on file Social History Narrative  Not on file ALLERGIES: Naldecon and Sulfa (sulfonamide antibiotics) Review of Systems Unable to perform ROS: Mental status change Constitutional: Negative for fever. HENT: Negative for nosebleeds. Respiratory: Positive for shortness of breath. Gastrointestinal: Positive for blood in stool. Negative for vomiting. Genitourinary: Negative for dysuria and hematuria. Neurological: Positive for light-headedness. Negative for syncope. Hematological: Does not bruise/bleed easily. Vitals:  
 19 1835 19 1919 19 2120 19 2121 BP:  110/47 157/67 156/60 Pulse:  (!) 111 92 Resp:  20 20 Temp:  98 °F (36.7 °C) 98.4 °F (36.9 °C) SpO2: 97% 97% 100% Weight:  96.6 kg (213 lb) Height:  5' 2\" (1.575 m) Physical Exam  
Constitutional: She is oriented to person, place, and time. She appears well-developed. No distress. HENT:  
Head: Normocephalic and atraumatic. Mouth/Throat: Oropharynx is clear and moist.  
Eyes: EOM are normal. No scleral icterus. Pale conjunctiva Neck: No tracheal deviation present. Cardiovascular: Normal rate, regular rhythm, normal heart sounds and intact distal pulses. Pulmonary/Chest: Effort normal and breath sounds normal. No respiratory distress. Abdominal: Soft. She exhibits no distension. There is no tenderness. Abd non-tender. Musculoskeletal: Normal range of motion. She exhibits no edema. Neurological: She is alert and oriented to person, place, and time. No cranial nerve deficit. Skin: Skin is warm and dry. Psychiatric: She has a normal mood and affect. Nursing note and vitals reviewed. Note written by Radha Cruz, as dictated by Magdalene Sidhu MD 9:44 PM  
 
MDM Number of Diagnoses or Management Options Anemia, unspecified type: established and worsening Gastrointestinal hemorrhage, unspecified gastrointestinal hemorrhage type: new and requires workup Diagnosis management comments: [de-identified] yoF hx demetia, presenting for low hemoglobin at outpatient provider check, downtrending on repeat here in ED. zGiven hx melena, likely GI bleed as source. Remote hx PUD. No abdominal tenderness or concern for perforation. Consented for blood transfusion. Admit for transfusion and GI evaluation. Procedures CONSULT NOTE: 
10:15 PM Magdalene Sidhu MD communicated with Dr. Brielle Painter, Consult for Hospitalist via [facility] Text. Discussed available diagnostic tests and clinical findings.   Dr. Brielle Painter will admit the pt.  
 
10:15 PM 
 Patient is being admitted to the hospital.  The results of their tests and reasons for their admission have been discussed with them and/or available family. They convey agreement and understanding for the need to be admitted and for their admission diagnosis. Consultation will be made now with the inpatient physician for hospitalization. I agree that the above documentation as written by ED Scribe is accurate and complete.  Lea Zafar MD

## 2019-01-04 NOTE — PROGRESS NOTES
Hospitalist Progress Note Kristi Hobbs NP Answering service: [phone] OR [phone] from in house phone Cell: [phone] Date of Service:  2019 NAME:  Frederic Brenner :  1940 MRN:  [id number] Admission Summary:  
[de-identified] female with a known history of hypertension, diabetes mellitus type 2, depression, and status post aortic valve replacement x2. The patient also in 2018 had undergone her thrombectomy done of the left  internal carotid artery and had a carotid artery stent. As per the daughter, the patient recently over the [place] and the [address] [place] [address] had been okay. Had a demise of 3 or 4 very close relatives and the patient had been very depressed. A couple of days back, the patient and family had noted that she was getting increasing short of breath, especially on exertion and had nonspecific atypical chest pains which were mostly localized on the right side. The patient went to her primary care doctor yesterday and blood work was drawn. The primary care doctor had advised the patient to go to the ER since the hemoglobin was found to be 7.4 on yesterday's blood work. The patient does admit of having dark black stools x1 episode yesterday. Interval history / Subjective:  
  Patient resting in bed. No acute complaints to include chest pain, shortness of breath, blood in the urine or stool. Hgb 7.3 s/p 1 UPRBCs. Seen with daughter at bedside discussed if hgb stable and no acute bleeding could possibly discharge tomorrow with outpatient scope. Assessment & Plan: Anemia -hgb 6.4 on admission, transfused 1 UPRBC now 7.3 
-monitor H&H  
-hold ASA and Plavix  
-GI consulted. Recommend EGD/colonoscopy - can be completed as outpatient once stabilized and clearance to hold Plavix is obtained from Cardiologist  
-PPI BID S/p AVR -on ASA and Plavix 
-cardiology consulted Recent Left Carotid Thrombectomy  
-with Dr Lisa Karimi with vascular surgery at [facility] 
-Plavix and ASA on hold today d/t the above Type 2 DM  
-on glipizide and metformin at home, on hold right now  
-SSI, accuchecks Elevated Troponin 
-cardiology following  
-troponin flat at 0.05 
-no need for further cardiac intervention at this time Code status: Full DVT prophylaxis: SCDs Care Plan discussed with: Patient/Family and Nurse attending MD, GI NP Disposition: Home w/Family and TBD Hospital Problems  Date Reviewed: 1/4/2019 Codes Class Noted POA  
 GI bleed ICD-10-CM: K92.2 ICD-9-CM: 578.9  1/4/2019 Unknown Review of Systems: A comprehensive review of systems was negative except for that written in the HPI. Vital Signs:  
 Last 24hrs VS reviewed since prior progress note. Most recent are: 
Visit Vitals /67 (BP 1 Location: Left arm, BP Patient Position: Head of bed elevated (Comment degrees)) Pulse 72 Temp 98.4 °F (36.9 °C) Resp 18 Ht 5' 2\" (1.575 m) Wt 96.6 kg (213 lb) SpO2 99% Breastfeeding? No  
BMI 38.96 kg/m² Intake/Output Summary (Last 24 hours) at 1/4/2019 1646 Last data filed at 1/4/2019 1345 Gross per 24 hour Intake 430 ml Output  Net 430 ml Physical Examination:  
 
 
     
Constitutional:  No acute distress, cooperative, pleasant   
ENT:  Oral mucous moist, oropharynx benign. Neck supple, Resp:  CTA bilaterally. No wheezing/rhonchi/rales. No accessory muscle use CV:  Regular rhythm, normal rate, no murmurs, gallops, rubs GI:  Soft, non distended, non tender. normoactive bowel sounds, Musculoskeletal:  No edema, warm, 2+ pulses throughout Neurologic:  Moves all extremities. AAOx3, CN II-XII reviewed Psych:  Good insight, Not anxious nor agitated. Data Review:  
 Review and/or order of clinical lab test 
Review and/or order of tests in the radiology section of CPT Review and/or order of tests in the medicine section of [facility] Labs:  
 
Recent Labs 01/04/19 
1205 01/04/19 
0707 01/03/19 1950 WBC  --  11.9* 13.4* HGB 7.3* 7.3* 6.4* HCT 24.6* 24.5* 21.4* PLT  --  206 232 Recent Labs 01/04/19 
0707 01/03/19 1950 01/02/19 
1142  138 140  
K 4.3 4.6 5.6*  
* 106 104 CO2 24 25 20 BUN 19 24* 36* CREA 0.76 0.78 0.79 * 150* 203* CA 8.5 8.4* 9.0 MG  --  2.4  --   
 
Recent Labs 01/04/19 
0707 01/03/19 1950 01/02/19 
1142 SGOT 23 20 21 ALT 24 26 20 AP 45 49 45 TBILI 0.6 0.2 <0.2 TP 6.7 7.0 6.6 ALB 3.6 3.8 4.2 GLOB 3.1 3.2  --   
 
Recent Labs 01/03/19 1950 INR 1.0 PTP 9.8 APTT 21.5* No results for input(s): FE, TIBC, PSAT, FERR in the last 72 hours. Lab Results Component Value Date/Time Folate 58.2 (H) 09/10/2010 08:57 AM  
  
No results for input(s): PH, PCO2, PO2 in the last 72 hours. Recent Labs 01/04/19 
1205 01/04/19 
0707 01/03/19 1950 TROIQ 0.05* <0.05 0.06* Lab Results Component Value Date/Time Cholesterol, total 143 08/16/2018 10:47 AM  
 HDL Cholesterol 68 08/16/2018 10:47 AM  
 LDL, calculated 57 08/16/2018 10:47 AM  
 Triglyceride 89 08/16/2018 10:47 AM  
 CHOL/HDL Ratio 2.0 09/09/2010 10:07 AM  
 
Lab Results Component Value Date/Time Glucose (POC) 152 (H) 01/04/2019 04:23 PM  
 Glucose (POC) 131 (H) 01/04/2019 12:32 PM  
 Glucose (POC) 151 (H) 01/04/2019 07:29 AM  
 Glucose (POC) 82 09/12/2017 05:06 PM  
 Glucose (POC) 97 08/26/2015 12:28 PM  
 Glucose (POC) 129 (H) 01/14/2010 07:03 AM  
 Glucose  (A) 01/02/2019 11:58 AM  
 Glucose  06/21/2010 02:09 PM  
 
Lab Results Component Value Date/Time  Color YELLOW/STRAW 10/10/2018 11:33 AM  
 Appearance CLOUDY (A) 10/10/2018 11:33 AM  
 Specific gravity 1.007 10/10/2018 11:33 AM  
 pH (UA) 6.5 10/10/2018 11:33 AM  
 Protein NEGATIVE  10/10/2018 11:33 AM  
 Glucose NEGATIVE  10/10/2018 11:33 AM  
 Ketone NEGATIVE  10/10/2018 11:33 AM  
 Bilirubin NEGATIVE  10/10/2018 11:33 AM  
 Urobilinogen 0.2 10/10/2018 11:33 AM  
 Nitrites NEGATIVE  10/10/2018 11:33 AM  
 Leukocyte Esterase SMALL (A) 10/10/2018 11:33 AM  
 Epithelial cells FEW 10/10/2018 11:33 AM  
 Bacteria NEGATIVE  10/10/2018 11:33 AM  
 WBC 0-4 10/10/2018 11:33 AM  
 RBC 0-5 10/10/2018 11:33 AM  
 
 
 
Medications Reviewed:  
 
Current Facility-Administered Medications Medication Dose Route Frequency  sodium chloride (NS) flush 5-10 mL  5-10 mL IntraVENous Q8H  
 sodium chloride (NS) flush 5-10 mL  5-10 mL IntraVENous PRN  
 0.9% sodium chloride infusion  50 mL/hr IntraVENous CONTINUOUS  
 pantoprazole (PROTONIX) injection 40 mg  40 mg IntraVENous Q12H  
 metoprolol succinate (TOPROL-XL) XL tablet 25 mg  25 mg Oral DAILY  oxybutynin chloride XL (DITROPAN XL) tablet 5 mg  5 mg Oral DAILY  PARoxetine (PAXIL) tablet 30 mg  30 mg Oral DAILY  0.9% sodium chloride infusion 250 mL  250 mL IntraVENous PRN  
 insulin lispro (HUMALOG) injection   SubCUTAneous AC&HS  
 glucose chewable tablet 16 g  4 Tab Oral PRN  
 dextrose (D50W) injection syrg 12.5-25 g  12.5-25 g IntraVENous PRN  
 glucagon (GLUCAGEN) injection 1 mg  1 mg IntraMUSCular PRN  
 0.9% sodium chloride infusion 250 mL  250 mL IntraVENous PRN  
 
______________________________________________________________________ EXPECTED LENGTH OF STAY: 2d 16h ACTUAL LENGTH OF STAY:          0 
 
            
Kristi Hobbs NP

## 2019-01-04 NOTE — PROGRESS NOTES
Reason for Admission:  SOB last 3 days and black stools with HGB of 7.4 RRAT Score: 15 Do you (patient/family) have any concerns for transition/discharge? None expressed by daughter at bedside Plan for utilizing home health:   TBD Likelihood of readmission? Mod based on RRAT Transition of Care Plan:  Patient is an [de-identified] yr old female who has been admitted with chif complaint of SOB, black stools and HGB of 7.4. Patient was alseep during meeting but daughter at bedside verified all demographics. Daughter stated she was concerned because patient's stool was black and she was worried about GI bleed. Daughter lives with patient and prior level of function is that patient is independent with her ADL's and mobility and does not use any DME. Daughter does not let patient cook and does a lot of the IADL's. Plan is for patient to return home at discharge and daughter had no concerns. No CM consults noted. Care Management Interventions PCP Verified by CM: Yes(Dr. Hernán Gong is leaving practice and patient will be assigned another doctor in the practice) MyChart Signup: No 
Discharge Durable Medical Equipment: No 
Physical Therapy Consult: No 
Occupational Therapy Consult: No 
Speech Therapy Consult: No 
Current Support Network: Own Home(Daughter lives with patient) Confirm Follow Up Transport: Family Plan discussed with Pt/Family/Caregiver: Yes Freedom of Choice Offered:  Yes

## 2019-01-04 NOTE — H&P
[address] HISTORY AND PHYSICAL Francie Johnson. 
MR#: [id number] : 1940 ACCOUNT #: [de-identified] ADMIT DATE: 2019 CHIEF COMPLAINT:  Increasing shortness of breath for the last 3 days. HISTORY OF PRESENT ILLNESS:  Patient is an 13-year-old female with a known history of hypertension, diabetes mellitus type 2, depression, and status post aortic valve replacement x2. The patient also in 2018 had undergone her thrombectomy done of the left  internal carotid artery and had a carotid artery stent. As per the daughter, the patient recently over the [place] and the [address] [place] [address] had been okay. Had a demise of 3 or 4 very close relatives and the patient had been very depressed. A couple of days back, the patient and family had noted that she was getting increasing short of breath, especially on exertion and had nonspecific atypical chest pains which were mostly localized on the right side. The patient went to her primary care doctor yesterday and blood work was drawn. The primary care doctor had advised the patient to go to the ER since the hemoglobin was found to be 7.4 on yesterday's blood work. The patient does admit of having dark black stools x1 episode yesterday. Does not complain of any epigastric pain. Does not complain of any hematemesis. Denies complaints of any nausea, vomiting or diarrhea. PAST MEDICAL HISTORY:   
1. History of depression with anxiety. 2.  Diabetes mellitus type 2. 
3.  GERD. 4.  Hypercholesterolemia. 5.  Hypertension. 6.  Status post aortic valve replacement. PAST SURGICAL HISTORY:  Status post cholecystectomy, status post aortic valve replacements x2. SOCIAL HISTORY:  Former smoker, quit in [address] [place] [address]. FAMILY HISTORY:  Father had heart failure. Brother has diabetes and coronary artery disease. Mother has coronary artery disease. Sister also had CHF and stroke. ALLERGIES:  SULFA. MEDICATIONS:  The patient's medication list: 1. Ativan 0.5 mg tablets t.i.d. p.r.n. for anxiety. 2.  Plavix 75 mg tablets p.o. daily. 3.  Prinivil 5 mg tablets once a day. 4.  Antivert 25 mg tablets 3 times a day as needed for dizziness. 5.  Mobic 15 mg tablet once a day, p.r.n.   
6.  Mevacor 10 mg tablets at bedtime. 7.  Metformin 500 mg tablets 2 tablets twice a day. 8.  Paxil 30 mg tablets p.o. daily. 9.  Aspirin 325 mg p.o. daily. 10.  Metoprolol XL 25 mg tablets 1 tablet once a day. 11. Myrbetriq 25 mg tablets once a day. REVIEW OF SYSTEMS: 
CONSTITUTIONAL:  Negative fevers, negative chills. Negative night sweats. EYES:  Negative vision problem, negative eye pain. HEENT:  Negative sore throat. Negative postnasal drip. Negative earache. RESPIRATORY:  Negative cough. Negative wheezing. Negative pleuritic chest pains. CARDIOVASCULAR:  Positive right-sided chest pains, nonspecific sharp in nature. Negative palpitations. GASTROINTESTINAL:  Negative abdominal pain, positive hematochezia, negative hematemesis. CENTRAL NERVOUS SYSTEM:  Positive depression. Rest of the review of systems was negative. PHYSICAL EXAMINATION: 
GENERAL:  The patient was alert, awake, oriented x3. HEAD, EYES, EARS, NOSE AND THROAT:  Pupils are equal, reactive to light. Positive pallor was noted. Oral mucosa was moist. 
NECK:  Supple. LUNGS:  Clear. HEART:  S1, S2 audible. ABDOMEN:  Soft, nontender, no guarding noted. No rebound. EXTREMITIES:  No edema. LABORATORY DATA:  WBC count 13.4, hemoglobin of 6.4, hematocrit of 21.4, platelets of 749. APT was 21.5, INR was normal.  Magnesium 2.4. Troponin I was 0.06, CK was 130.   Sodium of 138, potassium of 4.6, chloride of 106, BUN of 24, creatinine of 0.78. 
 
ASSESSMENT:  In summary, the patient is an 78-year-old female with a known history of diabetes, hypertension,  status post AVR presents with a hemoglobin of anemia of 6.4 secondary to possible gastrointestinal bleed (secondary to combination of dual antiplatelet agents plus p.r.n., Motrin). PLAN:  
1. Anemia. We will transfuse 1 unit of packed RBCs. Repeat hemoglobin and hematocrit post-transfusion. We will get GI consult. Check the stool for guaiac. 2.  Diabetes mellitus type 2. Continue with the Accu-Chek cover with a sliding scale. We will hold off metformin for now. 3. He is status post AVR, currently on dual antiplatelet agents. We will get a cardiology consult for evaluation. 4.  Elevated troponin levels. We will rule out the patient for myocardial infarction and get cardiology input. EKG shows T-wave inversion in anterolateral leads. 5.  Hypertension. We will continue to monitor. 6.  CODE STATUS:  FULL CODE. 7.  DVT prophylaxis:  SCDs were given. MD REBEKAH Connors/JACE 
D: 01/04/2019 00:36    
T: 01/04/2019 01:21 
JOB #: [id number]

## 2019-01-04 NOTE — PROGRESS NOTES
Hospitalist Progress Note Frieda Goncalves MD 
Office: [phone] Date of Service:  2019 NAME:  Joesph Saleh :  1940 MRN:  [id number] Pt seen and examined discussed care plan Please see the more detailed note from the NP. I agree with NP's assessment and plan. Details in NP note Hospital Problems  Date Reviewed: 2019 Codes Class Noted POA  
 GI bleed ICD-10-CM: K92.2 ICD-9-CM: 578.9  2019 Unknown Review of Systems: A comprehensive review of systems was negative. Vital Signs:  
 Last 24hrs VS reviewed since prior progress note. Most recent are: 
Visit Vitals /67 (BP 1 Location: Left arm, BP Patient Position: Head of bed elevated (Comment degrees)) Pulse 72 Temp 98.4 °F (36.9 °C) Resp 18 Ht 5' 2\" (1.575 m) Wt 96.6 kg (213 lb) SpO2 99% Breastfeeding? No  
BMI 38.96 kg/m² Physical Examination:  
 
 
     
   
   
Resp:  CTA bilaterally. No wheezing/rhonchi/rales. CV:  Regular rhythm, normal rate, no murmurs, gallops, rubs GI:  Soft, non distended, non tender. normoactive bowel sounds Musculoskeletal:  No edema, Neurologic:  Moves all extremities. AAOx3, CN II-XII reviewed PLAN:  
 
1. GIB on asa / plavix for Carotid artery disease with PTA of LICA in-stent restenosis 18 now on hold 2. Need BT follow up follow up hgb 3. Out pt EGD/ colonoscopy if off plavix 
______________________________________________________________________ EXPECTED LENGTH OF STAY: 2d 16h ACTUAL LENGTH OF STAY:          0 Frieda Goncalves MD

## 2019-01-04 NOTE — CONSULTS
Cardiology Note dictated #[id number]  Impressions:  Severe GI blood loss anemia   AS: s/p open AVR bioprosthesis in 2004 followed by TAVR ANDREW 2/2015, EF normalized post TAVR  Carotid artery disease with PTA of LICA in-stent restenosis 11/19/18:m on asa and clopidogrel post  CAD: minimal on 2010 cath; elevated troponin on admission: 0.06 with follow up of 0.05, this is likely demand ischemia from her severe anemia  Dementia   Recommendations:  Agree with current plans  No need for cath at this time  Thank you for this referral

## 2019-01-04 NOTE — PROGRESS NOTES
Spiritual Care Assessment/Progress Note [address] 
 
 
NAME: Leslie Ahuja      MRN: [id number] AGE: [de-identified] y.o. SEX: female Confucianist Affiliation: [facility]  
Language: [place] 1/3/2019     Total Time (in minutes): 25 Spiritual Assessment begun in [place] [address] [facility] [address] DEP through conversation with: 
  
    [x]Patient        [x] Family    [] Friend(s) Reason for Consult: Request by staff Spiritual beliefs: (Please include comment if needed) [x] Identifies with a zakia tradition:     
   [x] Supported by a zakia community:        
   [] Claims no spiritual orientation:       
   [] Seeking spiritual identity:            
   [] Adheres to an individual form of spirituality:       
   [] Not able to assess:                   
 
    
Identified resources for coping:  
   [x] Prayer                           
   [] Music                  [] Guided Imagery [x] Family/friends                 [] Pet visits [] Devotional reading                         [] Unknown 
   [] Other:                                      
 
 
Interventions offered during this visit: (See comments for more details) Patient Interventions: Affirmation of emotions/emotional suffering, Affirmation of zakia, Confucianist beliefs/image of God discussed, Prayer (actual), Prayer (assurance of), Normalization of emotional/spiritual concerns Family/Friend(s): Affirmation of emotions/emotional suffering, Affirmation of zakia, Initial Assessment, Prayer (actual), Prayer (assurance of), Confucianist beliefs/image of God discussed Plan of Care: 
 
 [x] Support spiritual and/or cultural needs  
 [] Support AMD and/or advance care planning process    
 [] Support grieving process 
 [] Coordinate Rites and/or Rituals  
 [] Coordination with community clergy [] No spiritual needs identified at this time 
 [] Detailed Plan of Care below (See Comments)  [] Make referral to Music Therapy 
[] Make referral to Pet Therapy [] Make referral to Addiction services 
[] Make referral to [facility] 
[] Make referral to Spiritual Care Partner 
[] No future visits requested       
[x] Follow up visits as needed Comments: Staff requested visit from [address] [place] [address] Blvd. Daughter present at time of visit. Family has concerns after receiving information from Dr. Pop Bowen was alert talkative. Pt shared stories about her children and her grandchildren. Pt relies on her zakia in God to cope with difficult situations. Pt stated that God is always with her and she trust  God to keep her. Pt talked about her hometown and [place] town.  asked if he could pray and Pt began to pray for the . This  was grateful to have a Pt pray for him.  then prayed for Pt and daughter.  advised Pt of  availability.  offered continued prayer and spiritual support. Carina Kasper,  Intern, MDiv 
[phone])

## 2019-01-05 LAB
BASOPHILS # BLD: 0.1 K/UL (ref 0–0.1)
BASOPHILS NFR BLD: 1 % (ref 0–1)
DIFFERENTIAL METHOD BLD: ABNORMAL
EOSINOPHIL # BLD: 0.2 K/UL (ref 0–0.4)
EOSINOPHIL NFR BLD: 2 % (ref 0–7)
ERYTHROCYTE [DISTWIDTH] IN BLOOD BY AUTOMATED COUNT: 16.5 % (ref 11.5–14.5)
GLUCOSE BLD STRIP.AUTO-MCNC: 137 MG/DL (ref 65–100)
GLUCOSE BLD STRIP.AUTO-MCNC: 144 MG/DL (ref 65–100)
GLUCOSE BLD STRIP.AUTO-MCNC: 156 MG/DL (ref 65–100)
GLUCOSE BLD STRIP.AUTO-MCNC: 168 MG/DL (ref 65–100)
HCT VFR BLD AUTO: 26.2 % (ref 35–47)
HCT VFR BLD AUTO: 26.8 % (ref 35–47)
HCT VFR BLD AUTO: 27 % (ref 35–47)
HGB BLD-MCNC: 8.1 G/DL (ref 11.5–16)
HGB BLD-MCNC: 8.2 G/DL (ref 11.5–16)
HGB BLD-MCNC: 8.2 G/DL (ref 11.5–16)
IMM GRANULOCYTES # BLD: 0 K/UL (ref 0–0.04)
IMM GRANULOCYTES NFR BLD AUTO: 0 % (ref 0–0.5)
LYMPHOCYTES # BLD: 2.3 K/UL (ref 0.8–3.5)
LYMPHOCYTES NFR BLD: 23 % (ref 12–49)
MCH RBC QN AUTO: 28.5 PG (ref 26–34)
MCHC RBC AUTO-ENTMCNC: 30.6 G/DL (ref 30–36.5)
MCV RBC AUTO: 93.1 FL (ref 80–99)
MONOCYTES # BLD: 1 K/UL (ref 0–1)
MONOCYTES NFR BLD: 10 % (ref 5–13)
NEUTS SEG # BLD: 6.5 K/UL (ref 1.8–8)
NEUTS SEG NFR BLD: 64 % (ref 32–75)
NRBC # BLD: 0 K/UL (ref 0–0.01)
NRBC BLD-RTO: 0 PER 100 WBC
PLATELET # BLD AUTO: 204 K/UL (ref 150–400)
PMV BLD AUTO: 10.5 FL (ref 8.9–12.9)
RBC # BLD AUTO: 2.88 M/UL (ref 3.8–5.2)
RBC MORPH BLD: ABNORMAL
SERVICE CMNT-IMP: ABNORMAL
TROPONIN I SERPL-MCNC: 0.1 NG/ML
TROPONIN I SERPL-MCNC: 0.13 NG/ML
TROPONIN I SERPL-MCNC: 0.13 NG/ML
WBC # BLD AUTO: 10.1 K/UL (ref 3.6–11)

## 2019-01-05 PROCEDURE — C9113 INJ PANTOPRAZOLE SODIUM, VIA: HCPCS | Performed by: INTERNAL MEDICINE

## 2019-01-05 PROCEDURE — 74011636637 HC RX REV CODE- 636/637: Performed by: INTERNAL MEDICINE

## 2019-01-05 PROCEDURE — 65660000000 HC RM CCU STEPDOWN

## 2019-01-05 PROCEDURE — 36415 COLL VENOUS BLD VENIPUNCTURE: CPT

## 2019-01-05 PROCEDURE — 36430 TRANSFUSION BLD/BLD COMPNT: CPT

## 2019-01-05 PROCEDURE — 74011250636 HC RX REV CODE- 250/636: Performed by: INTERNAL MEDICINE

## 2019-01-05 PROCEDURE — 85018 HEMOGLOBIN: CPT

## 2019-01-05 PROCEDURE — 85025 COMPLETE CBC W/AUTO DIFF WBC: CPT

## 2019-01-05 PROCEDURE — 74011250637 HC RX REV CODE- 250/637: Performed by: INTERNAL MEDICINE

## 2019-01-05 PROCEDURE — P9016 RBC LEUKOCYTES REDUCED: HCPCS

## 2019-01-05 PROCEDURE — 82962 GLUCOSE BLOOD TEST: CPT

## 2019-01-05 RX ORDER — SODIUM CHLORIDE 9 MG/ML
250 INJECTION, SOLUTION INTRAVENOUS AS NEEDED
Status: DISCONTINUED | OUTPATIENT
Start: 2019-01-05 | End: 2019-01-08 | Stop reason: HOSPADM

## 2019-01-05 RX ADMIN — OXYBUTYNIN CHLORIDE 5 MG: 5 TABLET, EXTENDED RELEASE ORAL at 11:42

## 2019-01-05 RX ADMIN — INSULIN LISPRO 2 UNITS: 100 INJECTION, SOLUTION INTRAVENOUS; SUBCUTANEOUS at 07:15

## 2019-01-05 RX ADMIN — INSULIN LISPRO 2 UNITS: 100 INJECTION, SOLUTION INTRAVENOUS; SUBCUTANEOUS at 16:57

## 2019-01-05 RX ADMIN — PAROXETINE HYDROCHLORIDE 30 MG: 20 TABLET, FILM COATED ORAL at 11:42

## 2019-01-05 RX ADMIN — INSULIN LISPRO 2 UNITS: 100 INJECTION, SOLUTION INTRAVENOUS; SUBCUTANEOUS at 16:58

## 2019-01-05 RX ADMIN — PANTOPRAZOLE SODIUM 40 MG: 40 INJECTION, POWDER, FOR SOLUTION INTRAVENOUS at 11:43

## 2019-01-05 RX ADMIN — Medication 10 ML: at 07:15

## 2019-01-05 RX ADMIN — METOPROLOL SUCCINATE 25 MG: 50 TABLET, EXTENDED RELEASE ORAL at 11:42

## 2019-01-05 RX ADMIN — Medication 10 ML: at 22:31

## 2019-01-05 RX ADMIN — PANTOPRAZOLE SODIUM 40 MG: 40 INJECTION, POWDER, FOR SOLUTION INTRAVENOUS at 22:31

## 2019-01-05 RX ADMIN — SODIUM CHLORIDE 50 ML/HR: 900 INJECTION, SOLUTION INTRAVENOUS at 07:14

## 2019-01-05 NOTE — PROGRESS NOTES
GI PROGRESS NOTE 
 
NAME:             Litzy Morales :              1940 MRN:              [id number] Admit Date:     1/3/2019 Todays Date:  2019 Subjective:  
     
  Feels very weak. Hgb lower and just got transfused a unit of pRBC. Medications-reviewed Current Facility-Administered Medications Medication Dose Route Frequency  0.9% sodium chloride infusion 250 mL  250 mL IntraVENous PRN  
 sodium chloride (NS) flush 5-10 mL  5-10 mL IntraVENous Q8H  
 sodium chloride (NS) flush 5-10 mL  5-10 mL IntraVENous PRN  
 0.9% sodium chloride infusion  50 mL/hr IntraVENous CONTINUOUS  
 pantoprazole (PROTONIX) injection 40 mg  40 mg IntraVENous Q12H  
 metoprolol succinate (TOPROL-XL) XL tablet 25 mg  25 mg Oral DAILY  oxybutynin chloride XL (DITROPAN XL) tablet 5 mg  5 mg Oral DAILY  PARoxetine (PAXIL) tablet 30 mg  30 mg Oral DAILY  0.9% sodium chloride infusion 250 mL  250 mL IntraVENous PRN  
 insulin lispro (HUMALOG) injection   SubCUTAneous AC&HS  
 glucose chewable tablet 16 g  4 Tab Oral PRN  
 dextrose (D50W) injection syrg 12.5-25 g  12.5-25 g IntraVENous PRN  
 glucagon (GLUCAGEN) injection 1 mg  1 mg IntraMUSCular PRN  
 0.9% sodium chloride infusion 250 mL  250 mL IntraVENous PRN Objective:  
 
Patient Vitals for the past 8 hrs: 
 BP Temp Pulse Resp SpO2  
19 0701 122/56 98.4 °F (36.9 °C) 73 18 94 % 19 0607 128/77 98.5 °F (36.9 °C) 73 18 97 % 19 0502 132/78 98 °F (36.7 °C) 72 16 94 % 19 0432 119/56 98.6 °F (37 °C) 73 16 95 % 19 0414 131/87 98.6 °F (37 °C) 70 16 95 % 19 0341 123/70 98.8 °F (37.1 °C) 72 18 95 % 19 0046 160/62 98.5 °F (36.9 °C) 80 18 97 % 701 -  190 In: 396.7 Out: -  
1901 - 01/05 0700 In: 21  [P.O.:750; I.V.:80] Out: - EXAM:   
 NEURO-a&o HEENT-wnl LUNGS-clear COR-regular rate and rhythym  ABD-soft , no tenderness, no rebound, good bs 
  
  
 LABS: 
Recent Labs 01/04/19 
2211 01/04/19 
1205 01/04/19 
0707 01/03/19 1950 WBC  --   --  11.9* 13.4* HGB 6.6* 7.3* 7.3* 6.4* HCT 21.8* 24.6* 24.5* 21.4* PLT  --   --  206 232 Recent Labs 01/04/19 
0707 01/03/19 1950 01/02/19 
1142  138 140  
K 4.3 4.6 5.6*  
* 106 104 CO2 24 25 20 BUN 19 24* 36* CREA 0.76 0.78 0.79 * 150* 203* CA 8.5 8.4* 9.0 MG  --  2.4  --   
 
Recent Labs 01/04/19 
0707 01/03/19 1950 01/02/19 
1142 SGOT 23 20 21 AP 45 49 45 TBILI 0.6 0.2 <0.2 TP 6.7 7.0 6.6 ALB 3.6 3.8 4.2 GLOB 3.1 3.2  --   
ALT 24 26 20 Recent Labs 01/03/19 1950 INR 1.0 PTP 9.8 APTT 21.5* No results for input(s): FE, TIBC, PSAT, FERR in the last 72 hours. Lab Results Component Value Date/Time Folate 58.2 (H) 09/10/2010 08:57 AM  
 
 
 
 
                
Assessment: 1. Anemia - Hgb dropped to 6.6 so just transfused a unit of pRBC. Hasreported melena and distant history of ulcer disease. Differentials include recurrent ulcer, erosion, gastritis, esophagitis, AVMs, neoplasia, etc. Cannot completely r/o potential colonic etiology as well. 2. Type 2 DM 3. History of AVR - on anti-coagulation Active Problems: 
  GI bleed (1/4/2019) Plan: 1. Repeat CBC pending 2. Continue pantoprazole 40 mg IV q12 
3. Will likely need at least the EGD before she goes home 4. Discussed with her daughter

## 2019-01-05 NOTE — PROGRESS NOTES
Bedside shift change report given to Kevin Davis RN (oncoming nurse) by Charito Redman RN (offgoing nurse). Report included the following information SBAR, Kardex, Intake/Output and MAR.

## 2019-01-05 NOTE — PROGRESS NOTES
Pt hgb at 2215 was 6.6. Hospitalist contacted. Hospitalist ordered to have family member read over the consent form and to page him when pt was ready to discuss and sign form. Pt's daughter reported she was ready to discuss with the doctor so hospitalist was paged to come to floor. Hospitalist came to the floor to answer questions and sign form after pt's daughter signed form. Nurse started blood transfusion.

## 2019-01-05 NOTE — PROGRESS NOTES
Hospitalist Progress Note NAME: Pauline Wei :  1940 MRN:  [id number] Assessment / Plan: 
 
1)GI bleed: Hb 8.2 from 6.6 after blood transfussion. GI on board likely EGD before d/c 
 
2) Elevated troponin: No CP, Cardiology on board, will serial troponins, no further  eval for now until w/o by GI 3) DM; Continue current management Body mass index is 38.96 kg/m². Code status: full Prophylaxis: yes Recommended Disposition: home Subjective: Chief Complaint / Reason for Physician Visit Discussed with RN events overnight. Patient feeling better after transfussion Review of Systems: 
Symptom Y/N Comments  Symptom Y/N Comments Fever/Chills    Chest Pain Poor Appetite    Edema Cough    Abdominal Pain Sputum    Joint Pain SOB/WHITTEN    Pruritis/Rash Nausea/vomit    Tolerating PT/OT Diarrhea    Tolerating Diet Constipation    Other Could NOT obtain due to:   
 
Objective: VITALS:  
Last 24hrs VS reviewed since prior progress note. Most recent are: 
Patient Vitals for the past 24 hrs: 
 Temp Pulse Resp BP SpO2  
19 1131 98.3 °F (36.8 °C) 76 18 166/67 97 % 19 0824 98.2 °F (36.8 °C) 73 18 136/71 95 % 19 0701 98.4 °F (36.9 °C) 73 18 122/56 94 % 19 0607 98.5 °F (36.9 °C) 73 18 128/77 97 % 19 0502 98 °F (36.7 °C) 72 16 132/78 94 % 19 0432 98.6 °F (37 °C) 73 16 119/56 95 % 19 0414 98.6 °F (37 °C) 70 16 131/87 95 % 19 0341 98.8 °F (37.1 °C) 72 18 123/70 95 % 19 0046 98.5 °F (36.9 °C) 80 18 160/62 97 % 19 1545 98.4 °F (36.9 °C) 73 18 128/68 99 % 19 1237 98.4 °F (36.9 °C) 72 18 126/67 99 % Intake/Output Summary (Last 24 hours) at 2019 1224 Last data filed at 2019 0710 Gross per 24 hour Intake 1146.7 ml Output  Net 1146.7 ml PHYSICAL EXAM: 
General: WD, WN. Alert, cooperative, no acute distress   
EENT:  EOMI. Anicteric sclerae. MMM Resp: CTA bilaterally, no wheezing or rales. No accessory muscle use CV:  Regular  rhythm,  No edema GI:  Soft, Non distended, Non tender.  +Bowel sounds Neurologic:  Alert and oriented X 3, normal speech, Psych:   Good insight. Not anxious nor agitated Skin:  No rashes. No jaundice Reviewed most current lab test results and cultures  YES Reviewed most current radiology test results   YES Review and summation of old records today    NO Reviewed patient's current orders and MAR    YES 
PMH/SH reviewed - no change compared to H&P 
________________________________________________________________________ Care Plan discussed with: 
  Comments Patient x Family  x   
RN Care Manager Consultant Multidiciplinary team rounds were held today with , nursing, pharmacist and clinical coordinator. Patient's plan of care was discussed; medications were reviewed and discharge planning was addressed. ________________________________________________________________________ Total NON critical care TIME:  35   Minutes Total CRITICAL CARE TIME Spent:   Minutes non procedure based Comments >50% of visit spent in counseling and coordination of care    
________________________________________________________________________ Leyla Ronquillo MD  
 
Procedures: see electronic medical records for all procedures/Xrays and details which were not copied into this note but were reviewed prior to creation of Plan. LABS: 
I reviewed today's most current labs and imaging studies. Pertinent labs include: 
Recent Labs 01/05/19 
1030 01/04/19 
2211 01/04/19 
1205 01/04/19 
0707 01/03/19 
1950 WBC  --   --   --  11.9* 13.4* HGB 8.2* 6.6* 7.3* 7.3* 6.4* HCT 27.0* 21.8* 24.6* 24.5* 21.4* PLT  --   --   --  206 232 Recent Labs 01/04/19 0707 01/03/19 1950  138  
K 4.3 4.6 * 106 CO2 24 25 * 150* BUN 19 24* CREA 0.76 0.78  
CA 8.5 8.4* MG  --  2.4 ALB 3.6 3.8 TBILI 0.6 0.2 SGOT 23 20 ALT 24 26 INR  --  1.0 Signed: Zaira Freitas MD

## 2019-01-05 NOTE — PROGRESS NOTES
Visited Ms Mitchel Weber in room 507/02 at patient's request. Ms Mitchel Weber was lying quietly in bed and her daughter was at her bedside when  arrived. Ms Mitchel Weber stated that she would like a Bible. When  returned to the room with a Bible for patient, patient's daughter stated that her mother had said that she was going to die. Encouraged Ms Mitchel Weber to elaborate on her thoughts about that. Ms Mitchel Weber said that she had done a lot of things that she shouldn't but that she had asked for God's forgiveness and the she felt she had been forgiven. She said that she just thought it was her time and she was ready and had no fears about death. Acknowledged her feelings and provided reassurance by quoting relevant Scriptural passages. Offered to have prayer on her behalf but she requested to just be kept in prayer. Reassured patient and daughter of prayers on their behalf and of 24-hour  availability for support. : Rev. Didi Luna. David Ford; [facility], to contact [phone] [address] call: [phone]

## 2019-01-05 NOTE — PROGRESS NOTES
Daughter of pt wondering why pt was NPO. RN saw that the hospitalist said NPO until cleared by GI for a diet. GI had already seen her this morning and had left a note that did not say anything about a diet. RN paged GI. Dr. Lex Stafford was on call and stated that the patient could resume her diabetic diet for now. Orders were put in.

## 2019-01-05 NOTE — PROGRESS NOTES
Cardiology Progress Note 2019     Admit Date: 1/3/2019 Admit Diagnosis: GI bleed  CC: none currently Assessment:  
Active Problems: 
  GI bleed (2019) Plan:  
Stable on current regimen and OK to stay off ASA/plavix until cleared by GI. Volume status:euvolemic Renal function: stable For other plans, see orders. Subjective: Jaky Oliva reports Chest Pain:  [x]   none,  consistent with  []   non-cardiac   []   atypical   []   angina [x]   none now    []      on-going Dyspnea: [x]   none  []   at rest  []   with exertion     []   improved   []   unchanged   []   worsening PND:       [x]   none  []   overnight Orthopnea: [x]   none  []   improved  []   unchanged  []   worsening Presyncope: [x]   none   []   improved    []   unchanged    []   worsening Ambulated in hallway without symptoms  []   Yes Ambulated in room without symptoms  []   Yes Objective:  
 Physical Exam: 
Overall VSSAF;   
Visit Vitals /71 (BP 1 Location: Right arm, BP Patient Position: At rest) Pulse 73 Temp 98.2 °F (36.8 °C) Resp 18 Ht 5' 2\" (1.575 m) Wt 96.6 kg (213 lb) SpO2 95% Breastfeeding? No  
BMI 38.96 kg/m² Temp (24hrs), Av.4 °F (36.9 °C), Min:98 °F (36.7 °C), Max:98.8 °F (37.1 °C) Patient Vitals for the past 8 hrs: 
 Pulse 19 0824 73  
19 0701 73  
19 0607 73  
19 0502 72  
19 0432 73  
19 0414 70  
19 0341 72 Patient Vitals for the past 8 hrs: 
 Resp  
19 0824 18  
19 0701 18  
19 0607 18  
19 0502 16  
19 0432 16  
19 0414 16  
19 0341 18 Patient Vitals for the past 8 hrs: 
 BP  
19 0824 136/71  
19 0701 122/56  
19 0607 128/77  
19 0502 132/78  
19 0432 119/56  
19 0414 131/87  
19 0341 123/70 Intake/Output Summary (Last 24 hours) at 2019 1131 Last data filed at 2019 0710 Gross per 24 hour Intake 1146.7 ml Output  Net 1146.7 ml  
 
 
General Appearance: Well developed, well nourished, no acute distress. Ears/Nose/Mouth/Throat:   Normal MM; anicteric. JVP: WNL Resp:   Lungs clear to auscultation bilaterally. Nl resp effort. Cardiovascular:  RRR, S1, S2 normal, II/VI GERALDO at base. No gallop or rub. Abdomen:   Soft, non-tender, bowel sounds are present. Extremities: No edema bilaterally. Skin: 
Neuro: Warm and dry. A/O x3, grossly nonfocal  
 []      cath site intact w/o hematoma or bruit; distal pulse unchanged. Recent Labs 01/05/19 
0606 01/04/19 
1205 01/04/19 
8534 TROIQ 0.13* 0.05* <0.05 Recent Labs 01/04/19 
2211 01/04/19 
1205 01/04/19 
0707 01/03/19 
1950 01/02/19 
1142 NA  --   --  139 138 140 K  --   --  4.3 4.6 5.6*  
CL  --   --  109* 106 104 CO2  --   --  24 25 20 BUN  --   --  19 24* 36* CREA  --   --  0.76 0.78 0.79 GLU  --   --  154* 150* 203* CA  --   --  8.5 8.4* 9.0 ALB  --   --  3.6 3.8 4.2 WBC  --   --  11.9* 13.4* 16.8* HGB 6.6* 7.3* 7.3* 6.4* 7.3* HCT 21.8* 24.6* 24.5* 21.4* 23.0*  
PLT  --   --  206 232 274 Recent Labs 01/04/19 
0707 01/03/19 
1950 01/02/19 
1142 SGOT 23 20 21 ALT 24 26 20 AP 45 49 45 TBILI 0.6 0.2 <0.2 TP 6.7 7.0 6.6 ALB 3.6 3.8 4.2 GLOB 3.1 3.2  --   
 
Recent Labs 01/03/19 
1950 INR 1.0 PTP 9.8 APTT 21.5* No results for input(s): FE, TIBC, PSAT, FERR in the last 72 hours. Lab Results Component Value Date/Time Glucose (POC) 144 (H) 01/05/2019 11:16 AM  
 Glucose (POC) 156 (H) 01/05/2019 06:01 AM  
 Glucose (POC) 167 (H) 01/04/2019 09:09 PM  
 Glucose (POC) 152 (H) 01/04/2019 04:23 PM  
 Glucose (POC) 131 (H) 01/04/2019 12:32 PM  
 
 
Current Facility-Administered Medications Medication Dose Route Frequency  0.9% sodium chloride infusion 250 mL  250 mL IntraVENous PRN  
 sodium chloride (NS) flush 5-10 mL  5-10 mL IntraVENous Q8H  
  sodium chloride (NS) flush 5-10 mL  5-10 mL IntraVENous PRN  
 0.9% sodium chloride infusion  50 mL/hr IntraVENous CONTINUOUS  
 pantoprazole (PROTONIX) injection 40 mg  40 mg IntraVENous Q12H  
 metoprolol succinate (TOPROL-XL) XL tablet 25 mg  25 mg Oral DAILY  oxybutynin chloride XL (DITROPAN XL) tablet 5 mg  5 mg Oral DAILY  PARoxetine (PAXIL) tablet 30 mg  30 mg Oral DAILY  0.9% sodium chloride infusion 250 mL  250 mL IntraVENous PRN  
 insulin lispro (HUMALOG) injection   SubCUTAneous AC&HS  
 glucose chewable tablet 16 g  4 Tab Oral PRN  
 dextrose (D50W) injection syrg 12.5-25 g  12.5-25 g IntraVENous PRN  
 glucagon (GLUCAGEN) injection 1 mg  1 mg IntraMUSCular PRN  
 0.9% sodium chloride infusion 250 mL  250 mL IntraVENous PRN James Rouse MD

## 2019-01-05 NOTE — PROGRESS NOTES
1915 Patient found to be unhooked from her IV fluids. Patient stating, \"I don;t need to be on those anymore. \" Refusing IV fluids at this time. Will attempt to infuse later.

## 2019-01-06 LAB
ABO + RH BLD: NORMAL
ANION GAP SERPL CALC-SCNC: 7 MMOL/L (ref 5–15)
BLD PROD TYP BPU: NORMAL
BLD PROD TYP BPU: NORMAL
BLOOD GROUP ANTIBODIES SERPL: NORMAL
BPU ID: NORMAL
BPU ID: NORMAL
BUN SERPL-MCNC: 11 MG/DL (ref 6–20)
BUN/CREAT SERPL: 14 (ref 12–20)
CALCIUM SERPL-MCNC: 8.7 MG/DL (ref 8.5–10.1)
CHLORIDE SERPL-SCNC: 108 MMOL/L (ref 97–108)
CO2 SERPL-SCNC: 26 MMOL/L (ref 21–32)
CREAT SERPL-MCNC: 0.81 MG/DL (ref 0.55–1.02)
CROSSMATCH RESULT,%XM: NORMAL
CROSSMATCH RESULT,%XM: NORMAL
ERYTHROCYTE [DISTWIDTH] IN BLOOD BY AUTOMATED COUNT: 16.1 % (ref 11.5–14.5)
GLUCOSE BLD STRIP.AUTO-MCNC: 124 MG/DL (ref 65–100)
GLUCOSE BLD STRIP.AUTO-MCNC: 138 MG/DL (ref 65–100)
GLUCOSE BLD STRIP.AUTO-MCNC: 143 MG/DL (ref 65–100)
GLUCOSE BLD STRIP.AUTO-MCNC: 183 MG/DL (ref 65–100)
GLUCOSE SERPL-MCNC: 129 MG/DL (ref 65–100)
HCT VFR BLD AUTO: 26.9 % (ref 35–47)
HGB BLD-MCNC: 8.3 G/DL (ref 11.5–16)
MCH RBC QN AUTO: 28.2 PG (ref 26–34)
MCHC RBC AUTO-ENTMCNC: 30.9 G/DL (ref 30–36.5)
MCV RBC AUTO: 91.5 FL (ref 80–99)
NRBC # BLD: 0 K/UL (ref 0–0.01)
NRBC BLD-RTO: 0 PER 100 WBC
PLATELET # BLD AUTO: 204 K/UL (ref 150–400)
PMV BLD AUTO: 10.2 FL (ref 8.9–12.9)
POTASSIUM SERPL-SCNC: 4 MMOL/L (ref 3.5–5.1)
RBC # BLD AUTO: 2.94 M/UL (ref 3.8–5.2)
SERVICE CMNT-IMP: ABNORMAL
SODIUM SERPL-SCNC: 141 MMOL/L (ref 136–145)
SPECIMEN EXP DATE BLD: NORMAL
STATUS OF UNIT,%ST: NORMAL
STATUS OF UNIT,%ST: NORMAL
TROPONIN I SERPL-MCNC: 0.08 NG/ML
UNIT DIVISION, %UDIV: 0
UNIT DIVISION, %UDIV: 0
WBC # BLD AUTO: 12.1 K/UL (ref 3.6–11)

## 2019-01-06 PROCEDURE — 80048 BASIC METABOLIC PNL TOTAL CA: CPT

## 2019-01-06 PROCEDURE — 74011250637 HC RX REV CODE- 250/637: Performed by: NURSE PRACTITIONER

## 2019-01-06 PROCEDURE — 84484 ASSAY OF TROPONIN QUANT: CPT

## 2019-01-06 PROCEDURE — C9113 INJ PANTOPRAZOLE SODIUM, VIA: HCPCS | Performed by: INTERNAL MEDICINE

## 2019-01-06 PROCEDURE — 85027 COMPLETE CBC AUTOMATED: CPT

## 2019-01-06 PROCEDURE — 74011250637 HC RX REV CODE- 250/637: Performed by: HOSPITALIST

## 2019-01-06 PROCEDURE — 65270000029 HC RM PRIVATE

## 2019-01-06 PROCEDURE — 36415 COLL VENOUS BLD VENIPUNCTURE: CPT

## 2019-01-06 PROCEDURE — 74011636637 HC RX REV CODE- 636/637: Performed by: INTERNAL MEDICINE

## 2019-01-06 PROCEDURE — 74011250637 HC RX REV CODE- 250/637: Performed by: INTERNAL MEDICINE

## 2019-01-06 PROCEDURE — 82962 GLUCOSE BLOOD TEST: CPT

## 2019-01-06 PROCEDURE — 74011250636 HC RX REV CODE- 250/636: Performed by: INTERNAL MEDICINE

## 2019-01-06 RX ORDER — LISINOPRIL 5 MG/1
5 TABLET ORAL DAILY
Status: DISCONTINUED | OUTPATIENT
Start: 2019-01-06 | End: 2019-01-08 | Stop reason: HOSPADM

## 2019-01-06 RX ORDER — DOCUSATE SODIUM 100 MG/1
100 CAPSULE, LIQUID FILLED ORAL DAILY
Status: DISCONTINUED | OUTPATIENT
Start: 2019-01-07 | End: 2019-01-08 | Stop reason: HOSPADM

## 2019-01-06 RX ORDER — ACETAMINOPHEN 325 MG/1
650 TABLET ORAL
Status: DISCONTINUED | OUTPATIENT
Start: 2019-01-06 | End: 2019-01-08 | Stop reason: HOSPADM

## 2019-01-06 RX ADMIN — INSULIN LISPRO 2 UNITS: 100 INJECTION, SOLUTION INTRAVENOUS; SUBCUTANEOUS at 06:37

## 2019-01-06 RX ADMIN — ACETAMINOPHEN 650 MG: 325 TABLET ORAL at 22:58

## 2019-01-06 RX ADMIN — INSULIN LISPRO 2 UNITS: 100 INJECTION, SOLUTION INTRAVENOUS; SUBCUTANEOUS at 12:18

## 2019-01-06 RX ADMIN — PANTOPRAZOLE SODIUM 40 MG: 40 INJECTION, POWDER, FOR SOLUTION INTRAVENOUS at 09:02

## 2019-01-06 RX ADMIN — SODIUM CHLORIDE 50 ML/HR: 900 INJECTION, SOLUTION INTRAVENOUS at 02:48

## 2019-01-06 RX ADMIN — Medication 10 ML: at 20:54

## 2019-01-06 RX ADMIN — LISINOPRIL 5 MG: 5 TABLET ORAL at 12:18

## 2019-01-06 RX ADMIN — Medication 10 ML: at 13:04

## 2019-01-06 RX ADMIN — METOPROLOL SUCCINATE 25 MG: 50 TABLET, EXTENDED RELEASE ORAL at 09:03

## 2019-01-06 RX ADMIN — PAROXETINE HYDROCHLORIDE 30 MG: 20 TABLET, FILM COATED ORAL at 09:03

## 2019-01-06 RX ADMIN — OXYBUTYNIN CHLORIDE 5 MG: 5 TABLET, EXTENDED RELEASE ORAL at 09:03

## 2019-01-06 RX ADMIN — PANTOPRAZOLE SODIUM 40 MG: 40 INJECTION, POWDER, FOR SOLUTION INTRAVENOUS at 20:53

## 2019-01-06 NOTE — PROGRESS NOTES
GI PROGRESS NOTE 
 
NAME:             Pauline Wei :              1940 MRN:              [id number] Admit Date:     1/3/2019 Todays Date:  2019 Subjective:  
     
  Feels weak but better after transfusion. Medications-reviewed Current Facility-Administered Medications Medication Dose Route Frequency  lisinopril (PRINIVIL, ZESTRIL) tablet 5 mg  5 mg Oral DAILY  [START ON 2019] docusate sodium (COLACE) capsule 100 mg  100 mg Oral DAILY  0.9% sodium chloride infusion 250 mL  250 mL IntraVENous PRN  
 sodium chloride (NS) flush 5-10 mL  5-10 mL IntraVENous Q8H  
 sodium chloride (NS) flush 5-10 mL  5-10 mL IntraVENous PRN  pantoprazole (PROTONIX) injection 40 mg  40 mg IntraVENous Q12H  
 metoprolol succinate (TOPROL-XL) XL tablet 25 mg  25 mg Oral DAILY  oxybutynin chloride XL (DITROPAN XL) tablet 5 mg  5 mg Oral DAILY  PARoxetine (PAXIL) tablet 30 mg  30 mg Oral DAILY  0.9% sodium chloride infusion 250 mL  250 mL IntraVENous PRN  
 insulin lispro (HUMALOG) injection   SubCUTAneous AC&HS  
 glucose chewable tablet 16 g  4 Tab Oral PRN  
 dextrose (D50W) injection syrg 12.5-25 g  12.5-25 g IntraVENous PRN  
 glucagon (GLUCAGEN) injection 1 mg  1 mg IntraMUSCular PRN  
 0.9% sodium chloride infusion 250 mL  250 mL IntraVENous PRN Objective:  
 
Patient Vitals for the past 8 hrs: 
 BP Temp Pulse Resp SpO2  
19 1146 146/66 98.8 °F (37.1 °C) 66 16 96 % 19 0903 178/63 98 °F (36.7 °C) 70 18 100 % No intake/output data recorded.  1901 -  0700 In: 396.7 Out: - EXAM:   
 NEURO-a&o HEENT-wnl LUNGS-clear COR-regular rate and rhythym ABD-soft , no tenderness, no rebound, good bs LABS: 
Recent Labs 19 
0255 19 
1830 19 
1345 WBC 12.1*  --  10.1 HGB 8.3* 8.1* 8.2* HCT 26.9* 26.2* 26.8*  
  --  204 Recent Labs 19 
0255 19 
0707 19 
1950  139 138  
K 4.0 4.3 4.6  109* 106 CO2 26 24 25 BUN 11 19 24* CREA 0.81 0.76 0.78 * 154* 150* CA 8.7 8.5 8.4* MG  --   --  2.4 Recent Labs 01/04/19 
0707 01/03/19 1950 SGOT 23 20 AP 45 49 TBILI 0.6 0.2 TP 6.7 7.0 ALB 3.6 3.8 GLOB 3.1 3.2 ALT 24 26 Recent Labs 01/03/19 1950 INR 1.0 PTP 9.8 APTT 21.5* No results for input(s): FE, TIBC, PSAT, FERR in the last 72 hours. Lab Results Component Value Date/Time Folate 58.2 (H) 09/10/2010 08:57 AM  
 
 
 
 
                
Assessment: 1. Anemia - Hgb 8.3 post transfusion  Has reported melena and distant history of ulcer disease. Differentials include recurrent ulcer, erosion, gastritis, esophagitis, AVMs, neoplasia, etc. Cannot completely r/o potential colonic etiology as well. 2. Type 2 DM 3. History of AVR - on anti-coagulation Principal Problem: 
  GI bleed (1/4/2019) Plan: 1. EGD Monday by Dr. Ham Friends 2. Continue pantoprazole 40 mg IV q12 
3. Discussed with her daughter

## 2019-01-06 NOTE — PROGRESS NOTES
Bedside shift change report given to Gomez Vasquez RN (oncoming nurse) by Isreal Lee RN (offgoing nurse). Report included the following information SBAR, Kardex, Intake/Output and MAR.

## 2019-01-06 NOTE — PROGRESS NOTES
Hospitalist Progress Note Delgado Delgado NP Answering service: [phone] OR [phone] from in house phone Cell: [phone] Date of Service:  2019 NAME:  Martha Raygoza :  1940 MRN:  [id number] Admission Summary:  
24-year-old female with a known history of hypertension, diabetes mellitus type 2, depression, and status post aortic valve replacement x2. The patient also in 2018 had undergone her thrombectomy done of the left  internal carotid artery and had a carotid artery stent. As per the daughter, the patient recently over the [place] and the [address] [place] [address] had been okay. Had a demise of 3 or 4 very close relatives and the patient had been very depressed. A couple of days back, the patient and family had noted that she was getting increasing short of breath, especially on exertion and had nonspecific atypical chest pains which were mostly localized on the right side. The patient went to her primary care doctor yesterday and blood work was drawn. The primary care doctor had advised the patient to go to the ER since the hemoglobin was found to be 7.4 on yesterday's blood work. The patient does admit of having dark black stools x1 episode yesterday. Interval history / Subjective:  
  Patient resting in bed. No BM since admission. Hgb stable. No chest pain or shortness of breath. Assessment & Plan: Anemia  
-s/p 2 U PRBCs 
-monitor H&H  
-hold ASA and Plavix  
-GI consulted. Will need an EGD here. -PPI BID S/p AVR -on ASA and Plavix 
-cardiology consulted Recent Left Carotid Thrombectomy  
-with Dr Andre Youssef with vascular surgery [facility] 
-Plavix and ASA on hold d/t the above Type 2 DM  
-on glipizide and metformin at home, on hold right now  
-SSI, accuchecks  
-blood sugars ranging from 137-183 Elevated Troponin 
-cardiology following  
-no need for further cardiac intervention at this time Leukocytosis 
-likely reactive from GI bleed/blood transfusion  
-no s/s of infection  
-continue to monitor Code status: Full DVT prophylaxis: SCDs Care Plan discussed with: Patient/Family and Nurse Disposition: Home w/Family and TBD Hospital Problems  Date Reviewed: 1/4/2019 Codes Class Noted POA * (Principal) GI bleed ICD-10-CM: K92.2 ICD-9-CM: 578.9  1/4/2019 Unknown Review of Systems: A comprehensive review of systems was negative except for that written in the HPI. Vital Signs:  
 Last 24hrs VS reviewed since prior progress note. Most recent are: 
Visit Vitals /66 (BP 1 Location: Right arm, BP Patient Position: At rest) Pulse 66 Temp 98.8 °F (37.1 °C) Resp 16 Ht 5' 2\" (1.575 m) Wt 96.6 kg (213 lb) SpO2 96% Breastfeeding? No  
BMI 38.96 kg/m² No intake or output data in the 24 hours ending 01/06/19 1322 Physical Examination:  
 
 
     
Constitutional:  No acute distress, cooperative, pleasant   
ENT:  Oral mucous moist, oropharynx benign. Neck supple, Resp:  CTA bilaterally. No wheezing/rhonchi/rales. No accessory muscle use CV:  Regular rhythm, normal rate, no murmurs, gallops, rubs GI:  Soft, non distended, non tender. normoactive bowel sounds, Musculoskeletal:  No edema, warm, 2+ pulses throughout Neurologic:  Moves all extremities. AAOx3, CN II-XII reviewed Psych:  Good insight, Not anxious nor agitated. Data Review:  
 Review and/or order of clinical lab test 
Review and/or order of tests in the medicine section of [facility] Labs:  
 
Recent Labs 01/06/19 
0255 01/05/19 
1830 01/05/19 
1345 WBC 12.1*  --  10.1 HGB 8.3* 8.1* 8.2* HCT 26.9* 26.2* 26.8*  
  --  204 Recent Labs 01/06/19 
0255 01/04/19 
0707 01/03/19 
1950  139 138  
K 4.0 4.3 4.6  109* 106 CO2 26 24 25 BUN 11 19 24* CREA 0.81 0.76 0.78 * 154* 150* CA 8.7 8.5 8.4* MG  --   --  2.4 Recent Labs 01/04/19 
0707 01/03/19 
1950 SGOT 23 20 ALT 24 26 AP 45 49 TBILI 0.6 0.2 TP 6.7 7.0 ALB 3.6 3.8 GLOB 3.1 3.2 Recent Labs 01/03/19 
1950 INR 1.0 PTP 9.8 APTT 21.5* No results for input(s): FE, TIBC, PSAT, FERR in the last 72 hours. Lab Results Component Value Date/Time Folate 58.2 (H) 09/10/2010 08:57 AM  
  
No results for input(s): PH, PCO2, PO2 in the last 72 hours. Recent Labs 01/06/19 
0255 01/05/19 
1830 01/05/19 
1674 TROIQ 0.08* 0.10* 0.13* Lab Results Component Value Date/Time Cholesterol, total 143 08/16/2018 10:47 AM  
 HDL Cholesterol 68 08/16/2018 10:47 AM  
 LDL, calculated 57 08/16/2018 10:47 AM  
 Triglyceride 89 08/16/2018 10:47 AM  
 CHOL/HDL Ratio 2.0 09/09/2010 10:07 AM  
 
Lab Results Component Value Date/Time Glucose (POC) 183 (H) 01/06/2019 11:16 AM  
 Glucose (POC) 143 (H) 01/06/2019 06:04 AM  
 Glucose (POC) 137 (H) 01/05/2019 09:23 PM  
 Glucose (POC) 168 (H) 01/05/2019 04:22 PM  
 Glucose (POC) 144 (H) 01/05/2019 11:16 AM  
 
Lab Results Component Value Date/Time Color YELLOW/STRAW 10/10/2018 11:33 AM  
 Appearance CLOUDY (A) 10/10/2018 11:33 AM  
 Specific gravity 1.007 10/10/2018 11:33 AM  
 pH (UA) 6.5 10/10/2018 11:33 AM  
 Protein NEGATIVE  10/10/2018 11:33 AM  
 Glucose NEGATIVE  10/10/2018 11:33 AM  
 Ketone NEGATIVE  10/10/2018 11:33 AM  
 Bilirubin NEGATIVE  10/10/2018 11:33 AM  
 Urobilinogen 0.2 10/10/2018 11:33 AM  
 Nitrites NEGATIVE  10/10/2018 11:33 AM  
 Leukocyte Esterase SMALL (A) 10/10/2018 11:33 AM  
 Epithelial cells FEW 10/10/2018 11:33 AM  
 Bacteria NEGATIVE  10/10/2018 11:33 AM  
 WBC 0-4 10/10/2018 11:33 AM  
 RBC 0-5 10/10/2018 11:33 AM  
 
 
 
Medications Reviewed:  
 
Current Facility-Administered Medications Medication Dose Route Frequency  lisinopril (PRINIVIL, ZESTRIL) tablet 5 mg  5 mg Oral DAILY  [START ON 1/7/2019] docusate sodium (COLACE) capsule 100 mg  100 mg Oral DAILY  0.9% sodium chloride infusion 250 mL  250 mL IntraVENous PRN  
 sodium chloride (NS) flush 5-10 mL  5-10 mL IntraVENous Q8H  
 sodium chloride (NS) flush 5-10 mL  5-10 mL IntraVENous PRN  pantoprazole (PROTONIX) injection 40 mg  40 mg IntraVENous Q12H  
 metoprolol succinate (TOPROL-XL) XL tablet 25 mg  25 mg Oral DAILY  oxybutynin chloride XL (DITROPAN XL) tablet 5 mg  5 mg Oral DAILY  PARoxetine (PAXIL) tablet 30 mg  30 mg Oral DAILY  0.9% sodium chloride infusion 250 mL  250 mL IntraVENous PRN  
 insulin lispro (HUMALOG) injection   SubCUTAneous AC&HS  
 glucose chewable tablet 16 g  4 Tab Oral PRN  
 dextrose (D50W) injection syrg 12.5-25 g  12.5-25 g IntraVENous PRN  
 glucagon (GLUCAGEN) injection 1 mg  1 mg IntraMUSCular PRN  
 0.9% sodium chloride infusion 250 mL  250 mL IntraVENous PRN  
 
______________________________________________________________________ EXPECTED LENGTH OF STAY: 2d 16h ACTUAL LENGTH OF STAY:          2 
 
            
Doug Shultz NP

## 2019-01-06 NOTE — PROGRESS NOTES
1205 Per Jignesh De Los Santos NP discontinue Q8H H&H, occult stool, and remote telemetry as well as add order for colace 100mg tab once daily. Orders put in verbal with read back.

## 2019-01-06 NOTE — ROUTINE PROCESS
Bedside and Verbal shift change report given to Vinayak Mckoy (oncoming nurse) by Rocio Christian RN (offgoing nurse). Report included the following information SBAR, Kardex, Intake/Output, MAR and Recent Results. Sandhya Null

## 2019-01-07 LAB
BASOPHILS # BLD: 0 K/UL (ref 0–0.1)
BASOPHILS NFR BLD: 0 % (ref 0–1)
DIFFERENTIAL METHOD BLD: ABNORMAL
EOSINOPHIL # BLD: 0.5 K/UL (ref 0–0.4)
EOSINOPHIL NFR BLD: 3 % (ref 0–7)
ERYTHROCYTE [DISTWIDTH] IN BLOOD BY AUTOMATED COUNT: 15.4 % (ref 11.5–14.5)
GLUCOSE BLD STRIP.AUTO-MCNC: 128 MG/DL (ref 65–100)
GLUCOSE BLD STRIP.AUTO-MCNC: 138 MG/DL (ref 65–100)
GLUCOSE BLD STRIP.AUTO-MCNC: 166 MG/DL (ref 65–100)
GLUCOSE BLD STRIP.AUTO-MCNC: 218 MG/DL (ref 65–100)
GLUCOSE BLD STRIP.AUTO-MCNC: 221 MG/DL (ref 65–100)
HCT VFR BLD AUTO: 29.3 % (ref 35–47)
HGB BLD-MCNC: 9.1 G/DL (ref 11.5–16)
IMM GRANULOCYTES # BLD: 0.1 K/UL (ref 0–0.04)
IMM GRANULOCYTES NFR BLD AUTO: 0 % (ref 0–0.5)
LYMPHOCYTES # BLD: 3.5 K/UL (ref 0.8–3.5)
LYMPHOCYTES NFR BLD: 24 % (ref 12–49)
MCH RBC QN AUTO: 28.4 PG (ref 26–34)
MCHC RBC AUTO-ENTMCNC: 31.1 G/DL (ref 30–36.5)
MCV RBC AUTO: 91.6 FL (ref 80–99)
MONOCYTES # BLD: 1.4 K/UL (ref 0–1)
MONOCYTES NFR BLD: 9 % (ref 5–13)
NEUTS SEG # BLD: 9.2 K/UL (ref 1.8–8)
NEUTS SEG NFR BLD: 63 % (ref 32–75)
NRBC # BLD: 0 K/UL (ref 0–0.01)
NRBC BLD-RTO: 0 PER 100 WBC
PLATELET # BLD AUTO: 257 K/UL (ref 150–400)
PMV BLD AUTO: 10.2 FL (ref 8.9–12.9)
RBC # BLD AUTO: 3.2 M/UL (ref 3.8–5.2)
SERVICE CMNT-IMP: ABNORMAL
WBC # BLD AUTO: 14.6 K/UL (ref 3.6–11)

## 2019-01-07 PROCEDURE — 74011250637 HC RX REV CODE- 250/637: Performed by: NURSE PRACTITIONER

## 2019-01-07 PROCEDURE — 74011250637 HC RX REV CODE- 250/637: Performed by: INTERNAL MEDICINE

## 2019-01-07 PROCEDURE — 36415 COLL VENOUS BLD VENIPUNCTURE: CPT

## 2019-01-07 PROCEDURE — 74011636637 HC RX REV CODE- 636/637: Performed by: INTERNAL MEDICINE

## 2019-01-07 PROCEDURE — 82962 GLUCOSE BLOOD TEST: CPT

## 2019-01-07 PROCEDURE — C9113 INJ PANTOPRAZOLE SODIUM, VIA: HCPCS | Performed by: INTERNAL MEDICINE

## 2019-01-07 PROCEDURE — 65270000029 HC RM PRIVATE

## 2019-01-07 PROCEDURE — 74011000250 HC RX REV CODE- 250

## 2019-01-07 PROCEDURE — 85025 COMPLETE CBC W/AUTO DIFF WBC: CPT

## 2019-01-07 PROCEDURE — 74011250636 HC RX REV CODE- 250/636: Performed by: INTERNAL MEDICINE

## 2019-01-07 RX ORDER — SODIUM CHLORIDE 9 MG/ML
INJECTION INTRAMUSCULAR; INTRAVENOUS; SUBCUTANEOUS
Status: COMPLETED
Start: 2019-01-07 | End: 2019-01-07

## 2019-01-07 RX ADMIN — OXYBUTYNIN CHLORIDE 5 MG: 5 TABLET, EXTENDED RELEASE ORAL at 10:25

## 2019-01-07 RX ADMIN — SODIUM CHLORIDE 10 ML: 9 INJECTION, SOLUTION INTRAMUSCULAR; INTRAVENOUS; SUBCUTANEOUS at 21:03

## 2019-01-07 RX ADMIN — PANTOPRAZOLE SODIUM 40 MG: 40 INJECTION, POWDER, FOR SOLUTION INTRAVENOUS at 21:03

## 2019-01-07 RX ADMIN — Medication 10 ML: at 06:50

## 2019-01-07 RX ADMIN — INSULIN LISPRO 2 UNITS: 100 INJECTION, SOLUTION INTRAVENOUS; SUBCUTANEOUS at 17:15

## 2019-01-07 RX ADMIN — Medication 10 ML: at 21:03

## 2019-01-07 RX ADMIN — INSULIN LISPRO 3 UNITS: 100 INJECTION, SOLUTION INTRAVENOUS; SUBCUTANEOUS at 11:50

## 2019-01-07 RX ADMIN — PAROXETINE HYDROCHLORIDE 30 MG: 20 TABLET, FILM COATED ORAL at 10:25

## 2019-01-07 RX ADMIN — PANTOPRAZOLE SODIUM 40 MG: 40 INJECTION, POWDER, FOR SOLUTION INTRAVENOUS at 10:27

## 2019-01-07 RX ADMIN — METOPROLOL SUCCINATE 25 MG: 50 TABLET, EXTENDED RELEASE ORAL at 10:27

## 2019-01-07 RX ADMIN — DOCUSATE SODIUM 100 MG: 100 CAPSULE, LIQUID FILLED ORAL at 10:26

## 2019-01-07 RX ADMIN — LISINOPRIL 5 MG: 5 TABLET ORAL at 10:26

## 2019-01-07 NOTE — PROGRESS NOTES
Bedside shift change report given to Ozzy Sam (oncoming nurse) by Amanda (offgoing nurse). Report included the following information SBAR, Kardex, Procedure Summary, Intake/Output and MAR.

## 2019-01-07 NOTE — CDMP QUERY
There is noted documentation in the Cardiology consult (01/04) of the pt having severe GI blood loss anemia, after further review, do you concur with these findings and can they be further specified as:  
 
=> Acute on chronic blood loss anemia (POA) in the setting of  low HGB, weakness and chest pain on admission requiring 2 units RBCs, Q8h lab monitoring and GI and Cardiology consults with pending EDG 
=> Other explanation of clinical findings 
=> Clinically Undetermined (no explanation for clinical findings) The medical record reflects the following:   
 
Risk Factors:  Anemia w/reported melena, Hx ulcer disease Clinical Indicators:  Pt presented with chest pain and weakness. HGB noted to be 7.3 and dropped to 6.4 the next day with a second noted drop to 6.6 on (01/05). Cardiology  documented severe GI blood loss anemia with elevated troponin likely demand ischemia from her severe anemia. Last noted HGB (PTA) was 11.3  on 10/2018 Treatment: 2 units RBCs, Q8h lab monitoring and GI and Cardiology consults with pending EDG today Please clarify and document your clinical opinion in the progress notes and discharge summary including the definitive and/or presumptive diagnosis, (suspected or probable), related to the above clinical findings. Please include clinical findings supporting your diagnosis. Thank you, Jones Leonardo, RN, MSN, [facility], [address] [place] 
[phone]

## 2019-01-07 NOTE — PROGRESS NOTES
[address] 
[address] [facility], [address] 
[phone] GI PROGRESS Alicia Lowry, [facility] Work Cell: [phone] NAME:   Greta Hale :    1940 MRN:    [id number] Assessment/Plan 1. Anemia - with reported melena and distant history of ulcer disease. Differentials include recurrent ulcer, erosion, gastritis, esophagitis, AVMs, neoplasia, etc. Cannot completely r/o potential colonic etiology as well. Hgb stable. - Diet as tolerated - Continue PPI 
- NPO after midnight 
- Plan for EGD tomorrow - Monitor H&H and for any clinical evidence of bleeding - Further recommendations to follow - consider colonoscopy depending upon results of the above 2. Type 2 DM 3. History of AVR - on anti-coagulation (Plavix - currently being held) Patient Active Problem List  
Diagnosis Code  Diabetes type 2, controlled ([facility] E11.9  
 Essential hypertension, benign I10  
 Palpitations R00.2  Vitamin D deficiency E55.9  S/P aortic valve replacement Z95.2  Hyperlipidemia E78.5  Depression with anxiety F41.8  Severe obesity (BMI 35.0-39. 9) E66.01  
 GI bleed K92.2 Subjective: No complaints this AM. Hgb stable. Tolerating diet. No evidence of bleeding. Review of Systems Constitutional: negative fever, negative chills, negative weight loss Eyes:   negative visual changes ENT:   negative sore throat, tongue or lip swelling Respiratory:  negative cough, negative dyspnea Cards:  negative for chest pain, palpitations, lower extremity edema GI:   See HPI 
:  negative for frequency, dysuria Integument:  negative for rash and pruritus Heme:  negative for easy bruising and gum/nose bleeding Musculoskel: negative for myalgias, back pain and muscle weakness Neuro: negative for headaches, dizziness, vertigo Psych:  negative for feelings of anxiety, depression Objective: VITALS:  
 Last 24hrs VS reviewed since prior hospitalist progress note. Most recent are: 
Visit Vitals /67 (BP 1 Location: Right arm, BP Patient Position: Head of bed elevated (Comment degrees)) Pulse 68 Temp 98.2 °F (36.8 °C) Resp 16 Ht 5' 2\" (1.575 m) Wt 96.6 kg (213 lb) SpO2 97% Breastfeeding? No  
BMI 38.96 kg/m² Intake/Output Summary (Last 24 hours) at 1/7/2019 7317 Last data filed at 1/6/2019 1218 Gross per 24 hour Intake 350 ml Output  Net 350 ml PHYSICAL EXAM: 
General   well developed, elderly, alert, in no acute distress EENT  Normocephalic, Atraumatic, PERRLA, EOMI, sclera clear Respiratory   Clear To Auscultation bilaterally - no wheezes, rales, rhonchi, or crackles Cardiology  Regular Rate and Rythmn  - no murmurs, rubs or gallops Abdominal  Soft, non-tender, non-distended, positive bowel sounds, no hepatosplenomegaly, no palpable mass Extremities  No clubbing, cyanosis, or edema. Pulses intact. Neurological  No focal neurological deficits noted Psychological  Oriented x 3. Normal affect. Lab Data Recent Results (from the past 12 hour(s)) GLUCOSE, POC Collection Time: 01/06/19 10:01 PM  
Result Value Ref Range Glucose (POC) 138 (H) 65 - 100 mg/dL Performed by Ubaldo Sexton CBC WITH AUTOMATED DIFF Collection Time: 01/07/19  3:53 AM  
Result Value Ref Range WBC 14.6 (H) 3.6 - 11.0 K/uL  
 RBC 3.20 (L) 3.80 - 5.20 M/uL HGB 9.1 (L) 11.5 - 16.0 g/dL HCT 29.3 (L) 35.0 - 47.0 % MCV 91.6 80.0 - 99.0 FL  
 MCH 28.4 26.0 - 34.0 PG  
 MCHC 31.1 30.0 - 36.5 g/dL  
 RDW 15.4 (H) 11.5 - 14.5 % PLATELET 799 851 - 001 K/uL MPV 10.2 8.9 - 12.9 FL  
 NRBC 0.0 0  WBC ABSOLUTE NRBC 0.00 0.00 - 0.01 K/uL NEUTROPHILS 63 32 - 75 % LYMPHOCYTES 24 12 - 49 % MONOCYTES 9 5 - 13 % EOSINOPHILS 3 0 - 7 % BASOPHILS 0 0 - 1 % IMMATURE GRANULOCYTES 0 0.0 - 0.5 % ABS. NEUTROPHILS 9.2 (H) 1.8 - 8.0 K/UL ABS. LYMPHOCYTES 3.5 0.8 - 3.5 K/UL  
 ABS. MONOCYTES 1.4 (H) 0.0 - 1.0 K/UL  
 ABS. EOSINOPHILS 0.5 (H) 0.0 - 0.4 K/UL  
 ABS. BASOPHILS 0.0 0.0 - 0.1 K/UL  
 ABS. IMM. GRANS. 0.1 (H) 0.00 - 0.04 K/UL  
 DF AUTOMATED    
GLUCOSE, POC Collection Time: 01/07/19  6:44 AM  
Result Value Ref Range Glucose (POC) 138 (H) 65 - 100 mg/dL Performed by Carl Camacho  traveler Medications: Reviewed PMH/SH reviewed - no change compared to H&P Mid-Level Provider: Janina Franklin NP Date/Time:  1/7/2019

## 2019-01-07 NOTE — PROGRESS NOTES
Hospitalist Progress Note Deann Borjas NP Answering service: [phone] OR [phone] from in house phone Cell: [phone] Date of Service:  2019 NAME:  Joesph Saleh :  1940 MRN:  [id number] Admission Summary:  
Pt and family had noticed the pt was getting more SOB (worse with exertion) and was complaining of nonspecific atypical chest pains which were mostly localized on her R side. She visited her PCP the day PTA and had blood work done. Her Hgb was found to be 7.4 and she was referred to the ED for work up. Pt admitted to having dark black stools x1 the day PTA. Pmhx: hypertension, diabetes mellitus type 2, depression, and s/p aortic valve replacement x2. The patient also in 2018 had undergone her thrombectomy done of the left  internal carotid artery and had a carotid artery stent. Interval history / Subjective:  
0850: Pt lying in bed, daughter at bedside. Pt with no new complaints - no abdominal pains, new dark tarry stools. It is being determined if pt to stay another night for EGD tomorrow or if GI would like for her to be discharged home and then do procedure outpatient. Pt has been off Plavix x 4 days (last dose on 1/3/2019). 1100: Plans are for pt to stay for EGD in am since she has been off her Plavix since admit. GI has discussed plan with pt and family, possible d/c tomorrow afternoon. Assessment & Plan:  
 
Acute Anemia, likely from GI blood loss: 
- s/p 2 U PRBCs - 
- hold ASA and Plavix (has not received this admit) - GI consulted, plans for EGD tomorrow - PPI BID S/p AVR:  
- on ASA and Plavix at home 
- cardiology consulted - they are ok with pt staying off asa/plavix until cleared by GI Recent Left Carotid Thrombectomy:  
- with Dr Jesse Grey with vascular surgery at [facility] 
- Plavix and ASA on hold d/t the above Hx Type 2 DM:  
 - on glipizide and metformin at home, on hold right now: resume post testing and on d/c 
- SSI, accuchecks - blood sugars ranging from 138-221 Elevated Troponin: 
- cardiology has seen; no need for further cardiac intervention at this time Leukocytosis 
- likely reactive from GI bleed/blood transfusion  
- still no s/s of infection, no fevers Code status: Full DVT prophylaxis: SCDs Care Plan discussed with: patient, daughter (at bedside), GI and attending MD 
Disposition: Home soon, possibly tomorrow post EGD Hospital Problems  Date Reviewed: 1/4/2019 Codes Class Noted POA * (Principal) GI bleed ICD-10-CM: K92.2 ICD-9-CM: 578.9  1/4/2019 Unknown Review of Systems:  
Denies HA. No chest pain or pressure. No SOB. No GI complaints. Vital Signs:  
 Last 24hrs VS reviewed since prior progress note. Most recent are: 
Visit Vitals /67 (BP 1 Location: Right arm, BP Patient Position: Head of bed elevated (Comment degrees)) Pulse 68 Temp 98.2 °F (36.8 °C) Resp 16 Ht 5' 2\" (1.575 m) Wt 96.6 kg (213 lb) SpO2 97% Breastfeeding? No  
BMI 38.96 kg/m² Intake/Output Summary (Last 24 hours) at 1/7/2019 1108 Last data filed at 1/6/2019 1218 Gross per 24 hour Intake 350 ml Output  Net 350 ml Physical Examination:  
     
Constitutional:  No acute distress, cooperative, pleasant   
ENT:  Oral MM dry, oropharynx benign. Resp:  CTA bilaterally. No wheezing/rhonchi. No accessory muscle use and on RA  
CV:  Regular rhythm, normal rate, no murmurs GI:  Soft, non distended, non tender. normoactive bowel sounds. No BM Musculoskeletal:  No edema, warm, 2+ pulses throughout Neurologic:  Moves all extremities. AAOx3. Strength and sensation intact Psych:  Good insight, Not anxious nor agitated. Data Review:  
Review and/or order of clinical lab test 
Review and/or order of tests in the medicine section of [facility] Labs:  
 
Recent Labs 01/07/19 
4166 01/06/19 
0255 WBC 14.6* 12.1* HGB 9.1* 8.3* HCT 29.3* 26.9*  
 204 Recent Labs 01/06/19 
0255   
K 4.0  
 CO2 26 BUN 11  
CREA 0.81 * CA 8.7 No results for input(s): SGOT, GPT, ALT, AP, TBIL, TBILI, TP, ALB, GLOB, GGT, AML, LPSE in the last 72 hours. No lab exists for component: AMYP, HLPSE No results for input(s): INR, PTP, APTT in the last 72 hours. No lab exists for component: INREXT, INREXT No results for input(s): FE, TIBC, PSAT, FERR in the last 72 hours. Lab Results Component Value Date/Time Folate 58.2 (H) 09/10/2010 08:57 AM  
  
No results for input(s): PH, PCO2, PO2 in the last 72 hours. Recent Labs 01/06/19 
0255 01/05/19 
1830 01/05/19 
1723 TROIQ 0.08* 0.10* 0.13* Lab Results Component Value Date/Time Cholesterol, total 143 08/16/2018 10:47 AM  
 HDL Cholesterol 68 08/16/2018 10:47 AM  
 LDL, calculated 57 08/16/2018 10:47 AM  
 Triglyceride 89 08/16/2018 10:47 AM  
 CHOL/HDL Ratio 2.0 09/09/2010 10:07 AM  
 
Lab Results Component Value Date/Time Glucose (POC) 138 (H) 01/07/2019 06:44 AM  
 Glucose (POC) 138 (H) 01/06/2019 10:01 PM  
 Glucose (POC) 124 (H) 01/06/2019 05:01 PM  
 Glucose (POC) 183 (H) 01/06/2019 11:16 AM  
 Glucose (POC) 143 (H) 01/06/2019 06:04 AM  
 
Lab Results Component Value Date/Time  Color YELLOW/STRAW 10/10/2018 11:33 AM  
 Appearance CLOUDY (A) 10/10/2018 11:33 AM  
 Specific gravity 1.007 10/10/2018 11:33 AM  
 pH (UA) 6.5 10/10/2018 11:33 AM  
 Protein NEGATIVE  10/10/2018 11:33 AM  
 Glucose NEGATIVE  10/10/2018 11:33 AM  
 Ketone NEGATIVE  10/10/2018 11:33 AM  
 Bilirubin NEGATIVE  10/10/2018 11:33 AM  
 Urobilinogen 0.2 10/10/2018 11:33 AM  
 Nitrites NEGATIVE  10/10/2018 11:33 AM  
 Leukocyte Esterase SMALL (A) 10/10/2018 11:33 AM  
 Epithelial cells FEW 10/10/2018 11:33 AM  
 Bacteria NEGATIVE  10/10/2018 11:33 AM  
 WBC 0-4 10/10/2018 11:33 AM  
 RBC 0-5 10/10/2018 11:33 AM  
 
 
 
Medications Reviewed:  
 
Current Facility-Administered Medications Medication Dose Route Frequency  lisinopril (PRINIVIL, ZESTRIL) tablet 5 mg  5 mg Oral DAILY  docusate sodium (COLACE) capsule 100 mg  100 mg Oral DAILY  acetaminophen (TYLENOL) tablet 650 mg  650 mg Oral Q6H PRN  
 0.9% sodium chloride infusion 250 mL  250 mL IntraVENous PRN  
 sodium chloride (NS) flush 5-10 mL  5-10 mL IntraVENous Q8H  
 sodium chloride (NS) flush 5-10 mL  5-10 mL IntraVENous PRN  pantoprazole (PROTONIX) injection 40 mg  40 mg IntraVENous Q12H  
 metoprolol succinate (TOPROL-XL) XL tablet 25 mg  25 mg Oral DAILY  oxybutynin chloride XL (DITROPAN XL) tablet 5 mg  5 mg Oral DAILY  PARoxetine (PAXIL) tablet 30 mg  30 mg Oral DAILY  0.9% sodium chloride infusion 250 mL  250 mL IntraVENous PRN  
 insulin lispro (HUMALOG) injection   SubCUTAneous AC&HS  
 glucose chewable tablet 16 g  4 Tab Oral PRN  
 dextrose (D50W) injection syrg 12.5-25 g  12.5-25 g IntraVENous PRN  
 glucagon (GLUCAGEN) injection 1 mg  1 mg IntraMUSCular PRN  
 0.9% sodium chloride infusion 250 mL  250 mL IntraVENous PRN  
______________________________________________________________________ EXPECTED LENGTH OF STAY: 2d 16h ACTUAL LENGTH OF STAY:          3 Adrienne Jeff NP Discussed with NP, agree with plan

## 2019-01-08 ENCOUNTER — ANESTHESIA (OUTPATIENT)
Dept: ENDOSCOPY | Age: 79
DRG: 378 | End: 2019-01-08
Payer: MEDICARE

## 2019-01-08 ENCOUNTER — ANESTHESIA EVENT (OUTPATIENT)
Dept: ENDOSCOPY | Age: 79
DRG: 378 | End: 2019-01-08
Payer: MEDICARE

## 2019-01-08 VITALS
BODY MASS INDEX: 39.2 KG/M2 | DIASTOLIC BLOOD PRESSURE: 70 MMHG | RESPIRATION RATE: 14 BRPM | OXYGEN SATURATION: 99 % | SYSTOLIC BLOOD PRESSURE: 127 MMHG | HEIGHT: 62 IN | TEMPERATURE: 98.8 F | WEIGHT: 213 LBS | HEART RATE: 58 BPM

## 2019-01-08 PROBLEM — K92.2 GI BLEED: Status: RESOLVED | Noted: 2019-01-04 | Resolved: 2019-01-08

## 2019-01-08 LAB
ANION GAP SERPL CALC-SCNC: 7 MMOL/L (ref 5–15)
BUN SERPL-MCNC: 15 MG/DL (ref 6–20)
BUN/CREAT SERPL: 19 (ref 12–20)
CALCIUM SERPL-MCNC: 8.6 MG/DL (ref 8.5–10.1)
CHLORIDE SERPL-SCNC: 105 MMOL/L (ref 97–108)
CO2 SERPL-SCNC: 25 MMOL/L (ref 21–32)
CREAT SERPL-MCNC: 0.81 MG/DL (ref 0.55–1.02)
ERYTHROCYTE [DISTWIDTH] IN BLOOD BY AUTOMATED COUNT: 14.6 % (ref 11.5–14.5)
GLUCOSE BLD STRIP.AUTO-MCNC: 126 MG/DL (ref 65–100)
GLUCOSE BLD STRIP.AUTO-MCNC: 175 MG/DL (ref 65–100)
GLUCOSE SERPL-MCNC: 160 MG/DL (ref 65–100)
H PYLORI FROM TISSUE: NEGATIVE
H PYLORI FROM TISSUE: NEGATIVE
HCT VFR BLD AUTO: 27.1 % (ref 35–47)
HGB BLD-MCNC: 8.4 G/DL (ref 11.5–16)
IRON SATN MFR SERPL: 5 % (ref 20–50)
IRON SERPL-MCNC: 16 UG/DL (ref 35–150)
KIT LOT NO., HCLOLOT: NORMAL
KIT LOT NO., HCLOLOT: NORMAL
KOH PREP SPEC: NORMAL
MCH RBC QN AUTO: 28 PG (ref 26–34)
MCHC RBC AUTO-ENTMCNC: 31 G/DL (ref 30–36.5)
MCV RBC AUTO: 90.3 FL (ref 80–99)
NEGATIVE CONTROL: NEGATIVE
NEGATIVE CONTROL: NEGATIVE
NRBC # BLD: 0.02 K/UL (ref 0–0.01)
NRBC BLD-RTO: 0.2 PER 100 WBC
PLATELET # BLD AUTO: 241 K/UL (ref 150–400)
PMV BLD AUTO: 10.3 FL (ref 8.9–12.9)
POSITIVE CONTROL: POSITIVE
POSITIVE CONTROL: POSITIVE
POTASSIUM SERPL-SCNC: 4.1 MMOL/L (ref 3.5–5.1)
RBC # BLD AUTO: 3 M/UL (ref 3.8–5.2)
SERVICE CMNT-IMP: ABNORMAL
SERVICE CMNT-IMP: ABNORMAL
SERVICE CMNT-IMP: NORMAL
SODIUM SERPL-SCNC: 137 MMOL/L (ref 136–145)
TIBC SERPL-MCNC: 329 UG/DL (ref 250–450)
WBC # BLD AUTO: 11.9 K/UL (ref 3.6–11)

## 2019-01-08 PROCEDURE — 0DB98ZX EXCISION OF DUODENUM, VIA NATURAL OR ARTIFICIAL OPENING ENDOSCOPIC, DIAGNOSTIC: ICD-10-PCS | Performed by: SPECIALIST

## 2019-01-08 PROCEDURE — 0DB58ZX EXCISION OF ESOPHAGUS, VIA NATURAL OR ARTIFICIAL OPENING ENDOSCOPIC, DIAGNOSTIC: ICD-10-PCS | Performed by: SPECIALIST

## 2019-01-08 PROCEDURE — 82962 GLUCOSE BLOOD TEST: CPT

## 2019-01-08 PROCEDURE — 76040000007: Performed by: SPECIALIST

## 2019-01-08 PROCEDURE — 0DB68ZX EXCISION OF STOMACH, VIA NATURAL OR ARTIFICIAL OPENING ENDOSCOPIC, DIAGNOSTIC: ICD-10-PCS | Performed by: SPECIALIST

## 2019-01-08 PROCEDURE — 80048 BASIC METABOLIC PNL TOTAL CA: CPT

## 2019-01-08 PROCEDURE — 88305 TISSUE EXAM BY PATHOLOGIST: CPT

## 2019-01-08 PROCEDURE — 87210 SMEAR WET MOUNT SALINE/INK: CPT

## 2019-01-08 PROCEDURE — 74011636637 HC RX REV CODE- 636/637: Performed by: INTERNAL MEDICINE

## 2019-01-08 PROCEDURE — 77030009426 HC FCPS BIOP ENDOSC BSC -B: Performed by: SPECIALIST

## 2019-01-08 PROCEDURE — 88342 IMHCHEM/IMCYTCHM 1ST ANTB: CPT

## 2019-01-08 PROCEDURE — 74011250636 HC RX REV CODE- 250/636

## 2019-01-08 PROCEDURE — 36415 COLL VENOUS BLD VENIPUNCTURE: CPT

## 2019-01-08 PROCEDURE — 85027 COMPLETE CBC AUTOMATED: CPT

## 2019-01-08 PROCEDURE — 83540 ASSAY OF IRON: CPT

## 2019-01-08 PROCEDURE — 76060000032 HC ANESTHESIA 0.5 TO 1 HR: Performed by: SPECIALIST

## 2019-01-08 PROCEDURE — 87077 CULTURE AEROBIC IDENTIFY: CPT | Performed by: SPECIALIST

## 2019-01-08 PROCEDURE — 74011250637 HC RX REV CODE- 250/637: Performed by: NURSE PRACTITIONER

## 2019-01-08 RX ORDER — ATROPINE SULFATE 0.1 MG/ML
0.5 INJECTION INTRAVENOUS
Status: DISCONTINUED | OUTPATIENT
Start: 2019-01-08 | End: 2019-01-08 | Stop reason: HOSPADM

## 2019-01-08 RX ORDER — FENTANYL CITRATE 50 UG/ML
200 INJECTION, SOLUTION INTRAMUSCULAR; INTRAVENOUS
Status: DISCONTINUED | OUTPATIENT
Start: 2019-01-08 | End: 2019-01-08 | Stop reason: HOSPADM

## 2019-01-08 RX ORDER — NALOXONE HYDROCHLORIDE 0.4 MG/ML
0.4 INJECTION, SOLUTION INTRAMUSCULAR; INTRAVENOUS; SUBCUTANEOUS
Status: DISCONTINUED | OUTPATIENT
Start: 2019-01-08 | End: 2019-01-08 | Stop reason: HOSPADM

## 2019-01-08 RX ORDER — FLUMAZENIL 0.1 MG/ML
0.2 INJECTION INTRAVENOUS
Status: DISCONTINUED | OUTPATIENT
Start: 2019-01-08 | End: 2019-01-08 | Stop reason: HOSPADM

## 2019-01-08 RX ORDER — SODIUM CHLORIDE 0.9 % (FLUSH) 0.9 %
5-40 SYRINGE (ML) INJECTION AS NEEDED
Status: DISCONTINUED | OUTPATIENT
Start: 2019-01-08 | End: 2019-01-08 | Stop reason: HOSPADM

## 2019-01-08 RX ORDER — LANOLIN ALCOHOL/MO/W.PET/CERES
1 CREAM (GRAM) TOPICAL 2 TIMES DAILY WITH MEALS
Status: DISCONTINUED | OUTPATIENT
Start: 2019-01-08 | End: 2019-01-08 | Stop reason: HOSPADM

## 2019-01-08 RX ORDER — LIDOCAINE HYDROCHLORIDE 20 MG/ML
INJECTION, SOLUTION EPIDURAL; INFILTRATION; INTRACAUDAL; PERINEURAL AS NEEDED
Status: DISCONTINUED | OUTPATIENT
Start: 2019-01-08 | End: 2019-01-08 | Stop reason: HOSPADM

## 2019-01-08 RX ORDER — PANTOPRAZOLE SODIUM 40 MG/1
40 TABLET, DELAYED RELEASE ORAL DAILY
Qty: 30 TAB | Refills: 0 | Status: SHIPPED | OUTPATIENT
Start: 2019-01-08 | End: 2021-02-05 | Stop reason: SDUPTHER

## 2019-01-08 RX ORDER — PROPOFOL 10 MG/ML
INJECTION, EMULSION INTRAVENOUS AS NEEDED
Status: DISCONTINUED | OUTPATIENT
Start: 2019-01-08 | End: 2019-01-08 | Stop reason: HOSPADM

## 2019-01-08 RX ORDER — MIDAZOLAM HYDROCHLORIDE 1 MG/ML
.25-1 INJECTION, SOLUTION INTRAMUSCULAR; INTRAVENOUS
Status: DISCONTINUED | OUTPATIENT
Start: 2019-01-08 | End: 2019-01-08 | Stop reason: HOSPADM

## 2019-01-08 RX ORDER — SODIUM CHLORIDE 0.9 % (FLUSH) 0.9 %
5-40 SYRINGE (ML) INJECTION EVERY 8 HOURS
Status: DISCONTINUED | OUTPATIENT
Start: 2019-01-08 | End: 2019-01-08 | Stop reason: HOSPADM

## 2019-01-08 RX ORDER — SODIUM CHLORIDE 9 MG/ML
INJECTION, SOLUTION INTRAVENOUS
Status: DISCONTINUED | OUTPATIENT
Start: 2019-01-08 | End: 2019-01-08 | Stop reason: HOSPADM

## 2019-01-08 RX ORDER — CLOPIDOGREL BISULFATE 75 MG/1
75 TABLET ORAL DAILY
Status: DISCONTINUED | OUTPATIENT
Start: 2019-01-09 | End: 2019-01-08

## 2019-01-08 RX ORDER — DEXTROMETHORPHAN/PSEUDOEPHED 2.5-7.5/.8
1.2 DROPS ORAL
Status: DISCONTINUED | OUTPATIENT
Start: 2019-01-08 | End: 2019-01-08 | Stop reason: HOSPADM

## 2019-01-08 RX ORDER — SODIUM CHLORIDE 9 MG/ML
50 INJECTION, SOLUTION INTRAVENOUS CONTINUOUS
Status: DISPENSED | OUTPATIENT
Start: 2019-01-08 | End: 2019-01-08

## 2019-01-08 RX ORDER — LORAZEPAM 2 MG/ML
2 INJECTION INTRAMUSCULAR AS NEEDED
Status: DISCONTINUED | OUTPATIENT
Start: 2019-01-08 | End: 2019-01-08 | Stop reason: HOSPADM

## 2019-01-08 RX ORDER — GUAIFENESIN 100 MG/5ML
81 LIQUID (ML) ORAL DAILY
Status: DISCONTINUED | OUTPATIENT
Start: 2019-01-09 | End: 2019-01-08

## 2019-01-08 RX ORDER — LANOLIN ALCOHOL/MO/W.PET/CERES
325 CREAM (GRAM) TOPICAL 2 TIMES DAILY WITH MEALS
Qty: 60 TAB | Refills: 0 | Status: SHIPPED | OUTPATIENT
Start: 2019-01-08 | End: 2021-04-28

## 2019-01-08 RX ORDER — EPINEPHRINE 0.1 MG/ML
1 INJECTION INTRACARDIAC; INTRAVENOUS
Status: DISCONTINUED | OUTPATIENT
Start: 2019-01-08 | End: 2019-01-08 | Stop reason: HOSPADM

## 2019-01-08 RX ADMIN — PROPOFOL 20 MG: 10 INJECTION, EMULSION INTRAVENOUS at 12:33

## 2019-01-08 RX ADMIN — Medication 10 ML: at 07:12

## 2019-01-08 RX ADMIN — Medication 10 ML: at 13:51

## 2019-01-08 RX ADMIN — LIDOCAINE HYDROCHLORIDE 40 MG: 20 INJECTION, SOLUTION EPIDURAL; INFILTRATION; INTRACAUDAL; PERINEURAL at 12:23

## 2019-01-08 RX ADMIN — SODIUM CHLORIDE: 9 INJECTION, SOLUTION INTRAVENOUS at 12:18

## 2019-01-08 RX ADMIN — PROPOFOL 30 MG: 10 INJECTION, EMULSION INTRAVENOUS at 12:27

## 2019-01-08 RX ADMIN — PROPOFOL 80 MG: 10 INJECTION, EMULSION INTRAVENOUS at 12:23

## 2019-01-08 RX ADMIN — PROPOFOL 30 MG: 10 INJECTION, EMULSION INTRAVENOUS at 12:31

## 2019-01-08 RX ADMIN — PROPOFOL 30 MG: 10 INJECTION, EMULSION INTRAVENOUS at 12:29

## 2019-01-08 RX ADMIN — PROPOFOL 30 MG: 10 INJECTION, EMULSION INTRAVENOUS at 12:25

## 2019-01-08 RX ADMIN — INSULIN LISPRO 2 UNITS: 100 INJECTION, SOLUTION INTRAVENOUS; SUBCUTANEOUS at 07:12

## 2019-01-08 NOTE — PROGRESS NOTES
Daniela [facility] 1940 
[id number] Situation: 
Verbal report received from: Mason Hammond Preoperative diagnosis: Anemia Patient stated . Background: 
Procedure: Procedure(s): ESOPHAGOGASTRODUODENOSCOPY (EGD) Physician performing procedure; Dr. Elton Duque Patient diabetic yes Patient taking blood thinners no Patient has a defibrillator: no  
Patient needs antibiotics: no  
 
Assessment: 
Vital signs stable yes Alert and oriented yes Abdominal assessment: round and soft Recommendation: 
Endoscopic Procedure Family or Friend yes Permission to share finding with Family or Friend yes

## 2019-01-08 NOTE — DISCHARGE SUMMARY
Discharge Summary     PATIENT ID: Jaky Oliva  MRN: [id number]   YOB: 1940    DATE OF ADMISSION: 1/3/2019  9:12 PM    DATE OF DISCHARGE: 1/8/2018  PRIMARY CARE PROVIDER: Christa Brian MD   ATTENDING PHYSICIAN: Beryle Khat, MD  DISCHARGING PROVIDER: Sp Donahue NP. To contact this individual call [phone] and ask the  to page. If unavailable ask to be transferred the Adult Hospitalist Department. CONSULTATIONS: IP CONSULT TO HOSPITALIST  IP CONSULT TO GASTROENTEROLOGY  IP CONSULT TO CARDIOLOGY    PROCEDURES/SURGERIES: Procedure(s):  ESOPHAGOGASTRODUODENOSCOPY (EGD)  ESOPHAGOGASTRODUODENAL (EGD) BIOPSY  ENDOSCOPIC BRUSHING    ADMITTING DIAGNOSES & HOSPITAL COURSE:   Pt and family had noticed the pt was getting more SOB (worse with exertion) and was complaining of nonspecific atypical chest pains which were mostly localized on her R side. She visited her PCP the day PTA and had blood work done. Her Hgb was found to be 7.4 and she was referred to the ED for work up. Pt admitted to having dark black stools x1 the day PTA.       Pmhx: hypertension, diabetes mellitus type 2, depression, and s/p aortic valve replacement x2. The patient also in 11/2018 had undergone her thrombectomy done of the left  internal carotid artery and had a carotid artery stent. DISCHARGE DIAGNOSES / PLAN:      Acute Anemia, likely from GI blood loss:  - s/p 2 units PRBCs 1/4-1/5 with stable Hgb now  - Iron 16 TIBC 329 iron % sat 5 (1/8)  - s/p EGD 1/8: Candida Esophagitis (not treating as abx with interfere with plavix as per GI discretion). Gastric erosion. H Pylori negative. - PPI on discharge. Avoid NSAIDs. Start Iron, BID if possible.   - pt to follow up with GI outpatient     S/p AVR: on ASA and Plavix at home, ok to resume     Recent Left Carotid Thrombectomy:   - with Dr Levi Moore with vascular surgery [facility]  - Plavix and ASA      Hx Type 2 DM:   - HgbA1c 1/2 6.2  - on glipizide and metformin at home, resume d/c  - blood sugars ranging from 126-175     Elevated Troponin:  - cardiology has seen; no need for further cardiac intervention at this time     Leukocytosis: trending down  - likely reactive from GI bleed/blood transfusion   - still no s/s of infection, no fevers. FOLLOW UP APPOINTMENTS:    Follow-up Information     Follow up With Specialties Details Why Contact Info    Bard Sandifer, MD Pediatrics  or alternative provider in 2-3 weeks [address]  [address]      Lia Barron MD Gastroenterology In 4 weeks will need follow up and arrangement for colonoscopy Javier Graff4 [phone]           ADDITIONAL CARE RECOMMENDATIONS:   Please take iron twice daily if able. If unable to take twice daily due to GI side effects such as constipation, please take daily. Colace 1-2 x/day and/or miralax may help with bowel regularity    Monitor blood sugars at home 1-2 times daily and keep record for PCP review    Please note that if HgbA1c is tested in next 3 months, may not be valid as pt received blood transfusion this admit    NO NSAIDS:  No motrin, ibuprofen, naprosyn or other pain relievers with non steroidal anti inflammatory components. Please use Tylenol for pain relief. DIET: Cardiac Diet and Diabetic Diet  ACTIVITY: Activity as tolerated    DISCHARGE MEDICATIONS:  Current Discharge Medication List      START taking these medications    Details   ferrous sulfate 325 mg (65 mg iron) tablet Take 1 Tab by mouth two (2) times daily (with meals). Qty: 60 Tab, Refills: 0      pantoprazole (PROTONIX) 40 mg tablet Take 1 Tab by mouth daily. Qty: 30 Tab, Refills: 0      L. acidoph & paracasei- S therm- Bifido (ALVIN-Q/RISAQUAD) 8 billion cell cap cap Take 1 Cap by mouth daily.   Qty: 30 Cap, Refills: 0         CONTINUE these medications which have NOT CHANGED    Details   glimepiride (AMARYL) 1 mg tablet Take 1 mg by mouth Daily (before breakfast). tiZANidine (ZANAFLEX) 4 mg tablet Take 4 mg by mouth three (3) times daily as needed. acetaminophen (TYLENOL) 325 mg tablet Take 325 mg by mouth every four (4) hours as needed for Pain. clopidogrel (PLAVIX) 75 mg tab Take 75 mg by mouth.      lisinopril (PRINIVIL, ZESTRIL) 5 mg tablet TAKE 1 TABLET EVERY DAY  Qty: 90 Tab, Refills: 3      PARoxetine (PAXIL) 30 mg tablet TAKE 1 TABLET EVERY DAY  Qty: 90 Tab, Refills: 3      meclizine (ANTIVERT) 25 mg tablet Take 1 Tab by mouth three (3) times daily as needed for Dizziness. Qty: 20 Tab, Refills: 0      metoprolol succinate (TOPROL-XL) 25 mg XL tablet TAKE 1 TABLET EVERY DAY  Qty: 90 Tab, Refills: 3      MYRBETRIQ 25 mg ER tablet Take 25 mg by mouth daily. metFORMIN (GLUCOPHAGE) 500 mg tablet TAKE TWO TABLETS BY MOUTH DAILY WITH BREAKFAST  Qty: 180 Tab, Refills: 3      lovastatin (MEVACOR) 10 mg tablet TAKE ONE TABLET BY MOUTH NIGHTLY  Qty: 90 Tab, Refills: 3      aspirin delayed-release 325 mg tablet Take 1 Tab by mouth daily. Qty: 90 Tab, Refills: 1    Associated Diagnoses: Diabetes mellitus ([address]      PV W-O EDU/FERROUS FUMARATE/FA (M-VIT PO) Take 1 tablet by mouth daily. STOP taking these medications       ibuprofen (ADVIL) 200 mg tablet Comments:   Reason for Stopping:         LORazepam (ATIVAN) 0.5 mg tablet Comments:   Reason for Stopping:         meloxicam (MOBIC) 15 mg tablet Comments:   Reason for Stopping:             NOTIFY YOUR PHYSICIAN FOR ANY OF THE FOLLOWING:   Fever over 101 degrees for 24 hours. Chest pain, shortness of breath, fever, chills, nausea, vomiting, diarrhea, change in mentation, falling, weakness, bleeding. Severe pain or pain not relieved by medications. Or, any other signs or symptoms that you may have questions about.     DISPOSITION:    Home With:   OT  PT  HH  RN       Long term SNF/Inpatient Rehab    Independent/assisted living    Hospice   xx Other: Home     PATIENT CONDITION AT DISCHARGE:   Functional status    Poor     Deconditioned    xx Independent      Cognition   xx  Lucid     Forgetful     Dementia      Catheters/lines (plus indication)    Bess     PICC     PEG    xx None      Code status   xx  Full code     DNR      PHYSICAL EXAMINATION AT DISCHARGE:  /70   Pulse (!) 58   Temp 98.8 °F (37.1 °C)   Resp 14   Ht 5' 2\" (1.575 m)   Wt 96.6 kg (213 lb)   SpO2 99%   Breastfeeding? No   BMI 38.96 kg/m²     1100: Pt in bed, no new complaints and awaiting EGD today. 1500: Cleared by GI for discharge. Discussed discharge instructions with pt, her daughter and sister. Discussed need for follow up outpatient and new medications. Constitutional:  No acute distress, cooperative, pleasant    ENT:  Oral MM dry, oropharynx benign. Resp:  CTA bilaterally. No wheezing/rhonchi. No accessory muscle use and on RA   CV:  Regular rhythm, normal rate, no murmurs    GI:  Soft, non distended, non tender. normoactive bowel sounds. No BM     Musculoskeletal:  No edema, warm, 2+ pulses throughout    Neurologic:  Moves all extremities. AAOx3. Strength and sensation intact                         Psych:  Good insight, Not anxious nor agitated.     CHRONIC MEDICAL DIAGNOSES:  Problem List as of 1/8/2019 Date Reviewed: 1/8/2019          Codes Class Noted - Resolved    Severe obesity (BMI 35.0-39. 9) ICD-10-CM: E66.01  ICD-9-CM: 278.01  8/16/2018 - Present        Hyperlipidemia ICD-10-CM: E78.5  ICD-9-CM: 272.4  7/3/2013 - Present        Depression with anxiety ICD-10-CM: F41.8  ICD-9-CM: 300.4  7/3/2013 - Present        Vitamin D deficiency ICD-10-CM: E55.9  ICD-9-CM: 268.9  Unknown - Present        S/P aortic valve replacement ICD-10-CM: Z95.2  ICD-9-CM: V43.3  Unknown - Present    Overview Addendum 3/30/2015 11:14 AM by Paulo Nose, MD Dr. Merlinda Ferrara,  Open procedure in 2012, transcatheter redo in 2015.              Diabetes type 2, controlled ([facility] [address] ICD-10-CM: E11.9  ICD-9-CM: 250.00  11/7/2009 - Present        Essential hypertension, benign ICD-10-CM: I10  ICD-9-CM: 401.1  11/7/2009 - Present        Palpitations ICD-10-CM: R00.2  ICD-9-CM: 785.1  11/7/2009 - Present        * (Principal) RESOLVED: GI bleed ICD-10-CM: K92.2  ICD-9-CM: 578.9  1/4/2019 - 1/8/2019        RESOLVED: Advanced care planning/counseling discussion ICD-10-CM: C30.99  ICD-9-CM: V65.49  5/11/2016 - 2/13/2018        RESOLVED: Depression with anxiety ICD-10-CM: F41.8  ICD-9-CM: 300.4  Unknown - 7/3/2013        RESOLVED: Esophageal reflux ICD-10-CM: K21.9  ICD-9-CM: 530.81  11/7/2009 - 10/1/2012            Greater than 30 minutes were spent with the patient on counseling and coordination of care    Signed:   Chava Pugh NP  1/8/2019  4:28 PM

## 2019-01-08 NOTE — PROGRESS NOTES
Bedside and Verbal shift change report given to [address] [place] [address] Avenue (oncoming nurse) by Macy Beck (offgoing nurse). Report included the following information SBAR.

## 2019-01-08 NOTE — ANESTHESIA POSTPROCEDURE EVALUATION
Procedure(s): ESOPHAGOGASTRODUODENOSCOPY (EGD) ESOPHAGOGASTRODUODENAL (EGD) BIOPSY ENDOSCOPIC BRUSHING. Anesthesia Post Evaluation Multimodal analgesia: multimodal analgesia not used between 6 hours prior to anesthesia start to PACU discharge Patient location during evaluation: PACU Patient participation: complete - patient participated Level of consciousness: awake Pain score: 0 Pain management: adequate Airway patency: patent Anesthetic complications: no 
Cardiovascular status: acceptable Respiratory status: acceptable Hydration status: acceptable Comments: I have evaluated the patient and meets criteria for discharge from PACU. Carl Liao MD 
 
 
 
Visit Vitals /73 Pulse (!) 59 Temp 37.1 °C (98.8 °F) Resp 14 Ht 5' 2\" (1.575 m) Wt 96.6 kg (213 lb) SpO2 100% Breastfeeding? No  
BMI 38.96 kg/m²

## 2019-01-08 NOTE — ANESTHESIA PREPROCEDURE EVALUATION
Anesthetic History Review of Systems / Medical History Pulmonary Neuro/Psych Cardiovascular Hypertension GI/Hepatic/Renal 
  
GERD Endo/Other Diabetes Morbid obesity Other Findings Physical Exam 
 
Airway Mallampati: II 
TM Distance: > 6 cm Neck ROM: normal range of motion Mouth opening: Normal 
 
 Cardiovascular Regular rate and rhythm,  S1 and S2 normal,  no murmur, click, rub, or gallop Dental 
 
Dentition: Full upper dentures Pulmonary Breath sounds clear to auscultation Abdominal 
GI exam deferred Other Findings Anesthetic Plan ASA: 3 Anesthesia type: MAC Anesthetic plan and risks discussed with: Patient

## 2019-01-08 NOTE — PROGRESS NOTES
TRANSFER - OUT REPORT: 
 
Verbal report given to elicia Carver(name) on Litzy Morales  being transferred to [facility](unit) for routine post - op Report consisted of patients Situation, Background, Assessment and  
Recommendations(SBAR). Information from the following report(s) SBAR, Kardex, MAR, Accordion and Cardiac Rhythm SR was reviewed with the receiving nurse. Lines:  
Peripheral IV 01/03/19 Right Hand (Active) Site Assessment Clean, dry, & intact 1/8/2019  8:26 AM  
Phlebitis Assessment 0 1/8/2019  8:26 AM  
Infiltration Assessment 0 1/8/2019  8:26 AM  
Dressing Status Clean, dry, & intact 1/8/2019  8:26 AM  
Dressing Type Transparent 1/8/2019  8:26 AM  
Hub Color/Line Status Pink;Capped 1/8/2019  8:26 AM  
Action Taken Open ports on tubing capped 1/7/2019 11:53 PM  
Alcohol Cap Used Yes 1/8/2019  8:26 AM  
  
 
Opportunity for questions and clarification was provided. Patient transported by transporter.

## 2019-01-08 NOTE — PROGRESS NOTES
Bedside shift change report given to Jaleesa (oncoming nurse) by Dhruv Reyes (offgoing nurse). Report included the following information SBAR.

## 2019-01-08 NOTE — PROCEDURES
EGD Procedure Note    Indications:  Iron deficiency anemia   Referring Physician: Bard Sandifer, MD   Anesthesia/Sedation:MAC  Endoscopist:  Dr. Doherty Master  Assistant:  Endoscopy RN-1: Jil Newton RN  Endoscopy RN-2: Aneesh Dias RN    Preoperative diagnosis: Anemia    Postoperative diagnosis: 1.- Candida Esophagitis 2. Gastric erosion        Procedure in Detail:  Informed consent was obtained for the procedure, including sedation. Risks of perforation, hemorrhage, adverse drug reaction, and aspiration were discussed. The patient was placed in the left lateral decubitus position. Based on the pre-procedure assessment, including review of the patient's medical history, medications, allergies, and review of systems, she had been deemed to be an appropriate candidate for moderate sedation; she was therefore sedated with the medications listed above. The patient was monitored continuously with ECG tracing, pulse oximetry, blood pressure monitoring, and direct observations. An Blooie video endoscope was inserted into the mouth and advanced under direct vision to into the esophagus, then stomach and duodenum. A careful inspection was made as the gastroscope was withdrawn, including a retroflexed view of the proximal stomach; findings and interventions are described below. Findings:   Esophagus:mild candida esophagitis - KOH of brushing sent  Stomach: normal fundus and body, mild deformity of the prepyloric area with small deep erosion in middle of a slightly fibrotic area- Bx for path and staining, Pyloritec. Duodenum/jejunum: mild mucosal break or villous atrophy limited to apex of bulb and postbulbar area - Bx for path    Therapies:  See above    Specimens: see above           EBL: None    Complications:   None; patient tolerated the procedure well. Recommendations:  -Acid suppression with a proton pump inhibitor. , -Await pathology. , -Await LIZETH test result and treat for Helicobacter pylori if positive. , -Follow up with me., -No NSAIDS, -resume diet, resume Plavix and ASA, Protonix or omeprazole 40 mg daisly, Ferrous sulfate 325 mg once or twice a day if she can tolerate it    Dang Patrick MD

## 2019-01-08 NOTE — DISCHARGE INSTRUCTIONS
Discharge Instructions     PATIENT ID: Michelle Miller  MRN: [id number]   YOB: 1940    DATE OF ADMISSION: 1/3/2019  9:12 PM    DATE OF DISCHARGE: 1/8/2019  PRIMARY CARE PROVIDER: Humberto Martinez MD   ATTENDING PHYSICIAN: Marie Reardon MD  DISCHARGING PROVIDER: Nader Boyce NP. To contact this individual call [phone] and ask the  to page. If unavailable ask to be transferred the Adult Hospitalist Department. DISCHARGE DIAGNOSES  Acute blood loss anemia, likely GI related    CONSULTATIONS: IP CONSULT TO HOSPITALIST  IP CONSULT TO GASTROENTEROLOGY  IP CONSULT TO CARDIOLOGY    PROCEDURES/SURGERIES: Procedure(s):  ESOPHAGOGASTRODUODENOSCOPY (EGD)  ESOPHAGOGASTRODUODENAL (EGD) BIOPSY  ENDOSCOPIC BRUSHING    FOLLOW UP APPOINTMENTS:   Follow-up Information     Follow up With Specialties Details Why Contact Info    Humberto Martinez MD Pediatrics  or alternative provider in 2-3 weeks [address]  [address]      Michele Voss MD Gastroenterology In 4 weeks will need follow up and arrangement for colonoscopy [place] [id number] [phone]           ADDITIONAL CARE RECOMMENDATIONS:   Please take iron twice daily if able. If unable to take twice daily due to GI side effects such as constipation, please take daily. Colace 1-2 x/day and/or miralax may help with bowel regularity    Monitor blood sugars at home 1-2 times daily and keep record for PCP review    Please note that if HgbA1c is tested in next 3 months, may not be valid as pt received blood transfusion this admit    NO NSAIDS:  No motrin, ibuprofen, naprosyn or other pain relievers with non steroidal anti inflammatory components. Please use Tylenol for pain relief. DIET: Cardiac Diet and Diabetic Diet  ACTIVITY: Activity as tolerated    · It is important that you take the medication exactly as they are prescribed.    · Keep your medication in the bottles provided by the pharmacist and keep a list of the medication names, dosages, and times to be taken in your wallet. · Do not take other medications without consulting your doctor. NOTIFY YOUR PHYSICIAN FOR ANY OF THE FOLLOWING:   Fever over 101 degrees for 24 hours. Chest pain, shortness of breath, fever, chills, nausea, vomiting, diarrhea, change in mentation, falling, weakness, bleeding. Severe pain or pain not relieved by medications. Or, any other signs or symptoms that you may have questions about.     DISPOSITION:    Home With:   OT  PT  HH  RN       SNF/Inpatient Rehab/LTAC    Independent/assisted living    Hospice   xx Other: Home     CDMP Checked:   Yes *x*     PROBLEM LIST Updated:  Yes *x*     Signed:   Cb Dias NP  1/8/2019  3:09 PM

## 2019-01-08 NOTE — PROGRESS NOTES
Bedside shift change report given to [facility] SUDHEER (oncoming nurse) by Trish Baum (offgoing nurse). Report included the following information SBAR, Kardex and MAR.

## 2019-01-08 NOTE — PROGRESS NOTES

## 2019-01-09 ENCOUNTER — PATIENT OUTREACH (OUTPATIENT)
Dept: FAMILY MEDICINE CLINIC | Age: 79
End: 2019-01-09

## 2019-01-10 ENCOUNTER — TELEPHONE (OUTPATIENT)
Dept: FAMILY MEDICINE CLINIC | Age: 79
End: 2019-01-10

## 2019-01-10 NOTE — ADT AUTH CERT NOTES
Per message from nurse: 
 
I put everything I could find in the additional clinicals I entered this am at 0827. Pt was discharged on 19. Evy Francis Facility Name: [address]           
  
  
  
  
  
   
Patient Demographics Patient Name Kassie Bowser Sex Female  
1940 Address [address] [place] [address] Denita Tobin Said Phone [phone] (Home) [phone] (Mobile) *Preferred* Hospital Account Name Acct ID Class Status Primary Coverage Kassie Bowser [id number] INPATIENT Billed HUMANA MEDICARE - [facility] HUMANA MEDICARE CHOICE PPO/PFFS  
  
   
Guarantor Account (for Hospital Account [de-identified]) Name Relation to Pt Service Area Active? Acct Type Kassie Bowser Self BONSC Yes Personal/Family Address Phone [address] [place] [address] [phone](R)    
  
   
Coverage Information (for Hospital Account [de-identified]) F/O Payor/Plan Subscriber  Subscriber Sex Precert # [id number] [facility] PPO/PFFS [id number] Subscriber Subscriber # Kassie Bowser [id number] Grp # Group Name [id number] Address Phone  [address] [address] [address] Policy Number Status Effective Date Benefits Phone [id number] -  - Auth/Cert DTA#[id number]  
  
   
Diagnosis Codes Comments Anemia, unspecified type  ICD-10-CM: D64.9 ICD-9-CM: 313. 9   
  
   
Admission Information Arrival Date/Time: 2019 1832 Admit Date/Time: 2019 2112 IP Adm. Date/Time: 2019 5285 Admission Type: Emergency Point of Origin: Non-health Care Facility/self Admit Category:   
Means of Arrival: Car Primary Service: Medicine Secondary Service: N/A Transfer Source:  Service Area: [address] [place] [address] Unit: [facility] 5E1 SURGICAL UNIT Admit Provider: Leda Roy MD Attending Provider: Pramod Marie MD Referring Provider:   
Admission Information Attending Provider Admission Dx Admitted On 01/03/19 Service Isolation Code Status MEDICINE  Prior Allergies Advance Care Planning Naldecon, Sulfa (Sulfonamide Antibiotics) Jump to the Activity    
Admission Information Unit/Bed: [facility] 5E1 SURGICAL UNIT/02 Service: MEDICINE Admitting provider: Mela Garcia MD Phone: [phone] Attending provider:  Phone: PCP: Broderick Ramey MD Phone:   
Admission dx: GI bleed Patient class: I Admission type: ER

## 2019-01-10 NOTE — PROGRESS NOTES
Hospital Discharge Follow-Up Date/Time:  1/10/2019 3:19 PM 
 
Patient was admitted to [facility] on 1-3-19 and discharged on 1-8-19 for: 
 
Kristin Garcia, likely from GI blood loss: 
- s/p 2 units PRBCs 1/4-1/5 with stable Hgb now - Iron 16 TIBC 329 iron % sat 5 (1/8) 
- s/p EGD 1/8: Candida Esophagitis (not treating as abx with interfere with plavix as per GI discretion). Gastric erosion. H Pylori negative. - PPI on discharge. Avoid NSAIDs. Start Iron, BID if possible. - pt to follow up with GI outpatient 
  
S/p AVR: on ASA and Plavix at home, ok to resume 
  
Recent Left Carotid Thrombectomy:  
- with Dr Yung Beckwith with vascular surgery at [facility] 
- Plavix and ASA  
  
Hx Type 2 DM:  
- HgbA1c 1/2 6.2 
- on glipizide and metformin at home, resume d/c 
- blood sugars ranging from 126-175 
  
Elevated Troponin: 
- cardiology has seen; no need for further cardiac intervention at this time 
  
Leukocytosis: trending down 
- likely reactive from GI bleed/blood transfusion  
- still no s/s of infection, no fevers.   
 
The physician discharge summary was available at the time of outreach. Patient was contacted within two business days of discharge. Challenges reviewed with the provider:  
- Patient has not visualized any blood since discharge on 1-8-19, states she is almost back to her baseline activity level. - Patient is not taking NSAIDS, as instructed. - Patient is taking iron twice a day, as instructed. - Patient complains of polyuria and states this has been occurring for approximately 6 years. - Patient checks home blood glucose 1-2 times a day at home and does not log readings, education provided. - Patient states she does not avoid sweets; however she states she attempts to limit carbs, education provided. Method of communication with provider :chart routing Inpatient RRAT score: 15 on 1-8-19. Was this a readmission? no  
Patient stated reason for the readmission: not applicable Nurse Navigator (NN) contacted the patient by telephone to perform post hospital discharge assessment. Verified name and  with patient as identifiers. Provided introduction to self, and explanation of the Nurse Navigator role. Reviewed discharge instructions and red flags with patient who verbalized understanding. Patient given an opportunity to ask questions and does not have any further questions or concerns at this time. The patient agrees to contact the PCP office for questions related to their healthcare. NN provided contact information for future reference. Disease Specific:   N/A to this admission. Summary of patient's problems: 1. Patient has not visualized any blood since discharge on 19, states she is almost back to her baseline activity level. 2. Patient is not taking NSAIDS, as instructed. 3. Patient is taking iron twice a day, as instructed. 4. Patient complains of polyuria and states this has been occurring for approximately 6 years. 5. Patient checks home blood glucose 1-2 times a day at home and does not log readings, education provided. 6. Patient states she does not avoid sweets; however she states she attempts to limit carbs, education provided. Home Health orders at discharge: none Home Health company: none Date of initial visit: not applicable Durable Medical Equipment ordered/company: None and patient does not utilize DME in her home environment. Durable Medical Equipment received: None upon discharge. Barriers to care? lack of knowledge about disease Advance Care Planning:  
Does patient have an Advance Directive:  reviewed and current  Patient has [facility],  [address] Directive for [facility], dated 2-2-15, scanned into [facility]. Medications per [address] [place] [address] After Visit Summary dated 19:  
DISCHARGE MEDICATIONS: 
     
Current Discharge Medication List  
   
     
START taking these medications  
  Details ferrous sulfate 325 mg (65 mg iron) tablet Take 1 Tab by mouth two (2) times daily (with meals). Qty: 60 Tab, Refills: 0  
   
pantoprazole (PROTONIX) 40 mg tablet Take 1 Tab by mouth daily. Qty: 30 Tab, Refills: 0  
   
L. acidoph & paracasei- S therm- Bifido (ALVIN-Q/RISAQUAD) 8 billion cell cap cap Take 1 Cap by mouth daily. Qty: 30 Cap, Refills: 0  
   
   
     
CONTINUE these medications which have NOT CHANGED  
  Details  
glimepiride (AMARYL) 1 mg tablet Take 1 mg by mouth Daily (before breakfast).    
tiZANidine (ZANAFLEX) 4 mg tablet Take 4 mg by mouth three (3) times daily as needed.  
   
acetaminophen (TYLENOL) 325 mg tablet Take 325 mg by mouth every four (4) hours as needed for Pain.  
   
clopidogrel (PLAVIX) 75 mg tab Take 75 mg by mouth.  
   
lisinopril (PRINIVIL, ZESTRIL) 5 mg tablet TAKE 1 TABLET EVERY DAY Qty: 90 Tab, Refills: 3  
  PARoxetine (PAXIL) 30 mg tablet TAKE 1 TABLET EVERY DAY Qty: 90 Tab, Refills: 3  
   
meclizine (ANTIVERT) 25 mg tablet Take 1 Tab by mouth three (3) times daily as needed for Dizziness. Qty: 20 Tab, Refills: 0  
   
metoprolol succinate (TOPROL-XL) 25 mg XL tablet TAKE 1 TABLET EVERY DAY Qty: 90 Tab, Refills: 3  
  MYRBETRIQ 25 mg ER tablet Take 25 mg by mouth daily.  
   
metFORMIN (GLUCOPHAGE) 500 mg tablet TAKE TWO TABLETS BY MOUTH DAILY WITH BREAKFAST Qty: 180 Tab, Refills: 3  
   
lovastatin (MEVACOR) 10 mg tablet TAKE ONE TABLET BY MOUTH NIGHTLY Qty: 90 Tab, Refills: 3  
   
aspirin delayed-release 325 mg tablet Take 1 Tab by mouth daily. Qty: 90 Tab, Refills: 1  
  Associated Diagnoses: Diabetes mellitus ([address]  
   
PV W-O EDU/FERROUS FUMARATE/FA (M-VIT PO) Take 1 tablet by mouth daily.  
   
   
    
STOP taking these medications  
   
  ibuprofen (ADVIL) 200 mg tablet Comments:  
Reason for Stopping:   
     
  LORazepam (ATIVAN) 0.5 mg tablet Comments:  
Reason for Stopping:   
     
  meloxicam (MOBIC) 15 mg tablet Comments: Reason for Stopping:   
     
 
Medication reconciliation was performed with patient, who verbalizes understanding of administration of home medications. There were no barriers to obtaining medications identified at this time and patient states she takes all medications as ordered. Referral to Pharm D needed: no  
 
Current Outpatient Medications Medication Sig  
 ferrous sulfate 325 mg (65 mg iron) tablet Take 1 Tab by mouth two (2) times daily (with meals).  pantoprazole (PROTONIX) 40 mg tablet Take 1 Tab by mouth daily.  L. acidoph & paracasei- S therm- Bifido (ALVIN-Q/RISAQUAD) 8 billion cell cap cap Take 1 Cap by mouth daily.  glimepiride (AMARYL) 1 mg tablet Take 1 mg by mouth Daily (before breakfast).  tiZANidine (ZANAFLEX) 4 mg tablet Take 4 mg by mouth three (3) times daily as needed.  acetaminophen (TYLENOL) 325 mg tablet Take 325 mg by mouth every four (4) hours as needed for Pain.  clopidogrel (PLAVIX) 75 mg tab Take 75 mg by mouth.  lisinopril (PRINIVIL, ZESTRIL) 5 mg tablet TAKE 1 TABLET EVERY DAY  PARoxetine (PAXIL) 30 mg tablet TAKE 1 TABLET EVERY DAY  meclizine (ANTIVERT) 25 mg tablet Take 1 Tab by mouth three (3) times daily as needed for Dizziness.  metoprolol succinate (TOPROL-XL) 25 mg XL tablet TAKE 1 TABLET EVERY DAY  MYRBETRIQ 25 mg ER tablet Take 25 mg by mouth daily.  metFORMIN (GLUCOPHAGE) 500 mg tablet TAKE TWO TABLETS BY MOUTH DAILY WITH BREAKFAST  lovastatin (MEVACOR) 10 mg tablet TAKE ONE TABLET BY MOUTH NIGHTLY  aspirin delayed-release 325 mg tablet Take 1 Tab by mouth daily.  PV W-O EDU/FERROUS FUMARATE/FA (M-VIT PO) Take 1 tablet by mouth daily. No current facility-administered medications for this visit. [facility] follow up appointment(s):  
Future Appointments Date Time Provider Beba Marti 1/11/2019 10:20 AM Martha Rodriguez  [facility]  
 ** Original SVETLANA was scheduled with Dolores Montes D eOca NP, at [facility] at 10am. ** Non-[facility] follow up appointment(s): Patient needs to schedule follow-up appointment with Dr. Radha Awad/ROBERT and patient needs follow-up colonoscopy. Patient states she will make this appointment this afternoon. [facility]:  out of service area Goals  Attends follow-up appointments as directed. 1-10-19: Patient has SVETLANA scheduled for 1-11-19, at 10am, with Dolores Montes De Oca NP at [facility]. Patient states she will make follow-up appointment with Dr. Kathie Awad/ROBERT this afternoon and patient reminded that she needs to schedule follow-up colonoscopy according to discharge instructions. CAROL  
  
  Establish PCP relationships and regularly scheduled appointments.  Returns to baseline activity level. 1-10-19: Patient states she has \"mostly returned to myself\" when referring to her baseline activity. [address] [place] [address] resources in place to maintain patient in the community (ie. Home Health, DME equipment, refer to, medication assistant plan, etc.) 1-10-19: Patient states she has four supportive daughters and lives with family members. Patient drives herself, on short distances only, as needed. Daughters also provide transportation. Patient states she baby-sits her grandchild and plans to return to this activity next week.  Cait Nolasco

## 2019-01-10 NOTE — TELEPHONE ENCOUNTER
Please see outreach dated 1-10-19. Patient was scheduled for SVETLANA on 1-11-19, at 10am, with Soo Mtz NP at [facility].

## 2019-01-11 ENCOUNTER — OFFICE VISIT (OUTPATIENT)
Dept: FAMILY MEDICINE CLINIC | Age: 79
End: 2019-01-11

## 2019-01-11 VITALS
TEMPERATURE: 97.7 F | DIASTOLIC BLOOD PRESSURE: 79 MMHG | HEIGHT: 62 IN | HEART RATE: 77 BPM | WEIGHT: 213 LBS | SYSTOLIC BLOOD PRESSURE: 144 MMHG | OXYGEN SATURATION: 96 % | BODY MASS INDEX: 39.2 KG/M2 | RESPIRATION RATE: 12 BRPM

## 2019-01-11 DIAGNOSIS — E11.8 CONTROLLED TYPE 2 DIABETES MELLITUS WITH COMPLICATION, WITHOUT LONG-TERM CURRENT USE OF INSULIN (HCC): ICD-10-CM

## 2019-01-11 DIAGNOSIS — Z95.2 S/P AORTIC VALVE REPLACEMENT: ICD-10-CM

## 2019-01-11 DIAGNOSIS — K29.01 GASTROINTESTINAL HEMORRHAGE ASSOCIATED WITH ACUTE GASTRITIS: Primary | ICD-10-CM

## 2019-01-11 DIAGNOSIS — B37.81 CANDIDA ESOPHAGITIS (HCC): ICD-10-CM

## 2019-01-11 NOTE — PROGRESS NOTES
HPI 
Sonja El is a [de-identified] y.o. female who presents to follow-up on hospital stay at [facility] 1/31/8 for GI bleed. Patient out reach was completed on 1/10. Patient and family had noted that she was getting more short of breath, dyspnea on exertion for a few days in December. Was brought in to see Dr. Esperanza Loza and blood work revealed a new and significant anemia. Patient admitted to dark stool a day or 2 prior to that visit. I sent her to the emergency room where she was admitted. She received 2 units packed red blood cells, had an EGD showing Candida esophagitis and gastric erosions. H. pylori was negative. Was started on Protonix, was told to stop taking NSAIDs. She was told to continue Plavix and aspirin after her EGD. She does have back pain. She does manage her own medications to a certain extent. Tried to clarify what she was taking prior to her hospital stay. She thinks she was either taking Motrin or Mobic for back pain. Also thinks she might of been taking extra aspirin for back pain. Emphasized the importance of not taking NSAIDs. She is taking iron supplement twice a day. Does not think that she is getting constipated. Has a bowel regime that she was given but has not started this yet. PMHx: 
Past Medical History:  
Diagnosis Date  Atypical chest pain Long h/o intermittent non-cardiac CP.  Depression with anxiety  Diabetes ([address]  GERD (gastroesophageal reflux disease)  Hypercholesteremia  Hyperlipidemia 7/3/2013  Hypertension  Inner ear dysfunction  S/P aortic valve replacement Dr. Cooper Look yearly  Vitamin D deficiency Meds:  
Current Outpatient Medications Medication Sig Dispense Refill  ferrous sulfate 325 mg (65 mg iron) tablet Take 1 Tab by mouth two (2) times daily (with meals). 60 Tab 0  
 pantoprazole (PROTONIX) 40 mg tablet Take 1 Tab by mouth daily.  30 Tab 0  
  L. acidoph & paracasei- S therm- Bifido (ALVIN-Q/RISAQUAD) 8 billion cell cap cap Take 1 Cap by mouth daily. 30 Cap 0  
 glimepiride (AMARYL) 1 mg tablet Take 1 mg by mouth Daily (before breakfast).  acetaminophen (TYLENOL) 325 mg tablet Take 325 mg by mouth every four (4) hours as needed for Pain.  clopidogrel (PLAVIX) 75 mg tab Take 75 mg by mouth.  lisinopril (PRINIVIL, ZESTRIL) 5 mg tablet TAKE 1 TABLET EVERY DAY 90 Tab 3  
 PARoxetine (PAXIL) 30 mg tablet TAKE 1 TABLET EVERY DAY 90 Tab 3  
 meclizine (ANTIVERT) 25 mg tablet Take 1 Tab by mouth three (3) times daily as needed for Dizziness. 20 Tab 0  
 metoprolol succinate (TOPROL-XL) 25 mg XL tablet TAKE 1 TABLET EVERY DAY 90 Tab 3  
 MYRBETRIQ 25 mg ER tablet Take 25 mg by mouth daily.  metFORMIN (GLUCOPHAGE) 500 mg tablet TAKE TWO TABLETS BY MOUTH DAILY WITH BREAKFAST 180 Tab 3  
 lovastatin (MEVACOR) 10 mg tablet TAKE ONE TABLET BY MOUTH NIGHTLY 90 Tab 3  
 aspirin delayed-release 325 mg tablet Take 1 Tab by mouth daily. 90 Tab 1  PV W-O EDU/FERROUS FUMARATE/FA (M-VIT PO) Take 1 tablet by mouth daily.  tiZANidine (ZANAFLEX) 4 mg tablet Take 4 mg by mouth three (3) times daily as needed. Allergies: Allergies Allergen Reactions  Naldecon Unknown (comments)  Sulfa (Sulfonamide Antibiotics) Rash Smoker: 
Social History Tobacco Use Smoking Status Former Smoker  Last attempt to quit: 1999  Years since quittin.3 Smokeless Tobacco Never Used ETOH:  
Social History Substance and Sexual Activity Alcohol Use No  
 
 
FH:  
Family History Problem Relation Age of Onset  Heart Disease Mother  Heart Failure Father  Heart Failure Sister  Stroke Sister  Diabetes Brother  Heart Disease Brother ROS:  
As listed in HPI. In addition: 
Constitutional:   No headache, fever, fatigue, weight loss or weight gain Cardiac:    No chest pain Resp:   No cough or shortness of breath Neuro   No loss of consciousness, dizziness, seizures Physical Exam: 
Blood pressure 144/79, pulse 77, temperature 97.7 °F (36.5 °C), resp. rate 12, height 5' 2\" (1.575 m), weight 213 lb (96.6 kg), SpO2 96 %. GEN: No apparent distress. Alert and oriented and responds to all questions appropriately. NEUROLOGIC:  No focal neurologic deficits. Strength and sensation grossly intact. Coordination and gait grossly intact. EXT: Well perfused. No edema. SKIN: No obvious rashes. Lungs clear to auscultation bilaterally CV regular rate, AVR Assessment and Plan GI bleed, possibly gastric download does not look like active bleed was found Gastric erosions, candidal esophagitis Not treating cannot because of interaction with Plavix Protonix Avoiding NSAIDs Continuing Plavix and aspirin Because of the continued anticoagulation will keep a close eye on her blood count. Patient is to go to the emergency room if she notices black stool (will be tough with iron supplement but err on the side of caution) Plan CBC lab visit in 1 week Back is bothering her today. Paraspinal muscle spasms exquisitely tender to palpation. Heating pad, movement will help. Anemia Iron twice daily, could take this once a day or every other day. Recommend bowel regime Diabetes A1c upon admission was 6.2. Now that she is transfused A1c will not be accurate for 3 months Glipizide, metformin Monitor fasting blood sugar Follow-up with GI Follow-up with me in 1 month ICD-10-CM ICD-9-CM 1. Gastrointestinal hemorrhage associated with acute gastritis K29.01 535.01 CBC WITH AUTOMATED DIFF 2. S/P aortic valve replacement Z95.2 V43.3 3. Controlled type 2 diabetes mellitus with complication, without long-term current use of insulin (HCC) E11.8 250.90   
4. Candida esophagitis ([facility]) B37.81 112.84   
 
 
AVS given. Pt expressed understanding of instructions

## 2019-01-11 NOTE — PROGRESS NOTES
. 
Chief Complaint Patient presents with  Transitions Of Care Abhinav Mcdaniel 1. Have you been to the ER, urgent care clinic since your last visit? Hospitalized since your last visit? yes 2. Have you seen or consulted any other health care providers outside of the [address] [place] [address] since your last visit? Include any pap smears or colon screening. No 
 
. Health Maintenance Due Topic Date Due  Shingrix Vaccine Age 50> (1 of 2) 09/28/1988  
 FOOT EXAM Q1  07/03/2018 Abhinav Ca

## 2019-01-16 ENCOUNTER — APPOINTMENT (OUTPATIENT)
Dept: CT IMAGING | Age: 79
End: 2019-01-16
Attending: EMERGENCY MEDICINE
Payer: MEDICARE

## 2019-01-16 ENCOUNTER — HOSPITAL ENCOUNTER (EMERGENCY)
Age: 79
Discharge: HOME | End: 2019-01-16
Attending: EMERGENCY MEDICINE
Payer: MEDICARE

## 2019-01-16 VITALS
RESPIRATION RATE: 18 BRPM | HEIGHT: 62 IN | WEIGHT: 213 LBS | DIASTOLIC BLOOD PRESSURE: 52 MMHG | BODY MASS INDEX: 39.2 KG/M2 | TEMPERATURE: 98.3 F | OXYGEN SATURATION: 99 % | HEART RATE: 70 BPM | SYSTOLIC BLOOD PRESSURE: 111 MMHG

## 2019-01-16 DIAGNOSIS — R10.12 ABDOMINAL PAIN, LUQ (LEFT UPPER QUADRANT): ICD-10-CM

## 2019-01-16 DIAGNOSIS — E11.9 TYPE 2 DIABETES MELLITUS WITHOUT COMPLICATION, WITHOUT LONG-TERM CURRENT USE OF INSULIN (HCC): Primary | ICD-10-CM

## 2019-01-16 DIAGNOSIS — N39.0 URINARY TRACT INFECTION WITHOUT HEMATURIA, SITE UNSPECIFIED: Primary | ICD-10-CM

## 2019-01-16 LAB
ALBUMIN SERPL-MCNC: 3.6 G/DL (ref 3.5–5)
ALBUMIN/GLOB SERPL: 0.9 {RATIO} (ref 1.1–2.2)
ALP SERPL-CCNC: 58 U/L (ref 45–117)
ALT SERPL-CCNC: 20 U/L (ref 12–78)
ANION GAP SERPL CALC-SCNC: 4 MMOL/L (ref 5–15)
APPEARANCE UR: CLEAR
AST SERPL-CCNC: 17 U/L (ref 15–37)
ATRIAL RATE: 62 BPM
BACTERIA URNS QL MICRO: ABNORMAL /HPF
BASOPHILS # BLD: 0.1 K/UL (ref 0–0.1)
BASOPHILS NFR BLD: 1 % (ref 0–1)
BILIRUB SERPL-MCNC: 0.2 MG/DL (ref 0.2–1)
BILIRUB UR QL: NEGATIVE
BUN SERPL-MCNC: 30 MG/DL (ref 6–20)
BUN/CREAT SERPL: 32 (ref 12–20)
CALCIUM SERPL-MCNC: 9.4 MG/DL (ref 8.5–10.1)
CALCULATED P AXIS, ECG09: 64 DEGREES
CALCULATED R AXIS, ECG10: 42 DEGREES
CALCULATED T AXIS, ECG11: 81 DEGREES
CHLORIDE SERPL-SCNC: 105 MMOL/L (ref 97–108)
CO2 SERPL-SCNC: 29 MMOL/L (ref 21–32)
COLOR UR: ABNORMAL
COMMENT, HOLDF: NORMAL
CREAT SERPL-MCNC: 0.95 MG/DL (ref 0.55–1.02)
DIAGNOSIS, 93000: NORMAL
DIFFERENTIAL METHOD BLD: ABNORMAL
EOSINOPHIL # BLD: 0.3 K/UL (ref 0–0.4)
EOSINOPHIL NFR BLD: 3 % (ref 0–7)
EPITH CASTS URNS QL MICRO: ABNORMAL /LPF
ERYTHROCYTE [DISTWIDTH] IN BLOOD BY AUTOMATED COUNT: 14.2 % (ref 11.5–14.5)
GLOBULIN SER CALC-MCNC: 4.1 G/DL (ref 2–4)
GLUCOSE SERPL-MCNC: 137 MG/DL (ref 65–100)
GLUCOSE UR STRIP.AUTO-MCNC: NEGATIVE MG/DL
HCT VFR BLD AUTO: 31.8 % (ref 35–47)
HGB BLD-MCNC: 9.1 G/DL (ref 11.5–16)
HGB UR QL STRIP: ABNORMAL
IMM GRANULOCYTES # BLD AUTO: 0 K/UL (ref 0–0.04)
IMM GRANULOCYTES NFR BLD AUTO: 0 % (ref 0–0.5)
KETONES UR QL STRIP.AUTO: NEGATIVE MG/DL
LEUKOCYTE ESTERASE UR QL STRIP.AUTO: ABNORMAL
LIPASE SERPL-CCNC: 182 U/L (ref 73–393)
LYMPHOCYTES # BLD: 2.7 K/UL (ref 0.8–3.5)
LYMPHOCYTES NFR BLD: 27 % (ref 12–49)
MCH RBC QN AUTO: 26.1 PG (ref 26–34)
MCHC RBC AUTO-ENTMCNC: 28.6 G/DL (ref 30–36.5)
MCV RBC AUTO: 91.1 FL (ref 80–99)
MONOCYTES # BLD: 1 K/UL (ref 0–1)
MONOCYTES NFR BLD: 10 % (ref 5–13)
NEUTS SEG # BLD: 5.8 K/UL (ref 1.8–8)
NEUTS SEG NFR BLD: 59 % (ref 32–75)
NITRITE UR QL STRIP.AUTO: POSITIVE
NRBC # BLD: 0 K/UL (ref 0–0.01)
NRBC BLD-RTO: 0 PER 100 WBC
P-R INTERVAL, ECG05: 174 MS
PH UR STRIP: 7 [PH] (ref 5–8)
PLATELET # BLD AUTO: 390 K/UL (ref 150–400)
PMV BLD AUTO: 10.5 FL (ref 8.9–12.9)
POTASSIUM SERPL-SCNC: 4.6 MMOL/L (ref 3.5–5.1)
PROT SERPL-MCNC: 7.7 G/DL (ref 6.4–8.2)
PROT UR STRIP-MCNC: ABNORMAL MG/DL
Q-T INTERVAL, ECG07: 390 MS
QRS DURATION, ECG06: 80 MS
QTC CALCULATION (BEZET), ECG08: 395 MS
RBC # BLD AUTO: 3.49 M/UL (ref 3.8–5.2)
RBC #/AREA URNS HPF: ABNORMAL /HPF (ref 0–5)
RBC MORPH BLD: ABNORMAL
SAMPLES BEING HELD,HOLD: NORMAL
SODIUM SERPL-SCNC: 138 MMOL/L (ref 136–145)
SP GR UR REFRACTOMETRY: <1.005 (ref 1–1.03)
UROBILINOGEN UR QL STRIP.AUTO: 0.2 EU/DL (ref 0.2–1)
VENTRICULAR RATE, ECG03: 62 BPM
WBC # BLD AUTO: 9.9 K/UL (ref 3.6–11)
WBC URNS QL MICRO: ABNORMAL /HPF (ref 0–4)

## 2019-01-16 PROCEDURE — 36415 COLL VENOUS BLD VENIPUNCTURE: CPT

## 2019-01-16 PROCEDURE — 80053 COMPREHEN METABOLIC PANEL: CPT

## 2019-01-16 PROCEDURE — C9113 INJ PANTOPRAZOLE SODIUM, VIA: HCPCS | Performed by: EMERGENCY MEDICINE

## 2019-01-16 PROCEDURE — 87186 SC STD MICRODIL/AGAR DIL: CPT

## 2019-01-16 PROCEDURE — 83690 ASSAY OF LIPASE: CPT

## 2019-01-16 PROCEDURE — 87077 CULTURE AEROBIC IDENTIFY: CPT

## 2019-01-16 PROCEDURE — 99284 EMERGENCY DEPT VISIT MOD MDM: CPT

## 2019-01-16 PROCEDURE — 93005 ELECTROCARDIOGRAM TRACING: CPT

## 2019-01-16 PROCEDURE — 74177 CT ABD & PELVIS W/CONTRAST: CPT

## 2019-01-16 PROCEDURE — 74011000250 HC RX REV CODE- 250: Performed by: EMERGENCY MEDICINE

## 2019-01-16 PROCEDURE — 87086 URINE CULTURE/COLONY COUNT: CPT

## 2019-01-16 PROCEDURE — 74011000258 HC RX REV CODE- 258: Performed by: EMERGENCY MEDICINE

## 2019-01-16 PROCEDURE — 74011636320 HC RX REV CODE- 636/320: Performed by: EMERGENCY MEDICINE

## 2019-01-16 PROCEDURE — 96375 TX/PRO/DX INJ NEW DRUG ADDON: CPT

## 2019-01-16 PROCEDURE — 96365 THER/PROPH/DIAG IV INF INIT: CPT

## 2019-01-16 PROCEDURE — 74011250636 HC RX REV CODE- 250/636: Performed by: EMERGENCY MEDICINE

## 2019-01-16 PROCEDURE — 85025 COMPLETE CBC W/AUTO DIFF WBC: CPT

## 2019-01-16 PROCEDURE — 81001 URINALYSIS AUTO W/SCOPE: CPT

## 2019-01-16 RX ORDER — ONDANSETRON 2 MG/ML
4 INJECTION INTRAMUSCULAR; INTRAVENOUS
Status: COMPLETED | OUTPATIENT
Start: 2019-01-16 | End: 2019-01-16

## 2019-01-16 RX ORDER — SODIUM CHLORIDE 0.9 % (FLUSH) 0.9 %
10 SYRINGE (ML) INJECTION
Status: COMPLETED | OUTPATIENT
Start: 2019-01-16 | End: 2019-01-16

## 2019-01-16 RX ORDER — NITROFURANTOIN 25; 75 MG/1; MG/1
100 CAPSULE ORAL 2 TIMES DAILY
Qty: 10 CAP | Refills: 0 | Status: SHIPPED | OUTPATIENT
Start: 2019-01-16 | End: 2019-07-05 | Stop reason: SDUPTHER

## 2019-01-16 RX ORDER — BLOOD-GLUCOSE METER
EACH MISCELLANEOUS
Qty: 1 EACH | Refills: 0 | Status: SHIPPED | OUTPATIENT
Start: 2019-01-16 | End: 2019-05-07 | Stop reason: SDUPTHER

## 2019-01-16 RX ORDER — NITROFURANTOIN 25; 75 MG/1; MG/1
100 CAPSULE ORAL
Status: DISCONTINUED | OUTPATIENT
Start: 2019-01-16 | End: 2019-01-16 | Stop reason: HOSPADM

## 2019-01-16 RX ORDER — LANCETS
EACH MISCELLANEOUS
Qty: 100 EACH | Refills: 11 | Status: SHIPPED | OUTPATIENT
Start: 2019-01-16

## 2019-01-16 RX ADMIN — SODIUM CHLORIDE 1 G: 900 INJECTION, SOLUTION INTRAVENOUS at 17:42

## 2019-01-16 RX ADMIN — SODIUM CHLORIDE 40 MG: 9 INJECTION, SOLUTION INTRAMUSCULAR; INTRAVENOUS; SUBCUTANEOUS at 13:25

## 2019-01-16 RX ADMIN — IOPAMIDOL 100 ML: 755 INJECTION, SOLUTION INTRAVENOUS at 15:02

## 2019-01-16 RX ADMIN — ONDANSETRON 4 MG: 2 INJECTION INTRAMUSCULAR; INTRAVENOUS at 13:23

## 2019-01-16 RX ADMIN — SODIUM CHLORIDE 100 ML: 900 INJECTION, SOLUTION INTRAVENOUS at 15:02

## 2019-01-16 RX ADMIN — Medication 10 ML: at 15:02

## 2019-01-16 NOTE — ED PROVIDER NOTES
HPI Pt reports abrupt onset of left sided abdominal pain yesterday afternoon. She states that the pain has been intermittent since then and did not awaken her at night. Denies fever,cough, cold symptoms, headache, neck pain, visual changes, focal weakness or rash. Denies any  difficulty breathing, difficulty swallowing, SOB or chest pain. Denies any nausea, vomiting or diarrhea. Pt. Reports that she had not had any pain medications today prior to arrival. 
PMH, social history and ROS are limited secondary to pt's anxiety and depression. Pt was recently discharged from [facility] after inpatient treatment for anemia. Past Medical History:  
Diagnosis Date  Anemia  Atypical chest pain Long h/o intermittent non-cardiac CP.  Depression with anxiety  Diabetes ([address]  GERD (gastroesophageal reflux disease)  Hypercholesteremia  Hyperlipidemia 7/3/2013  Hypertension  Inner ear dysfunction  S/P aortic valve replacement Dr. Willis Hernandez yearly  Vitamin D deficiency Past Surgical History:  
Procedure Laterality Date [address] Bovine valve  HX CATARACT REMOVAL    
 HX CHOLECYSTECTOMY  1999  HX MOHS PROCEDURES Left 2014 Family History:  
Problem Relation Age of Onset  Heart Disease Mother  Heart Failure Father  Heart Failure Sister  Stroke Sister  Diabetes Brother  Heart Disease Brother Social History Socioeconomic History  Marital status:  Spouse name: Not on file  Number of children: Not on file  Years of education: Not on file  Highest education level: Not on file Social Needs  Financial resource strain: Not on file  Food insecurity - worry: Not on file  Food insecurity - inability: Not on file  Transportation needs - medical: Not on file  Transportation needs - non-medical: Not on file Occupational History  Not on file Tobacco Use  
  Smoking status: Former Smoker Last attempt to quit: 1999 Years since quittin.4  Smokeless tobacco: Never Used Substance and Sexual Activity  Alcohol use: No  
 Drug use: No  
 Sexual activity: Yes  
  Partners: Male Other Topics Concern  Not on file Social History Narrative  Not on file ALLERGIES: Naldecon and Sulfa (sulfonamide antibiotics) Review of Systems Constitutional: Negative for activity change, appetite change, fever and unexpected weight change. HENT: Negative for congestion and trouble swallowing. Respiratory: Negative for cough and shortness of breath. Cardiovascular: Negative for chest pain, palpitations and leg swelling. Gastrointestinal: Positive for abdominal pain. Negative for blood in stool, constipation and diarrhea. Genitourinary: Positive for frequency. Negative for difficulty urinating. Musculoskeletal: Negative for back pain. All other systems reviewed and are negative. Vitals:  
 19 1223 19 1259 BP:  133/53 Pulse: 79 65 Resp:  18 Temp:  98.8 °F (37.1 °C) SpO2: 95% 97% Weight:  96.6 kg (213 lb) Height:  5' 2\" (1.575 m) Physical Exam  
Constitutional: She is oriented to person, place, and time. Non-toxic appearance. She does not appear ill.  
elderly white female; lives with her adult daughters; non smoker HENT:  
Head: Normocephalic. Mouth/Throat: Oropharynx is clear and moist.  
Edentulous; left dentures at home Cardiovascular: Normal rate and regular rhythm. Pulmonary/Chest: Effort normal and breath sounds normal.  
Abdominal: Soft. Normal appearance and bowel sounds are normal. There is tenderness in the left lower quadrant. Neurological: She is alert and oriented to person, place, and time. Skin: Skin is warm and dry. No rash noted. There is pallor. Psychiatric: She has a normal mood and affect. Nursing note and vitals reviewed. MDM Procedures Pt has been re-examined and reports that she is presently pain free. 7:36 PM 
Patient's results and plan of care have been reviewed with her and her daughter. Patient and/or family have verbally conveyed their understanding and agreement of the patient's signs, symptoms, diagnosis, treatment and prognosis and additionally agree to follow up as recommended or return to the Emergency Room should her condition change prior to follow-up. Discharge instructions have also been provided to the patient and her daughter with some educational information regarding her diagnosis as well a list of reasons why they would want to return to the ER prior to her follow-up appointment should her condition change. Gil Cuevas NP

## 2019-01-16 NOTE — DISCHARGE INSTRUCTIONS
Patient Education        Abdominal Pain: Care Instructions  Your Care Instructions    Abdominal pain has many possible causes. Some aren't serious and get better on their own in a few days. Others need more testing and treatment. If your pain continues or gets worse, you need to be rechecked and may need more tests to find out what is wrong. You may need surgery to correct the problem. Don't ignore new symptoms, such as fever, nausea and vomiting, urination problems, pain that gets worse, and dizziness. These may be signs of a more serious problem. Your doctor may have recommended a follow-up visit in the next 8 to 12 hours. If you are not getting better, you may need more tests or treatment. The doctor has checked you carefully, but problems can develop later. If you notice any problems or new symptoms, get medical treatment right away. Follow-up care is a key part of your treatment and safety. Be sure to make and go to all appointments, and call your doctor if you are having problems. It's also a good idea to know your test results and keep a list of the medicines you take. How can you care for yourself at home? · Rest until you feel better. · To prevent dehydration, drink plenty of fluids, enough so that your urine is light yellow or clear like water. Choose water and other caffeine-free clear liquids until you feel better. If you have kidney, heart, or liver disease and have to limit fluids, talk with your doctor before you increase the amount of fluids you drink. · If your stomach is upset, eat mild foods, such as rice, dry toast or crackers, bananas, and applesauce. Try eating several small meals instead of two or three large ones. · Wait until 48 hours after all symptoms have gone away before you have spicy foods, alcohol, and drinks that contain caffeine. · Do not eat foods that are high in fat. · Avoid anti-inflammatory medicines such as aspirin, ibuprofen (Advil, Motrin), and naproxen (Aleve). These can cause stomach upset. Talk to your doctor if you take daily aspirin for another health problem. When should you call for help? Call 911 anytime you think you may need emergency care. For example, call if:    · You passed out (lost consciousness).     · You pass maroon or very bloody stools.     · You vomit blood or what looks like coffee grounds.     · You have new, severe belly pain.    Call your doctor now or seek immediate medical care if:    · Your pain gets worse, especially if it becomes focused in one area of your belly.     · You have a new or higher fever.     · Your stools are black and look like tar, or they have streaks of blood.     · You have unexpected vaginal bleeding.     · You have symptoms of a urinary tract infection. These may include:  ? Pain when you urinate. ? Urinating more often than usual.  ? Blood in your urine.     · You are dizzy or lightheaded, or you feel like you may faint.    Watch closely for changes in your health, and be sure to contact your doctor if:    · You are not getting better after 1 day (24 hours). Where can you learn more? Go to http://roseFed Playbookcolleen.info/. Enter I135 in the search box to learn more about \"Abdominal Pain: Care Instructions. \"  Current as of: November 20, 2017  Content Version: 11.8  © 4241-5495 [employer]. Care instructions adapted under license by [employer] (which disclaims liability or warranty for this information). If you have questions about a medical condition or this instruction, always ask your healthcare professional. [place] [address] any warranty or liability for your use of this information. Patient Education        Urinary Tract Infection in Women: Care Instructions  Your Care Instructions    A urinary tract infection, or UTI, is a general term for an infection anywhere between the kidneys and the urethra (where urine comes out).  Most UTIs are bladder infections. They often cause pain or burning when you urinate. UTIs are caused by bacteria and can be cured with antibiotics. Be sure to complete your treatment so that the infection goes away. Follow-up care is a key part of your treatment and safety. Be sure to make and go to all appointments, and call your doctor if you are having problems. It's also a good idea to know your test results and keep a list of the medicines you take. How can you care for yourself at home? · Take your antibiotics as directed. Do not stop taking them just because you feel better. You need to take the full course of antibiotics. · Drink extra water and other fluids for the next day or two. This may help wash out the bacteria that are causing the infection. (If you have kidney, heart, or liver disease and have to limit fluids, talk with your doctor before you increase your fluid intake.)  · Avoid drinks that are carbonated or have caffeine. They can irritate the bladder. · Urinate often. Try to empty your bladder each time. · To relieve pain, take a hot bath or lay a heating pad set on low over your lower belly or genital area. Never go to sleep with a heating pad in place. To prevent UTIs  · Drink plenty of water each day. This helps you urinate often, which clears bacteria from your system. (If you have kidney, heart, or liver disease and have to limit fluids, talk with your doctor before you increase your fluid intake.)  · Urinate when you need to. · Urinate right after you have sex. · Change sanitary pads often. · Avoid douches, bubble baths, feminine hygiene sprays, and other feminine hygiene products that have deodorants. · After going to the bathroom, wipe from front to back. When should you call for help?   Call your doctor now or seek immediate medical care if:    · Symptoms such as fever, chills, nausea, or vomiting get worse or appear for the first time.     · You have new pain in your back just below your rib cage. This is called flank pain.     · There is new blood or pus in your urine.     · You have any problems with your antibiotic medicine.    Watch closely for changes in your health, and be sure to contact your doctor if:    · You are not getting better after taking an antibiotic for 2 days.     · Your symptoms go away but then come back. Where can you learn more? Go to http://rose-colleen.info/. Enter Z916 in the search box to learn more about \"Urinary Tract Infection in Women: Care Instructions. \"  Current as of: March 21, 2018  Content Version: 11.8  © 6775-7725 [employer]. Care instructions adapted under license by [employer] (which disclaims liability or warranty for this information). If you have questions about a medical condition or this instruction, always ask your healthcare professional. [address] any warranty or liability for your use of this information.

## 2019-01-16 NOTE — ED TRIAGE NOTES
Patient comes to the ER c/o L sided abdominal pain since yesterday. Recently discharged from hospital for anemia

## 2019-01-16 NOTE — PROGRESS NOTES
Date of previous inpatient admission/ ED visit? Pt was previously hospitalized here for anemia, required transfusion, 1/03-1/08/2019. What brought the patient back to ED? L sided abdominal pain Did patient decline recommended services during last admission/ ED visit (if yes, what)? NO Has patient seen a provider since their last inpatient admission/ED visit (if yes, when)? Yes, pt was seen by Dr. Penny Dockery in [facility] on 1/11/2019 for transition of care appointment and plan was for another follow up in 30 days. CM Interventions: 
From previous inpatient admission/ED visit:  YORDY HARKINS appointment From current inpatient admission/ED visit:  TBD pending medical workup. Per assessment completed by inpt  last week, pt is independent in ADL's and lives at home with her daughter. No barriers or concerns presented for transitions of care planning.   ED CM will remain available to assist. 
 
AKHIL Morris

## 2019-01-16 NOTE — ED NOTES
Patient verbalizes understanding of discharge instructions, opportunity for questions provided, patient denies any. Instructions given to patient by provider. Patient ambulatory accompanied by family and no acute distress at discharge.

## 2019-01-18 LAB
BACTERIA SPEC CULT: ABNORMAL
CC UR VC: ABNORMAL
SERVICE CMNT-IMP: ABNORMAL

## 2019-01-25 DIAGNOSIS — E11.9 TYPE 2 DIABETES MELLITUS WITHOUT COMPLICATION, WITHOUT LONG-TERM CURRENT USE OF INSULIN (HCC): ICD-10-CM

## 2019-01-25 RX ORDER — BUSPIRONE HYDROCHLORIDE 5 MG/1
5 TABLET ORAL 2 TIMES DAILY
Qty: 60 TAB | Refills: 2 | Status: SHIPPED | OUTPATIENT
Start: 2019-01-25 | End: 2019-05-15 | Stop reason: SDUPTHER

## 2019-03-07 DIAGNOSIS — F41.8 DEPRESSION WITH ANXIETY: Primary | ICD-10-CM

## 2019-03-07 RX ORDER — BUSPIRONE HYDROCHLORIDE 10 MG/1
TABLET ORAL
Qty: 30 TAB | Refills: 5 | Status: SHIPPED | OUTPATIENT
Start: 2019-03-07 | End: 2019-08-26 | Stop reason: DRUGHIGH

## 2019-04-11 ENCOUNTER — TELEPHONE (OUTPATIENT)
Dept: FAMILY MEDICINE CLINIC | Age: 79
End: 2019-04-11

## 2019-04-11 NOTE — TELEPHONE ENCOUNTER
Called pt's daughter (checked disclosure) and verified name and . Voiced to her:    \"Okay to hold metformin the day of colonoscopy     Glimepiride is still on her list.  Is she still taking this? She can hold that too.     May restart your medication when you feel like you are going to eat normally\"    Pt is currently taking glimepiride, voiced to her to hold that medication as well.

## 2019-04-11 NOTE — TELEPHONE ENCOUNTER
Okay to hold metformin the day of colonoscopy    Glimepiride is still on her list.  Is she still taking this? She can hold that too.     May restart your medication when you feel like you are going to eat normally

## 2019-04-22 ENCOUNTER — HOSPITAL ENCOUNTER (OUTPATIENT)
Age: 79
Setting detail: OUTPATIENT SURGERY
Discharge: HOME | End: 2019-04-22
Attending: SPECIALIST | Admitting: SPECIALIST
Payer: MEDICARE

## 2019-04-22 ENCOUNTER — ANESTHESIA EVENT (OUTPATIENT)
Dept: ENDOSCOPY | Age: 79
End: 2019-04-22
Payer: MEDICARE

## 2019-04-22 ENCOUNTER — ANESTHESIA (OUTPATIENT)
Dept: ENDOSCOPY | Age: 79
End: 2019-04-22
Payer: MEDICARE

## 2019-04-22 VITALS
TEMPERATURE: 98.8 F | HEART RATE: 68 BPM | DIASTOLIC BLOOD PRESSURE: 62 MMHG | SYSTOLIC BLOOD PRESSURE: 119 MMHG | OXYGEN SATURATION: 98 % | BODY MASS INDEX: 38.59 KG/M2 | RESPIRATION RATE: 16 BRPM | WEIGHT: 211 LBS

## 2019-04-22 LAB
BASOPHILS # BLD: 0 K/UL (ref 0–0.1)
BASOPHILS NFR BLD: 0 % (ref 0–1)
DIFFERENTIAL METHOD BLD: ABNORMAL
EOSINOPHIL # BLD: 0.2 K/UL (ref 0–0.4)
EOSINOPHIL NFR BLD: 2 % (ref 0–7)
ERYTHROCYTE [DISTWIDTH] IN BLOOD BY AUTOMATED COUNT: 14.5 % (ref 11.5–14.5)
GLUCOSE BLD STRIP.AUTO-MCNC: 159 MG/DL (ref 65–100)
HCT VFR BLD AUTO: 39.4 % (ref 35–47)
HGB BLD-MCNC: 11.8 G/DL (ref 11.5–16)
IMM GRANULOCYTES # BLD AUTO: 0 K/UL (ref 0–0.04)
IMM GRANULOCYTES NFR BLD AUTO: 0 % (ref 0–0.5)
LYMPHOCYTES # BLD: 2.4 K/UL (ref 0.8–3.5)
LYMPHOCYTES NFR BLD: 32 % (ref 12–49)
MCH RBC QN AUTO: 26.3 PG (ref 26–34)
MCHC RBC AUTO-ENTMCNC: 29.9 G/DL (ref 30–36.5)
MCV RBC AUTO: 87.9 FL (ref 80–99)
MONOCYTES # BLD: 0.9 K/UL (ref 0–1)
MONOCYTES NFR BLD: 13 % (ref 5–13)
NEUTS SEG # BLD: 3.9 K/UL (ref 1.8–8)
NEUTS SEG NFR BLD: 53 % (ref 32–75)
NRBC # BLD: 0 K/UL (ref 0–0.01)
NRBC BLD-RTO: 0 PER 100 WBC
PLATELET # BLD AUTO: 178 K/UL (ref 150–400)
PMV BLD AUTO: 10.9 FL (ref 8.9–12.9)
RBC # BLD AUTO: 4.48 M/UL (ref 3.8–5.2)
SERVICE CMNT-IMP: ABNORMAL
WBC # BLD AUTO: 7.5 K/UL (ref 3.6–11)

## 2019-04-22 PROCEDURE — 76040000007: Performed by: SPECIALIST

## 2019-04-22 PROCEDURE — 74011250636 HC RX REV CODE- 250/636

## 2019-04-22 PROCEDURE — 76060000032 HC ANESTHESIA 0.5 TO 1 HR: Performed by: SPECIALIST

## 2019-04-22 PROCEDURE — 85025 COMPLETE CBC W/AUTO DIFF WBC: CPT

## 2019-04-22 PROCEDURE — 82784 ASSAY IGA/IGD/IGG/IGM EACH: CPT

## 2019-04-22 PROCEDURE — 36415 COLL VENOUS BLD VENIPUNCTURE: CPT

## 2019-04-22 PROCEDURE — 74011250637 HC RX REV CODE- 250/637: Performed by: SPECIALIST

## 2019-04-22 PROCEDURE — 77030027957 HC TBNG IRR ENDOGTR BUSS -B: Performed by: SPECIALIST

## 2019-04-22 PROCEDURE — 82962 GLUCOSE BLOOD TEST: CPT

## 2019-04-22 RX ORDER — PROPOFOL 10 MG/ML
INJECTION, EMULSION INTRAVENOUS AS NEEDED
Status: DISCONTINUED | OUTPATIENT
Start: 2019-04-22 | End: 2019-04-22 | Stop reason: HOSPADM

## 2019-04-22 RX ORDER — FLUMAZENIL 0.1 MG/ML
0.2 INJECTION INTRAVENOUS
Status: DISCONTINUED | OUTPATIENT
Start: 2019-04-22 | End: 2019-04-22 | Stop reason: HOSPADM

## 2019-04-22 RX ORDER — FENTANYL CITRATE 50 UG/ML
200 INJECTION, SOLUTION INTRAMUSCULAR; INTRAVENOUS
Status: DISCONTINUED | OUTPATIENT
Start: 2019-04-22 | End: 2019-04-22 | Stop reason: HOSPADM

## 2019-04-22 RX ORDER — SODIUM CHLORIDE 9 MG/ML
INJECTION, SOLUTION INTRAVENOUS
Status: DISCONTINUED | OUTPATIENT
Start: 2019-04-22 | End: 2019-04-22 | Stop reason: HOSPADM

## 2019-04-22 RX ORDER — LORAZEPAM 2 MG/ML
2 INJECTION INTRAMUSCULAR AS NEEDED
Status: DISCONTINUED | OUTPATIENT
Start: 2019-04-22 | End: 2019-04-22 | Stop reason: HOSPADM

## 2019-04-22 RX ORDER — ATROPINE SULFATE 0.1 MG/ML
0.5 INJECTION INTRAVENOUS
Status: DISCONTINUED | OUTPATIENT
Start: 2019-04-22 | End: 2019-04-22 | Stop reason: HOSPADM

## 2019-04-22 RX ORDER — AMOXICILLIN 500 MG/1
500 CAPSULE ORAL
COMMUNITY
End: 2019-07-08 | Stop reason: SDUPTHER

## 2019-04-22 RX ORDER — SODIUM CHLORIDE 9 MG/ML
50 INJECTION, SOLUTION INTRAVENOUS CONTINUOUS
Status: DISCONTINUED | OUTPATIENT
Start: 2019-04-22 | End: 2019-04-22 | Stop reason: HOSPADM

## 2019-04-22 RX ORDER — SODIUM CHLORIDE 0.9 % (FLUSH) 0.9 %
5-40 SYRINGE (ML) INJECTION EVERY 8 HOURS
Status: DISCONTINUED | OUTPATIENT
Start: 2019-04-22 | End: 2019-04-22 | Stop reason: HOSPADM

## 2019-04-22 RX ORDER — DEXTROMETHORPHAN/PSEUDOEPHED 2.5-7.5/.8
1.2 DROPS ORAL
Status: DISCONTINUED | OUTPATIENT
Start: 2019-04-22 | End: 2019-04-22 | Stop reason: HOSPADM

## 2019-04-22 RX ORDER — NALOXONE HYDROCHLORIDE 0.4 MG/ML
0.4 INJECTION, SOLUTION INTRAMUSCULAR; INTRAVENOUS; SUBCUTANEOUS
Status: DISCONTINUED | OUTPATIENT
Start: 2019-04-22 | End: 2019-04-22 | Stop reason: HOSPADM

## 2019-04-22 RX ORDER — SODIUM CHLORIDE 0.9 % (FLUSH) 0.9 %
5-40 SYRINGE (ML) INJECTION AS NEEDED
Status: DISCONTINUED | OUTPATIENT
Start: 2019-04-22 | End: 2019-04-22 | Stop reason: HOSPADM

## 2019-04-22 RX ORDER — EPINEPHRINE 0.1 MG/ML
1 INJECTION INTRACARDIAC; INTRAVENOUS
Status: DISCONTINUED | OUTPATIENT
Start: 2019-04-22 | End: 2019-04-22 | Stop reason: HOSPADM

## 2019-04-22 RX ORDER — LIDOCAINE HYDROCHLORIDE 20 MG/ML
INJECTION, SOLUTION EPIDURAL; INFILTRATION; INTRACAUDAL; PERINEURAL AS NEEDED
Status: DISCONTINUED | OUTPATIENT
Start: 2019-04-22 | End: 2019-04-22 | Stop reason: HOSPADM

## 2019-04-22 RX ORDER — MIDAZOLAM HYDROCHLORIDE 1 MG/ML
.25-1 INJECTION, SOLUTION INTRAMUSCULAR; INTRAVENOUS
Status: DISCONTINUED | OUTPATIENT
Start: 2019-04-22 | End: 2019-04-22 | Stop reason: HOSPADM

## 2019-04-22 RX ADMIN — SIMETHICONE 80 MG: 20 SUSPENSION/ DROPS ORAL at 13:27

## 2019-04-22 RX ADMIN — PROPOFOL 20 MG: 10 INJECTION, EMULSION INTRAVENOUS at 13:24

## 2019-04-22 RX ADMIN — PROPOFOL 20 MG: 10 INJECTION, EMULSION INTRAVENOUS at 13:23

## 2019-04-22 RX ADMIN — LIDOCAINE HYDROCHLORIDE 40 MG: 20 INJECTION, SOLUTION EPIDURAL; INFILTRATION; INTRACAUDAL; PERINEURAL at 13:18

## 2019-04-22 RX ADMIN — PROPOFOL 20 MG: 10 INJECTION, EMULSION INTRAVENOUS at 13:34

## 2019-04-22 RX ADMIN — SODIUM CHLORIDE: 9 INJECTION, SOLUTION INTRAVENOUS at 13:09

## 2019-04-22 RX ADMIN — PROPOFOL 30 MG: 10 INJECTION, EMULSION INTRAVENOUS at 13:25

## 2019-04-22 RX ADMIN — PROPOFOL 20 MG: 10 INJECTION, EMULSION INTRAVENOUS at 13:31

## 2019-04-22 RX ADMIN — PROPOFOL 20 MG: 10 INJECTION, EMULSION INTRAVENOUS at 13:27

## 2019-04-22 RX ADMIN — PROPOFOL 30 MG: 10 INJECTION, EMULSION INTRAVENOUS at 13:37

## 2019-04-22 RX ADMIN — PROPOFOL 50 MG: 10 INJECTION, EMULSION INTRAVENOUS at 13:21

## 2019-04-22 RX ADMIN — PROPOFOL 20 MG: 10 INJECTION, EMULSION INTRAVENOUS at 13:29

## 2019-04-22 NOTE — DISCHARGE INSTRUCTIONS
Elisa Sandhu  [id number]  1940    COLON DISCHARGE INSTRUCTIONS  Discomfort:  Redness at IV site- apply warm compress to area; if redness or soreness persist- contact your physician  There may be a slight amount of blood passed from the rectum  Gaseous discomfort- walking, belching will help relieve any discomfort  You may not operate a vehicle for 12 hours  You may not engage in an occupation involving machinery or appliances for rest of today  You may not drink alcoholic beverages for at least 12 hours  Avoid making any critical decisions for at least 24 hour  DIET:   Regular diet. - however -  remember your colon is empty and a heavy meal will produce gas. Avoid these foods:  vegetables, fried / greasy foods, carbonated drinks for today. ACTIVITY:  You may resume your normal daily activities it is recommended that you spend the remainder of the day resting -  avoid any strenuous activity. CALL M.D. ANY SIGN OF:  Increasing pain, nausea, vomiting  Abdominal distension (swelling)  New increased bleeding (oral or rectal)  Fever (chills)  Pain in chest area  Bloody discharge from nose or mouth  Shortness of breath    You may  take any Advil, Aspirin, Ibuprofen, Motrin, Aleve, Goodys, or any similar pain or arthritis products or Tylenol as needed for pain. Follow-up Instructions:      Office telephone for problems or questions [phone]  No further colonic surveillance necessary. Certainly if new GI symptoms occur it may require repeat study    Patient Education        Diverticulosis: Care Instructions  Your Care Instructions  In diverticulosis, pouches called diverticula form in the wall of the large intestine (colon). The pouches do not cause any pain or other symptoms. Most people who have diverticulosis do not know they have it. But the pouches sometimes bleed, and if they become infected, they can cause pain and other symptoms.  When this happens, it is called diverticulitis. Diverticula form when pressure pushes the wall of the colon outward at certain weak points. A diet that is too low in fiber can cause diverticula. Follow-up care is a key part of your treatment and safety. Be sure to make and go to all appointments, and call your doctor if you are having problems. It's also a good idea to know your test results and keep a list of the medicines you take. How can you care for yourself at home? · Include fruits, leafy green vegetables, beans, and whole grains in your diet each day. These foods are high in fiber. · Take a fiber supplement, such as Citrucel or Metamucil, every day if needed. Read and follow all instructions on the label. · Drink plenty of fluids, enough so that your urine is light yellow or clear like water. If you have kidney, heart, or liver disease and have to limit fluids, talk with your doctor before you increase the amount of fluids you drink. · Get at least 30 minutes of exercise on most days of the week. Walking is a good choice. You also may want to do other activities, such as running, swimming, cycling, or playing tennis or team sports. · Cut out foods that cause gas, pain, or other symptoms. When should you call for help? Call your doctor now or seek immediate medical care if:    · You have belly pain.     · You pass maroon or very bloody stools.     · You have a fever.     · You have nausea and vomiting.     · You have unusual changes in your bowel movements or abdominal swelling.     · You have burning pain when you urinate.     · You have abnormal vaginal discharge.     · You have shoulder pain.     · You have cramping pain that does not get better when you have a bowel movement or pass gas.     · You pass gas or stool from your urethra while urinating.    Watch closely for changes in your health, and be sure to contact your doctor if you have any problems. Where can you learn more?   Go to http://rose-colleen.info/. Enter Q342 in the search box to learn more about \"Diverticulosis: Care Instructions. \"  Current as of: March 27, 2018  Content Version: 11.9  © 0360-3203 "[employer]. Care instructions adapted under license by [employer] (which disclaims liability or warranty for this information). If you have questions about a medical condition or this instruction, always ask your healthcare professional. [place] [address] any warranty or liability for your use of this information.

## 2019-04-22 NOTE — PROGRESS NOTES
Richard Graham 1940 
[id number] Situation: 
Verbal report received from: mishel berry rn 
Procedure: Procedure(s): 
COLONOSCOPY Background: 
 
Preoperative diagnosis: GASTRITIS, IRON DEFIVIENCY ANEMIA Postoperative diagnosis: 1. mild diverticulosis :  Dr. Ashlee Vargas Assistant(s): Endoscopy Technician-1: Karina Perdomo Endoscopy RN-1: Mima Yoder RN Specimens: * No specimens in log * H. Pylori  no Assessment: 
Intra-procedure medications Anesthesia gave intra-procedure sedation and medications, see anesthesia flow sheet yes Intravenous fluids: NS@ Tomasa Glaze Vital signs stable  yes Abdominal assessment: round and soft  yes Recommendation: 
Discharge patient per MD order yes. Family or Friend  yes Permission to share finding with family or friend yes

## 2019-04-22 NOTE — PROGRESS NOTES

## 2019-04-22 NOTE — ANESTHESIA PREPROCEDURE EVALUATION
Relevant Problems No relevant active problems Anesthetic History Review of Systems / Medical History Patient summary reviewed, nursing notes reviewed and pertinent labs reviewed Pulmonary Neuro/Psych Cardiovascular Hypertension Comments: TAVR  
GI/Hepatic/Renal 
  
GERD Endo/Other Diabetes Morbid obesity Other Findings Physical Exam 
 
Airway Mallampati: I Cardiovascular Rhythm: regular Rate: normal 
 
 
 
 Dental 
 
 
  
Pulmonary Breath sounds clear to auscultation Abdominal 
 
 
 
 Other Findings Anesthetic Plan ASA: 3 Anesthesia type: MAC Induction: Intravenous Anesthetic plan and risks discussed with: Patient

## 2019-04-22 NOTE — ANESTHESIA POSTPROCEDURE EVALUATION
Post-Anesthesia Evaluation and Assessment Patient: Dariela Tucker MRN: [id number]  SSN: [id number] YOB: 1940  Age: [de-identified] y.o. Sex: female I have evaluated the patient and they are stable and ready for discharge from the PACU. Cardiovascular Function/Vital Signs Visit Vitals /59 Pulse 69 Temp 37.1 °C (98.8 °F) Resp 16 Wt 95.7 kg (211 lb) SpO2 94% BMI 38.59 kg/m² Patient is status post MAC anesthesia for Procedure(s): 
COLONOSCOPY. Nausea/Vomiting: None Postoperative hydration reviewed and adequate. Pain: 
Pain Scale 1: Numeric (0 - 10) (04/22/19 1410) Pain Intensity 1: 0 (04/22/19 1410) Managed Neurological Status: At baseline Mental Status, Level of Consciousness: Alert and  oriented to person, place, and time Pulmonary Status:  
O2 Device: Room air (04/22/19 1410) Adequate oxygenation and airway patent Complications related to anesthesia: None Post-anesthesia assessment completed. No concerns Signed By: Mamie Rios MD   
 April 22, 2019 Procedure(s): 
COLONOSCOPY. MAC 
 
<BSHSIANPOST> Vitals Value Taken Time BP 94/67 4/22/2019  2:13 PM  
Temp Pulse 67 4/22/2019  2:16 PM  
Resp 27 4/22/2019  2:16 PM  
SpO2 99 % 4/22/2019  2:16 PM  
Vitals shown include unvalidated device data.

## 2019-04-25 NOTE — H&P
Pre-endoscopy H and P for Colonoscopy The patient was seen and examined. Date of last colonoscopy: never, Polyps  No   
 
The airway was assessed and documented. The problem list, past medical history, and medications were reviewed. Patient Active Problem List  
Diagnosis Code  Diabetes type 2, controlled ([facility] E11.9  
 Essential hypertension, benign I10  
 Palpitations R00.2  Vitamin D deficiency E55.9  S/P aortic valve replacement Z95.2  Hyperlipidemia E78.5  Depression with anxiety F41.8  Severe obesity (BMI 35.0-39. 9) E66.01 Social History Socioeconomic History  Marital status:  Spouse name: Not on file  Number of children: Not on file  Years of education: Not on file  Highest education level: Not on file Occupational History  Not on file Social Needs  Financial resource strain: Not on file  Food insecurity:  
  Worry: Not on file Inability: Not on file  Transportation needs:  
  Medical: Not on file Non-medical: Not on file Tobacco Use  Smoking status: Former Smoker Last attempt to quit: 1999 Years since quittin.6  Smokeless tobacco: Never Used Substance and Sexual Activity  Alcohol use: No  
 Drug use: No  
 Sexual activity: Yes  
  Partners: Male Lifestyle  Physical activity:  
  Days per week: Not on file Minutes per session: Not on file  Stress: Not on file Relationships  Social connections:  
  Talks on phone: Not on file Gets together: Not on file Attends [employer] service: Not on file Active member of club or organization: Not on file Attends meetings of clubs or organizations: Not on file Relationship status: Not on file  Intimate partner violence:  
  Fear of current or ex partner: Not on file Emotionally abused: Not on file Physically abused: Not on file Forced sexual activity: Not on file Other Topics Concern  Not on file Social History Narrative  Not on file Past Medical History:  
Diagnosis Date  Anemia  Atypical chest pain Long h/o intermittent non-cardiac CP.  Depression with anxiety  Diabetes ([address]  GERD (gastroesophageal reflux disease)  Hypercholesteremia  Hyperlipidemia 7/3/2013  Hypertension  Inner ear dysfunction  S/P aortic valve replacement Dr. De Leon Bebe yearly  Vitamin D deficiency The patient has a family history of na Prior to Admission Medications Prescriptions Last Dose Informant Patient Reported? Taking? Blood-Glucose Meter (ACCU-CHEK GUIDE GLUCOSE METER) mis 4/21/2019 at Unknown time  No Yes Sig: Check sugar daily L. acidoph & paracasei- S therm- Bifido (ALVIN-Q/RISAQUAD) 8 billion cell cap cap 4/20/2019 at Unknown time  No Yes Sig: Take 1 Cap by mouth daily. MYRBETRIQ 25 mg ER tablet Unknown at Unknown time  Yes No  
Sig: Take 25 mg by mouth daily. PARoxetine (PAXIL) 30 mg tablet 4/20/2019  No No  
Sig: TAKE 1 TABLET EVERY DAY  
PV W-O EDU/FERROUS FUMARATE/FA (M-VIT PO) 4/20/2019  Yes No  
Sig: Take 1 tablet by mouth daily. acetaminophen (TYLENOL) 325 mg tablet Not Taking at Unknown time  Yes No  
Sig: Take 325 mg by mouth every four (4) hours as needed for Pain.  
amoxicillin (AMOXIL) 500 mg capsule 4/22/2019 at Unknown time  Yes Yes Sig: Take 500 mg by mouth. aspirin delayed-release 325 mg tablet 4/21/2019  No No  
Sig: Take 1 Tab by mouth daily. busPIRone (BUSPAR) 10 mg tablet 4/15/2019 at Unknown time  No Yes Sig: Take 1/2 tablet by mouth twice daily. busPIRone (BUSPAR) 5 mg tablet 4/15/2019 at Unknown time  No Yes Sig: Take 1 Tab by mouth two (2) times a day. clopidogrel (PLAVIX) 75 mg tab 4/17/2019 at Unknown time  Yes Yes Sig: Take 75 mg by mouth. ferrous sulfate 325 mg (65 mg iron) tablet   No No  
Sig: Take 1 Tab by mouth two (2) times daily (with meals). glimepiride (AMARYL) 1 mg tablet 4/20/2019 at Unknown time  Yes Yes Sig: Take 1 mg by mouth Daily (before breakfast). glucose blood VI test strips (ACCU-CHEK GUIDE) strip 4/20/2019 at Unknown time  No Yes Sig: Check sugar daily  
lancets (ACCU-CHEK SOFTCLIX LANCETS) misc 4/20/2019 at Unknown time  No Yes Sig: Check sugar daily  
lisinopril (PRINIVIL, ZESTRIL) 5 mg tablet 4/20/2019 at Unknown time  No Yes Sig: TAKE 1 TABLET EVERY DAY  
lovastatin (MEVACOR) 10 mg tablet 4/20/2019 at Unknown time  No Yes Sig: TAKE ONE TABLET BY MOUTH NIGHTLY  
meclizine (ANTIVERT) 25 mg tablet 4/20/2019 at Unknown time  No Yes Sig: Take 1 Tab by mouth three (3) times daily as needed for Dizziness. metFORMIN (GLUCOPHAGE) 500 mg tablet 4/22/2019 at Unknown time  No Yes Sig: TAKE TWO TABLETS BY MOUTH DAILY WITH BREAKFAST  
metoprolol succinate (TOPROL-XL) 25 mg XL tablet 4/22/2019 at Unknown time  No Yes Sig: TAKE 1 TABLET EVERY DAY  
pantoprazole (PROTONIX) 40 mg tablet 4/20/2019 at Unknown time  No Yes Sig: Take 1 Tab by mouth daily. tiZANidine (ZANAFLEX) 4 mg tablet 4/15/2019 at Unknown time  Yes Yes Sig: Take 4 mg by mouth three (3) times daily as needed. Facility-Administered Medications: None The review of systems is:  negative for shortness of breath or chest pain The heart, lungs and mental status were satisfactory for the administration of MAC sedation and for the procedure. Mallampati score: See Anesthesia. I discussed with the patient the objectives, risks, consequences and alternatives to the procedure. Plan: Endoscopic procedure with MAC sedation.  
 
Mayco Schulz MD 
4/25/2019 
7:23 AM

## 2019-04-25 NOTE — PROCEDURES
Colonoscopy Procedure Note    Indications:   Iron deficiency anemia  Referring Physician: Ericka Buck MD   Anesthesia/Sedation:MAC  Endoscopist:  Dr. Nkechi Smalls  Assistant:  Endoscopy Technician-1: Mendel Pardo  Endoscopy RN-1: Asha Sandhu RN  Surgical Assistant: None  Implants: None    Preoperative diagnosis: GASTRITIS, IRON DEFIVIENCY ANEMIA    Postoperative diagnosis: 1. mild diverticulosis      Procedure in Detail:  Informed consent was obtained for the procedure, including sedation. Risks of perforation, hemorrhage, adverse drug reaction, and aspiration were discussed. The patient was placed in the left lateral decubitus position. Based on the pre-procedure assessment, including review of the patient's medical history, medications, allergies, and review of systems, she had been deemed to be an appropriate candidate for moderate sedation; she was therefore sedated with the medications listed above. The patient was monitored continuously with ECG tracing, pulse oximetry, blood pressure monitoring, and direct observations. A rectal examination was performed. The [id number] was inserted into the rectum and advanced under direct vision to the cecum, which was identified by the ileocecal valve and appendiceal orifice. The quality of the colonic preparation was good. A careful inspection was made as the colonoscope was withdrawn, including a retroflexed view of the rectum; findings and interventions are described below. Findings:   Rectum: normal  Sigmoid: moderate diverticulosis; Descending Colon: normal  Transverse Colon: normal  Ascending Colon: normal  Cecum: normal  Terminal Ileum: not intubated    Specimens:     none    EBL: None    Complications: None; patient tolerated the procedure well. Recommendations:     - Follow up with me.      -Iron deficiency anemia secondary to erosive gastritis; resume iron supplementation avoid any NSAIDs not specifically recommended by your MD; resume iron supplementation. Checking CBC and celiac panel today prior biopsies for celiac disease and the duodenum were negative.   If iron deficiency does not resolve and is severe can consider M2A capsule study    Signed By: Tushar Martinez MD                        April 25, 2019

## 2019-05-03 LAB
GLIADIN PEPTIDE IGA SER-ACNC: 4 UNITS (ref 0–19)
GLIADIN PEPTIDE IGG SER-ACNC: 3 UNITS (ref 0–19)
IGA SERPL-MCNC: 236 MG/DL (ref 64–422)
TTG IGA SER-ACNC: <2 U/ML (ref 0–3)
TTG IGG SER-ACNC: <2 U/ML (ref 0–5)

## 2019-05-07 DIAGNOSIS — E11.9 TYPE 2 DIABETES MELLITUS WITHOUT COMPLICATION, WITHOUT LONG-TERM CURRENT USE OF INSULIN (HCC): ICD-10-CM

## 2019-05-09 RX ORDER — BLOOD-GLUCOSE METER
EACH MISCELLANEOUS
Qty: 1 EACH | Refills: 0 | Status: SHIPPED | OUTPATIENT
Start: 2019-05-09

## 2019-05-15 RX ORDER — BUSPIRONE HYDROCHLORIDE 5 MG/1
TABLET ORAL
Qty: 60 TAB | Refills: 0 | Status: SHIPPED | OUTPATIENT
Start: 2019-05-15 | End: 2019-06-12 | Stop reason: SDUPTHER

## 2019-05-22 ENCOUNTER — TELEPHONE (OUTPATIENT)
Dept: FAMILY MEDICINE CLINIC | Age: 79
End: 2019-05-22

## 2019-05-22 NOTE — TELEPHONE ENCOUNTER
Let Ms. Rosalina Singh know the alzheimer's vaccine is not out yet. It is only in the trial stages.  She voiced understanding

## 2019-05-22 NOTE — TELEPHONE ENCOUNTER
Pt is calling wanting to know if we do the alzheimer's shot here and (is it safe if it wasn't in the family)

## 2019-06-12 RX ORDER — BUSPIRONE HYDROCHLORIDE 5 MG/1
TABLET ORAL
Qty: 60 TAB | Refills: 0 | Status: SHIPPED | OUTPATIENT
Start: 2019-06-12 | End: 2019-08-26 | Stop reason: SDUPTHER

## 2019-07-05 ENCOUNTER — OFFICE VISIT (OUTPATIENT)
Dept: FAMILY MEDICINE CLINIC | Age: 79
End: 2019-07-05

## 2019-07-05 VITALS
OXYGEN SATURATION: 95 % | WEIGHT: 208 LBS | TEMPERATURE: 97.6 F | RESPIRATION RATE: 14 BRPM | BODY MASS INDEX: 38.28 KG/M2 | HEART RATE: 75 BPM | HEIGHT: 62 IN | SYSTOLIC BLOOD PRESSURE: 141 MMHG | DIASTOLIC BLOOD PRESSURE: 89 MMHG

## 2019-07-05 DIAGNOSIS — N30.00 ACUTE CYSTITIS WITHOUT HEMATURIA: Primary | ICD-10-CM

## 2019-07-05 DIAGNOSIS — E66.01 SEVERE OBESITY WITH BODY MASS INDEX (BMI) OF 35.0 TO 39.9 WITH SERIOUS COMORBIDITY (HCC): ICD-10-CM

## 2019-07-05 DIAGNOSIS — R30.0 DYSURIA: ICD-10-CM

## 2019-07-05 DIAGNOSIS — H61.21 IMPACTED CERUMEN OF RIGHT EAR: ICD-10-CM
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
## 2019-07-05 DIAGNOSIS — M25.511 ACUTE PAIN OF RIGHT SHOULDER: ICD-10-CM

## 2019-07-05 DIAGNOSIS — S50.362A INSECT BITE OF LEFT ELBOW, INITIAL ENCOUNTER: ICD-10-CM

## 2019-07-05 PROBLEM — E11.21 TYPE 2 DIABETES WITH NEPHROPATHY (HCC): Status: ACTIVE | Noted: 2019-07-05

## 2019-07-05 RX ORDER — NITROFURANTOIN 25; 75 MG/1; MG/1
100 CAPSULE ORAL 2 TIMES DAILY
Qty: 14 CAP | Refills: 0 | Status: SHIPPED | OUTPATIENT
Start: 2019-07-05 | End: 2019-07-12

## 2019-07-05 NOTE — PATIENT INSTRUCTIONS
Earwax Blockage: Care Instructions Your Care Instructions Earwax is a natural substance that protects the ear canal. Normally, earwax drains from the ears and does not cause problems. Sometimes earwax builds up and hardens. Earwax blockage (also called cerumen impaction) can cause some loss of hearing and pain. When wax is tightly packed, you will need to have your doctor remove it. Follow-up care is a key part of your treatment and safety. Be sure to make and go to all appointments, and call your doctor if you are having problems. It's also a good idea to know your test results and keep a list of the medicines you take. How can you care for yourself at home? · Do not try to remove earwax with cotton swabs, fingers, or other objects. This can make the blockage worse and damage the eardrum. · If your doctor recommends that you try to remove earwax at home: ? Soften and loosen the earwax with warm mineral oil. You also can try hydrogen peroxide mixed with an equal amount of room temperature water. Place 2 drops of the fluid, warmed to body temperature, in the ear two times a day for up to 5 days. ? Once the wax is loose and soft, all that is usually needed to remove it from the ear canal is a gentle, warm shower. Direct the water into the ear, then tip your head to let the earwax drain out. Dry your ear thoroughly with a hair dryer set on low. Hold the dryer several inches from your ear. ? If the warm mineral oil and shower do not work, use an over-the-counter wax softener. Read and follow all instructions on the label. After using the wax softener, use an ear syringe to gently flush the ear. Make sure the flushing solution is body temperature. Cool or hot fluids in the ear can cause dizziness. When should you call for help? Call your doctor now or seek immediate medical care if: 
  · Pus or blood drains from your ear.  
  · Your ears are ringing or feel full.  
  · You have a loss of hearing.  Watch closely for changes in your health, and be sure to contact your doctor if: 
  · You have pain or reduced hearing after 1 week of home treatment.  
  · You have any new symptoms, such as nausea or balance problems. Where can you learn more? Go to http://rose-colleen.info/. Enter U151 in the search box to learn more about \"Earwax Blockage: Care Instructions. \" Current as of: September 23, 2018 Content Version: 11.9 © 8801-8798 [employer]. Care instructions adapted under license by [employer] (which disclaims liability or warranty for this information). If you have questions about a medical condition or this instruction, always ask your healthcare professional. [place] [address] any warranty or liability for your use of this information. Insect Stings and Bites: Care Instructions Your Care Instructions Stings and bites from bees, wasps, ants, and other insects often cause pain, swelling, redness, and itching. In some people, especially children, the redness and swelling may be worse. It may extend several inches beyond the affected area. But in most cases, stings and bites don't cause reactions all over the body. If you have had a reaction to an insect sting or bite, you are at risk for a reaction if you get stung or bitten again. Follow-up care is a key part of your treatment and safety. Be sure to make and go to all appointments, and call your doctor if you are having problems. It's also a good idea to know your test results and keep a list of the medicines you take. How can you care for yourself at home? · Do not scratch or rub the skin where the sting or bite occurred. · Put a cold pack or ice cube on the area. Put a thin cloth between the ice and your skin. For some people, a paste of baking soda mixed with a little water helps relieve pain and decrease the reaction. · Take an over-the-counter antihistamine, such as diphenhydramine (Benadryl) or loratadine (Claritin), to relieve swelling, redness, and itching. Calamine lotion or hydrocortisone cream may also help. Do not give antihistamines to your child unless you have checked with the doctor first. 
· Be safe with medicines. If your doctor prescribed medicine for your allergy, take it exactly as prescribed. Call your doctor if you think you are having a problem with your medicine. You will get more details on the specific medicines your doctor prescribes. · Your doctor may prescribe a shot of epinephrine to carry with you in case you have a severe reaction. Learn how and when to give yourself the shot, and keep it with you at all times. Make sure it has not . · Go to the emergency room anytime you have a severe reaction. Go even if you have given yourself epinephrine and are feeling better. Symptoms can come back. When should you call for help? Call 911 anytime you think you may need emergency care. For example, call if: 
  · You have symptoms of a severe allergic reaction. These may include: 
? Sudden raised, red areas (hives) all over your body. ? Swelling of the throat, mouth, lips, or tongue. ? Trouble breathing. ? Passing out (losing consciousness). Or you may feel very lightheaded or suddenly feel weak, confused, or restless.  
 Call your doctor now or seek immediate medical care if: 
  · You have symptoms of an allergic reaction not right at the sting or bite, such as: ? A rash or small area of hives (raised, red areas on the skin). ? Itching. ? Swelling. ? Belly pain, nausea, or vomiting.  
  · You have a lot of swelling around the site (such as your entire arm or leg is swollen).  
  · You have signs of infection, such as: 
? Increased pain, swelling, redness, or warmth around the sting. ? Red streaks leading from the area. ? Pus draining from the sting. ? A fever.  Watch closely for changes in your health, and be sure to contact your doctor if: 
  · You do not get better as expected. Where can you learn more? Go to http://rose-colleen.info/. Enter P390 in the search box to learn more about \"Insect Stings and Bites: Care Instructions. \" Current as of: September 23, 2018 Content Version: 11.9 © 7610-7408 [employer]. Care instructions adapted under license by [employer] (which disclaims liability or warranty for this information). If you have questions about a medical condition or this instruction, always ask your healthcare professional. [place] [address] any warranty or liability for your use of this information. Shoulder Blade: Exercises Your Care Instructions Here are some examples of typical exercises for your condition. Start each exercise slowly. Ease off the exercise if you start to have pain. Your doctor or physical therapist will tell you when you can start these exercises and which ones will work best for you. How to do the exercises Shoulder roll 1. Stand tall with your chin slightly tucked. Imagine that a string at the top of your head is pulling you straight up. 2. Keep your arms relaxed. All motion will be in your shoulders. 3. Shrug your shoulders up toward your ears, then up and back. [place] your shoulders down and back, like you're sliding your hands down into your back pants pockets. 4. Repeat the circles at least 2 to 4 times. 5. This exercise is also helpful anytime you want to relax. Lower neck and upper back stretch 1. With your arms about shoulder height, clasp your hands in front of you. 2. Drop your chin toward your chest. 
3. Reach straight forward so you are rounding your upper back. Think about pulling your shoulder blades apart. Nader Wynn feel a stretch across your upper back and shoulders. Hold for at least 6 seconds. 4. Repeat 2 to 4 times. Triceps stretch 1. Reach your arm straight up. 2. Keeping your elbow in place, bend your arm and reach your hand down behind your back. 3. With your other hand, apply gentle pressure to the bent elbow. Melania Ward feel a stretch at the back of your upper arm and shoulder. Hold about 6 seconds. 4. Repeat 2 to 4 times with each arm. Shoulder stretch 1. Relax your shoulders. 2. Raise one arm to shoulder height, and reach it across your chest. 
3. Pull the arm slightly toward you with your other arm. This will help you get a gentle stretch. Hold for about 6 seconds. 4. Repeat 2 to 4 times. Shoulder blade squeeze 1. Sit or stand up tall with your arms at your sides. 2. Keep your shoulders relaxed and down, not shrugged. 3. Squeeze your shoulder blades together. Hold for 6 seconds, then relax. 4. Repeat 8 to 12 times. Straight-arm shoulder blade squeeze 1. Sit or stand tall. Relax your shoulders. 2. With palms down, hold your elastic tubing or band straight out in front of you. 3. Start with slight tension in the tubing or band, with your hands about shoulder-width apart. 4. Slowly pull straight out to the sides, squeezing your shoulder blades together. Keep your arms straight and at shoulder height. Slowly release. 5. Repeat 8 to 12 times. Rowing 1. [place] your elastic tubing or band at about waist height. Take one end in each hand. 2. Sit or stand with your feet hip-width apart. 3. Hold your arms straight in front of you. Adjust your distance to create slight tension in the tubing or band. 4. Slightly tuck your chin. Relax your shoulders. 5. Without shrugging your shoulders, pull straight back. Your elbows will pass alongside your waist. 
 
Pull-downs 1. [place] your elastic tubing or band in the top of a closed door. Take one end in each hand. 2. Either sit or stand, depending on what is more comfortable. If you feel unsteady, sit on a chair. 3. Start with your arms up and comfortably apart, elbows straight. There should be a slight tension in the tubing or band. 4. Slightly tuck your chin, and look straight ahead. 5. Keeping your back straight, slowly pull down and back, bending your elbows. 6. Stop where your hands are level with your chin, in a \"goalpost\" position. 7. Repeat 8 to 12 times. Chest T stretch 1. Lie on your back. Raise your knees so they are bent. Plant your feet on the floor, hip-width apart. 2. Tuck your chin, and relax your shoulders. 3. Reach your arms straight out to the sides. If you don't feel a mild stretch in your shoulders and across your chest, use a foam roll or a tightly rolled blanket under your spine, from your tailbone to your head. 4. Relax in this position for at least 15 to 30 seconds while you breathe normally. Repeat 2 to 4 times. 5. As you get used to this stretch, keep adding a little more time until you are able relax in this position for 2 or 3 minutes. When you can relax for at least 2 minutes, you only need to do the exercise 1 time per session. Chest goalpost stretch 1. Lie on your back. Raise your knees so they are bent. Plant your feet on the floor, hip-width apart. 2. Tuck your chin, and relax your shoulders. 3. Reach your arms straight out to the sides. 4. Bend your arms at the elbows, with your hands pointed toward the top of your head. Your arms should make an L on either side of your head. Your palms should be facing up. 5. If you don't feel a mild stretch in your shoulders and across your chest, use a foam roll or tightly rolled blanket under your spine, from your tailbone to your head. 6. Relax in this position for at least 15 to 30 seconds while you breathe normally. Repeat 2 to 4 times. 7. Each day you do this exercise, add a little more time until you can relax in this position for 2 or 3 minutes.  When you can relax for at least 2 minutes, you only need to do the exercise 1 time per session. Follow-up care is a key part of your treatment and safety. Be sure to make and go to all appointments, and call your doctor if you are having problems. It's also a good idea to know your test results and keep a list of the medicines you take. Where can you learn more? Go to http://rose-colleen.info/. Enter [phone] in the search box to learn more about \"Shoulder Blade: Exercises. \" Current as of: September 20, 2018 Content Version: 11.9 © 2730-5485 [employer]. Care instructions adapted under license by [employer] (which disclaims liability or warranty for this information). If you have questions about a medical condition or this instruction, always ask your healthcare professional. [address] any warranty or liability for your use of this information. Shoulder Arthritis: Exercises Your Care Instructions Here are some examples of typical rehabilitation exercises for your condition. Start each exercise slowly. Ease off the exercise if you start to have pain. Your doctor or physical therapist will tell you when you can start these exercises and which ones will work best for you. How to do the exercises Shoulder flexion (lying down) 1. Lie on your back, holding a wand with both hands. Your palms should face down as you hold the wand. 2. Keeping your elbows straight, slowly raise your arms over your head. Raise them until you feel a stretch in your shoulders, upper back, and chest. 
3. Hold for 15 to 30 seconds. 4. Repeat 2 to 4 times. Shoulder rotation (lying down) 1. Lie on your back. Hold a wand with both hands with your elbows bent and palms up. 2. Keep your elbows close to your body, and move the wand across your body toward the sore arm. 3. Hold for 8 to 12 seconds. 4. Repeat 2 to 4 times. Shoulder internal rotation with towel 1. Hold a towel above and behind your head with the arm that is not sore. 2. With your sore arm, reach behind your back and grasp the towel. 3. With the arm above your head, pull the towel upward. Do this until you feel a stretch on the front and outside of your sore shoulder. 4. Hold 15 to 30 seconds. 5. Repeat 2 to 4 times. Shoulder blade squeeze 1. Stand with your arms at your sides, and squeeze your shoulder blades together. Do not raise your shoulders up as you squeeze. 2. Hold 6 seconds. 3. Repeat 8 to 12 times. Resisted rows 1. Put the band around a solid object at about waist level. (A bedpost will work well.) Each hand should hold an end of the band. 2. With your elbows at your sides and bent to 90 degrees, pull the band back. Your shoulder blades should move toward each other. Return to the starting position. 3. Repeat 8 to 12 times. External rotator strengthening exercise 1. Start by tying a piece of elastic exercise material to a doorknob. You can use surgical tubing or Thera-Band. (You may also hold one end of the band in each hand.) 2. Stand or sit with your shoulder relaxed and your elbow bent 90 degrees. Your upper arm should rest comfortably against your side. Squeeze a rolled towel between your elbow and your body for comfort. This will help keep your arm at your side. 3. Hold one end of the elastic band with the hand of the painful arm. 4. Start with your forearm across your belly. Slowly rotate the forearm out away from your body. Keep your elbow and upper arm tucked against the towel roll or the side of your body until you begin to feel tightness in your shoulder. Slowly move your arm back to where you started. 5. Repeat 8 to 12 times. Internal rotator strengthening exercise 1. Start by tying a piece of elastic exercise material to a doorknob. You can use surgical tubing or Thera-Band. 2. Stand or sit with your shoulder relaxed and your elbow bent 90 degrees. Your upper arm should rest comfortably against your side. Squeeze a rolled towel between your elbow and your body for comfort. This will help keep your arm at your side. 3. Hold one end of the elastic band in the hand of the painful arm. 4. Slowly rotate your forearm toward your body until it touches your belly. Slowly move it back to where you started. 5. Keep your elbow and upper arm firmly tucked against the towel roll or at your side. 6. Repeat 8 to 12 times. Pendulum swing 1. Hold on to a table or the back of a chair with your good arm. Then bend forward a little and let your sore arm hang straight down. This exercise does not use the arm muscles. Rather, use your legs and your hips to create movement that makes your arm swing freely. 2. Use the movement from your hips and legs to guide the slightly swinging arm back and forth like a pendulum (or elephant trunk). Then guide it in circles that start small (about the size of a dinner plate). Make the circles a bit larger each day, as your pain allows. 3. Do this exercise for 5 minutes, 5 to 7 times each day. 4. As you have less pain, try bending over a little farther to do this exercise. This will increase the amount of movement at your shoulder. Follow-up care is a key part of your treatment and safety. Be sure to make and go to all appointments, and call your doctor if you are having problems. It's also a good idea to know your test results and keep a list of the medicines you take. Where can you learn more? Go to http://rose-colleen.info/. Enter H562 in the search box to learn more about \"Shoulder Arthritis: Exercises. \" Current as of: September 20, 2018 Content Version: 11.9 © 3279-1737 [employer]. Care instructions adapted under license by [employer] (which disclaims liability or warranty for this information).  If you have questions about a medical condition or this instruction, always ask your healthcare professional. [place] [address] any warranty or liability for your use of this information. Painful Urination (Dysuria): Care Instructions Your Care Instructions Burning pain with urination (dysuria) is a common symptom of a urinary tract infection or other urinary problems. The bladder may become inflamed. This can cause pain when the bladder fills and empties. You may also feel pain if the tube that carries urine from the bladder to the outside of the body (urethra) gets irritated or infected. Sexually transmitted infections (STIs) also may cause pain when you urinate. Sometimes the pain can be caused by things other than an infection. The urethra can be irritated by soaps, perfumes, or foreign objects in the urethra. Kidney stones can cause pain when they pass through the urethra. The cause may be hard to find. You may need tests. Treatment for painful urination depends on the cause. Follow-up care is a key part of your treatment and safety. Be sure to make and go to all appointments, and call your doctor if you are having problems. It's also a good idea to know your test results and keep a list of the medicines you take. How can you care for yourself at home? · Drink extra water for the next day or two. This will help make the urine less concentrated. (If you have kidney, heart, or liver disease and have to limit fluids, talk with your doctor before you increase the amount of fluids you drink.) · Avoid drinks that are carbonated or have caffeine. They can irritate the bladder. · Urinate often. Try to empty your bladder each time. For women: · Urinate right after you have sex. · After going to the bathroom, wipe from front to back. · Avoid douches, bubble baths, and feminine hygiene sprays. And avoid other feminine hygiene products that have deodorants. When should you call for help? Call your doctor now or seek immediate medical care if: 
  · You have new symptoms, such as fever, nausea, or vomiting.  
  · You have new or worse symptoms of a urinary problem. For example: ? You have blood or pus in your urine. ? You have chills or body aches. ? It hurts worse to urinate. ? You have groin or belly pain. ? You have pain in your back just below your rib cage (the flank area).  
 Watch closely for changes in your health, and be sure to contact your doctor if you have any problems. Where can you learn more? Go to http://rose-colleen.info/. Enter G103 in the search box to learn more about \"Painful Urination (Dysuria): Care Instructions. \" Current as of: March 20, 2018 Content Version: 11.9 © 5813-9835 [employer]. Care instructions adapted under license by [employer] (which disclaims liability or warranty for this information). If you have questions about a medical condition or this instruction, always ask your healthcare professional. [place] [address] any warranty or liability for your use of this information.

## 2019-07-06 NOTE — ACP (ADVANCE CARE PLANNING)
Has advanced medical directive scanned into chart. Reviewed at this visit. No changes.   Edwina ZAPATA

## 2019-07-06 NOTE — PROGRESS NOTES
Subjective:     Kalpana Dee is a [de-identified] y.o. female who presents today with the following:  Chief Complaint   Patient presents with    Bladder Infection    Urinary Burning    Skin Exam    Shoulder Pain     rt shoulder    Headache       Patient Active Problem List   Diagnosis Code    Diabetes type 2, controlled ([facility] [address] E11.9    Essential hypertension, benign I10    Palpitations R00.2    Vitamin D deficiency E55.9    S/P aortic valve replacement Z95.2    Hyperlipidemia E78.5    Depression with anxiety F41.8    Severe obesity (BMI 35.0-39. 9) E66.01    Type 2 diabetes with nephropathy (HCC) E11.21         COMPLIANT WITH MEDICATION:   HTN; Denies chest pain, dyspnea, palpitations, headache and blurred vision. Blood pressure normotensive. depression screening addressed not at risk    abuse screening addressed denies    learning assessment addressed reviewed nurses notes    fall risk addressed not at risk    HM: addressed    ROS:  Gen: denies fever, chills, fatigue, weight loss, weight gain  HEENT:denies blurry vision, nasal congestion, sore throat  Resp: denies dypsnea, cough, wheezing  CV: denies chest pain radiating to the jaws or arms, palpitations, lower extremity edema  Abd: denies nausea, vomiting, diarrhea, constipation  Neuro: denies numbness/tingling  Endo: denies polyuria, polydipsia, heat/cold intolerance  Heme: no lymphadenopathy    Allergies   Allergen Reactions    Naldecon Unknown (comments)    Sulfa (Sulfonamide Antibiotics) Rash         Current Outpatient Medications:     nitrofurantoin, macrocrystal-monohydrate, (MACROBID) 100 mg capsule, Take 1 Cap by mouth two (2) times a day for 7 days.  Indications: Bacterial Urinary Tract Infection, Disp: 14 Cap, Rfl: 0    busPIRone (BUSPAR) 5 mg tablet, TAKE 1 TABLET BY MOUTH TWICE DAILY, Disp: 60 Tab, Rfl: 0    ACCU-CHEK GUIDE GLUCOSE METER [facility], CHECK BLOOD SUGAR EVERY DAY, Disp: 1 Each, Rfl: 0    busPIRone (BUSPAR) 10 mg tablet, Take 1/2 tablet by mouth twice daily. , Disp: 30 Tab, Rfl: 5    lancets (ACCU-CHEK SOFTCLIX LANCETS) misc, Check sugar daily, Disp: 100 Each, Rfl: 11    glucose blood VI test strips (ACCU-CHEK GUIDE) strip, Check sugar daily, Disp: 100 Strip, Rfl: 11    ferrous sulfate 325 mg (65 mg iron) tablet, Take 1 Tab by mouth two (2) times daily (with meals). , Disp: 60 Tab, Rfl: 0    pantoprazole (PROTONIX) 40 mg tablet, Take 1 Tab by mouth daily. , Disp: 30 Tab, Rfl: 0    glimepiride (AMARYL) 1 mg tablet, Take 1 mg by mouth Daily (before breakfast). , Disp: , Rfl:     acetaminophen (TYLENOL) 325 mg tablet, Take 325 mg by mouth every four (4) hours as needed for Pain., Disp: , Rfl:     clopidogrel (PLAVIX) 75 mg tab, Take 75 mg by mouth., Disp: , Rfl:     lisinopril (PRINIVIL, ZESTRIL) 5 mg tablet, TAKE 1 TABLET EVERY DAY, Disp: 90 Tab, Rfl: 3    PARoxetine (PAXIL) 30 mg tablet, TAKE 1 TABLET EVERY DAY, Disp: 90 Tab, Rfl: 3    meclizine (ANTIVERT) 25 mg tablet, Take 1 Tab by mouth three (3) times daily as needed for Dizziness. , Disp: 20 Tab, Rfl: 0    metoprolol succinate (TOPROL-XL) 25 mg XL tablet, TAKE 1 TABLET EVERY DAY, Disp: 90 Tab, Rfl: 3    MYRBETRIQ 25 mg ER tablet, Take 25 mg by mouth daily. , Disp: , Rfl:     metFORMIN (GLUCOPHAGE) 500 mg tablet, TAKE TWO TABLETS BY MOUTH DAILY WITH BREAKFAST, Disp: 180 Tab, Rfl: 3    lovastatin (MEVACOR) 10 mg tablet, TAKE ONE TABLET BY MOUTH NIGHTLY, Disp: 90 Tab, Rfl: 3    aspirin delayed-release 325 mg tablet, Take 1 Tab by mouth daily. , Disp: 90 Tab, Rfl: 1    amoxicillin (AMOXIL) 500 mg capsule, Take 500 mg by mouth., Disp: , Rfl:     L. acidoph & paracasei- S therm- Bifido (ALVIN-Q/RISAQUAD) 8 billion cell cap cap, Take 1 Cap by mouth daily. , Disp: 30 Cap, Rfl: 0    tiZANidine (ZANAFLEX) 4 mg tablet, Take 4 mg by mouth three (3) times daily as needed. , Disp: , Rfl:     PV W-O EDU/FERROUS FUMARATE/FA (M-VIT PO), Take 1 tablet by mouth daily. , Disp: , Rfl:     Past Medical History:   Diagnosis Date    Anemia     Atypical chest pain     Long h/o intermittent non-cardiac CP.  Depression with anxiety     Diabetes ([address]     GERD (gastroesophageal reflux disease)     Hypercholesteremia     Hyperlipidemia 7/3/2013    Hypertension     Inner ear dysfunction     S/P aortic valve replacement     Dr. Mela Holcomb yearly    Vitamin D deficiency        Past Surgical History:   Procedure Laterality Date    COLONOSCOPY N/A 2019    COLONOSCOPY performed by Snow Yang MD at [address] HX AORTIC VALVE REPLACEMENT      Bovine valve    HX CATARACT REMOVAL      HX CHOLECYSTECTOMY      HX MOHS PROCEDURES Left        Social History     Tobacco Use   Smoking Status Former Smoker    Last attempt to quit: 1999    Years since quittin.8   Smokeless Tobacco Never Used       Social History     Socioeconomic History    Marital status:      Spouse name: Not on file    Number of children: Not on file    Years of education: Not on file    Highest education level: Not on file   Tobacco Use    Smoking status: Former Smoker     Last attempt to quit: 1999     Years since quittin.8    Smokeless tobacco: Never Used   Substance and Sexual Activity    Alcohol use: No    Drug use: No    Sexual activity: Yes     Partners: Male       Family History   Problem Relation Age of Onset    Heart Disease Mother     Heart Failure Father     Heart Failure Sister     Stroke Sister     Diabetes Brother     Heart Disease Brother          Objective:     Visit Vitals  /89 (BP 1 Location: Left arm, BP Patient Position: Sitting)   Pulse 75   Temp 97.6 °F (36.4 °C) (Oral)   Resp 14   Ht 5' 2\" (1.575 m)   Wt 208 lb (94.3 kg)   SpO2 95%   BMI 38.04 kg/m²     Body mass index is 38.04 kg/m². General: Alert and oriented. No acute distress. Well nourished  HEENT :  Ears:right ear cerumen impaction after irrigation without instrumentation .  TMs are normal. Canals are clear. Eyes: pupils equal, round, react to light and accommodation. Extra ocular movements intact. Nose: patent. Mouth and throat is clear. Neck:supple full range of motion no thyromegaly. Trachea midline, No carotid bruits. No significant lymphadenopathy  Lungs[de-identified] clear to auscultation without wheezes, rales, or rhonchi. Heart :RRR, S1 & S2 are normal intensity. No murmur; no gallop  Abdomen: bowel sounds active. No tenderness, guarding, rebound, masses, hepatic or spleen enlargement  Back: no CVA tenderness. Extremities: without clubbing, cyanosis, or edema. Bra line digging into right shoulder . Full ROM negative exam  Pulses: radial and femoral pulses are normal  Neuro: HMF intact. Cranial nerves II through XII grossly normal.  Motor: is 5 over 5 and symmetrical.  .1Cm insect bite mild erythema, no warmth exudate or red streaks. Deep tendon reflexes: +2 equal  Skin: left elbow . 01    Results for orders placed or performed during the hospital encounter of 04/22/19   CBC WITH AUTOMATED DIFF   Result Value Ref Range    WBC 7.5 3.6 - 11.0 K/uL    RBC 4.48 3.80 - 5.20 M/uL    HGB 11.8 11.5 - 16.0 g/dL    HCT 39.4 35.0 - 47.0 %    MCV 87.9 80.0 - 99.0 FL    MCH 26.3 26.0 - 34.0 PG    MCHC 29.9 (L) 30.0 - 36.5 g/dL    RDW 14.5 11.5 - 14.5 %    PLATELET 344 327 - 182 K/uL    MPV 10.9 8.9 - 12.9 FL    NRBC 0.0 0  WBC    ABSOLUTE NRBC 0.00 0.00 - 0.01 K/uL    NEUTROPHILS 53 32 - 75 %    LYMPHOCYTES 32 12 - 49 %    MONOCYTES 13 5 - 13 %    EOSINOPHILS 2 0 - 7 %    BASOPHILS 0 0 - 1 %    IMMATURE GRANULOCYTES 0 0.0 - 0.5 %    ABS. NEUTROPHILS 3.9 1.8 - 8.0 K/UL    ABS. LYMPHOCYTES 2.4 0.8 - 3.5 K/UL    ABS. MONOCYTES 0.9 0.0 - 1.0 K/UL    ABS. EOSINOPHILS 0.2 0.0 - 0.4 K/UL    ABS. BASOPHILS 0.0 0.0 - 0.1 K/UL    ABS. IMM.  GRANS. 0.0 0.00 - 0.04 K/UL    DF AUTOMATED     CELIAC ANTIBODY PROFILE   Result Value Ref Range    Immunoglobulin A, Qt. 236 64 - 422 mg/dL    Deamidated Gliadin Ab, IgA 4 0 - 19 units    Deamidated Gliadin Ab, IgG 3 0 - 19 units    t-Transglutaminase, IgA <2 0 - 3 U/mL    t-Transglutaminase, IgG <2 0 - 5 U/mL   GLUCOSE, POC   Result Value Ref Range    Glucose (POC) 159 (H) 65 - 100 mg/dL    Performed by Antwon Mckeon        No results found for this visit on 07/05/19. Assessment/ Plan:     1. Dysuria    - AMB POC URINALYSIS DIP STICK AUTO W/O MICRO  - CULTURE, URINE    2. Impacted cerumen of right ear    - REMOVAL IMPACTED CERUMEN IRRIGATION/LVG UNILAT    3. Acute cystitis without hematuria  nitrofurantoin     4. Insect bite of left elbow, initial encounter  Keep the area clean  Wash with mild soap and water. 5. Acute pain of right shoulder  Provided exercises   Discussed nonpharmacologic interventions. Readjust bra strap. 6. Severe obesity with body mass index (BMI) of 35.0 to 39.9 with serious comorbidity (HCC)  BMI discussed with her       Orders Placed This Encounter    REMOVAL IMPACTED CERUMEN IRRIGATION/LVG UNILAT    CULTURE, URINE    AMB POC URINALYSIS DIP STICK AUTO W/O MICRO    nitrofurantoin, macrocrystal-monohydrate, (MACROBID) 100 mg capsule     Sig: Take 1 Cap by mouth two (2) times a day for 7 days. Indications: Bacterial Urinary Tract Infection     Dispense:  14 Cap     Refill:  0         Verbal and written instructions (see AVS) provided.  Patient expresses understanding of diagnosis and treatment plan. Health Maintenance Due   Topic Date Due    Shingrix Vaccine Age 49> (1 of 2) 09/28/1988    FOOT EXAM Q1  07/03/2018    MICROALBUMIN Q1  02/13/2019    HEMOGLOBIN A1C Q6M  07/02/2019         Follow-up and Dispositions    · Return in about 1 month (around 8/5/2019).            BRITTANY Vazquez

## 2019-07-07 LAB — BACTERIA UR CULT: ABNORMAL

## 2019-07-08 NOTE — PROGRESS NOTES
Urine culture   Beta Strep UTI please  Amoxicillin called in to pharmacy.   Ask her to stop taking the nitrofurantoin

## 2019-08-22 ENCOUNTER — OFFICE VISIT (OUTPATIENT)
Dept: FAMILY MEDICINE CLINIC | Age: 79
End: 2019-08-22

## 2019-08-22 VITALS
DIASTOLIC BLOOD PRESSURE: 72 MMHG | WEIGHT: 208 LBS | SYSTOLIC BLOOD PRESSURE: 130 MMHG | TEMPERATURE: 98.1 F | OXYGEN SATURATION: 96 % | RESPIRATION RATE: 19 BRPM | BODY MASS INDEX: 38.28 KG/M2 | HEART RATE: 69 BPM | HEIGHT: 62 IN

## 2019-08-22 DIAGNOSIS — E11.8 CONTROLLED TYPE 2 DIABETES MELLITUS WITH COMPLICATION, WITHOUT LONG-TERM CURRENT USE OF INSULIN (HCC): Primary | ICD-10-CM

## 2019-08-22 DIAGNOSIS — E55.9 VITAMIN D DEFICIENCY: ICD-10-CM

## 2019-08-22 DIAGNOSIS — Z13.39 SCREENING FOR ALCOHOLISM: ICD-10-CM

## 2019-08-22 DIAGNOSIS — E78.2 MIXED HYPERLIPIDEMIA: ICD-10-CM

## 2019-08-22 DIAGNOSIS — E11.21 TYPE 2 DIABETES WITH NEPHROPATHY (HCC): ICD-10-CM

## 2019-08-22 DIAGNOSIS — Z00.00 MEDICARE ANNUAL WELLNESS VISIT, SUBSEQUENT: ICD-10-CM

## 2019-08-22 DIAGNOSIS — I10 ESSENTIAL HYPERTENSION, BENIGN: ICD-10-CM

## 2019-08-22 DIAGNOSIS — E61.1 IRON DEFICIENCY: ICD-10-CM

## 2019-08-22 PROBLEM — R41.3 POOR SHORT TERM MEMORY: Status: ACTIVE | Noted: 2019-08-22

## 2019-08-22 LAB
ALBUMIN UR QL STRIP: 10 MG/L
CREATININE, URINE POC: 100 MG/DL
MICROALBUMIN/CREAT RATIO POC: <30 MG/G

## 2019-08-22 NOTE — PROGRESS NOTES
Chief Complaint   Patient presents with    Shoulder Pain     right     Annual Wellness Visit     Health Maintenance reviewed     1. Have you been to the ER, urgent care clinic since your last visit? Hospitalized since your last visit? No    2. Have you seen or consulted any other health care providers outside of the [facility] since your last visit? Include any pap smears or colon screening.  Yes, eye doctor- Dr. Ceci Lazcano

## 2019-08-22 NOTE — PATIENT INSTRUCTIONS
Medicare Wellness Visit, Female     The best way to live healthy is to have a lifestyle where you eat a well-balanced diet, exercise regularly, limit alcohol use, and quit all forms of tobacco/nicotine, if applicable. Regular preventive services are another way to keep healthy. Preventive services (vaccines, screening tests, monitoring & exams) can help personalize your care plan, which helps you manage your own care. Screening tests can find health problems at the earliest stages, when they are easiest to treat. [facility] follows the current, evidence-based guidelines published by the [facility] ([facility]) when recommending preventive services for our patients. Because we follow these guidelines, sometimes recommendations change over time as research supports it. (For example, mammograms used to be recommended annually. Even though Medicare will still pay for an annual mammogram, the newer guidelines recommend a mammogram every two years for women of average risk.)  Of course, you and your doctor may decide to screen more often for some diseases, based on your risk and your health status. Preventive services for you include:  - Medicare offers their members a free annual wellness visit, which is time for you and your primary care provider to discuss and plan for your preventive service needs. Take advantage of this benefit every year!  -All adults over the age of 72 should receive the recommended pneumonia vaccines. Current USPSTF guidelines recommend a series of two vaccines for the best pneumonia protection.   -All adults should have a flu vaccine yearly and a tetanus vaccine every 10 years. All adults age 61 and older should receive a shingles vaccine once in their lifetime.    -A bone mass density test is recommended when a woman turns 65 to screen for osteoporosis. This test is only recommended one time, as a screening.  Some providers will use this same test as a disease monitoring tool if you already have osteoporosis. -All adults age 38-68 who are overweight should have a diabetes screening test once every three years.   -Other screening tests and preventive services for persons with diabetes include: an eye exam to screen for diabetic retinopathy, a kidney function test, a foot exam, and stricter control over your cholesterol.   -Cardiovascular screening for adults with routine risk involves an electrocardiogram (ECG) at intervals determined by your doctor.   -Colorectal cancer screenings should be done for adults age 54-65 with no increased risk factors for colorectal cancer. There are a number of acceptable methods of screening for this type of cancer. Each test has its own benefits and drawbacks. Discuss with your doctor what is most appropriate for you during your annual wellness visit. The different tests include: colonoscopy (considered the best screening method), a fecal occult blood test, a fecal DNA test, and sigmoidoscopy. -Breast cancer screenings are recommended every other year for women of normal risk, age 54-69.  -Cervical cancer screenings for women over age 72 are only recommended with certain risk factors.   -All adults born between [facility] should be screened once for Hepatitis C.      Here is a list of your current Health Maintenance items (your personalized list of preventive services) with a due date:  Health Maintenance Due   Topic Date Due    Shingles Vaccine (1 of 2) 09/28/1988    Diabetic Foot Care  07/03/2018    Albumin Urine Test  02/13/2019    Hemoglobin A1C    07/02/2019    Cholesterol Test   08/16/2019    Annual Well Visit  08/17/2019

## 2019-08-22 NOTE — PROGRESS NOTES
Chief Complaint   Patient presents with    Shoulder Pain     right     Annual Wellness Visit     Pt reports that she has had long standing right shoulder pain, has had surgery and injection sin the shoulder previously. Pt reports that it bothers her when she tries to sleep at night. Pt has a small amount of yogurt this morning. Subjective: (As above and below)     Chief Complaint   Patient presents with    Shoulder Pain     right     Annual Wellness Visit     she is a [de-identified]y.o. year old female who presents for evaluation. Reviewed PmHx, RxHx, FmHx, SocHx, AllgHx and updated in chart. Review of Systems - negative except as listed above    Objective:     Vitals:    08/22/19 1004   BP: 130/72   Pulse: 69   Resp: 19   Temp: 98.1 °F (36.7 °C)   TempSrc: Oral   SpO2: 96%   Weight: 208 lb (94.3 kg)   Height: 5' 2\" (1.575 m)     Physical Examination: General appearance - alert, well appearing, and in no distress  Mental status - normal mood, behavior, speech, dress, motor activity, and thought processes  Mouth - mucous membranes moist, pharynx normal without lesions  Chest - clear to auscultation, no wheezes, rales or rhonchi, symmetric air entry  Heart - normal rate, regular rhythm, normal S1, S2, no murmurs, rubs, clicks or gallops  Musculoskeletal - right shoulder pain with overhead motion  Extremities - peripheral pulses normal, no pedal edema, no clubbing or cyanosis    Diabetic foot exam:     Left Foot:   Visual Exam: normal    Pulse DP: 2+ (normal)   Filament test: normal sensation        Right Foot:   Visual Exam: normal    Pulse DP: 2+ (normal)   Filament test: normal sensation      Assessment/ Plan:   1. Controlled type 2 diabetes mellitus with complication, without long-term current use of insulin ([facility])  -check labs  - HEMOGLOBIN A1C WITH EAG  - AMB POC URINE, MICROALBUMIN, SEMIQUANT (3 RESULTS)  -  DIABETES FOOT EXAM    2.  Essential hypertension, benign  -well controlled  - CBC W/O DIFF  - TSH 3RD GENERATION    3. Vitamin D deficiency  - VITAMIN D, 25 HYDROXY    4. Mixed hyperlipidemia  -continue on current medication  - METABOLIC PANEL, COMPREHENSIVE  - LIPID PANEL    5. Type 2 diabetes with nephropathy (HCC)  - HEMOGLOBIN A1C WITH EAG  - AMB POC URINE, MICROALBUMIN, SEMIQUANT (3 RESULTS)    6. Iron deficiency  - IRON PROFILE  - FERRITIN       I have discussed the diagnosis with the patient and the intended plan as seen in the above orders. The patient has received an after-visit summary and questions were answered concerning future plans. Medication Side Effects and Warnings were discussed with patient: yes  Patient Labs were reviewed: yes  Patient Past Records were reviewed:  yes    Pati Galvan M.D. This is the Subsequent Medicare Annual Wellness Exam, performed 12 months or more after the Initial AWV or the last Subsequent AWV    I have reviewed the patient's medical history in detail and updated the computerized patient record. History     Past Medical History:   Diagnosis Date    Anemia     Atypical chest pain     Long h/o intermittent non-cardiac CP.  Depression with anxiety     Diabetes ([address]     GERD (gastroesophageal reflux disease)     Hypercholesteremia     Hyperlipidemia 7/3/2013    Hypertension     Inner ear dysfunction     S/P aortic valve replacement     Dr. Rocio Quarles yearly    Vitamin D deficiency       Past Surgical History:   Procedure Laterality Date    COLONOSCOPY N/A 4/22/2019    COLONOSCOPY performed by Sofía Lozano MD at [address] HX AORTIC VALVE REPLACEMENT  1999    Bovine valve    HX CATARACT REMOVAL      HX CHOLECYSTECTOMY  1999    HX MOHS PROCEDURES Left 2014     Current Outpatient Medications   Medication Sig Dispense Refill    varicella-zoster recombinant, PF, (SHINGRIX, PF,) 50 mcg/0.5 mL susr injection 0.5 mL by IntraMUSCular route once for 1 dose.  0.5 mL 0    amoxicillin (AMOXIL) 500 mg capsule Take 1 Cap by mouth three (3) times daily. Indications: beta strp uti 30 Cap 0    busPIRone (BUSPAR) 5 mg tablet TAKE 1 TABLET BY MOUTH TWICE DAILY 60 Tab 0    ACCU-CHEK GUIDE GLUCOSE METER misc CHECK BLOOD SUGAR EVERY DAY 1 Each 0    lancets (ACCU-CHEK SOFTCLIX LANCETS) misc Check sugar daily 100 Each 11    glucose blood VI test strips (ACCU-CHEK GUIDE) strip Check sugar daily 100 Strip 11    ferrous sulfate 325 mg (65 mg iron) tablet Take 1 Tab by mouth two (2) times daily (with meals). 60 Tab 0    pantoprazole (PROTONIX) 40 mg tablet Take 1 Tab by mouth daily. 30 Tab 0    L. acidoph & paracasei- S therm- Bifido (ALVIN-Q/RISAQUAD) 8 billion cell cap cap Take 1 Cap by mouth daily. 30 Cap 0    glimepiride (AMARYL) 1 mg tablet Take 1 mg by mouth Daily (before breakfast).  tiZANidine (ZANAFLEX) 4 mg tablet Take 4 mg by mouth three (3) times daily as needed.  acetaminophen (TYLENOL) 325 mg tablet Take 325 mg by mouth every four (4) hours as needed for Pain.  clopidogrel (PLAVIX) 75 mg tab Take 75 mg by mouth.  lisinopril (PRINIVIL, ZESTRIL) 5 mg tablet TAKE 1 TABLET EVERY DAY 90 Tab 3    PARoxetine (PAXIL) 30 mg tablet TAKE 1 TABLET EVERY DAY 90 Tab 3    meclizine (ANTIVERT) 25 mg tablet Take 1 Tab by mouth three (3) times daily as needed for Dizziness. 20 Tab 0    metoprolol succinate (TOPROL-XL) 25 mg XL tablet TAKE 1 TABLET EVERY DAY 90 Tab 3    MYRBETRIQ 25 mg ER tablet Take 25 mg by mouth daily.  metFORMIN (GLUCOPHAGE) 500 mg tablet TAKE TWO TABLETS BY MOUTH DAILY WITH BREAKFAST 180 Tab 3    lovastatin (MEVACOR) 10 mg tablet TAKE ONE TABLET BY MOUTH NIGHTLY 90 Tab 3    aspirin delayed-release 325 mg tablet Take 1 Tab by mouth daily. 90 Tab 1    PV W-O EDU/FERROUS FUMARATE/FA (M-VIT PO) Take 1 tablet by mouth daily.  busPIRone (BUSPAR) 10 mg tablet Take 1/2 tablet by mouth twice daily.  30 Tab 5     Allergies   Allergen Reactions    Naldecon Unknown (comments)    Sulfa (Sulfonamide Antibiotics) Rash     Family History   Problem Relation Age of Onset    Heart Disease Mother     Heart Failure Father     Heart Failure Sister     Stroke Sister     Diabetes Brother     Heart Disease Brother      Social History     Tobacco Use    Smoking status: Former Smoker     Last attempt to quit: 1999     Years since quittin.0    Smokeless tobacco: Never Used   Substance Use Topics    Alcohol use: No     Patient Active Problem List   Diagnosis Code    [facility] E11.9    Essential hypertension, benign I10    Palpitations R00.2    Vitamin D deficiency E55.9    S/P aortic valve replacement Z95.2    Hyperlipidemia E78.5    Depression with anxiety F41.8    Severe obesity (BMI 35.0-39. 9) E66.01    Type 2 diabetes with nephropathy ([facility] E11.21       Depression Risk Factor Screening:     3 most recent PHQ Screens 2019   Little interest or pleasure in doing things Not at all   Feeling down, depressed, irritable, or hopeless Not at all   Total Score PHQ 2 0   Trouble falling or staying asleep, or sleeping too much -   Feeling tired or having little energy -   Poor appetite, weight loss, or overeating -   Feeling bad about yourself - or that you are a failure or have let yourself or your family down -   Trouble concentrating on things such as school, work, reading, or watching TV -   Moving or speaking so slowly that other people could have noticed; or the opposite being so fidgety that others notice -   Thoughts of being better off dead, or hurting yourself in some way -   PHQ 9 Score -   How difficult have these problems made it for you to do your work, take care of your home and get along with others -     Alcohol Risk Factor Screening: You do not drink alcohol or very rarely. Functional Ability and Level of Safety:   Hearing Loss  The patient wears hearing aids.     Activities of Daily Living  The home contains: handrails, grab bars and ramps  Patient does total self care    Fall Risk  Fall Risk Assessment, last 12 mths 7/5/2019   Able to walk? Yes   Fall in past 12 months? No   Fall with injury? -   Number of falls in past 12 months -   Fall Risk Score -       Abuse Screen  Patient is not abused    Cognitive Screening   Evaluation of Cognitive Function:  Has your family/caregiver stated any concerns about your memory: yes  Pt has been diagnosed with short term memory loss    Patient Care Team   Patient Care Team:  Roseline Bates MD as PCP - General (Family Practice)  Carly Ford MD (Cardiology)    Assessment/Plan   Education and counseling provided:  Are appropriate based on today's review and evaluation    Diagnoses and all orders for this visit:    1. Controlled type 2 diabetes mellitus with complication, without long-term current use of insulin (HCC)  -     HEMOGLOBIN A1C WITH EAG  -     AMB POC URINE, MICROALBUMIN, SEMIQUANT (3 RESULTS)  -      DIABETES FOOT EXAM    2. Essential hypertension, benign  -     CBC W/O DIFF  -     TSH 3RD GENERATION    3. Vitamin D deficiency  -     VITAMIN D, 25 HYDROXY    4. Mixed hyperlipidemia  -     METABOLIC PANEL, COMPREHENSIVE  -     LIPID PANEL    5. Type 2 diabetes with nephropathy (HCC)  -     HEMOGLOBIN A1C WITH EAG  -     AMB POC URINE, MICROALBUMIN, SEMIQUANT (3 RESULTS)    6. Iron deficiency  -     IRON PROFILE  -     FERRITIN    7. Medicare annual wellness visit, subsequent    8. Screening for alcoholism  -     CO ANNUAL ALCOHOL SCREEN 15 MIN    Other orders  -     varicella-zoster recombinant, PF, (SHINGRIX, PF,) 50 mcg/0.5 mL susr injection; 0.5 mL by IntraMUSCular route once for 1 dose.         Health Maintenance Due   Topic Date Due    Shingrix Vaccine Age 50> (1 of 2) 09/28/1988    MICROALBUMIN Q1  02/13/2019    HEMOGLOBIN A1C Q6M  07/02/2019    LIPID PANEL Q1  08/16/2019

## 2019-08-23 LAB
25(OH)D3+25(OH)D2 SERPL-MCNC: 27.4 NG/ML (ref 30–100)
ALBUMIN SERPL-MCNC: 4.5 G/DL (ref 3.5–4.7)
ALBUMIN/GLOB SERPL: 1.9 {RATIO} (ref 1.2–2.2)
ALP SERPL-CCNC: 53 IU/L (ref 39–117)
ALT SERPL-CCNC: 24 IU/L (ref 0–32)
AST SERPL-CCNC: 26 IU/L (ref 0–40)
BILIRUB SERPL-MCNC: 0.3 MG/DL (ref 0–1.2)
BUN SERPL-MCNC: 17 MG/DL (ref 8–27)
BUN/CREAT SERPL: 26 (ref 12–28)
CALCIUM SERPL-MCNC: 9.6 MG/DL (ref 8.7–10.3)
CHLORIDE SERPL-SCNC: 99 MMOL/L (ref 96–106)
CHOLEST SERPL-MCNC: 136 MG/DL (ref 100–199)
CO2 SERPL-SCNC: 23 MMOL/L (ref 20–29)
CREAT SERPL-MCNC: 0.65 MG/DL (ref 0.57–1)
ERYTHROCYTE [DISTWIDTH] IN BLOOD BY AUTOMATED COUNT: 13.8 % (ref 12.3–15.4)
EST. AVERAGE GLUCOSE BLD GHB EST-MCNC: 163 MG/DL
FERRITIN SERPL-MCNC: 59 NG/ML (ref 15–150)
GLOBULIN SER CALC-MCNC: 2.4 G/DL (ref 1.5–4.5)
GLUCOSE SERPL-MCNC: 155 MG/DL (ref 65–99)
HBA1C MFR BLD: 7.3 % (ref 4.8–5.6)
HCT VFR BLD AUTO: 42.3 % (ref 34–46.6)
HDLC SERPL-MCNC: 64 MG/DL
HGB BLD-MCNC: 13.6 G/DL (ref 11.1–15.9)
INTERPRETATION, 910389: NORMAL
IRON SATN MFR SERPL: 22 % (ref 15–55)
IRON SERPL-MCNC: 67 UG/DL (ref 27–139)
LDLC SERPL CALC-MCNC: 54 MG/DL (ref 0–99)
Lab: NORMAL
MCH RBC QN AUTO: 28.9 PG (ref 26.6–33)
MCHC RBC AUTO-ENTMCNC: 32.2 G/DL (ref 31.5–35.7)
MCV RBC AUTO: 90 FL (ref 79–97)
PLATELET # BLD AUTO: 202 X10E3/UL (ref 150–450)
POTASSIUM SERPL-SCNC: 4.4 MMOL/L (ref 3.5–5.2)
PROT SERPL-MCNC: 6.9 G/DL (ref 6–8.5)
RBC # BLD AUTO: 4.7 X10E6/UL (ref 3.77–5.28)
SODIUM SERPL-SCNC: 139 MMOL/L (ref 134–144)
TIBC SERPL-MCNC: 308 UG/DL (ref 250–450)
TRIGL SERPL-MCNC: 89 MG/DL (ref 0–149)
TSH SERPL DL<=0.005 MIU/L-ACNC: 3.02 UIU/ML (ref 0.45–4.5)
UIBC SERPL-MCNC: 241 UG/DL (ref 118–369)
VLDLC SERPL CALC-MCNC: 18 MG/DL (ref 5–40)
WBC # BLD AUTO: 9.3 X10E3/UL (ref 3.4–10.8)

## 2019-08-23 NOTE — PROGRESS NOTES
Vitamin D is slightly low, please take a daily supplement of at least 2000 IU (international units) daily. Slight loss of diabetic control. Recommend increasing metformin to 4 pills daily. Please advise if you agree. All other labs are within normal limits. A message has been sent in [employer] and the lab work released to the patient.

## 2019-08-26 ENCOUNTER — TELEPHONE (OUTPATIENT)
Dept: FAMILY MEDICINE CLINIC | Age: 79
End: 2019-08-26

## 2019-08-26 DIAGNOSIS — F41.8 DEPRESSION WITH ANXIETY: ICD-10-CM

## 2019-08-26 RX ORDER — BUSPIRONE HYDROCHLORIDE 5 MG/1
TABLET ORAL
Qty: 180 TAB | Refills: 3 | Status: SHIPPED | OUTPATIENT
Start: 2019-08-26 | End: 2020-09-01

## 2019-08-26 NOTE — TELEPHONE ENCOUNTER
----- Message from Rosalio Smith sent at 8/24/2019 10:07 PM EDT -----  Regarding: Prescription Question  Contact: [phone]  Please send prescription for 90 days for busPIRone 5 mg tablet to [facility].

## 2019-08-27 ENCOUNTER — TELEPHONE (OUTPATIENT)
Dept: FAMILY MEDICINE CLINIC | Age: 79
End: 2019-08-27

## 2019-08-27 NOTE — TELEPHONE ENCOUNTER
Patient's daughter called reporting that patient possibly took a double dose of all of her morning medications. Daughter is concerned for possible side effects. I advised that primary concerns were for low BP and low blood sugar. Advised to monitor pt closely and seek immediate medical attention for any abnormalities.

## 2019-09-10 ENCOUNTER — TELEPHONE (OUTPATIENT)
Dept: FAMILY MEDICINE CLINIC | Age: 79
End: 2019-09-10

## 2019-09-10 NOTE — TELEPHONE ENCOUNTER
Patient calling to speak to a nurse.  She says that she was told by [facility] that her eye was bleeding

## 2019-09-26 ENCOUNTER — TELEPHONE (OUTPATIENT)
Dept: FAMILY MEDICINE CLINIC | Age: 79
End: 2019-09-26

## 2019-09-26 DIAGNOSIS — E11.9 TYPE 2 DIABETES MELLITUS WITHOUT COMPLICATION, WITHOUT LONG-TERM CURRENT USE OF INSULIN (HCC): ICD-10-CM

## 2019-09-26 RX ORDER — METFORMIN HYDROCHLORIDE 500 MG/1
2000 TABLET ORAL DAILY
Qty: 360 TAB | Refills: 3 | Status: SHIPPED | OUTPATIENT
Start: 2019-09-26 | End: 2020-09-01

## 2019-09-27 NOTE — TELEPHONE ENCOUNTER
----- Message from Rosalio Muñoz sent at 9/26/2019  8:39 PM EDT -----  Regarding: Prescription Question  Contact: [phone]  Please send new prescriptions (90 day supply) for:  Metformin (increased to 4 pills per day)and  glucose blood VI test strips strip  Commonly known as: ASCENSIA AUTODISC VI, ONE TOUCH ULTRA TEST Brandee more  Thank you

## 2019-10-08 ENCOUNTER — OFFICE VISIT (OUTPATIENT)
Dept: FAMILY MEDICINE CLINIC | Age: 79
End: 2019-10-08

## 2019-10-08 VITALS
SYSTOLIC BLOOD PRESSURE: 138 MMHG | TEMPERATURE: 98.8 F | DIASTOLIC BLOOD PRESSURE: 72 MMHG | RESPIRATION RATE: 16 BRPM | BODY MASS INDEX: 38.76 KG/M2 | HEIGHT: 62 IN | WEIGHT: 210.6 LBS | OXYGEN SATURATION: 93 % | HEART RATE: 64 BPM

## 2019-10-08 DIAGNOSIS — I10 ESSENTIAL HYPERTENSION, BENIGN: ICD-10-CM

## 2019-10-08 DIAGNOSIS — E11.8 CONTROLLED TYPE 2 DIABETES MELLITUS WITH COMPLICATION, WITHOUT LONG-TERM CURRENT USE OF INSULIN (HCC): Primary | ICD-10-CM

## 2019-10-08 NOTE — PROGRESS NOTES
Chief Complaint   Patient presents with    Medication Evaluation    Diabetes     f/u     Pt was seen by Dr. Claudette Mathew last week, diagnosed with dementia and started on Memantine. Pt has not yet started due to needing GI clearance, due to stomach issues. Her neurologist was concerned with patient's low heart rate. Daughter is concerned that patient is only drinking three slim fast shakes a day, not eating much food. Subjective: (As above and below)     Chief Complaint   Patient presents with    Medication Evaluation    Diabetes     f/u     she is a 78y.o. year old female who presents for evaluation. Reviewed PmHx, RxHx, FmHx, SocHx, AllgHx and updated in chart. Review of Systems - negative except as listed above    Objective:     Vitals:    10/08/19 1035   BP: 138/72   Pulse: 64   Resp: 16   Temp: 98.8 °F (37.1 °C)   TempSrc: Oral   SpO2: 93%   Weight: 210 lb 9.6 oz (95.5 kg)   Height: 5' 2\" (1.575 m)     Physical Examination: General appearance - alert, well appearing, and in no distress  Mental status - normal mood, behavior, speech, dress, motor activity, and thought processes  Mouth - mucous membranes moist, pharynx normal without lesions  Chest - clear to auscultation, no wheezes, rales or rhonchi, symmetric air entry  Heart - normal rate, regular rhythm, normal S1, S2, no murmurs, rubs, clicks or gallops  Musculoskeletal - no joint tenderness, deformity or swelling  Extremities - peripheral pulses normal, no pedal edema, no clubbing or cyanosis    Assessment/ Plan:   1. Controlled type 2 diabetes mellitus with complication, without long-term current use of insulin ([address]  -continue on current medications    2. Essential hypertension, benign  -well controlled    3. Dementia  -start on medication after Gi eval  -heart rate normal for pt      I have discussed the diagnosis with the patient and the intended plan as seen in the above orders.   The patient has received an after-visit summary and questions were answered concerning future plans.      Medication Side Effects and Warnings were discussed with patient: yes  Patient Labs were reviewed: yes  Patient Past Records were reviewed:  yes    Stewart Basilio M.D.

## 2019-10-08 NOTE — PROGRESS NOTES
Chief Complaint   Patient presents with    Medication Evaluation    Diabetes     f/u     1. Have you been to the ER, urgent care clinic since your last visit? Hospitalized since your last visit? No    2. Have you seen or consulted any other health care providers outside of the [address] [place] [address] since your last visit? Include any pap smears or colon screening. No     Pt has flu vaccine and shingles vaccine since last visit. Health maintenance reviewed. Pt informed of health maintenance past due and/or upcoming. Pt verbalized understanding.

## 2019-10-30 RX ORDER — GLIMEPIRIDE 1 MG/1
1 TABLET ORAL
Qty: 90 TAB | Refills: 3 | Status: SHIPPED | OUTPATIENT
Start: 2019-10-30 | End: 2020-09-01

## 2019-10-30 NOTE — TELEPHONE ENCOUNTER
Requested Prescriptions     Pending Prescriptions Disp Refills    glimepiride (AMARYL) 1 mg tablet       Sig: Take 1 Tab by mouth Daily (before breakfast).

## 2019-11-04 RX ORDER — LISINOPRIL 5 MG/1
TABLET ORAL
Qty: 90 TAB | Refills: 3 | Status: SHIPPED | OUTPATIENT
Start: 2019-11-04 | End: 2021-06-29 | Stop reason: SDUPTHER

## 2019-11-04 NOTE — TELEPHONE ENCOUNTER
Requested Prescriptions     Pending Prescriptions Disp Refills    lisinopril (PRINIVIL, ZESTRIL) 5 mg tablet 90 Tab 3

## 2020-01-07 ENCOUNTER — TELEPHONE (OUTPATIENT)
Dept: FAMILY MEDICINE CLINIC | Age: 80
End: 2020-01-07

## 2020-01-07 RX ORDER — PAROXETINE 30 MG/1
TABLET, FILM COATED ORAL
Qty: 90 TAB | Refills: 3 | Status: SHIPPED | OUTPATIENT
Start: 2020-01-07 | End: 2021-02-05 | Stop reason: SDUPTHER

## 2020-01-07 NOTE — TELEPHONE ENCOUNTER
----- Message from Christian. Danyelle Elizabeth sent at 1/7/2020  1:07 PM EST -----  Regarding: Prescription Question  Contact: [phone]  Hello,    I am out of  Paroxetine HCL 30mg Tab and [facility] said they have not been to get in touch with your office. Since I'm out would you send a prescription to [facility] for a 90 day refill? @ [employer]. Also my daughter needs for the attached FLMA form to be filled out and returned to her. Would you do this for me? Thank you and let me know if you have any questions or concerns.     Take care,    Triny Rebollar [phone]

## 2020-01-07 NOTE — TELEPHONE ENCOUNTER
Requested Prescriptions     Pending Prescriptions Disp Refills    PARoxetine (PAXIL) 30 mg tablet 90 Tab 3     90 days supply requested and send to [facility] on [address]

## 2020-01-09 ENCOUNTER — TELEPHONE (OUTPATIENT)
Dept: FAMILY MEDICINE CLINIC | Age: 80
End: 2020-01-09

## 2020-01-09 NOTE — TELEPHONE ENCOUNTER
Message from Magalie Velazco   Received: Yesterday   Message Contents   [place], [address] [facility] Office Pool             General Message/Vendor Calls     Caller's first and last name:   Ms. Mario Bowen, pt's daughter     Reason for call:   Status of medication refill     Callback required yes/no and why:   yes     Best contact number(s):   [phone]     Details to clarify the request:   Inquiring the status of refill request. [facility] has no medication there for . Pt is out of medication now.      Daniel Rendon

## 2020-01-09 NOTE — TELEPHONE ENCOUNTER
Called in rx paxil to [facility] in [place]. Daughter notified that medication was called in, she voiced understanding.

## 2020-02-24 ENCOUNTER — OFFICE VISIT (OUTPATIENT)
Dept: FAMILY MEDICINE CLINIC | Age: 80
End: 2020-02-24

## 2020-02-24 VITALS
BODY MASS INDEX: 39.01 KG/M2 | HEIGHT: 62 IN | RESPIRATION RATE: 15 BRPM | WEIGHT: 212 LBS | TEMPERATURE: 97.9 F | OXYGEN SATURATION: 95 % | HEART RATE: 54 BPM | SYSTOLIC BLOOD PRESSURE: 134 MMHG | DIASTOLIC BLOOD PRESSURE: 75 MMHG

## 2020-02-24 DIAGNOSIS — E78.2 MIXED HYPERLIPIDEMIA: ICD-10-CM

## 2020-02-24 DIAGNOSIS — E66.01 SEVERE OBESITY WITH BODY MASS INDEX (BMI) OF 35.0 TO 39.9 WITH SERIOUS COMORBIDITY (HCC): ICD-10-CM

## 2020-02-24 DIAGNOSIS — I10 ESSENTIAL HYPERTENSION, BENIGN: ICD-10-CM

## 2020-02-24 DIAGNOSIS — E55.9 VITAMIN D DEFICIENCY: ICD-10-CM

## 2020-02-24 DIAGNOSIS — E11.21 CONTROLLED TYPE 2 DIABETES MELLITUS WITH DIABETIC NEPHROPATHY, WITHOUT LONG-TERM CURRENT USE OF INSULIN (HCC): Primary | ICD-10-CM

## 2020-02-24 PROBLEM — T82.857A PROSTHETIC AORTIC VALVE STENOSIS: Status: ACTIVE | Noted: 2020-02-24

## 2020-02-24 RX ORDER — ACETAMINOPHEN 500 MG
3000 TABLET ORAL 2 TIMES DAILY
COMMUNITY

## 2020-02-24 RX ORDER — MEMANTINE HYDROCHLORIDE 10 MG/1
TABLET ORAL 2 TIMES DAILY
COMMUNITY

## 2020-02-24 NOTE — PATIENT INSTRUCTIONS
Body Mass Index: Care Instructions Your Care Instructions Body mass index (BMI) can help you see if your weight is raising your risk for health problems. It uses a formula to compare how much you weigh with how tall you are. · A BMI lower than 18.5 is considered underweight. · A BMI between 18.5 and 24.9 is considered healthy. · A BMI between 25 and 29.9 is considered overweight. A BMI of 30 or higher is considered obese. If your BMI is in the normal range, it means that you have a lower risk for weight-related health problems. If your BMI is in the overweight or obese range, you may be at increased risk for weight-related health problems, such as high blood pressure, heart disease, stroke, arthritis or joint pain, and diabetes. If your BMI is in the underweight range, you may be at increased risk for health problems such as fatigue, lower protection (immunity) against illness, muscle loss, bone loss, hair loss, and hormone problems. BMI is just one measure of your risk for weight-related health problems. You may be at higher risk for health problems if you are not active, you eat an unhealthy diet, or you drink too much alcohol or use tobacco products. Follow-up care is a key part of your treatment and safety. Be sure to make and go to all appointments, and call your doctor if you are having problems. It's also a good idea to know your test results and keep a list of the medicines you take. How can you care for yourself at home? · Practice healthy eating habits. This includes eating plenty of fruits, vegetables, whole grains, lean protein, and low-fat dairy. · If your doctor recommends it, get more exercise. Walking is a good choice. Bit by bit, increase the amount you walk every day. Try for at least 30 minutes on most days of the week. · Do not smoke. Smoking can increase your risk for health problems.  If you need help quitting, talk to your doctor about stop-smoking programs and medicines. These can increase your chances of quitting for good. · Limit alcohol to 2 drinks a day for men and 1 drink a day for women. Too much alcohol can cause health problems. If you have a BMI higher than 25 · Your doctor may do other tests to check your risk for weight-related health problems. This may include measuring the distance around your waist. A waist measurement of more than 40 inches in men or 35 inches in women can increase the risk of weight-related health problems. · Talk with your doctor about steps you can take to stay healthy or improve your health. You may need to make lifestyle changes to lose weight and stay healthy, such as changing your diet and getting regular exercise. If you have a BMI lower than 18.5 · Your doctor may do other tests to check your risk for health problems. · Talk with your doctor about steps you can take to stay healthy or improve your health. You may need to make lifestyle changes to gain or maintain weight and stay healthy, such as getting more healthy foods in your diet and doing exercises to build muscle. Where can you learn more? Go to http://rose-colleen.info/. Enter S176 in the search box to learn more about \"Body Mass Index: Care Instructions. \" Current as of: October 13, 2016 Content Version: 11.4 © 6158-9727 [employer]wise[employer]. Care instructions adapted under license by [employer] (which disclaims liability or warranty for this information). If you have questions about a medical condition or this instruction, always ask your healthcare professional. [address] any warranty or liability for your use of this information.

## 2020-02-24 NOTE — PROGRESS NOTES
Chief Complaint   Patient presents with    Diabetes     follow-up     1. Have you been to the ER, urgent care clinic since your last visit? Hospitalized since your last visit? No    2. Have you seen or consulted any other health care providers outside of the [address] [place] [address] since your last visit? Include any pap smears or colon screening.  Yes When: Joy Kothari and Dr. Femi Obregon Maintenance Due   Topic Date Due    Shingrix Vaccine Age 50> (2 of 2) 11/06/2019    GLAUCOMA SCREENING Q2Y  03/28/2020

## 2020-02-24 NOTE — PROGRESS NOTES
Chief Complaint   Patient presents with    Diabetes     follow-up     BS this am was 136, yesterday was 106, day before that was 112. Pt was prescribed Sondra Hickman, just titrated up to new dose of 10mg BID. Pt ate a small breakfast.     Subjective: (As above and below)     Chief Complaint   Patient presents with    Diabetes     follow-up     she is a 78y.o. year old female who presents for evaluation. Reviewed PmHx, RxHx, FmHx, SocHx, AllgHx and updated in chart. Review of Systems - negative except as listed above    Objective:     Vitals:    02/24/20 0936   BP: 153/75   Pulse: (!) 54   Resp: 15   Temp: 97.9 °F (36.6 °C)   TempSrc: Oral   SpO2: 95%   Weight: 212 lb (96.2 kg)   Height: 5' 2\" (1.575 m)     Physical Examination: General appearance - alert, well appearing, and in no distress  Mental status - normal mood, behavior, speech, dress, motor activity, and thought processes  Neck - supple, no significant adenopathy  Chest - clear to auscultation, no wheezes, rales or rhonchi, symmetric air entry  Heart - holosystolic murmur, regular rate  Musculoskeletal - no joint tenderness, deformity or swelling  Extremities - peripheral pulses normal, no pedal edema, no clubbing or cyanosis    Assessment/ Plan:   1. Controlled type 2 diabetes mellitus with diabetic nephropathy, without long-term current use of insulin ([facility])  -check fasting labs  - HEMOGLOBIN A1C WITH EAG    2. Vitamin D deficiency  - VITAMIN D, 25 HYDROXY    3. Mixed hyperlipidemia  -check labs  - METABOLIC PANEL, COMPREHENSIVE  - LIPID PANEL    4. Essential hypertension, benign  -slightly elevated  - CBC W/O DIFF  - TSH 3RD GENERATION    5. Severe obesity with body mass index (BMI) of 35.0 to 39.9 with serious comorbidity ([address]  -continue to work on diet and exercise       I have discussed the diagnosis with the patient and the intended plan as seen in the above orders.   The patient has received an after-visit summary and questions were answered concerning future plans. Medication Side Effects and Warnings were discussed with patient: yes  Patient Labs were reviewed: yes  Patient Past Records were reviewed:  yes    Krista Aguilar M.D. Discussed the patient's BMI with her. The BMI follow up plan is as follows:     dietary management education, guidance, and counseling  encourage exercise  monitor weight  prescribed dietary intake    An After Visit Summary was printed and given to the patient.

## 2020-02-25 LAB
25(OH)D3+25(OH)D2 SERPL-MCNC: 50 NG/ML (ref 30–100)
ALBUMIN SERPL-MCNC: 4.3 G/DL (ref 3.6–4.6)
ALBUMIN/GLOB SERPL: 1.7 {RATIO} (ref 1.2–2.2)
ALP SERPL-CCNC: 62 IU/L (ref 39–117)
ALT SERPL-CCNC: 13 IU/L (ref 0–32)
AST SERPL-CCNC: 21 IU/L (ref 0–40)
BILIRUB SERPL-MCNC: 0.2 MG/DL (ref 0–1.2)
BUN SERPL-MCNC: 21 MG/DL (ref 8–27)
BUN/CREAT SERPL: 29 (ref 12–28)
CALCIUM SERPL-MCNC: 9.8 MG/DL (ref 8.7–10.3)
CHLORIDE SERPL-SCNC: 101 MMOL/L (ref 96–106)
CHOLEST SERPL-MCNC: 151 MG/DL (ref 100–199)
CO2 SERPL-SCNC: 22 MMOL/L (ref 20–29)
CREAT SERPL-MCNC: 0.72 MG/DL (ref 0.57–1)
ERYTHROCYTE [DISTWIDTH] IN BLOOD BY AUTOMATED COUNT: 12.5 % (ref 11.7–15.4)
EST. AVERAGE GLUCOSE BLD GHB EST-MCNC: 143 MG/DL
GLOBULIN SER CALC-MCNC: 2.6 G/DL (ref 1.5–4.5)
GLUCOSE SERPL-MCNC: 101 MG/DL (ref 65–99)
HBA1C MFR BLD: 6.6 % (ref 4.8–5.6)
HCT VFR BLD AUTO: 39.2 % (ref 34–46.6)
HDLC SERPL-MCNC: 63 MG/DL
HGB BLD-MCNC: 12.5 G/DL (ref 11.1–15.9)
INTERPRETATION, 910389: NORMAL
LDLC SERPL CALC-MCNC: 66 MG/DL (ref 0–99)
Lab: NORMAL
MCH RBC QN AUTO: 27.5 PG (ref 26.6–33)
MCHC RBC AUTO-ENTMCNC: 31.9 G/DL (ref 31.5–35.7)
MCV RBC AUTO: 86 FL (ref 79–97)
PLATELET # BLD AUTO: 273 X10E3/UL (ref 150–450)
POTASSIUM SERPL-SCNC: 4.5 MMOL/L (ref 3.5–5.2)
PROT SERPL-MCNC: 6.9 G/DL (ref 6–8.5)
RBC # BLD AUTO: 4.54 X10E6/UL (ref 3.77–5.28)
SODIUM SERPL-SCNC: 142 MMOL/L (ref 134–144)
TRIGL SERPL-MCNC: 109 MG/DL (ref 0–149)
TSH SERPL DL<=0.005 MIU/L-ACNC: 4.2 UIU/ML (ref 0.45–4.5)
VLDLC SERPL CALC-MCNC: 22 MG/DL (ref 5–40)
WBC # BLD AUTO: 11.8 X10E3/UL (ref 3.4–10.8)

## 2020-02-25 NOTE — PROGRESS NOTES
Diabetic control has greatly improved, at goal.  Keep up the good work! All other labs are within normal limits. A message has been sent in [employer] and the lab work released to the patient.

## 2020-04-11 LAB
FERRITIN SERPL-MCNC: 122 NG/ML (ref 15–150)
IRON SATN MFR SERPL: 16 % (ref 15–55)
IRON SERPL-MCNC: 46 UG/DL (ref 27–139)
TIBC SERPL-MCNC: 280 UG/DL (ref 250–450)
UIBC SERPL-MCNC: 234 UG/DL (ref 118–369)

## 2020-06-10 ENCOUNTER — TELEPHONE (OUTPATIENT)
Dept: FAMILY MEDICINE CLINIC | Age: 80
End: 2020-06-10

## 2020-06-10 RX ORDER — DICLOFENAC SODIUM 10 MG/G
2 GEL TOPICAL 4 TIMES DAILY
Qty: 100 G | Refills: 0 | Status: SHIPPED | OUTPATIENT
Start: 2020-06-10

## 2020-06-10 NOTE — TELEPHONE ENCOUNTER
Daughter is calling stating Dr Koo is pt's dr she is wanting to know if something can be called in for pt's arthritis in shoulder

## 2020-06-10 NOTE — TELEPHONE ENCOUNTER
Called pt, verified name and . Informed pt that per Dr. Fang Zavaleta advise that I have called in a topical gel to help with shoulder arthritis. Pt stated understanding.

## 2020-06-10 NOTE — TELEPHONE ENCOUNTER
----- Message from Anika Gibson sent at 6/10/2020  1:11 PM EDT -----  Regarding: /Telephone  Contact: [phone]  Pt is requesting a Rx for \"Arthritis in her shoulder\" to be called into [address] listed on file.

## 2020-08-14 ENCOUNTER — TELEPHONE (OUTPATIENT)
Dept: FAMILY MEDICINE CLINIC | Age: 80
End: 2020-08-14

## 2020-08-14 NOTE — LETTER
8/14/2020 8:31 PM 
 
Ms. Proctor  [address] [facility] [address] To Whom It May Concern: Ms. Sheri Medina is a patient at [facility]. Due to both her age as well as multiple other medical conditions she is at very risk should she contract COVID-19. Please allow her caregivers to work virtually to reduce her risk. Please let me know if I can be of further assistance. Sincerely, Jacob Ibarra MD

## 2020-08-15 NOTE — TELEPHONE ENCOUNTER
----- Message from Rosalio Martinez sent at 8/10/2020  8:34 AM EDT -----  Regarding: RE: Non-Urgent Medical Question  Contact: [phone]  Hello,  Would you send it to my home address. [address] [place] [address], [address].     Thank you,

## 2020-09-01 RX ORDER — METFORMIN HYDROCHLORIDE 500 MG/1
TABLET ORAL
Qty: 360 TAB | Refills: 3 | Status: SHIPPED | OUTPATIENT
Start: 2020-09-01 | End: 2021-02-05 | Stop reason: SDUPTHER

## 2020-09-01 RX ORDER — GLIMEPIRIDE 1 MG/1
TABLET ORAL
Qty: 90 TAB | Refills: 3 | Status: SHIPPED | OUTPATIENT
Start: 2020-09-01 | End: 2021-02-05 | Stop reason: SDUPTHER

## 2020-09-01 RX ORDER — BUSPIRONE HYDROCHLORIDE 5 MG/1
TABLET ORAL
Qty: 180 TAB | Refills: 3 | Status: SHIPPED | OUTPATIENT
Start: 2020-09-01 | End: 2021-02-05 | Stop reason: SDUPTHER

## 2020-09-08 ENCOUNTER — VIRTUAL VISIT (OUTPATIENT)
Dept: FAMILY MEDICINE CLINIC | Age: 80
End: 2020-09-08
Payer: MEDICARE

## 2020-09-08 ENCOUNTER — TELEPHONE (OUTPATIENT)
Dept: FAMILY MEDICINE CLINIC | Age: 80
End: 2020-09-08

## 2020-09-08 DIAGNOSIS — E55.9 VITAMIN D DEFICIENCY: ICD-10-CM

## 2020-09-08 DIAGNOSIS — E11.21 CONTROLLED TYPE 2 DIABETES MELLITUS WITH DIABETIC NEPHROPATHY, WITHOUT LONG-TERM CURRENT USE OF INSULIN (HCC): Primary | ICD-10-CM

## 2020-09-08 DIAGNOSIS — E78.2 MIXED HYPERLIPIDEMIA: ICD-10-CM

## 2020-09-08 DIAGNOSIS — I10 ESSENTIAL HYPERTENSION, BENIGN: ICD-10-CM

## 2020-09-08 DIAGNOSIS — R06.00 DYSPNEA, UNSPECIFIED TYPE: ICD-10-CM

## 2020-09-08 PROCEDURE — 99214 OFFICE O/P EST MOD 30 MIN: CPT | Performed by: FAMILY MEDICINE

## 2020-09-08 PROCEDURE — G8399 PT W/DXA RESULTS DOCUMENT: HCPCS | Performed by: FAMILY MEDICINE

## 2020-09-08 PROCEDURE — G8427 DOCREV CUR MEDS BY ELIG CLIN: HCPCS | Performed by: FAMILY MEDICINE

## 2020-09-08 PROCEDURE — 1101F PT FALLS ASSESS-DOCD LE1/YR: CPT | Performed by: FAMILY MEDICINE

## 2020-09-08 PROCEDURE — 1090F PRES/ABSN URINE INCON ASSESS: CPT | Performed by: FAMILY MEDICINE

## 2020-09-08 PROCEDURE — G8756 NO BP MEASURE DOC: HCPCS | Performed by: FAMILY MEDICINE

## 2020-09-08 PROCEDURE — G9717 DOC PT DX DEP/BP F/U NT REQ: HCPCS | Performed by: FAMILY MEDICINE

## 2020-09-08 RX ORDER — CARVEDILOL 6.25 MG/1
TABLET ORAL 2 TIMES DAILY WITH MEALS
COMMUNITY
End: 2021-04-28

## 2020-09-08 RX ORDER — FLUTICASONE PROPIONATE 50 MCG
2 SPRAY, SUSPENSION (ML) NASAL AS NEEDED
COMMUNITY

## 2020-09-08 RX ORDER — LORATADINE 10 MG/1
10 TABLET ORAL
COMMUNITY
End: 2021-04-28

## 2020-09-08 NOTE — TELEPHONE ENCOUNTER
No level of service. Apt scheduled for 4:00PM    Patient reschedule virtual visit for tomorrow. She waited too long to be seen.  I cancelled the appointment at 5:00 PM

## 2020-09-08 NOTE — PROGRESS NOTES
Chief Complaint   Patient presents with    Hypertension       1. Have you been to the ER, urgent care clinic since your last visit? Hospitalized since your last visit? No    2. Have you seen or consulted any other health care providers outside of the [address] [place] [address] since your last visit? Include any pap smears or colon screening.  No    Health Maintenance Due   Topic Date Due    Eye Exam Retinal or Dilated  03/28/2020    Foot Exam Q1  08/22/2020    MICROALBUMIN Q1  08/22/2020    A1C test (Diabetic or Prediabetic)  08/24/2020    Flu Vaccine (1) 09/01/2020    Medicare Yearly Exam  08/22/2020

## 2020-09-10 NOTE — PROGRESS NOTES
Chief Complaint   Patient presents with    Hypertension     Patient was seen today via virtual video visit with daughter facilitating. Daughter reports that patient has had increased swelling in 1 leg. Daughter has been encouraging patient to keep her legs elevated as much as possible. Daughter also reports some increased shortness of breath with activity, seems worse at sometimes than others, no clear pattern    Daughter is also concerned about an enlarging mole on the right side of patient's forehead in line with the eyebrow. Daughter reports that it is become larger and stands up more from the skin. Daughter also requests referral to a podiatrist, reports the patient's toenails are too thick for her to cut at home at this time. Patient reports that she is doing well, denies complaint today. Majority of visit was conducted with daughter. Horacio Mcgrath is a 78 y.o. female who was seen by synchronous (real-time) audio-video technology on 9/8/2020 for Hypertension        Assessment & Plan:   Diagnoses and all orders for this visit:    1. Controlled type 2 diabetes mellitus with diabetic nephropathy, without long-term current use of insulin (HCC)  -     HEMOGLOBIN A1C WITH EAG; Future  -     REFERRAL TO DERMATOLOGY  -     REFERRAL TO PODIATRY    2. Essential hypertension, benign  -     CBC WITH AUTOMATED DIFF; Future  -     TSH 3RD GENERATION; Future    3. Vitamin D deficiency  -     VITAMIN D, 25 HYDROXY; Future    4. Mixed hyperlipidemia  -     METABOLIC PANEL, COMPREHENSIVE; Future  -     LIPID PANEL; Future    5. Dyspnea, unspecified type  -     NT-PRO BNP; Future            Subjective:       Prior to Admission medications    Medication Sig Start Date End Date Taking? Authorizing Provider   VIT B CMPLX #9-FA-VIT C-VIT E PO Take  by mouth. Yes Provider, Historical   fluticasone propionate (Flonase Allergy Relief) 50 mcg/actuation nasal spray 2 Sprays by Both Nostrils route daily.    Yes Provider, Historical   loratadine (Claritin) 10 mg tablet Take 10 mg by mouth. Yes Provider, Historical   carvediloL (COREG) 6.25 mg tablet Take  by mouth two (2) times daily (with meals). Yes Provider, Historical   vit C/E/Zn/coppr/lutein/zeaxan (PRESERVISION AREDS-2 PO) Take  by mouth. Yes Provider, Historical   busPIRone (BUSPAR) 5 mg tablet TAKE 1 TABLET TWICE DAILY 9/1/20  Yes Rea Koo MD   metFORMIN (GLUCOPHAGE) 500 mg tablet TAKE 4 TABLETS BY MOUTH DAILY. 9/1/20  Yes Rea Koo MD   glimepiride (AMARYL) 1 mg tablet TAKE 1 TABLET EVERY DAY BEFORE BREAKFAST 9/1/20  Yes Rea Koo MD   memantine (NAMENDA) 10 mg tablet Take  by mouth two (2) times a day. Yes Provider, Historical   cholecalciferol (VITAMIN D3) (2,000 UNITS /50 MCG) cap capsule Take 3,000 Units by mouth two (2) times a day. Yes Provider, Historical   PARoxetine (PAXIL) 30 mg tablet TAKE 1 TABLET EVERY DAY 1/7/20  Yes Rea Koo MD   lisinopril (PRINIVIL, ZESTRIL) 5 mg tablet TAKE 1 TABLET EVERY DAY  Patient taking differently: 40 mg. TAKE 1 TABLET EVERY DAY 11/4/19  Yes Carleen Ochoa MD   glucose blood VI test strips (ACCU-CHEK GUIDE) strip Check sugar daily, one touch ultra test strips 9/26/19  Yes Rea Koo MD   ACCU-CHEK GUIDE GLUCOSE METER misc CHECK BLOOD SUGAR EVERY DAY 5/9/19  Yes Carleen Ochoa MD   lancets (ACCU-CHEK SOFTCLIX LANCETS) misc Check sugar daily 1/16/19  Yes Carleen Ochoa MD   ferrous sulfate 325 mg (65 mg iron) tablet Take 1 Tab by mouth two (2) times daily (with meals). 1/8/19  Yes Danny Koo NP   pantoprazole (PROTONIX) 40 mg tablet Take 1 Tab by mouth daily. 1/8/19  Yes Demetra Foster NP   L. acidoph & paracasei- S therm- Bifido (ALVIN-Q/RISAQUAD) 8 billion cell cap cap Take 1 Cap by mouth daily. 1/9/19  Yes Danny Koo NP   acetaminophen (TYLENOL) 325 mg tablet Take 325 mg by mouth every four (4) hours as needed for Pain.    Yes Provider, Historical   clopidogrel (PLAVIX) 75 mg tab Take 75 mg by mouth. Yes Provider, Historical   MYRBETRIQ 25 mg ER tablet Take 25 mg by mouth daily. 8/11/18  Yes Provider, Historical   lovastatin (MEVACOR) 10 mg tablet TAKE ONE TABLET BY MOUTH NIGHTLY 3/15/18  Yes Aylin Ny MD   aspirin delayed-release 325 mg tablet Take 1 Tab by mouth daily. Patient taking differently: Take 81 mg by mouth daily. 3/14/14  Yes Aylin Ny MD   PV W-O EDU/FERROUS FUMARATE/FA (M-VIT PO) Take 1 tablet by mouth daily. Yes Provider, Historical   diclofenac (VOLTAREN) 1 % gel Apply 2 g to affected area four (4) times daily. 6/10/20   Dillon Koo MD   amoxicillin (AMOXIL) 500 mg capsule Take 1 Cap by mouth three (3) times daily. Indications: beta strp uti 7/8/19   Kelsi Meza NP   tiZANidine (ZANAFLEX) 4 mg tablet Take 4 mg by mouth three (3) times daily as needed. Provider, Historical   meclizine (ANTIVERT) 25 mg tablet Take 1 Tab by mouth three (3) times daily as needed for Dizziness. 10/10/18   Roger Hicks MD   metoprolol succinate (TOPROL-XL) 25 mg XL tablet TAKE 1 TABLET EVERY DAY 8/22/18   Aylin Ny MD     Patient Active Problem List   Diagnosis Code    Diabetes type 2, controlled ([address] E11.9    Essential hypertension, benign I10    Palpitations R00.2    Vitamin D deficiency E55.9    S/P aortic valve replacement Z95.2    Hyperlipidemia E78.5    Depression with anxiety F41.8    Severe obesity (BMI 35.0-39. 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 HENT: Negative for congestion and sore throat. Respiratory: Positive for shortness of breath. Negative for cough. Cardiovascular: Positive for leg swelling. Negative for chest pain and palpitations. Gastrointestinal: Negative for abdominal pain, heartburn, nausea and vomiting. Genitourinary: Negative for dysuria and urgency. Musculoskeletal: Negative for joint pain and myalgias. Neurological: Negative for dizziness, tingling and headaches. All other systems reviewed and are negative. Objective:     Patient-Reported Vitals 9/8/2020   Patient-Reported Weight 209lb   Patient-Reported Temperature 97   Patient-Reported Systolic  218   Patient-Reported Diastolic 86        [INSTRUCTIONS:  \"[x]\" Indicates a positive item  \"[]\" Indicates a negative item  -- DELETE ALL ITEMS NOT EXAMINED]    Constitutional: [x] Appears well-developed and well-nourished [x] No apparent distress      [] Abnormal -     Mental status: [x] Alert and awake  [x] Oriented to person/place/time [x] Able to follow commands    [] Abnormal -     Eyes:   EOM    [x]  Normal    [] Abnormal -   Sclera  [x]  Normal    [] Abnormal -          Discharge [x]  None visible   [] Abnormal -     HENT: [x] Normocephalic, atraumatic  [] Abnormal -   [x] Mouth/Throat: Mucous membranes are moist    External Ears [x] Normal  [] Abnormal -    Neck: [x] No visualized mass [] Abnormal -     Pulmonary/Chest: [x] Respiratory effort normal   [x] No visualized signs of difficulty breathing or respiratory distress        [] Abnormal -      Musculoskeletal:   [x] Normal gait with no signs of ataxia         [x] Normal range of motion of neck        [] Abnormal -     Neurological:        [x] No Facial Asymmetry (Cranial nerve 7 motor function) (limited exam due to video visit)          [x] No gaze palsy        [] Abnormal -          Skin:        [x] No significant exanthematous lesions or discoloration noted on facial skin         [] Abnormal -            Psychiatric:       [x] Normal Affect [] Abnormal -        [x] No Hallucinations    Other pertinent observable physical exam findings:-        We discussed the expected course, resolution and complications of the diagnosis(es) in detail.   Medication risks, benefits, costs, interactions, and alternatives were discussed as indicated. I advised her to contact the office if her condition worsens, changes or fails to improve as anticipated. She expressed understanding with the diagnosis(es) and plan. Neo Ruiz, who was evaluated through a patient-initiated, synchronous (real-time) audio-video encounter, and/or her healthcare decision maker, is aware that it is a billable service, with coverage as determined by her insurance carrier. She provided verbal consent to proceed: Yes, and patient identification was verified. It was conducted pursuant to the emergency declaration under the [facility], [address] [place] [address] authority and the [employer] and [employer] Act. A caregiver was present when appropriate. Ability to conduct physical exam was limited. I was in the office. The patient was at home.       Mady Diaz MD

## 2020-09-30 ENCOUNTER — TELEPHONE (OUTPATIENT)
Dept: FAMILY MEDICINE CLINIC | Age: 80
End: 2020-09-30

## 2020-09-30 DIAGNOSIS — E11.21 CONTROLLED TYPE 2 DIABETES MELLITUS WITH DIABETIC NEPHROPATHY, WITHOUT LONG-TERM CURRENT USE OF INSULIN (HCC): Primary | ICD-10-CM

## 2020-09-30 NOTE — TELEPHONE ENCOUNTER
----- Message from Rosalio Matthews sent at 9/30/2020 11:38 AM EDT -----  Regarding: RE: Referral Request  Contact: [phone]  [place], [address]  Referring Provider fax  [phone]  Thank you,

## 2020-10-06 DIAGNOSIS — R30.0 DYSURIA: Primary | ICD-10-CM

## 2020-10-31 LAB
25(OH)D3+25(OH)D2 SERPL-MCNC: 43.4 NG/ML (ref 30–100)
ALBUMIN SERPL-MCNC: 4.4 G/DL (ref 3.6–4.6)
ALBUMIN/GLOB SERPL: 1.8 {RATIO} (ref 1.2–2.2)
ALP SERPL-CCNC: 62 IU/L (ref 39–117)
ALT SERPL-CCNC: 17 IU/L (ref 0–32)
AST SERPL-CCNC: 23 IU/L (ref 0–40)
BASOPHILS # BLD AUTO: 0.1 X10E3/UL (ref 0–0.2)
BASOPHILS NFR BLD AUTO: 1 %
BILIRUB SERPL-MCNC: 0.2 MG/DL (ref 0–1.2)
BUN SERPL-MCNC: 17 MG/DL (ref 8–27)
BUN/CREAT SERPL: 23 (ref 12–28)
CALCIUM SERPL-MCNC: 9.7 MG/DL (ref 8.7–10.3)
CHLORIDE SERPL-SCNC: 100 MMOL/L (ref 96–106)
CHOLEST SERPL-MCNC: 158 MG/DL (ref 100–199)
CO2 SERPL-SCNC: 27 MMOL/L (ref 20–29)
CREAT SERPL-MCNC: 0.75 MG/DL (ref 0.57–1)
EOSINOPHIL # BLD AUTO: 0.2 X10E3/UL (ref 0–0.4)
EOSINOPHIL NFR BLD AUTO: 2 %
ERYTHROCYTE [DISTWIDTH] IN BLOOD BY AUTOMATED COUNT: 12.9 % (ref 11.7–15.4)
EST. AVERAGE GLUCOSE BLD GHB EST-MCNC: 123 MG/DL
GLOBULIN SER CALC-MCNC: 2.5 G/DL (ref 1.5–4.5)
GLUCOSE SERPL-MCNC: 115 MG/DL (ref 65–99)
HBA1C MFR BLD: 5.9 % (ref 4.8–5.6)
HCT VFR BLD AUTO: 41.2 % (ref 34–46.6)
HDLC SERPL-MCNC: 68 MG/DL
HGB BLD-MCNC: 13.5 G/DL (ref 11.1–15.9)
IMM GRANULOCYTES # BLD AUTO: 0.1 X10E3/UL (ref 0–0.1)
IMM GRANULOCYTES NFR BLD AUTO: 1 %
INTERPRETATION, 910389: NORMAL
LDLC SERPL CALC-MCNC: 70 MG/DL (ref 0–99)
LYMPHOCYTES # BLD AUTO: 3 X10E3/UL (ref 0.7–3.1)
LYMPHOCYTES NFR BLD AUTO: 26 %
Lab: NORMAL
MCH RBC QN AUTO: 28.3 PG (ref 26.6–33)
MCHC RBC AUTO-ENTMCNC: 32.8 G/DL (ref 31.5–35.7)
MCV RBC AUTO: 86 FL (ref 79–97)
MONOCYTES # BLD AUTO: 0.9 X10E3/UL (ref 0.1–0.9)
MONOCYTES NFR BLD AUTO: 7 %
NEUTROPHILS # BLD AUTO: 7.4 X10E3/UL (ref 1.4–7)
NEUTROPHILS NFR BLD AUTO: 63 %
NT-PROBNP SERPL-MCNC: 429 PG/ML (ref 0–738)
PLATELET # BLD AUTO: 258 X10E3/UL (ref 150–450)
POTASSIUM SERPL-SCNC: 4.6 MMOL/L (ref 3.5–5.2)
PROT SERPL-MCNC: 6.9 G/DL (ref 6–8.5)
RBC # BLD AUTO: 4.77 X10E6/UL (ref 3.77–5.28)
SODIUM SERPL-SCNC: 140 MMOL/L (ref 134–144)
TRIGL SERPL-MCNC: 116 MG/DL (ref 0–149)
TSH SERPL DL<=0.005 MIU/L-ACNC: 5.39 UIU/ML (ref 0.45–4.5)
VLDLC SERPL CALC-MCNC: 20 MG/DL (ref 5–40)
WBC # BLD AUTO: 11.7 X10E3/UL (ref 3.4–10.8)

## 2020-11-02 NOTE — PROGRESS NOTES
Stable mild elevation in white blood cell count  Blood sugar control has greatly improved, recommend discontinuing Amaryl. Thyroid is slightly underactive, recommend rechecking in 3-4 weeks, if level remains abnormal then thyroid supplement is recommended. All other labs are within normal limits. A message has been sent in [employer] and the lab work released to the patient.

## 2020-12-06 ENCOUNTER — TELEPHONE (OUTPATIENT)
Dept: INTERNAL MEDICINE CLINIC | Age: 80
End: 2020-12-06
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
## 2020-12-31 DIAGNOSIS — E11.9 TYPE 2 DIABETES MELLITUS WITHOUT COMPLICATION, WITHOUT LONG-TERM CURRENT USE OF INSULIN (HCC): ICD-10-CM

## 2020-12-31 RX ORDER — BLOOD SUGAR DIAGNOSTIC
STRIP MISCELLANEOUS
Qty: 100 STRIP | Refills: 11 | Status: SHIPPED | OUTPATIENT
Start: 2020-12-31

## 2021-02-15 ENCOUNTER — TELEPHONE (OUTPATIENT)
Dept: FAMILY MEDICINE CLINIC | Age: 81
End: 2021-02-15

## 2021-02-15 NOTE — TELEPHONE ENCOUNTER
Message from [facility]    Dr. Koo/ telephone  Received: Today  Message Contents   Galindo Shirley [facility] Office Pool             General Message/Vendor Calls     Caller's first and last name: Sapphire Cohen, daughter         Reason for call:Would like to know if she needs to  FMLA authorization in office or on [employer]. Also needs to finish taking her labs       Callback required yes/no and why: Yes, to confirm       Best contact number(s): [phone]       Details to clarify the request: Does not see completed documents in [address] [place] [address] [address].        Constantin Erazo

## 2021-02-16 ENCOUNTER — TELEPHONE (OUTPATIENT)
Dept: FAMILY MEDICINE CLINIC | Age: 81
End: 2021-02-16

## 2021-02-16 NOTE — TELEPHONE ENCOUNTER
I remember doing the FMLA forms, but I only see a blank copy scanned in. Can we look for these forms? I am not sure what she is referring to re: labs.

## 2021-02-16 NOTE — TELEPHONE ENCOUNTER
Tal Seaman notified that forms were filled out but they did not have a fax number attached.  She will call us back with fax number

## 2021-02-16 NOTE — TELEPHONE ENCOUNTER
Message from [facility]    Dr. Jason Steele  Received:  Today  Message Contents   [facility] [employer] [address] Office Pool   Phone Number:  [phone] (Call me)             General Message/Vendor Calls     Caller's first and last name: Wilian Beverly       Reason for call: Please fax Patricia Smith form to Fax: [phone] required yes/no and why: Yes/confirm once sent       Best contact number(s): [phone]       Details to clarify the request: N/A       Lindsey Martinez

## 2021-02-18 RX ORDER — ONDANSETRON 4 MG/1
4 TABLET, ORALLY DISINTEGRATING ORAL
Qty: 30 TAB | Refills: 1 | Status: SHIPPED | OUTPATIENT
Start: 2021-02-18 | End: 2021-04-28

## 2021-03-09 ENCOUNTER — OFFICE VISIT (OUTPATIENT)
Dept: FAMILY MEDICINE CLINIC | Age: 81
End: 2021-03-09
Payer: MEDICARE

## 2021-03-09 VITALS
BODY MASS INDEX: 36.91 KG/M2 | TEMPERATURE: 97 F | HEIGHT: 62 IN | WEIGHT: 200.6 LBS | SYSTOLIC BLOOD PRESSURE: 122 MMHG | HEART RATE: 76 BPM | OXYGEN SATURATION: 96 % | DIASTOLIC BLOOD PRESSURE: 80 MMHG | RESPIRATION RATE: 17 BRPM

## 2021-03-09 DIAGNOSIS — R63.0 DECREASED APPETITE: ICD-10-CM

## 2021-03-09 DIAGNOSIS — I10 ESSENTIAL HYPERTENSION, BENIGN: ICD-10-CM

## 2021-03-09 DIAGNOSIS — Z00.00 MEDICARE ANNUAL WELLNESS VISIT, SUBSEQUENT: ICD-10-CM

## 2021-03-09 DIAGNOSIS — E55.9 VITAMIN D DEFICIENCY: ICD-10-CM

## 2021-03-09 DIAGNOSIS — E11.21 CONTROLLED TYPE 2 DIABETES MELLITUS WITH DIABETIC NEPHROPATHY, WITHOUT LONG-TERM CURRENT USE OF INSULIN (HCC): Primary | ICD-10-CM

## 2021-03-09 DIAGNOSIS — K21.9 GASTROESOPHAGEAL REFLUX DISEASE WITHOUT ESOPHAGITIS: ICD-10-CM

## 2021-03-09 DIAGNOSIS — E78.2 MIXED HYPERLIPIDEMIA: ICD-10-CM

## 2021-03-09 PROBLEM — E66.01 SEVERE OBESITY (BMI 35.0-39.9): Status: RESOLVED | Noted: 2018-08-16 | Resolved: 2021-03-09

## 2021-03-09 PROCEDURE — 1101F PT FALLS ASSESS-DOCD LE1/YR: CPT | Performed by: FAMILY MEDICINE

## 2021-03-09 PROCEDURE — 99214 OFFICE O/P EST MOD 30 MIN: CPT | Performed by: FAMILY MEDICINE

## 2021-03-09 PROCEDURE — 1090F PRES/ABSN URINE INCON ASSESS: CPT | Performed by: FAMILY MEDICINE

## 2021-03-09 PROCEDURE — G8752 SYS BP LESS 140: HCPCS | Performed by: FAMILY MEDICINE

## 2021-03-09 PROCEDURE — G8754 DIAS BP LESS 90: HCPCS | Performed by: FAMILY MEDICINE

## 2021-03-09 PROCEDURE — G9717 DOC PT DX DEP/BP F/U NT REQ: HCPCS | Performed by: FAMILY MEDICINE

## 2021-03-09 PROCEDURE — G8417 CALC BMI ABV UP PARAM F/U: HCPCS | Performed by: FAMILY MEDICINE

## 2021-03-09 PROCEDURE — G8536 NO DOC ELDER MAL SCRN: HCPCS | Performed by: FAMILY MEDICINE

## 2021-03-09 PROCEDURE — G8427 DOCREV CUR MEDS BY ELIG CLIN: HCPCS | Performed by: FAMILY MEDICINE

## 2021-03-09 PROCEDURE — G0439 PPPS, SUBSEQ VISIT: HCPCS | Performed by: FAMILY MEDICINE

## 2021-03-09 PROCEDURE — G8399 PT W/DXA RESULTS DOCUMENT: HCPCS | Performed by: FAMILY MEDICINE

## 2021-03-09 NOTE — PATIENT INSTRUCTIONS
Medicare Wellness Visit, Female The best way to live healthy is to have a lifestyle where you eat a well-balanced diet, exercise regularly, limit alcohol use, and quit all forms of tobacco/nicotine, if applicable. Regular preventive services are another way to keep healthy. Preventive services (vaccines, screening tests, monitoring & exams) can help personalize your care plan, which helps you manage your own care. Screening tests can find health problems at the earliest stages, when they are easiest to treat. [facility] follows the current, evidence-based guidelines published by the [facility] Goldberg ([facility]) when recommending preventive services for our patients. Because we follow these guidelines, sometimes recommendations change over time as research supports it. (For example, mammograms used to be recommended annually. Even though Medicare will still pay for an annual mammogram, the newer guidelines recommend a mammogram every two years for women of average risk). Of course, you and your doctor may decide to screen more often for some diseases, based on your risk and your co-morbidities (chronic disease you are already diagnosed with). Preventive services for you include: - Medicare offers their members a free annual wellness visit, which is time for you and your primary care provider to discuss and plan for your preventive service needs. Take advantage of this benefit every year! 
-All adults over the age of 72 should receive the recommended pneumonia vaccines. Current USPSTF guidelines recommend a series of two vaccines for the best pneumonia protection.  
-All adults should have a flu vaccine yearly and a tetanus vaccine every 10 years.  
-All adults age 48 and older should receive the shingles vaccines (series of two vaccines). -All adults age 38-68 who are overweight should have a diabetes screening test once every three years. -All adults born between [birth year] and 1967 should be screened once for Hepatitis C. 
-Other screening tests and preventive services for persons with diabetes include: an eye exam to screen for diabetic retinopathy, a kidney function test, a foot exam, and stricter control over your cholesterol.  
-Cardiovascular screening for adults with routine risk involves an electrocardiogram (ECG) at intervals determined by your doctor.  
-Colorectal cancer screenings should be done for adults age 54-65 with no increased risk factors for colorectal cancer. There are a number of acceptable methods of screening for this type of cancer. Each test has its own benefits and drawbacks. Discuss with your doctor what is most appropriate for you during your annual wellness visit. The different tests include: colonoscopy (considered the best screening method), a fecal occult blood test, a fecal DNA test, and sigmoidoscopy. 
 
-A bone mass density test is recommended when a woman turns 65 to screen for osteoporosis. This test is only recommended one time, as a screening. Some providers will use this same test as a disease monitoring tool if you already have osteoporosis. -Breast cancer screenings are recommended every other year for women of normal risk, age 54-69. 
-Cervical cancer screenings for women over age 72 are only recommended with certain risk factors. Here is a list of your current Health Maintenance items (your personalized list of preventive services) with a due date: 
Health Maintenance Due Topic Date Due [facility] Eye Exam  03/28/2020  Diabetic Foot Care  08/22/2020  Albumin Urine Test  08/22/2020

## 2021-03-09 NOTE — PROGRESS NOTES
Chief Complaint   Patient presents with    Decreased Appetite     Pt gets gassy when she eats     Labs     Pt is seen with daughter. Pt will be hungry but then when she eats she gets abdominal pain and gas. Pt had a telehealth visit with GI, also has a follow up in April. During virtual visit pt was advised to take a break from metformin and namenda and see if this changed her symptoms. Daughter reports that all medication was stopped off and on due to stomach issues. Pt has also been throwing up, occurs both when she eats and when she doesn't. Pt has had this problem ongoing x more than one year, has seen GI before, had an endoscopy several months before the pandemic. Subjective: (As above and below)     Chief Complaint   Patient presents with    Decreased Appetite     Pt gets gassy when she eats     Labs     she is a 78y.o. year old female who presents for evaluation. Reviewed PmHx, RxHx, FmHx, SocHx, AllgHx and updated in chart. Review of Systems - negative except as listed above    Objective:     Vitals:    03/09/21 0932   BP: 122/80   Pulse: 76   Resp: 17   Temp: 97 °F (36.1 °C)   TempSrc: Temporal   SpO2: 96%   Weight: 200 lb 9.6 oz (91 kg)   Height: 5' 2\" (1.575 m)     Physical Examination: General appearance - alert, well appearing, and in no distress  Mental status - normal mood, behavior, speech, dress, motor activity, and thought processes  Ears - bilateral TM's and external ear canals normal  Chest - clear to auscultation, no wheezes, rales or rhonchi, symmetric air entry  Heart - normal rate, regular rhythm, murmur, unchanged from previous  Musculoskeletal - no joint tenderness, deformity or swelling  Extremities - peripheral pulses normal, no pedal edema, no clubbing or cyanosis    Assessment/ Plan:   1.  Controlled type 2 diabetes mellitus with diabetic nephropathy, without long-term current use of insulin ([address]  -check labs, consider decreasing metformin  - METABOLIC PANEL, COMPREHENSIVE  - HEMOGLOBIN A1C WITH EAG  - MICROALBUMIN, UR, RAND W/ MICROALB/CREAT RATIO    2. Vitamin D deficiency  -check levels  - VITAMIN D, 25 HYDROXY    3. Essential hypertension, benign  -well controlled  - CBC W/O DIFF  - TSH 3RD GENERATION    4. Mixed hyperlipidemia  - LIPID PANEL    5. Decreased appetite  - AMB POC URINALYSIS DIP STICK AUTO W/O MICRO    6. Gastroesophageal reflux disease without esophagitis  -continue on protonix    7. Medicare annual wellness visit, subsequent  -see below       I have discussed the diagnosis with the patient and the intended plan as seen in the above orders. The patient has received an after-visit summary and questions were answered concerning future plans. Medication Side Effects and Warnings were discussed with patient: yes  Patient Labs were reviewed: yes  Patient Past Records were reviewed:  yes    Lionel Bence, M.D. This is the Subsequent Medicare Annual Wellness Exam, performed 12 months or more after the Initial AWV or the last Subsequent AWV    I have reviewed the patient's medical history in detail and updated the computerized patient record.      Depression Risk Factor Screening:     3 most recent PHQ Screens 7/5/2019   Little interest or pleasure in doing things Not at all   Feeling down, depressed, irritable, or hopeless Not at all   Total Score PHQ 2 0   Trouble falling or staying asleep, or sleeping too much -   Feeling tired or having little energy -   Poor appetite, weight loss, or overeating -   Feeling bad about yourself - or that you are a failure or have let yourself or your family down -   Trouble concentrating on things such as school, work, reading, or watching TV -   Moving or speaking so slowly that other people could have noticed; or the opposite being so fidgety that others notice -   Thoughts of being better off dead, or hurting yourself in some way -   PHQ 9 Score -   How difficult have these problems made it for you to do your work, take care of your home and get along with others -       Alcohol Risk Screen    Do you average more than 1 drink per night or more than 7 drinks a week:  No    On any one occasion in the past three months have you have had more than 3 drinks containing alcohol:  No        Functional Ability and Level of Safety:    Hearing: The patient needs further evaluation. pt had hearing aids, lost them x 4 pairs. Activities of Daily Living: The home contains: good lighting  Patient needs help with:  transportation, housework, managing medications and managing money      Ambulation: with mild difficulty     Fall Risk:  Fall Risk Assessment, last 12 mths 3/9/2021   Able to walk? Yes   Fall in past 12 months? 1   Do you feel unsteady? 0   Are you worried about falling 0   Is TUG test greater than 12 seconds? 0   Number of falls in past 12 months 1   Fall with injury? 1      Abuse Screen:  Patient is not abused       Cognitive Screening    Has your family/caregiver stated any concerns about your memory: yes - diagnosed dementia      Assessment/Plan   Education and counseling provided:  Are appropriate based on today's review and evaluation    Diagnoses and all orders for this visit:    1. Controlled type 2 diabetes mellitus with diabetic nephropathy, without long-term current use of insulin (HCC)  -     METABOLIC PANEL, COMPREHENSIVE  -     HEMOGLOBIN A1C WITH EAG  -     MICROALBUMIN, UR, RAND W/ MICROALB/CREAT RATIO    2. Vitamin D deficiency  -     VITAMIN D, 25 HYDROXY    3. Essential hypertension, benign  -     CBC W/O DIFF  -     TSH 3RD GENERATION    4. Mixed hyperlipidemia  -     LIPID PANEL    5. Decreased appetite  -     AMB POC URINALYSIS DIP STICK AUTO W/O MICRO    6. Gastroesophageal reflux disease without esophagitis    7.  Medicare annual wellness visit, subsequent        Health Maintenance Due     Health Maintenance Due   Topic Date Due    Eye Exam Retinal or Dilated  03/28/2020    Foot Exam Q1 08/22/2020    MICROALBUMIN Q1  08/22/2020       Patient Care Team   Patient Care Team:  Cinthya Goff MD as PCP - General (Family Medicine)  Cinthya Goff MD as PCP - [facility]  Scot Saint, MD (Cardiology)    History     Patient Active Problem List   Diagnosis Code    Diabetes type 2, controlled ([facility] E11.9    Essential hypertension, benign I10    Palpitations R00.2    Vitamin D deficiency E55.9    S/P aortic valve replacement Z95.2    Hyperlipidemia E78.5    Depression with anxiety F41.8    Poor short term memory R41.3    Prosthetic aortic valve stenosis T82.857A     Past Medical History:   Diagnosis Date    Anemia     Atypical chest pain     Long h/o intermittent non-cardiac CP.  Depression with anxiety     Diabetes ([facility]     GERD (gastroesophageal reflux disease)     Hypercholesteremia     Hyperlipidemia 7/3/2013    Hypertension     Inner ear dysfunction     S/P aortic valve replacement     Dr. Little Hickey yearly    Type 2 diabetes with nephropathy ([facility] 7/5/2019    Vitamin D deficiency       Past Surgical History:   Procedure Laterality Date    COLONOSCOPY N/A 4/22/2019    COLONOSCOPY performed by Tunde Bautista MD at [address] HX AORTIC VALVE REPLACEMENT  1999    Bovine valve    HX CATARACT REMOVAL      HX CHOLECYSTECTOMY  1999    HX MOHS PROCEDURES Left 2014     Current Outpatient Medications   Medication Sig Dispense Refill    ondansetron (ZOFRAN ODT) 4 mg disintegrating tablet Take 1 Tab by mouth every eight (8) hours as needed for Nausea or Vomiting. 30 Tab 1    metFORMIN (GLUCOPHAGE) 500 mg tablet TAKE 4 TABLETS BY MOUTH DAILY. 360 Tab 3    glimepiride (AMARYL) 1 mg tablet TAKE 1 TABLET EVERY DAY BEFORE BREAKFAST 90 Tab 3    busPIRone (BUSPAR) 5 mg tablet TAKE 1 TABLET TWICE DAILY 180 Tab 3    PARoxetine (PAXIL) 30 mg tablet TAKE 1 TABLET EVERY DAY 90 Tab 3    pantoprazole (Protonix) 40 mg tablet Take 1 Tab by mouth daily.  90 Tab 3    Myrbetriq 25 mg ER tablet Take 1 Tab by mouth daily. 90 Tab 3    Accu-Chek Guide test strips strip CHECK SUGAR DAILY 100 Strip 11    VIT B CMPLX #9-FA-VIT C-VIT E PO Take  by mouth.  fluticasone propionate (Flonase Allergy Relief) 50 mcg/actuation nasal spray 2 Sprays by Both Nostrils route daily.  loratadine (Claritin) 10 mg tablet Take 10 mg by mouth.  carvediloL (COREG) 6.25 mg tablet Take  by mouth two (2) times daily (with meals).  vit C/E/Zn/coppr/lutein/zeaxan (PRESERVISION AREDS-2 PO) Take  by mouth.  diclofenac (VOLTAREN) 1 % gel Apply 2 g to affected area four (4) times daily. 100 g 0    memantine (NAMENDA) 10 mg tablet Take  by mouth two (2) times a day.  cholecalciferol (VITAMIN D3) (2,000 UNITS /50 MCG) cap capsule Take 3,000 Units by mouth two (2) times a day.  lisinopril (PRINIVIL, ZESTRIL) 5 mg tablet TAKE 1 TABLET EVERY DAY (Patient taking differently: 40 mg. TAKE 1 TABLET EVERY DAY) 90 Tab 3    ACCU-CHEK GUIDE GLUCOSE METER misc CHECK BLOOD SUGAR EVERY DAY 1 Each 0    lancets (ACCU-CHEK SOFTCLIX LANCETS) misc Check sugar daily 100 Each 11    ferrous sulfate 325 mg (65 mg iron) tablet Take 1 Tab by mouth two (2) times daily (with meals). 60 Tab 0    L. acidoph & paracasei- S therm- Bifido (ALVIN-Q/RISAQUAD) 8 billion cell cap cap Take 1 Cap by mouth daily. 30 Cap 0    tiZANidine (ZANAFLEX) 4 mg tablet Take 4 mg by mouth three (3) times daily as needed.  acetaminophen (TYLENOL) 325 mg tablet Take 325 mg by mouth every four (4) hours as needed for Pain.  clopidogrel (PLAVIX) 75 mg tab Take 75 mg by mouth.  meclizine (ANTIVERT) 25 mg tablet Take 1 Tab by mouth three (3) times daily as needed for Dizziness. 20 Tab 0    metoprolol succinate (TOPROL-XL) 25 mg XL tablet TAKE 1 TABLET EVERY DAY 90 Tab 3    lovastatin (MEVACOR) 10 mg tablet TAKE ONE TABLET BY MOUTH NIGHTLY 90 Tab 3    aspirin delayed-release 325 mg tablet Take 1 Tab by mouth daily.  (Patient taking differently: Take 81 mg by mouth daily.) 90 Tab 1    PV W-O EDU/FERROUS FUMARATE/FA (M-VIT PO) Take 1 tablet by mouth daily.  amoxicillin (AMOXIL) 500 mg capsule Take 1 Cap by mouth three (3) times daily. Indications: beta strp uti 30 Cap 0     Allergies   Allergen Reactions    Naldecon Unknown (comments)    Sulfa (Sulfonamide Antibiotics) Rash       Family History   Problem Relation Age of Onset    Heart Disease Mother     Heart Failure Father     Heart Failure Sister     Stroke Sister     Diabetes Brother     Heart Disease Brother      Social History     Tobacco Use    Smoking status: Former Smoker     Quit date: 1999     Years since quittin.5    Smokeless tobacco: Never Used   Substance Use Topics    Alcohol use:  No

## 2021-03-09 NOTE — PROGRESS NOTES
1. Have you been to the ER, urgent care clinic since your last visit? Hospitalized since your last visit? Yes, [facility] 6 weeks ago. Pt fell and hurt her nose. 2. Have you seen or consulted any other health care providers outside of the [address] [place] [address] since your last visit? Include any pap smears or colon screening.  No    Health Maintenance Due   Topic Date Due    COVID-19 Vaccine (1 of 2) Never done    Eye Exam Retinal or Dilated  03/28/2020    Medicare Yearly Exam  08/22/2020    Foot Exam Q1  08/22/2020    MICROALBUMIN Q1  08/22/2020    Flu Vaccine (1) 09/01/2020       Chief Complaint   Patient presents with    Decreased Appetite     Pt gets gassy when she eats    [employer]

## 2021-03-11 LAB
25(OH)D3+25(OH)D2 SERPL-MCNC: 55.9 NG/ML (ref 30–100)
ALBUMIN SERPL-MCNC: 4.5 G/DL (ref 3.6–4.6)
ALBUMIN/GLOB SERPL: 1.8 {RATIO} (ref 1.2–2.2)
ALP SERPL-CCNC: 59 IU/L (ref 39–117)
ALT SERPL-CCNC: 19 IU/L (ref 0–32)
AST SERPL-CCNC: 30 IU/L (ref 0–40)
BILIRUB SERPL-MCNC: 0.2 MG/DL (ref 0–1.2)
BUN SERPL-MCNC: 21 MG/DL (ref 8–27)
BUN/CREAT SERPL: 25 (ref 12–28)
CALCIUM SERPL-MCNC: 9.4 MG/DL (ref 8.7–10.3)
CHLORIDE SERPL-SCNC: 99 MMOL/L (ref 96–106)
CHOLEST SERPL-MCNC: 159 MG/DL (ref 100–199)
CO2 SERPL-SCNC: 28 MMOL/L (ref 20–29)
CREAT SERPL-MCNC: 0.84 MG/DL (ref 0.57–1)
ERYTHROCYTE [DISTWIDTH] IN BLOOD BY AUTOMATED COUNT: 12.5 % (ref 11.7–15.4)
EST. AVERAGE GLUCOSE BLD GHB EST-MCNC: 117 MG/DL
GLOBULIN SER CALC-MCNC: 2.5 G/DL (ref 1.5–4.5)
GLUCOSE SERPL-MCNC: 153 MG/DL (ref 65–99)
HBA1C MFR BLD: 5.7 % (ref 4.8–5.6)
HCT VFR BLD AUTO: 38 % (ref 34–46.6)
HDLC SERPL-MCNC: 75 MG/DL
HGB BLD-MCNC: 12.3 G/DL (ref 11.1–15.9)
IMP & REVIEW OF LAB RESULTS: NORMAL
LDLC SERPL CALC-MCNC: 64 MG/DL (ref 0–99)
Lab: NORMAL
MCH RBC QN AUTO: 28.9 PG (ref 26.6–33)
MCHC RBC AUTO-ENTMCNC: 32.4 G/DL (ref 31.5–35.7)
MCV RBC AUTO: 89 FL (ref 79–97)
PLATELET # BLD AUTO: 239 X10E3/UL (ref 150–450)
POTASSIUM SERPL-SCNC: 4.5 MMOL/L (ref 3.5–5.2)
PROT SERPL-MCNC: 7 G/DL (ref 6–8.5)
RBC # BLD AUTO: 4.26 X10E6/UL (ref 3.77–5.28)
SODIUM SERPL-SCNC: 142 MMOL/L (ref 134–144)
TRIGL SERPL-MCNC: 113 MG/DL (ref 0–149)
TSH SERPL DL<=0.005 MIU/L-ACNC: 5.22 UIU/ML (ref 0.45–4.5)
VLDLC SERPL CALC-MCNC: 20 MG/DL (ref 5–40)
WBC # BLD AUTO: 11.4 X10E3/UL (ref 3.4–10.8)

## 2021-03-11 RX ORDER — LEVOTHYROXINE SODIUM 50 UG/1
50 TABLET ORAL
Qty: 90 TAB | Refills: 0 | Status: SHIPPED | OUTPATIENT
Start: 2021-03-11 | End: 2021-04-28 | Stop reason: SDUPTHER

## 2021-03-11 NOTE — PROGRESS NOTES
Blood sugars are very well controlled, please decrease metformin to 2 pills daily. Thyroid remains underactive, recommend starting on thyroid supplement. Please inform and advise if pt agree.

## 2021-03-31 ENCOUNTER — OFFICE VISIT (OUTPATIENT)
Dept: FAMILY MEDICINE CLINIC | Age: 81
End: 2021-03-31
Payer: MEDICARE

## 2021-03-31 VITALS
DIASTOLIC BLOOD PRESSURE: 83 MMHG | TEMPERATURE: 97.4 F | SYSTOLIC BLOOD PRESSURE: 123 MMHG | BODY MASS INDEX: 36.25 KG/M2 | HEART RATE: 65 BPM | OXYGEN SATURATION: 96 % | HEIGHT: 62 IN | WEIGHT: 197 LBS | RESPIRATION RATE: 17 BRPM

## 2021-03-31 DIAGNOSIS — K31.1 PYLORIC STENOSIS IN ADULT: Primary | ICD-10-CM

## 2021-03-31 DIAGNOSIS — I10 ESSENTIAL HYPERTENSION, BENIGN: ICD-10-CM

## 2021-03-31 DIAGNOSIS — F03.90 DEMENTIA WITHOUT BEHAVIORAL DISTURBANCE, UNSPECIFIED DEMENTIA TYPE: ICD-10-CM

## 2021-03-31 DIAGNOSIS — R63.0 DECREASED APPETITE: ICD-10-CM

## 2021-03-31 DIAGNOSIS — E11.21 CONTROLLED TYPE 2 DIABETES MELLITUS WITH DIABETIC NEPHROPATHY, WITHOUT LONG-TERM CURRENT USE OF INSULIN (HCC): ICD-10-CM

## 2021-03-31 PROCEDURE — G8417 CALC BMI ABV UP PARAM F/U: HCPCS | Performed by: FAMILY MEDICINE

## 2021-03-31 PROCEDURE — 1101F PT FALLS ASSESS-DOCD LE1/YR: CPT | Performed by: FAMILY MEDICINE

## 2021-03-31 PROCEDURE — G8536 NO DOC ELDER MAL SCRN: HCPCS | Performed by: FAMILY MEDICINE

## 2021-03-31 PROCEDURE — G8754 DIAS BP LESS 90: HCPCS | Performed by: FAMILY MEDICINE

## 2021-03-31 PROCEDURE — 1090F PRES/ABSN URINE INCON ASSESS: CPT | Performed by: FAMILY MEDICINE

## 2021-03-31 PROCEDURE — G8752 SYS BP LESS 140: HCPCS | Performed by: FAMILY MEDICINE

## 2021-03-31 PROCEDURE — 99214 OFFICE O/P EST MOD 30 MIN: CPT | Performed by: FAMILY MEDICINE

## 2021-03-31 PROCEDURE — G9717 DOC PT DX DEP/BP F/U NT REQ: HCPCS | Performed by: FAMILY MEDICINE

## 2021-03-31 PROCEDURE — G8427 DOCREV CUR MEDS BY ELIG CLIN: HCPCS | Performed by: FAMILY MEDICINE

## 2021-03-31 PROCEDURE — G8399 PT W/DXA RESULTS DOCUMENT: HCPCS | Performed by: FAMILY MEDICINE

## 2021-03-31 RX ORDER — TAMSULOSIN HYDROCHLORIDE 0.4 MG/1
0.4 CAPSULE ORAL DAILY
COMMUNITY
End: 2021-04-28

## 2021-03-31 NOTE — PROGRESS NOTES
MARKY Long is a 78 y.o. female who presents to follow-up hospital stay and regular doctors. 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 Got an EGD and balloon dilation. This was checked up with a barium swallow which reportedly went slowly. Barium stayed in her stomach for the initial part of the study and then the next day 80% had left the stomach. She did get relief of her symptoms though. She is supposed to be eating a \"semisoft\" diet. She is eating her normal stuff. The most substantial thing sounds like a turkey sandwich. Eats three quarters of the sandwich. Has trouble chewing. She lost her dentures. Had some urinary retention in the hospital.  Was started on Flomax and followed up with urology who felt that everything looked okay and told her to follow-up as needed. They kept her on the Flomax. Her Myrbetriq was stopped    Metformin was cut back to 500 mg twice daily when she was having stomach problems. Namenda was cut back as well. Note that the stomach problem is known to be a physical obstruction she is restarting the Namenda    PMHx:  Past Medical History:   Diagnosis Date    Anemia     Atypical chest pain     Long h/o intermittent non-cardiac CP.  Depression with anxiety     Diabetes ([address]     GERD (gastroesophageal reflux disease)     Hypercholesteremia     Hyperlipidemia 7/3/2013    Hypertension     Inner ear dysfunction     S/P aortic valve replacement     Dr. Elda Aguilar yearly    Type 2 diabetes with nephropathy ([facility] 7/5/2019    Vitamin D deficiency        Meds:   Current Outpatient Medications   Medication Sig Dispense Refill    tamsulosin (Flomax) 0.4 mg capsule Take 0.4 mg by mouth daily.  levothyroxine (SYNTHROID) 50 mcg tablet Take 1 Tab by mouth Daily (before breakfast). 90 Tab 0    ondansetron (ZOFRAN ODT) 4 mg disintegrating tablet Take 1 Tab by mouth every eight (8) hours as needed for Nausea or Vomiting.  30 Tab 1    metFORMIN (GLUCOPHAGE) 500 mg tablet TAKE 4 TABLETS BY MOUTH DAILY. (Patient taking differently: 500 mg two (2) times daily (with meals). ) 360 Tab 3    glimepiride (AMARYL) 1 mg tablet TAKE 1 TABLET EVERY DAY BEFORE BREAKFAST 90 Tab 3    busPIRone (BUSPAR) 5 mg tablet TAKE 1 TABLET TWICE DAILY 180 Tab 3    PARoxetine (PAXIL) 30 mg tablet TAKE 1 TABLET EVERY DAY 90 Tab 3    pantoprazole (Protonix) 40 mg tablet Take 1 Tab by mouth daily. 90 Tab 3    Accu-Chek Guide test strips strip CHECK SUGAR DAILY 100 Strip 11    VIT B CMPLX #9-FA-VIT C-VIT E PO Take  by mouth.  fluticasone propionate (Flonase Allergy Relief) 50 mcg/actuation nasal spray 2 Sprays by Both Nostrils route daily.  loratadine (Claritin) 10 mg tablet Take 10 mg by mouth.  carvediloL (COREG) 6.25 mg tablet Take  by mouth two (2) times daily (with meals).  vit C/E/Zn/coppr/lutein/zeaxan (PRESERVISION AREDS-2 PO) Take  by mouth.  diclofenac (VOLTAREN) 1 % gel Apply 2 g to affected area four (4) times daily. 100 g 0    memantine (NAMENDA) 10 mg tablet Take  by mouth two (2) times a day.  cholecalciferol (VITAMIN D3) (2,000 UNITS /50 MCG) cap capsule Take 3,000 Units by mouth two (2) times a day.  lisinopril (PRINIVIL, ZESTRIL) 5 mg tablet TAKE 1 TABLET EVERY DAY (Patient taking differently: 40 mg. TAKE 1 TABLET EVERY DAY) 90 Tab 3    amoxicillin (AMOXIL) 500 mg capsule Take 1 Cap by mouth three (3) times daily. Indications: beta strp uti 30 Cap 0    ACCU-CHEK GUIDE GLUCOSE METER misc CHECK BLOOD SUGAR EVERY DAY 1 Each 0    lancets (ACCU-CHEK SOFTCLIX LANCETS) misc Check sugar daily 100 Each 11    ferrous sulfate 325 mg (65 mg iron) tablet Take 1 Tab by mouth two (2) times daily (with meals). 60 Tab 0    L. acidoph & paracasei- S therm- Bifido (ALVIN-Q/RISAQUAD) 8 billion cell cap cap Take 1 Cap by mouth daily. 30 Cap 0    tiZANidine (ZANAFLEX) 4 mg tablet Take 4 mg by mouth three (3) times daily as needed.       acetaminophen (TYLENOL) 325 mg tablet Take 325 mg by mouth every four (4) hours as needed for Pain.  clopidogrel (PLAVIX) 75 mg tab Take 75 mg by mouth.  meclizine (ANTIVERT) 25 mg tablet Take 1 Tab by mouth three (3) times daily as needed for Dizziness. 20 Tab 0    metoprolol succinate (TOPROL-XL) 25 mg XL tablet TAKE 1 TABLET EVERY DAY 90 Tab 3    lovastatin (MEVACOR) 10 mg tablet TAKE ONE TABLET BY MOUTH NIGHTLY 90 Tab 3    aspirin delayed-release 325 mg tablet Take 1 Tab by mouth daily. (Patient taking differently: Take 81 mg by mouth daily.) 90 Tab 1    PV W-O EDU/FERROUS FUMARATE/FA (M-VIT PO) Take 1 tablet by mouth daily. Allergies: Allergies   Allergen Reactions    Naldecon Unknown (comments)    Sulfa (Sulfonamide Antibiotics) Rash       Smoker:  Social History     Tobacco Use   Smoking Status Former Smoker    Quit date: 1999    Years since quittin.6   Smokeless Tobacco Never Used       ETOH:   Social History     Substance and Sexual Activity   Alcohol Use No       FH:   Family History   Problem Relation Age of Onset    Heart Disease Mother     Heart Failure Father     Heart Failure Sister     Stroke Sister     Diabetes Brother     Heart Disease Brother        ROS:   As listed in HPI. In addition:  Constitutional:   No headache, fever, fatigue, weight loss or weight gain      Cardiac:    No chest pain      Resp:   No cough or shortness of breath      Neuro   No loss of consciousness, dizziness, seizures      Physical Exam:  Blood pressure 123/83, pulse 65, temperature 97.4 °F (36.3 °C), temperature source Temporal, resp. rate 17, height 5' 2\" (1.575 m), weight 197 lb (89.4 kg), SpO2 96 %. GEN: No apparent distress. Alert and oriented and responds to all questions appropriately. NEUROLOGIC:  No focal neurologic deficits. Strength and sensation grossly intact. Coordination and gait grossly intact. EXT: Well perfused. No edema. SKIN: No obvious rashes.   Lungs clear to auscultation bilaterally  CV regular rhythm no murmur  Abdomen soft nontender nondistended       Assessment and Plan     Pyloric stenosis  Status post EGD and dilation. Provided some symptomatic relief. She is eating with a reasonably good appetite  Was told that if this did not last she may need bypass  Needs to be a little more conscious about what she is eating. She can eat her turkey cold cup sandwich but need to take steps like dicing the turkey if she is having trouble chewing. We do not want an unchewed lump to block the pylorus    Dementia  Had a cognitive decline through this illness. Daughter thought it could be because she has not taken her medicine in a month (vomited it up) probably more likely due to metabolic stressors  Is restarting Namenda. Pay attention to potential appetite suppression    Urinary retention  Was stopped on Myrbetriq. Was started on Flomax. Was having some balance problems. Flomax can cause orthostasis. Asked daughter to contact urology and make sure Flomax is a necessary ongoing medication and stop it if it is not    Diabetes  A1c was 5.7. Okay to continue Metformin 500 mg twice daily      ICD-10-CM ICD-9-CM    1. Pyloric stenosis in adult  K31.1 537.0    2. Controlled type 2 diabetes mellitus with diabetic nephropathy, without long-term current use of insulin ([facility])  E11.21 250.40      583.81    3. Essential hypertension, benign  I10 401.1    4. Decreased appetite  R63.0 783.0    5. Dementia without behavioral disturbance, unspecified dementia type ([facility]  F03.90 294.20        AVS given.  Pt expressed understanding of instructions

## 2021-03-31 NOTE — PROGRESS NOTES
1. Have you been to the ER, urgent care clinic since your last visit? Hospitalized since your last visit? Yes, [facility], bowel obstruction 03/18/2021     2. Have you seen or consulted any other health care providers outside of the [address] [place] [address] since your last visit? Include any pap smears or colon screening.  No    Health Maintenance Due   Topic Date Due    Eye Exam Retinal or Dilated  03/28/2020    Foot Exam Q1  08/22/2020    MICROALBUMIN Q1  08/22/2020

## 2021-04-28 ENCOUNTER — OFFICE VISIT (OUTPATIENT)
Dept: FAMILY MEDICINE CLINIC | Age: 81
End: 2021-04-28
Payer: MEDICARE

## 2021-04-28 VITALS
WEIGHT: 184.4 LBS | BODY MASS INDEX: 33.93 KG/M2 | OXYGEN SATURATION: 96 % | HEIGHT: 62 IN | DIASTOLIC BLOOD PRESSURE: 63 MMHG | TEMPERATURE: 96.9 F | RESPIRATION RATE: 16 BRPM | HEART RATE: 61 BPM | SYSTOLIC BLOOD PRESSURE: 124 MMHG

## 2021-04-28 DIAGNOSIS — E11.9 TYPE 2 DIABETES MELLITUS WITHOUT COMPLICATION, WITHOUT LONG-TERM CURRENT USE OF INSULIN (HCC): ICD-10-CM

## 2021-04-28 DIAGNOSIS — Z98.84 PERSONAL HISTORY OF GASTRIC BYPASS: ICD-10-CM

## 2021-04-28 DIAGNOSIS — I10 ESSENTIAL HYPERTENSION, BENIGN: ICD-10-CM

## 2021-04-28 DIAGNOSIS — E03.9 ACQUIRED HYPOTHYROIDISM: ICD-10-CM

## 2021-04-28 DIAGNOSIS — E55.9 VITAMIN D DEFICIENCY: ICD-10-CM

## 2021-04-28 DIAGNOSIS — F03.90 DEMENTIA WITHOUT BEHAVIORAL DISTURBANCE, UNSPECIFIED DEMENTIA TYPE: ICD-10-CM

## 2021-04-28 DIAGNOSIS — K31.1 PYLORIC STENOSIS IN ADULT: Primary | ICD-10-CM

## 2021-04-28 DIAGNOSIS — Z95.2 S/P AORTIC VALVE REPLACEMENT: ICD-10-CM

## 2021-04-28 DIAGNOSIS — D50.9 IRON DEFICIENCY ANEMIA, UNSPECIFIED IRON DEFICIENCY ANEMIA TYPE: ICD-10-CM

## 2021-04-28 DIAGNOSIS — E11.21 CONTROLLED TYPE 2 DIABETES MELLITUS WITH DIABETIC NEPHROPATHY, WITHOUT LONG-TERM CURRENT USE OF INSULIN (HCC): ICD-10-CM

## 2021-04-28 PROCEDURE — G8536 NO DOC ELDER MAL SCRN: HCPCS | Performed by: FAMILY MEDICINE

## 2021-04-28 PROCEDURE — 1090F PRES/ABSN URINE INCON ASSESS: CPT | Performed by: FAMILY MEDICINE

## 2021-04-28 PROCEDURE — 99215 OFFICE O/P EST HI 40 MIN: CPT | Performed by: FAMILY MEDICINE

## 2021-04-28 PROCEDURE — G9717 DOC PT DX DEP/BP F/U NT REQ: HCPCS | Performed by: FAMILY MEDICINE

## 2021-04-28 PROCEDURE — 1101F PT FALLS ASSESS-DOCD LE1/YR: CPT | Performed by: FAMILY MEDICINE

## 2021-04-28 PROCEDURE — G8754 DIAS BP LESS 90: HCPCS | Performed by: FAMILY MEDICINE

## 2021-04-28 PROCEDURE — G8417 CALC BMI ABV UP PARAM F/U: HCPCS | Performed by: FAMILY MEDICINE

## 2021-04-28 PROCEDURE — G8752 SYS BP LESS 140: HCPCS | Performed by: FAMILY MEDICINE

## 2021-04-28 PROCEDURE — G8427 DOCREV CUR MEDS BY ELIG CLIN: HCPCS | Performed by: FAMILY MEDICINE

## 2021-04-28 PROCEDURE — G8399 PT W/DXA RESULTS DOCUMENT: HCPCS | Performed by: FAMILY MEDICINE

## 2021-04-28 RX ORDER — SUCRALFATE 1 G/1
TABLET ORAL 4 TIMES DAILY
COMMUNITY
Start: 2021-04-17

## 2021-04-28 RX ORDER — LEVOTHYROXINE SODIUM 50 UG/1
50 TABLET ORAL
Qty: 90 TAB | Refills: 0 | Status: SHIPPED | OUTPATIENT
Start: 2021-04-28 | End: 2021-07-15

## 2021-04-28 NOTE — PROGRESS NOTES
1. Have you been to the ER, urgent care clinic since your last visit? Hospitalized since your last visit? Yes. [facility]    2. Have you seen or consulted any other health care providers outside of the [address] [place] [address] since your last visit? Include any pap smears or colon screening.  No     Health Maintenance Due   Topic Date Due    Eye Exam Retinal or Dilated  03/28/2020    Foot Exam Q1  08/22/2020    MICROALBUMIN Q1  08/22/2020

## 2021-04-28 NOTE — PROGRESS NOTES
HPI  Herman Jernigan is a 78 y.o. female who presents to follow-up hospital stay and [address] [place] [address]. Was there for 2 weeks, discharged 4/17. I last saw her about a month ago following up on a prior hospital stay. Had an obstruction of the pyloric sphincter at the time and had EGD and balloon dilation. Barium swallow after that procedure went slowly and there was a chance that she would need gastric bypass. 3 days after my visit with her she ended up back in the hospital for that bypass surgery. Was slow to wake up from anesthesia. Is still having reduced memory and attention relative to prior to surgery. Had some blood loss, required a total of 3 units PRBC with the last 1 happening on 4/15 2 days prior to discharge. Had an EGD that found an ulcer in the stomach after the surgery and was started on sucralfate. Daughter who is with patient today is working from home and doing most of the care. Having to watch patient carefully to make sure she eats properly and gives enough time in between medicines and eating. Difficult to encourage her to get up and walk around. Appears to be walking fairly confidently. Will be getting home physical therapy    Many medication changes were made including an increase in lisinopril from 5 mg to 10 mg. Has not been taking Synthroid. Plavix was stopped because of the bleeding. Was on this because of PAD, had a stent in the carotid and needed to be cleaned up 2 years ago    Since discharge she lost 5 pounds in the first week but weight has stabilized over the last few days. Spot checking fasting blood sugar, last number was 120. Denies lightheaded dizziness. PMHx:  Past Medical History:   Diagnosis Date    Anemia     Atypical chest pain     Long h/o intermittent non-cardiac CP.     Depression with anxiety     Diabetes ([facility]     GERD (gastroesophageal reflux disease)     Hypercholesteremia     Hyperlipidemia 7/3/2013    Hypertension     Inner ear dysfunction     S/P aortic valve replacement     Dr. Joycelyn Calhoun yearly    Type 2 diabetes with nephropathy ([facility] 7/5/2019    Vitamin D deficiency        Meds:   Current Outpatient Medications   Medication Sig Dispense Refill    sucralfate (CARAFATE) 1 gram tablet four (4) times daily.  levothyroxine (SYNTHROID) 50 mcg tablet Take 1 Tab by mouth Daily (before breakfast). 90 Tab 0    metFORMIN (GLUCOPHAGE) 500 mg tablet TAKE 4 TABLETS BY MOUTH DAILY. (Patient taking differently: 500 mg two (2) times daily (with meals). ) 360 Tab 3    glimepiride (AMARYL) 1 mg tablet TAKE 1 TABLET EVERY DAY BEFORE BREAKFAST (Patient taking differently: two (2) times a day. TAKE 1 TABLET EVERY DAY BEFORE BREAKFAST) 90 Tab 3    PARoxetine (PAXIL) 30 mg tablet TAKE 1 TABLET EVERY DAY (Patient taking differently: two (2) times a day. TAKE 1 TABLET EVERY DAY) 90 Tab 3    pantoprazole (Protonix) 40 mg tablet Take 1 Tab by mouth daily. 90 Tab 3    Accu-Chek Guide test strips strip CHECK SUGAR DAILY 100 Strip 11    VIT B CMPLX #9-FA-VIT C-VIT E PO Take  by mouth.  fluticasone propionate (Flonase Allergy Relief) 50 mcg/actuation nasal spray 2 Sprays by Both Nostrils route as needed.  vit C/E/Zn/coppr/lutein/zeaxan (PRESERVISION AREDS-2 PO) Take  by mouth.  memantine (NAMENDA) 10 mg tablet Take  by mouth two (2) times a day.  cholecalciferol (VITAMIN D3) (2,000 UNITS /50 MCG) cap capsule Take 3,000 Units by mouth two (2) times a day.  lisinopril (PRINIVIL, ZESTRIL) 5 mg tablet TAKE 1 TABLET EVERY DAY (Patient taking differently: Take 10 mg by mouth daily. TAKE 1 TABLET EVERY DAY) 90 Tab 3    amoxicillin (AMOXIL) 500 mg capsule Take 1 Cap by mouth three (3) times daily.  Indications: beta strp uti 30 Cap 0    ACCU-CHEK GUIDE GLUCOSE METER misc CHECK BLOOD SUGAR EVERY DAY 1 Each 0    lancets (ACCU-CHEK SOFTCLIX LANCETS) misc Check sugar daily 100 Each 11    L. acidoph & paracasei- S therm- Bifido (ALVIN-Q/RISAQUAD) 8 billion cell cap cap Take 1 Cap by mouth daily. 30 Cap 0    acetaminophen (TYLENOL) 325 mg tablet Take 325 mg by mouth every four (4) hours as needed for Pain.  lovastatin (MEVACOR) 10 mg tablet TAKE ONE TABLET BY MOUTH NIGHTLY 90 Tab 3    diclofenac (VOLTAREN) 1 % gel Apply 2 g to affected area four (4) times daily. 100 g 0    aspirin delayed-release 325 mg tablet Take 1 Tab by mouth daily. (Patient taking differently: Take 81 mg by mouth daily.) 90 Tab 1    PV W-O EDU/FERROUS FUMARATE/FA (M-VIT PO) Take 1 tablet by mouth daily. Allergies: Allergies   Allergen Reactions    Naldecon Unknown (comments)    Sulfa (Sulfonamide Antibiotics) Rash       Smoker:  Social History     Tobacco Use   Smoking Status Former Smoker    Quit date: 1999    Years since quittin.6   Smokeless Tobacco Never Used       ETOH:   Social History     Substance and Sexual Activity   Alcohol Use No       FH:   Family History   Problem Relation Age of Onset    Heart Disease Mother     Heart Failure Father     Heart Failure Sister     Stroke Sister     Diabetes Brother     Heart Disease Brother        ROS:   As listed in HPI. In addition:  Constitutional:   No headache, fever, fatigue, weight loss or weight gain      Cardiac:    No chest pain      Resp:   No cough or shortness of breath      Neuro   No loss of consciousness, dizziness, seizures      Physical Exam:  Blood pressure 124/63, pulse 61, temperature 96.9 °F (36.1 °C), temperature source Temporal, resp. rate 16, height 5' 2\" (1.575 m), weight 184 lb 6.4 oz (83.6 kg), SpO2 96 %. GEN: No apparent distress. Alert and oriented and responds to all questions appropriately. NEUROLOGIC:  No focal neurologic deficits. Strength and sensation grossly intact. Coordination and gait grossly intact. EXT: Well perfused. No edema. SKIN: No obvious rashes.   Lungs clear to auscultation bilaterally  CV regular rate rhythm, mild 2/6 systolic murmur is probably her heart valve       Assessment and Plan     Pyloric stenosis, status post gastric bypass  Surgeon released her to me and gastroenterology per daughter's report  Weight has stabilized  Is on solid foods with advice to avoid acidic foods. Is encouraged to drink 1 boost per day for the protein. She does not like the taste. I encouraged her to continue this until she is demonstrating that she can maintain her weight with solid foods    Is missing her dentures which is a barrier. Diabetes  Is taking Metformin 500 mg twice daily, glimepiride 1 mg twice daily. Monitor and may need to reduce diabetes medicine after gastric bypass    Blood loss anemia after gastric bypass  Check CBC and iron. will offer infusion if needed    Hypothyroid  Not currently taking Synthroid  Refilled Synthroid 50 mcg    Dementia  Tolerating Namenda    Appear to have a late effect from anesthesia. Encouraged her to remain physically and mentally active to try to recover but encouraged caution with anesthesia in the future    Stomach ulcer  Sucralfate  Avoiding acidic foods  Be vigilant for signs of upper GI bleed. I explained that dark stool should bring them to the emergency room  Gastroenterology follow-up in 2 months. Plan is to repeat EGD    Daughter needs a little help on some days like with grocery shopping. Provided names of home aide agencies in the area    Labs reviewed  A little anemic but probably better than in the hospital.  Iron stores are fine for now. Recheck CBC ferritin iron panel in 3 months  Remarkably TSH is fine. This might be a sick effect. Go ahead and take Synthroid as directed and we will check again in 3 months while on medicine      ICD-10-CM ICD-9-CM    1. Pyloric stenosis in adult  K31.1 537.0    2. Essential hypertension, benign  I10 401.1 CBC WITH AUTOMATED DIFF      METABOLIC PANEL, COMPREHENSIVE      METABOLIC PANEL, COMPREHENSIVE      CBC WITH AUTOMATED DIFF   3.  Controlled type 2 diabetes mellitus with diabetic nephropathy, without long-term current use of insulin (HCC)  E11.21 250.40      583.81    4. Vitamin D deficiency  E55.9 268.9    5. S/P aortic valve replacement  Z95.2 V43.3    6. Dementia without behavioral disturbance, unspecified dementia type ([facility]  F03.90 294.20    7. Iron deficiency anemia, unspecified iron deficiency anemia type  D50.9 280.9 FERRITIN      IRON PROFILE      IRON PROFILE      FERRITIN   8. Type 2 diabetes mellitus without complication, without long-term current use of insulin (HCC)  E11.9 250.00    9. Acquired hypothyroidism  E03.9 244.9 TSH 3RD GENERATION      TSH 3RD GENERATION       AVS given. Pt expressed understanding of instructions  This was a 40-minute visit. Greater than 50% of the time was spent counseling the patient on stenosis and recent gastric bypass.

## 2021-04-29 LAB
ALBUMIN SERPL-MCNC: 3.8 G/DL (ref 3.5–5)
ALBUMIN/GLOB SERPL: 1.3 {RATIO} (ref 1.1–2.2)
ALP SERPL-CCNC: 57 U/L (ref 45–117)
ALT SERPL-CCNC: 19 U/L (ref 12–78)
ANION GAP SERPL CALC-SCNC: 6 MMOL/L (ref 5–15)
AST SERPL-CCNC: 20 U/L (ref 15–37)
BASOPHILS # BLD: 0.1 K/UL (ref 0–0.1)
BASOPHILS NFR BLD: 1 % (ref 0–1)
BILIRUB SERPL-MCNC: 0.3 MG/DL (ref 0.2–1)
BUN SERPL-MCNC: 16 MG/DL (ref 6–20)
BUN/CREAT SERPL: 30 (ref 12–20)
CALCIUM SERPL-MCNC: 9 MG/DL (ref 8.5–10.1)
CHLORIDE SERPL-SCNC: 108 MMOL/L (ref 97–108)
CO2 SERPL-SCNC: 29 MMOL/L (ref 21–32)
CREAT SERPL-MCNC: 0.53 MG/DL (ref 0.55–1.02)
DIFFERENTIAL METHOD BLD: ABNORMAL
EOSINOPHIL # BLD: 0.2 K/UL (ref 0–0.4)
EOSINOPHIL NFR BLD: 3 % (ref 0–7)
ERYTHROCYTE [DISTWIDTH] IN BLOOD BY AUTOMATED COUNT: 16.8 % (ref 11.5–14.5)
FERRITIN SERPL-MCNC: 121 NG/ML (ref 26–388)
GLOBULIN SER CALC-MCNC: 3 G/DL (ref 2–4)
GLUCOSE SERPL-MCNC: 75 MG/DL (ref 65–100)
HCT VFR BLD AUTO: 35 % (ref 35–47)
HGB BLD-MCNC: 10.2 G/DL (ref 11.5–16)
IMM GRANULOCYTES # BLD AUTO: 0 K/UL (ref 0–0.04)
IMM GRANULOCYTES NFR BLD AUTO: 0 % (ref 0–0.5)
IRON SATN MFR SERPL: 11 % (ref 20–50)
IRON SERPL-MCNC: 35 UG/DL (ref 35–150)
LYMPHOCYTES # BLD: 2.3 K/UL (ref 0.8–3.5)
LYMPHOCYTES NFR BLD: 32 % (ref 12–49)
MCH RBC QN AUTO: 28.2 PG (ref 26–34)
MCHC RBC AUTO-ENTMCNC: 29.1 G/DL (ref 30–36.5)
MCV RBC AUTO: 96.7 FL (ref 80–99)
MONOCYTES # BLD: 0.6 K/UL (ref 0–1)
MONOCYTES NFR BLD: 9 % (ref 5–13)
NEUTS SEG # BLD: 4 K/UL (ref 1.8–8)
NEUTS SEG NFR BLD: 55 % (ref 32–75)
NRBC # BLD: 0 K/UL (ref 0–0.01)
NRBC BLD-RTO: 0 PER 100 WBC
PLATELET # BLD AUTO: 270 K/UL (ref 150–400)
PMV BLD AUTO: 11.3 FL (ref 8.9–12.9)
POTASSIUM SERPL-SCNC: 4.5 MMOL/L (ref 3.5–5.1)
PROT SERPL-MCNC: 6.8 G/DL (ref 6.4–8.2)
RBC # BLD AUTO: 3.62 M/UL (ref 3.8–5.2)
SODIUM SERPL-SCNC: 143 MMOL/L (ref 136–145)
TIBC SERPL-MCNC: 316 UG/DL (ref 250–450)
TSH SERPL DL<=0.05 MIU/L-ACNC: 2.56 UIU/ML (ref 0.36–3.74)
WBC # BLD AUTO: 7.2 K/UL (ref 3.6–11)

## 2021-05-26 NOTE — TELEPHONE ENCOUNTER
I will order an xray, but I think it really wont give much information. She needs to go back to ortho Dr Mike Bojorquez who may want her to have another MRI. Statement Selected

## 2021-06-29 ENCOUNTER — TELEPHONE (OUTPATIENT)
Dept: PHARMACY | Age: 81
End: 2021-06-29

## 2021-06-29 ENCOUNTER — TELEPHONE (OUTPATIENT)
Dept: FAMILY MEDICINE CLINIC | Age: 81
End: 2021-06-29

## 2021-06-29 RX ORDER — METFORMIN HYDROCHLORIDE 500 MG/1
500 TABLET ORAL 2 TIMES DAILY WITH MEALS
COMMUNITY

## 2021-06-29 RX ORDER — LISINOPRIL 10 MG/1
10 TABLET ORAL DAILY
Qty: 90 TABLET | Refills: 3 | Status: SHIPPED | OUTPATIENT
Start: 2021-06-29 | End: 2021-06-29 | Stop reason: SDUPTHER

## 2021-06-29 RX ORDER — LISINOPRIL 10 MG/1
10 TABLET ORAL DAILY
Qty: 90 TABLET | Refills: 3 | Status: SHIPPED | OUTPATIENT
Start: 2021-06-29 | End: 2021-09-03 | Stop reason: DRUGHIGH

## 2021-06-29 NOTE — TELEPHONE ENCOUNTER
Thank you, Dr. Abril Patel. Note refill sent. Sheryle Mungo updated. No further outreach planned at this time.     For Joe Stoner in place: No   Recommendation Provided To: Provider: 1 via Note to Provider    Intervention Detail: Refill(s) Provided   Gap Closed?: Yes   Total # of Interventions Recommended: 1   Total # of Interventions Accepted: 1   Intervention Accepted By: Provider: 1   Time Spent (min): 15

## 2021-06-29 NOTE — TELEPHONE ENCOUNTER
Dr. Kaleb Yanez,    Patient has lisinopril 40 mg tablet at home and daughter cutting into 1/4ths to get 10 mg dosage. Can patient please have new prescription for lisinopril 10 mg tablets sent to [facility] pharmacy, if you agree? Thank you,  Haley Vieira, PharmD, [facility], [address] Clinical Pharmacist  O: [phone]  Department, toll free: [phone], option 7   ===================================================================    CLINICAL PHARMACY: [facility]  Identified care gap per Veronica; fills at [facility]: ACE/ARB, Diabetes and Statin adherence    Last Visit: 4/28/21    Patient identified as LIS = 1, therefore patient may be able to receive a 90-day supply for the same cost as a 30-day supply    Patient not found in Outcomes MTM    ASSESSMENT  ACE/ARB ADHERENCE    Per Insurance Records through 6/21/21 (2020 [place] = n/a%; YTD [place] = 77%; Potential Fail Date: 7/23/21):   Lisinopril 5 mg tablets last filled on 4/17/21 for 15 day supply, quantity 30. Next refill due: 5/19/21  Prescriber Noris Huffman      BP Readings from Last 3 Encounters:   04/28/21 124/63   03/31/21 123/83   03/09/21 122/80     Estimated Creatinine Clearance: 62.1 mL/min (A) (by C-G formula based on SCr of 0.53 mg/dL (L)). DIABETES ADHERENCE    Per Insurance Records through 6/21/21 ([address] [place] = n/a%; YTD [place] = filled only once):   Metformin 500 mg tablets last filled on 2/8/21 for 90 day supply, quantity 360. Next refill due: 7/13/21  Glimepiride 1 mg tablets last filled on 2/8/21 for 90 day supply, quantity 90. Next refill due: 7/13/21  Prescriber Ferny Koo. Per chart review, 1 year supply medications sent to pharmacy 2/8/21    Per 4/28/21 PCP note \"Is taking Metformin 500 mg twice daily, glimepiride 1 mg twice daily.   Monitor and may need to reduce diabetes medicine after gastric bypass\"    Lab Results   Component Value Date/Time    Hemoglobin A1c 5.7 (H) 03/09/2021 09:40 AM    Hemoglobin A1c 5.9 (H) 10/30/2020 12:24 PM    Hemoglobin A1c 6.6 (H) 02/24/2020 10:15 AM    Hemoglobin A1c (POC) 6.2 (A) 02/13/2018 02:30 PM    Hemoglobin A1c (POC) 6.0 (A) 07/03/2017 03:05 PM    Hemoglobin A1c (POC) 6.1 (A) 12/21/2016 08:39 AM     NOTE A1c <9%    STATIN ADHERENCE    Per Insurance Records through 6/21/21 (2020 [facility] = n/a%; [facility] PDC = filled only once):   Lovastatin 10 mg tablets last filled on 2/3/21 for 90 day supply. Next refill due: 5/4/21  Prescriber Dr. Niki Thakur Results   Component Value Date/Time    Cholesterol, total 159 03/09/2021 09:40 AM    HDL Cholesterol 75 03/09/2021 09:40 AM    LDL, calculated 64 03/09/2021 09:40 AM    LDL, calculated 66 02/24/2020 10:15 AM    VLDL, calculated 20 03/09/2021 09:40 AM    VLDL, calculated 22 02/24/2020 10:15 AM    Triglyceride 113 03/09/2021 09:40 AM    CHOL/HDL Ratio 2.0 09/09/2010 10:07 AM     ALT (SGPT)   Date Value Ref Range Status   04/28/2021 19 12 - 78 U/L Final     AST (SGOT)   Date Value Ref Range Status   04/28/2021 20 15 - 37 U/L Final     The ASCVD Risk score (Minetta Riedel., et al., 2013) failed to calculate for the following reasons: The 2013 ASCVD risk score is only valid for ages 36 to 78     PLAN  The following are interventions that have been identified:  - Patient overdue refilling lisinopril, glimepiride, metformin, and lovastatin and active on home medication list    Reached daughter, Orville Cleary on [place], to review. Orville Cleary states her mother has dementia and she manages her medications for her. Reviewed lisinopril, glimepiride, metformin, and lovastatin. Orville Cleary states she has a lisinopril 40 mg tablet and is cutting it into fourths to get 10 mg daily. She would prefer a 10 mg tablet. Will send request to PCP. Glimepiride 1 mg tablet- states on hold after surgery, follows up with Dr. Casimiro Crowley next month. Metformin 500 mg tablets- decreased after surgery to 1 tablet BID.  Updated medication list.  Lovastatin 10 mg tablet- states she still takes nightly. States after the hospitalization she wasn't able to take her medications for awhile so she still has some remaining. No future appointments.     Margarette Regan, PharmD, [address]  Direct: [phone]  Department, toll free: [phone], option 7

## 2021-07-15 RX ORDER — LEVOTHYROXINE SODIUM 50 UG/1
TABLET ORAL
Qty: 90 TABLET | Refills: 3 | Status: SHIPPED | OUTPATIENT
Start: 2021-07-15

## 2021-09-03 ENCOUNTER — OFFICE VISIT (OUTPATIENT)
Dept: FAMILY MEDICINE CLINIC | Age: 81
End: 2021-09-03
Payer: MEDICARE

## 2021-09-03 VITALS
TEMPERATURE: 97.6 F | DIASTOLIC BLOOD PRESSURE: 74 MMHG | SYSTOLIC BLOOD PRESSURE: 117 MMHG | HEART RATE: 93 BPM | HEIGHT: 62 IN | BODY MASS INDEX: 35.07 KG/M2 | RESPIRATION RATE: 17 BRPM | OXYGEN SATURATION: 96 % | WEIGHT: 190.6 LBS

## 2021-09-03 DIAGNOSIS — E11.21 CONTROLLED TYPE 2 DIABETES MELLITUS WITH DIABETIC NEPHROPATHY, WITHOUT LONG-TERM CURRENT USE OF INSULIN (HCC): Primary | ICD-10-CM

## 2021-09-03 DIAGNOSIS — Z98.84 PERSONAL HISTORY OF GASTRIC BYPASS: ICD-10-CM

## 2021-09-03 DIAGNOSIS — E53.8 B12 DEFICIENCY: ICD-10-CM

## 2021-09-03 DIAGNOSIS — I10 ESSENTIAL HYPERTENSION, BENIGN: ICD-10-CM

## 2021-09-03 DIAGNOSIS — E61.1 IRON DEFICIENCY: ICD-10-CM

## 2021-09-03 DIAGNOSIS — E03.9 ACQUIRED HYPOTHYROIDISM: ICD-10-CM

## 2021-09-03 LAB
ALBUMIN UR QL STRIP: 30 MG/L
CREATININE, URINE POC: 100 MG/DL
HBA1C MFR BLD HPLC: 6.1 %
MICROALBUMIN/CREAT RATIO POC: <30 MG/G

## 2021-09-03 PROCEDURE — G8427 DOCREV CUR MEDS BY ELIG CLIN: HCPCS | Performed by: FAMILY MEDICINE

## 2021-09-03 PROCEDURE — G8417 CALC BMI ABV UP PARAM F/U: HCPCS | Performed by: FAMILY MEDICINE

## 2021-09-03 PROCEDURE — G9717 DOC PT DX DEP/BP F/U NT REQ: HCPCS | Performed by: FAMILY MEDICINE

## 2021-09-03 PROCEDURE — 82044 UR ALBUMIN SEMIQUANTITATIVE: CPT | Performed by: FAMILY MEDICINE

## 2021-09-03 PROCEDURE — G8754 DIAS BP LESS 90: HCPCS | Performed by: FAMILY MEDICINE

## 2021-09-03 PROCEDURE — 99214 OFFICE O/P EST MOD 30 MIN: CPT | Performed by: FAMILY MEDICINE

## 2021-09-03 PROCEDURE — G8752 SYS BP LESS 140: HCPCS | Performed by: FAMILY MEDICINE

## 2021-09-03 PROCEDURE — G8399 PT W/DXA RESULTS DOCUMENT: HCPCS | Performed by: FAMILY MEDICINE

## 2021-09-03 PROCEDURE — 1101F PT FALLS ASSESS-DOCD LE1/YR: CPT | Performed by: FAMILY MEDICINE

## 2021-09-03 PROCEDURE — 83036 HEMOGLOBIN GLYCOSYLATED A1C: CPT | Performed by: FAMILY MEDICINE

## 2021-09-03 PROCEDURE — 1090F PRES/ABSN URINE INCON ASSESS: CPT | Performed by: FAMILY MEDICINE

## 2021-09-03 PROCEDURE — G8536 NO DOC ELDER MAL SCRN: HCPCS | Performed by: FAMILY MEDICINE

## 2021-09-03 RX ORDER — LISINOPRIL 40 MG/1
1 TABLET ORAL
COMMUNITY
Start: 2021-02-03 | End: 2021-09-13

## 2021-09-03 NOTE — PROGRESS NOTES
MARKY Pressley is a 78 y.o. female who presents with headache and dyspnea on exertion. Accompanied by her daughter who helps with most of the history. Daughter noticed patient was short of breath starting 1 week ago. Patient would not have necessarily told her about this complaint earlier and patient has been sitting around a lot so daughter would not have noticed this earlier. Patient reports that has been going on for 6 months or so but is not the best historian so take that with a grain of salt. .    Made it longterm and from the waiting room before huffing and puffing. Its about 10 yards. She was able to power through but it took her a few minutes to recover. In 6 months ago she had a serious surgery. recall that she had pyloric stenosis causing outlet obstruction. Required gastric bypass and required 3 units PRBC. She was also slow to recover from her anesthesia    PMHx:  Past Medical History:   Diagnosis Date    Anemia     Atypical chest pain     Long h/o intermittent non-cardiac CP.  Depression with anxiety     Diabetes ([facility] [address]     GERD (gastroesophageal reflux disease)     Hypercholesteremia     Hyperlipidemia 7/3/2013    Hypertension     Inner ear dysfunction     S/P aortic valve replacement     Dr. Dora Thrasher yearly    Type 2 diabetes with nephropathy ([facility] [address] 7/5/2019    Vitamin D deficiency        Meds:   Current Outpatient Medications   Medication Sig Dispense Refill    lisinopriL (PRINIVIL, ZESTRIL) 40 mg tablet Take 1 Tablet by mouth.  levothyroxine (SYNTHROID) 50 mcg tablet TAKE 1 TABLET BY MOUTH  DAILY BEFORE BREAKFAST 90 Tablet 3    metFORMIN (GLUCOPHAGE) 500 mg tablet Take 500 mg by mouth two (2) times daily (with meals).  sucralfate (CARAFATE) 1 gram tablet four (4) times daily.       glimepiride (AMARYL) 1 mg tablet TAKE 1 TABLET EVERY DAY BEFORE BREAKFAST 90 Tab 3    PARoxetine (PAXIL) 30 mg tablet TAKE 1 TABLET EVERY DAY (Patient taking differently: two (2) times a day. TAKE 1 TABLET EVERY DAY) 90 Tab 3    pantoprazole (Protonix) 40 mg tablet Take 1 Tab by mouth daily. 90 Tab 3    Accu-Chek Guide test strips strip CHECK SUGAR DAILY 100 Strip 11    VIT B CMPLX #9-FA-VIT C-VIT E PO Take  by mouth.  fluticasone propionate (Flonase Allergy Relief) 50 mcg/actuation nasal spray 2 Sprays by Both Nostrils route as needed.  vit C/E/Zn/coppr/lutein/zeaxan (PRESERVISION AREDS-2 PO) Take  by mouth.  diclofenac (VOLTAREN) 1 % gel Apply 2 g to affected area four (4) times daily. 100 g 0    memantine (NAMENDA) 10 mg tablet Take  by mouth two (2) times a day.  cholecalciferol (VITAMIN D3) (2,000 UNITS /50 MCG) cap capsule Take 3,000 Units by mouth two (2) times a day.  ACCU-CHEK GUIDE GLUCOSE METER misc CHECK BLOOD SUGAR EVERY DAY 1 Each 0    lancets (ACCU-CHEK SOFTCLIX LANCETS) misc Check sugar daily 100 Each 11    L. acidoph & paracasei- S therm- Bifido (ALVIN-Q/RISAQUAD) 8 billion cell cap cap Take 1 Cap by mouth daily. 30 Cap 0    acetaminophen (TYLENOL) 325 mg tablet Take 325 mg by mouth every four (4) hours as needed for Pain.  lovastatin (MEVACOR) 10 mg tablet TAKE ONE TABLET BY MOUTH NIGHTLY 90 Tab 3    PV W-O EDU/FERROUS FUMARATE/FA (M-VIT PO) Take 1 tablet by mouth daily.  lisinopriL (PRINIVIL, ZESTRIL) 10 mg tablet Take 1 Tablet by mouth daily. (Patient not taking: Reported on 9/3/2021) 90 Tablet 3    aspirin delayed-release 325 mg tablet Take 1 Tab by mouth daily. (Patient not taking: Reported on 9/3/2021) 90 Tab 1       Allergies:    Allergies   Allergen Reactions    Naldecon Unknown (comments)    Sulfa (Sulfonamide Antibiotics) Rash    Chlorpheniramine Other (comments)    Phenylephrine Hcl Other (comments)    Phenyltoloxamine Other (comments)       Smoker:  Social History     Tobacco Use   Smoking Status Former Smoker    Quit date: 1999    Years since quittin.0   Smokeless Tobacco Never Used       ETOH:   Social History     Substance and Sexual Activity   Alcohol Use No       FH:   Family History   Problem Relation Age of Onset    Heart Disease Mother     Heart Failure Father     Heart Failure Sister     Stroke Sister     Diabetes Brother     Heart Disease Brother        ROS:   As listed in HPI. In addition:  Constitutional:   No headache, fever, fatigue, weight loss or weight gain      Cardiac:    No chest pain      Resp:   No cough or shortness of breath      Neuro   No loss of consciousness, dizziness, seizures      Physical Exam:  Blood pressure 117/74, pulse 93, temperature 97.6 °F (36.4 °C), temperature source Temporal, resp. rate 17, height 5' 2\" (1.575 m), weight 190 lb 9.6 oz (86.5 kg), SpO2 96 %. GEN: No apparent distress. Alert and oriented and responds to all questions appropriately. NEUROLOGIC:  No focal neurologic deficits. Strength and sensation grossly intact. Coordination and gait grossly intact. EXT: Well perfused. No edema. SKIN: No obvious rashes. Lungs clear to auscultation bilaterally  CV regular rate rhythm no murmur. Artificial aortic valve sound  HEENT neck muscles examined. Right trapezius reproduces right frontal headache. Assessment and Plan     WHITTEN  Poor exercise tolerance versus cardiac versus anemia versus thyroid  Patient recalls that this has been a problem since her surgery. Is possible she simply did not get moving very much after her surgery. She also had an anemia and a abnormal TSH    Will check CBC, TSH  Also check iron, B12 (recent gastric bypass)    Not currently taking blood thinners    If metabolic work-up is unremarkable I encouraged her to cautiously begin exercising again. Do not push through chest pain or your anginal equivalent. Reports she has follow-up with her cardiologist soon which would be appropriate if she has unable to exercise. Might need stress test for instance      ICD-10-CM ICD-9-CM    1.  Controlled type 2 diabetes mellitus with diabetic nephropathy, without long-term current use of insulin (HCC)  E11.21 250.40 AMB POC HEMOGLOBIN A1C     583.81 AMB POC URINE, MICROALBUMIN, SEMIQUANT (3 RESULTS)   2. Essential hypertension, benign  I10 401.1 CBC WITH AUTOMATED DIFF      METABOLIC PANEL, COMPREHENSIVE      TSH 3RD GENERATION      FERRITIN      IRON PROFILE   3. Acquired hypothyroidism  E03.9 244.9 CBC WITH AUTOMATED DIFF      METABOLIC PANEL, COMPREHENSIVE      TSH 3RD GENERATION      FERRITIN      IRON PROFILE   4. Personal history of gastric bypass  Z98.84 V45.86    5. Iron deficiency  E61.1 280.9 FERRITIN      IRON PROFILE   6. B12 deficiency  E53.8 266.2 VITAMIN B12 & FOLATE       AVS given.  Pt expressed understanding of instructions

## 2021-09-03 NOTE — PROGRESS NOTES
1. Have you been to the ER, urgent care clinic since your last visit? Hospitalized since your last visit? No    2. Have you seen or consulted any other health care providers outside of the [address] [place] [address] since your last visit? Include any pap smears or colon screening. Yes. Gastro.     Health Maintenance Due   Topic Date Due    Foot Exam Q1  08/22/2020    MICROALBUMIN Q1  08/22/2020    Flu Vaccine (1) 09/01/2021    A1C test (Diabetic or Prediabetic)  09/09/2021

## 2021-09-04 LAB
ALBUMIN SERPL-MCNC: 3.9 G/DL (ref 3.5–5)
ALBUMIN/GLOB SERPL: 1.3 {RATIO} (ref 1.1–2.2)
ALP SERPL-CCNC: 71 U/L (ref 45–117)
ALT SERPL-CCNC: 36 U/L (ref 12–78)
ANION GAP SERPL CALC-SCNC: 8 MMOL/L (ref 5–15)
AST SERPL-CCNC: 26 U/L (ref 15–37)
BASOPHILS # BLD: 0.1 K/UL (ref 0–0.1)
BASOPHILS NFR BLD: 1 % (ref 0–1)
BILIRUB SERPL-MCNC: 0.5 MG/DL (ref 0.2–1)
BUN SERPL-MCNC: 16 MG/DL (ref 6–20)
BUN/CREAT SERPL: 19 (ref 12–20)
CALCIUM SERPL-MCNC: 9.1 MG/DL (ref 8.5–10.1)
CHLORIDE SERPL-SCNC: 108 MMOL/L (ref 97–108)
CO2 SERPL-SCNC: 27 MMOL/L (ref 21–32)
COMMENT, HOLDF: NORMAL
CREAT SERPL-MCNC: 0.84 MG/DL (ref 0.55–1.02)
DIFFERENTIAL METHOD BLD: ABNORMAL
EOSINOPHIL # BLD: 0.1 K/UL (ref 0–0.4)
EOSINOPHIL NFR BLD: 1 % (ref 0–7)
ERYTHROCYTE [DISTWIDTH] IN BLOOD BY AUTOMATED COUNT: 15.3 % (ref 11.5–14.5)
FERRITIN SERPL-MCNC: 14 NG/ML (ref 26–388)
FOLATE SERPL-MCNC: 86.7 NG/ML (ref 5–21)
GLOBULIN SER CALC-MCNC: 3 G/DL (ref 2–4)
GLUCOSE SERPL-MCNC: 88 MG/DL (ref 65–100)
HCT VFR BLD AUTO: 34.9 % (ref 35–47)
HGB BLD-MCNC: 10.4 G/DL (ref 11.5–16)
IMM GRANULOCYTES # BLD AUTO: 0 K/UL (ref 0–0.04)
IMM GRANULOCYTES NFR BLD AUTO: 0 % (ref 0–0.5)
IRON SATN MFR SERPL: 6 % (ref 20–50)
IRON SERPL-MCNC: 25 UG/DL (ref 35–150)
LYMPHOCYTES # BLD: 2.6 K/UL (ref 0.8–3.5)
LYMPHOCYTES NFR BLD: 30 % (ref 12–49)
MCH RBC QN AUTO: 26.5 PG (ref 26–34)
MCHC RBC AUTO-ENTMCNC: 29.8 G/DL (ref 30–36.5)
MCV RBC AUTO: 89 FL (ref 80–99)
MONOCYTES # BLD: 0.7 K/UL (ref 0–1)
MONOCYTES NFR BLD: 8 % (ref 5–13)
NEUTS SEG # BLD: 5.2 K/UL (ref 1.8–8)
NEUTS SEG NFR BLD: 60 % (ref 32–75)
NRBC # BLD: 0 K/UL (ref 0–0.01)
NRBC BLD-RTO: 0 PER 100 WBC
PLATELET # BLD AUTO: 272 K/UL (ref 150–400)
PMV BLD AUTO: 10.6 FL (ref 8.9–12.9)
POTASSIUM SERPL-SCNC: 4.5 MMOL/L (ref 3.5–5.1)
PROT SERPL-MCNC: 6.9 G/DL (ref 6.4–8.2)
RBC # BLD AUTO: 3.92 M/UL (ref 3.8–5.2)
SAMPLES BEING HELD,HOLD: NORMAL
SODIUM SERPL-SCNC: 143 MMOL/L (ref 136–145)
TIBC SERPL-MCNC: 400 UG/DL (ref 250–450)
TSH SERPL DL<=0.05 MIU/L-ACNC: 3.28 UIU/ML (ref 0.36–3.74)
VIT B12 SERPL-MCNC: >2000 PG/ML (ref 193–986)
WBC # BLD AUTO: 8.6 K/UL (ref 3.6–11)

## 2021-09-07 ENCOUNTER — TELEPHONE (OUTPATIENT)
Dept: FAMILY MEDICINE CLINIC | Age: 81
End: 2021-09-07

## 2021-09-07 NOTE — TELEPHONE ENCOUNTER
Jasmyne Machuca called and wants to speak with Dr about patients health    Please give a call back  [facility]#[phone]

## 2021-09-08 NOTE — TELEPHONE ENCOUNTER
verified by Jo Romano (daughter)    She wanted to let you know that the pt's depression is progressively getting worse since her last visit. Per daughter, she's been crying all day since  with bouts of insomnia. She is also concerned about the absorption of her Rx's since her stomach bypass. Lastly, she states that the pt will not exercise because \"she's too old to exercise. \"  Please call [phone].

## 2021-09-08 NOTE — TELEPHONE ENCOUNTER
Pt states she would like to receive the iron infusion and Pt daughter still has concerns regarding patients depression, she wants to know if there should be any change in patients medication.

## 2021-09-08 NOTE — TELEPHONE ENCOUNTER
Labs reviewed. B12 and folate are fine which suggest that she is absorbing nutrients in spite of her bypass. Iron level is low. Probably never recovered her iron stores after her surgery. This will cause a sense of fatigue, shortness of breath. Iron infusion would be helpful. Please let me know if she would like me to order iron infusion. Thyroid looks fine. Kidney function is excellent.

## 2021-09-10 ENCOUNTER — APPOINTMENT (OUTPATIENT)
Dept: NON INVASIVE DIAGNOSTICS | Age: 81
DRG: 280 | End: 2021-09-10
Attending: INTERNAL MEDICINE
Payer: MEDICARE

## 2021-09-10 ENCOUNTER — HOSPITAL ENCOUNTER (INPATIENT)
Age: 81
LOS: 3 days | Discharge: HOME HEALTH | DRG: 280 | End: 2021-09-13
Attending: INTERNAL MEDICINE | Admitting: INTERNAL MEDICINE
Payer: MEDICARE

## 2021-09-10 DIAGNOSIS — I25.10 CORONARY ARTERY DISEASE INVOLVING NATIVE HEART WITHOUT ANGINA PECTORIS, UNSPECIFIED VESSEL OR LESION TYPE: ICD-10-CM

## 2021-09-10 LAB
ACT BLD: 164 SECS (ref 79–138)
ACT BLD: 175 SECS (ref 79–138)
ALBUMIN SERPL-MCNC: 3 G/DL (ref 3.5–5)
ALBUMIN/GLOB SERPL: 0.9 {RATIO} (ref 1.1–2.2)
ALP SERPL-CCNC: 64 U/L (ref 45–117)
ALT SERPL-CCNC: 35 U/L (ref 12–78)
ANION GAP SERPL CALC-SCNC: 9 MMOL/L (ref 5–15)
AST SERPL-CCNC: 34 U/L (ref 15–37)
ATRIAL RATE: 113 BPM
BASOPHILS # BLD: 0 K/UL (ref 0–0.1)
BASOPHILS NFR BLD: 0 % (ref 0–1)
BILIRUB SERPL-MCNC: 0.7 MG/DL (ref 0.2–1)
BUN SERPL-MCNC: 19 MG/DL (ref 6–20)
BUN/CREAT SERPL: 33 (ref 12–20)
CALCIUM SERPL-MCNC: 8.3 MG/DL (ref 8.5–10.1)
CALCULATED P AXIS, ECG09: 40 DEGREES
CALCULATED R AXIS, ECG10: -68 DEGREES
CALCULATED T AXIS, ECG11: 105 DEGREES
CHLORIDE SERPL-SCNC: 109 MMOL/L (ref 97–108)
CO2 SERPL-SCNC: 24 MMOL/L (ref 21–32)
CREAT SERPL-MCNC: 0.57 MG/DL (ref 0.55–1.02)
DIAGNOSIS, 93000: NORMAL
DIFFERENTIAL METHOD BLD: ABNORMAL
ECHO AO ROOT DIAM: 2.07 CM
ECHO AV AREA PEAK VELOCITY: 0.8 CM2
ECHO AV AREA VTI: 0.78 CM2
ECHO AV MEAN GRADIENT: 19.62 MMHG
ECHO AV PEAK GRADIENT: 31.39 MMHG
ECHO AV PEAK VELOCITY: 280.02 CM/S
ECHO AV VTI: 45.5 CM
ECHO EST RA PRESSURE: 15 MMHG
ECHO IVC PROX: 2.21 CM
ECHO LA MAJOR AXIS: 4 CM
ECHO LV INTERNAL DIMENSION DIASTOLIC: 4.78 CM (ref 3.9–5.3)
ECHO LV INTERNAL DIMENSION SYSTOLIC: 3.88 CM
ECHO LV IVSD: 1.02 CM (ref 0.6–0.9)
ECHO LV MASS 2D: 200 G (ref 67–162)
ECHO LV POSTERIOR WALL DIASTOLIC: 1.24 CM (ref 0.6–0.9)
ECHO LVOT DIAM: 1.82 CM
ECHO LVOT PEAK GRADIENT: 2.96 MMHG
ECHO LVOT PEAK VELOCITY: 85.97 CM/S
ECHO LVOT SV: 35.7 ML
ECHO LVOT VTI: 13.65 CM
ECHO MV AREA VTI: 1.84 CM2
ECHO MV EROA PISA: 0.14 CM2
ECHO MV MAX VELOCITY: 160.9 CM/S
ECHO MV MEAN GRADIENT: 5.66 MMHG
ECHO MV PEAK GRADIENT: 10.35 MMHG
ECHO MV REGURGITANT RADIUS PISA: 0.66 CM
ECHO MV REGURGITANT VOLUME: 24.43 ML
ECHO MV REGURGITANT VTIA: 173.89 CM
ECHO MV VTI: 19.38 CM
ECHO PV MAX VELOCITY: 82.12 CM/S
ECHO PV PEAK INSTANTANEOUS GRADIENT SYSTOLIC: 2.7 MMHG
ECHO RIGHT VENTRICULAR SYSTOLIC PRESSURE (RVSP): 59.39 MMHG
ECHO TV REGURGITANT MAX VELOCITY: 332.85 CM/S
ECHO TV REGURGITANT PEAK GRADIENT: 44.39 MMHG
EOSINOPHIL # BLD: 0.1 K/UL (ref 0–0.4)
EOSINOPHIL NFR BLD: 1 % (ref 0–7)
ERYTHROCYTE [DISTWIDTH] IN BLOOD BY AUTOMATED COUNT: 15.2 % (ref 11.5–14.5)
GLOBULIN SER CALC-MCNC: 3.3 G/DL (ref 2–4)
GLUCOSE BLD STRIP.AUTO-MCNC: 122 MG/DL (ref 65–117)
GLUCOSE BLD STRIP.AUTO-MCNC: 129 MG/DL (ref 65–117)
GLUCOSE SERPL-MCNC: 120 MG/DL (ref 65–100)
HCT VFR BLD AUTO: 28.6 % (ref 35–47)
HGB BLD-MCNC: 8.5 G/DL (ref 11.5–16)
IMM GRANULOCYTES # BLD AUTO: 0 K/UL (ref 0–0.04)
IMM GRANULOCYTES NFR BLD AUTO: 0 % (ref 0–0.5)
LVOT MG: 1.73 MMHG
LYMPHOCYTES # BLD: 1.7 K/UL (ref 0.8–3.5)
LYMPHOCYTES NFR BLD: 19 % (ref 12–49)
MCH RBC QN AUTO: 26.4 PG (ref 26–34)
MCHC RBC AUTO-ENTMCNC: 29.7 G/DL (ref 30–36.5)
MCV RBC AUTO: 88.8 FL (ref 80–99)
MONOCYTES # BLD: 0.9 K/UL (ref 0–1)
MONOCYTES NFR BLD: 10 % (ref 5–13)
MR PISA PV: 580.01 CM/S
NEUTS SEG # BLD: 6.1 K/UL (ref 1.8–8)
NEUTS SEG NFR BLD: 70 % (ref 32–75)
NRBC # BLD: 0 K/UL (ref 0–0.01)
NRBC BLD-RTO: 0 PER 100 WBC
P-R INTERVAL, ECG05: 152 MS
PLATELET # BLD AUTO: 195 K/UL (ref 150–400)
PMV BLD AUTO: 10.1 FL (ref 8.9–12.9)
POTASSIUM SERPL-SCNC: 3.7 MMOL/L (ref 3.5–5.1)
PROT SERPL-MCNC: 6.3 G/DL (ref 6.4–8.2)
Q-T INTERVAL, ECG07: 362 MS
QRS DURATION, ECG06: 152 MS
QTC CALCULATION (BEZET), ECG08: 496 MS
RBC # BLD AUTO: 3.22 M/UL (ref 3.8–5.2)
SERVICE CMNT-IMP: ABNORMAL
SERVICE CMNT-IMP: ABNORMAL
SODIUM SERPL-SCNC: 142 MMOL/L (ref 136–145)
TROPONIN I SERPL-MCNC: 0.29 NG/ML
VENTRICULAR RATE, ECG03: 113 BPM
WBC # BLD AUTO: 8.8 K/UL (ref 3.6–11)

## 2021-09-10 PROCEDURE — 76937 US GUIDE VASCULAR ACCESS: CPT | Performed by: INTERNAL MEDICINE

## 2021-09-10 PROCEDURE — 85025 COMPLETE CBC W/AUTO DIFF WBC: CPT

## 2021-09-10 PROCEDURE — 80053 COMPREHEN METABOLIC PANEL: CPT

## 2021-09-10 PROCEDURE — 99153 MOD SED SAME PHYS/QHP EA: CPT | Performed by: INTERNAL MEDICINE

## 2021-09-10 PROCEDURE — 4A023N7 MEASUREMENT OF CARDIAC SAMPLING AND PRESSURE, LEFT HEART, PERCUTANEOUS APPROACH: ICD-10-PCS | Performed by: INTERNAL MEDICINE

## 2021-09-10 PROCEDURE — 85347 COAGULATION TIME ACTIVATED: CPT

## 2021-09-10 PROCEDURE — 99152 MOD SED SAME PHYS/QHP 5/>YRS: CPT | Performed by: INTERNAL MEDICINE

## 2021-09-10 PROCEDURE — B2151ZZ FLUOROSCOPY OF LEFT HEART USING LOW OSMOLAR CONTRAST: ICD-10-PCS | Performed by: INTERNAL MEDICINE

## 2021-09-10 PROCEDURE — C1769 GUIDE WIRE: HCPCS | Performed by: INTERNAL MEDICINE

## 2021-09-10 PROCEDURE — 74011000636 HC RX REV CODE- 636: Performed by: INTERNAL MEDICINE

## 2021-09-10 PROCEDURE — C1894 INTRO/SHEATH, NON-LASER: HCPCS | Performed by: INTERNAL MEDICINE

## 2021-09-10 PROCEDURE — 93005 ELECTROCARDIOGRAM TRACING: CPT

## 2021-09-10 PROCEDURE — 36415 COLL VENOUS BLD VENIPUNCTURE: CPT

## 2021-09-10 PROCEDURE — 82962 GLUCOSE BLOOD TEST: CPT

## 2021-09-10 PROCEDURE — 74011250636 HC RX REV CODE- 250/636: Performed by: INTERNAL MEDICINE

## 2021-09-10 PROCEDURE — B2111ZZ FLUOROSCOPY OF MULTIPLE CORONARY ARTERIES USING LOW OSMOLAR CONTRAST: ICD-10-PCS | Performed by: INTERNAL MEDICINE

## 2021-09-10 PROCEDURE — C1887 CATHETER, GUIDING: HCPCS | Performed by: INTERNAL MEDICINE

## 2021-09-10 PROCEDURE — 74011000250 HC RX REV CODE- 250: Performed by: STUDENT IN AN ORGANIZED HEALTH CARE EDUCATION/TRAINING PROGRAM

## 2021-09-10 PROCEDURE — 84484 ASSAY OF TROPONIN QUANT: CPT

## 2021-09-10 PROCEDURE — 74011000250 HC RX REV CODE- 250: Performed by: INTERNAL MEDICINE

## 2021-09-10 PROCEDURE — 74011250636 HC RX REV CODE- 250/636: Performed by: STUDENT IN AN ORGANIZED HEALTH CARE EDUCATION/TRAINING PROGRAM

## 2021-09-10 PROCEDURE — 77030004549 HC CATH ANGI DX PRF MRTM -A: Performed by: INTERNAL MEDICINE

## 2021-09-10 PROCEDURE — 93458 L HRT ARTERY/VENTRICLE ANGIO: CPT | Performed by: INTERNAL MEDICINE

## 2021-09-10 PROCEDURE — 93306 TTE W/DOPPLER COMPLETE: CPT

## 2021-09-10 PROCEDURE — 65660000000 HC RM CCU STEPDOWN

## 2021-09-10 RX ORDER — HEPARIN SODIUM 1000 [USP'U]/ML
INJECTION, SOLUTION INTRAVENOUS; SUBCUTANEOUS AS NEEDED
Status: DISCONTINUED | OUTPATIENT
Start: 2021-09-10 | End: 2021-09-10 | Stop reason: HOSPADM

## 2021-09-10 RX ORDER — PAROXETINE HYDROCHLORIDE 20 MG/1
30 TABLET, FILM COATED ORAL DAILY
Status: DISCONTINUED | OUTPATIENT
Start: 2021-09-11 | End: 2021-09-13 | Stop reason: HOSPADM

## 2021-09-10 RX ORDER — LEVOTHYROXINE SODIUM 50 UG/1
50 TABLET ORAL
Status: DISCONTINUED | OUTPATIENT
Start: 2021-09-11 | End: 2021-09-13 | Stop reason: HOSPADM

## 2021-09-10 RX ORDER — PANTOPRAZOLE SODIUM 40 MG/1
40 TABLET, DELAYED RELEASE ORAL DAILY
Status: DISCONTINUED | OUTPATIENT
Start: 2021-09-11 | End: 2021-09-13 | Stop reason: HOSPADM

## 2021-09-10 RX ORDER — SODIUM CHLORIDE 9 MG/ML
75 INJECTION, SOLUTION INTRAVENOUS CONTINUOUS
Status: DISCONTINUED | OUTPATIENT
Start: 2021-09-10 | End: 2021-09-10

## 2021-09-10 RX ORDER — ONDANSETRON 2 MG/ML
4 INJECTION INTRAMUSCULAR; INTRAVENOUS
Status: DISCONTINUED | OUTPATIENT
Start: 2021-09-10 | End: 2021-09-13 | Stop reason: HOSPADM

## 2021-09-10 RX ORDER — ATORVASTATIN CALCIUM 10 MG/1
10 TABLET, FILM COATED ORAL DAILY
Status: DISCONTINUED | OUTPATIENT
Start: 2021-09-11 | End: 2021-09-13 | Stop reason: HOSPADM

## 2021-09-10 RX ORDER — DEXTROSE 50 % IN WATER (D50W) INTRAVENOUS SYRINGE
12.5-25 AS NEEDED
Status: DISCONTINUED | OUTPATIENT
Start: 2021-09-10 | End: 2021-09-13 | Stop reason: HOSPADM

## 2021-09-10 RX ORDER — ACETAMINOPHEN 325 MG/1
650 TABLET ORAL
Status: DISCONTINUED | OUTPATIENT
Start: 2021-09-10 | End: 2021-09-13 | Stop reason: HOSPADM

## 2021-09-10 RX ORDER — FLUTICASONE PROPIONATE 50 MCG
2 SPRAY, SUSPENSION (ML) NASAL DAILY
Status: DISCONTINUED | OUTPATIENT
Start: 2021-09-11 | End: 2021-09-13 | Stop reason: HOSPADM

## 2021-09-10 RX ORDER — PROMETHAZINE HYDROCHLORIDE 25 MG/1
12.5 TABLET ORAL
Status: DISCONTINUED | OUTPATIENT
Start: 2021-09-10 | End: 2021-09-13 | Stop reason: HOSPADM

## 2021-09-10 RX ORDER — MAGNESIUM SULFATE 100 %
4 CRYSTALS MISCELLANEOUS AS NEEDED
Status: DISCONTINUED | OUTPATIENT
Start: 2021-09-10 | End: 2021-09-13 | Stop reason: HOSPADM

## 2021-09-10 RX ORDER — HEPARIN SODIUM 200 [USP'U]/100ML
INJECTION, SOLUTION INTRAVENOUS
Status: COMPLETED | OUTPATIENT
Start: 2021-09-10 | End: 2021-09-10

## 2021-09-10 RX ORDER — FENTANYL CITRATE 50 UG/ML
INJECTION, SOLUTION INTRAMUSCULAR; INTRAVENOUS AS NEEDED
Status: DISCONTINUED | OUTPATIENT
Start: 2021-09-10 | End: 2021-09-10 | Stop reason: HOSPADM

## 2021-09-10 RX ORDER — INSULIN LISPRO 100 [IU]/ML
INJECTION, SOLUTION INTRAVENOUS; SUBCUTANEOUS
Status: DISCONTINUED | OUTPATIENT
Start: 2021-09-10 | End: 2021-09-13 | Stop reason: HOSPADM

## 2021-09-10 RX ORDER — SODIUM CHLORIDE 0.9 % (FLUSH) 0.9 %
5-40 SYRINGE (ML) INJECTION AS NEEDED
Status: DISCONTINUED | OUTPATIENT
Start: 2021-09-10 | End: 2021-09-13 | Stop reason: HOSPADM

## 2021-09-10 RX ORDER — MIDAZOLAM HYDROCHLORIDE 1 MG/ML
INJECTION, SOLUTION INTRAMUSCULAR; INTRAVENOUS AS NEEDED
Status: DISCONTINUED | OUTPATIENT
Start: 2021-09-10 | End: 2021-09-10 | Stop reason: HOSPADM

## 2021-09-10 RX ORDER — LIDOCAINE HYDROCHLORIDE 10 MG/ML
INJECTION INFILTRATION; PERINEURAL AS NEEDED
Status: DISCONTINUED | OUTPATIENT
Start: 2021-09-10 | End: 2021-09-10 | Stop reason: HOSPADM

## 2021-09-10 RX ORDER — POLYETHYLENE GLYCOL 3350 17 G/17G
17 POWDER, FOR SOLUTION ORAL DAILY PRN
Status: DISCONTINUED | OUTPATIENT
Start: 2021-09-10 | End: 2021-09-13 | Stop reason: HOSPADM

## 2021-09-10 RX ORDER — LISINOPRIL 20 MG/1
40 TABLET ORAL DAILY
Status: DISCONTINUED | OUTPATIENT
Start: 2021-09-11 | End: 2021-09-11

## 2021-09-10 RX ORDER — SODIUM CHLORIDE 0.9 % (FLUSH) 0.9 %
5-40 SYRINGE (ML) INJECTION EVERY 8 HOURS
Status: DISCONTINUED | OUTPATIENT
Start: 2021-09-10 | End: 2021-09-13 | Stop reason: HOSPADM

## 2021-09-10 RX ORDER — ACETAMINOPHEN 650 MG/1
650 SUPPOSITORY RECTAL
Status: DISCONTINUED | OUTPATIENT
Start: 2021-09-10 | End: 2021-09-13 | Stop reason: HOSPADM

## 2021-09-10 RX ADMIN — SODIUM CHLORIDE 75 ML/HR: 9 INJECTION, SOLUTION INTRAVENOUS at 16:04

## 2021-09-10 RX ADMIN — Medication 10 ML: at 23:12

## 2021-09-10 RX ADMIN — SODIUM CHLORIDE 75 ML/HR: 900 INJECTION, SOLUTION INTRAVENOUS at 13:00

## 2021-09-10 NOTE — CONSULTS
Cardiology Consult Note    CC: Elías Crandall MD  Reason for consult: WHITTEN; CP  Requesting MD:  Dr. Erica Evans, [facility]. Admit Date: 9/10/2021   Today's Date: 9/10/2021   Cardiologist:  Dr Kevon Mejias. Cardiac Assessment/Plan:   1) AS: Bioprosthetic AoVR (2004) then TAVR (#23 Missy) for AS/mod LV dysfxn 2/2015; no CAD then; EF normalized. *previous intermittent non-cardiac CP/dyspnea; chronic minor WHITTEN since then. *Slowly worse mean gradient: 19 4/2015; 27 2016; 37 12/2019. 53 12/2020 but 35 6/2021. 2) DM,    3) Carotid disease (LICA stent 8/0469); *worse U/S 02/1839; PTA of LICA restenoses 49/5147; Mild GIN, moderate LICA stenosis since then (inc 10/2020). 4) HTN,   5) Dyslipidemia, (lab for PCP). 6) LBBB 6/2021: ASx      7) Gastric outlet surgery [facility], 4/2021):  Distal gastrectomy w/ Kenyatta-en-Y gastric jejunostomy;     *ulceration @ anastomosis site; Xfusion needed (4/2021). 8) Back pain; limits mobility. 9) Dementia 2018; followed by Neuro. Daughter provides most of history    Transfer from [address] [place] [address] ER after presenting with dyspnea/CP x1; chronic LBBB; trop 0.22 x2. Hg 10.1; New CM. Rec: Cath today; NPO for now. I have recommended diagnostic cath for definitive evaluation of the patient's coronary anatomy and PCI if appropriate; All risks, benefits, and alternatives were discussed and patient wishes to proceed. Echo 9/10/21:  · LV: Estimated LVEF is 20 - 25%. Mildly dilated left ventricle. Mild concentric hypertrophy. Severely and segmentally reduced systolic function. · LA: Mildly dilated left atrium. · AV: Prosthetic aortic valve. Aortic valve mean gradient is 21 mmHg. Aortic valve area is 0.8 cm2. There is a bioprosthetic aortic valve. · MV: Mitral valve non-specific thickening. Moderate mitral valve regurgitation is present. Mod-to-severe visually; mild by ERO  · TV: Moderate tricuspid valve regurgitation is present. Right Ventricular Arterial Pressure (RVSP) is 62 mmHg.   · IVC: Severely elevated central venous pressure (15 mmHg); IVC diameter is larger than 21 mm and collapses less than 50% with respiration. ____________________________________________________________________  Randall Varela is a 78 y.o. female presents with elevated troponin and poss NSTEMI . The patient reports & daughter confirms: no exertional CP; 1 week of gradually worse WHITTEN; no LE edema/orthopnea; Compliant with meds; some salt in diet per pt. No PND, orthopnea, palpitations, pre-syncope, syncope, peripheral edema. No current complaints. ECG: sinus tachycardia; LBBB. Tele: sinus  CXR: No CHF on OSH CXR. Notable labs: Hg 10.1; Nl k/Cr; trop 0.2 x2.  _____________________________________________________________________  Notable prior cardiac history:  @ OV 6/24/21:  Ms Dari Lawson has a h/o:  1) AS: Bioprosthetic AoVR (2004) then TAVR (#23 Missy) for AS/mod LV dysfxn 2/2015; no CAD then; EF normalized. *previous intermittent non-cardiac CP/dyspnea; chronic minor WHITTEN since then. *Slowly worse mean gradient: 19 4/2015; 27 2016; 37 12/2019. 53 12/2020 but 35 6/2021. 2) DM,    3) Carotid disease (LICA stent 7/8577); *worse U/S 72/5791; PTA of LICA restenoses 56/9051; Mild GIN, moderate LICA stenosis since then (inc 10/2020). 4) HTN,   5) Dyslipidemia, (lab for PCP). 6) LBBB 6/2021: ASx      7) Gastric outlet surgery [facility], 4/2021):  Distal gastrectomy w/ Kenyatta-en-Y gastric jejunostomy;     *ulceration @ anastomosis site; Xfusion needed (4/2021). 8) Back pain; limits mobility. 9) Dementia 2018; followed by Neuro. Daughter provides most of history. Retired , caregiver since 2000. No nicotine or ethanol. No added salt. 11/2020:  Her memory is poor; she denies WHITTEN although her daughter states she does get short of breath doing daily activities; she is less active now with past; no real regular exercise. No chest pain nor palpitations. 6/2021:   Many meds changed after HD hospitalization 2 months ago with gastric outlet obstruction needing surgery & subsequent ulceration. Remains off Plavix & aspirin. Off Coreg. She reports & daughter confirms no chest pain or pressure. Pt reports no dyspnea except \"when running\"; daughter states her WHITTEN is significantly < previous; walked 30 minutes yesterday without symptoms. IMPRESSION AND PLAN  01. Nonrheumatic aortic (valve) stenosis (I35.0):  S/P bioprosthetic AoVR 2004 and TAVR 3/2015. Stable, despite slowly worsening gradient. We discussed the signs and symptoms of unstable angina, myocardial infarction and malignant arrhythmia. The patient knows to seek immediate medical attention should they occur. ECG done   02. Hypertension (I10): Adequately controlled. Continue lisinopril 40; add back Coreg if needed. I have made no changes to the present regimen. 03. Mixed hyperlipidemia (E78.2):  Lipid labs drawn by PCP. The patient is tolerating lipid lowering therapy well.   04. 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 s/p PTA of left ICA InStent restenoses. Arterial duplex ultrasound of carotid artery revealed mild InStent restenoses. No active intervention is required at this point. Will continue current medications and risk factor modifications. 05. Gastrointestinal hemorrhage associated with gastrojejunal ulcer (K28.4):  Managed by GI; & aspirin 81 q.day when given permission by GI who she sees in a few days. Okay to stay off Plavix. 06. Dilated cardiomyopathy (I42.0):  Resolved: EF normalized after TAVR. Cont Med Rx. Her ejection fraction is greater than 35%. No congestive heart failure manifestations. does not meed the criteria for a primary prevention ICD. 07. Palpitations (R00.2):  C/W PACs/PVCs:  Holter/EVR if recurrent/progressive palpitations; patient knows to call us. 08. Body mass index [BMI]30.0-30.9, adult (Z68.30): The patient was instructed on AHA diet and regular exercise. ORDERS:  1 ECG done    2 Carotid Duplex in 4 Months. 3 Return office visit with Nghia Dobbins MD in 1 Year. 4 The patient was instructed on AHA diet and regular exercise. 6/24/21 MEDICATION LIST  Medication Sig Desc   glimepiride 1 mg tablet take 1 tablet by oral route  every day   levothyroxine 50 mcg capsule take 1 capsule by oral route  every day   lisinopril 40 mg tablet take 1 tablet by oral route  every day   lovastatin 10 mg tablet TAKE 1 TABLET EVERY DAY WITH THE EVENING MEAL   memantine 10 mg tablet take 1 tablet by oral route 2 times every day   metformin 500 mg tablet take 1/2  tablet by oral route 2 times every day with morning and evening meals   multivitamin tablet take 1 tablet by oral route  every day with food   pantoprazole 40 mg tablet,delayed release take 1 tablet by oral route  every day   paroxetine 30 mg tablet take 1 tablet (30MG)  by oral route  every day   Vitamin B-12 1,000 mcg tablet daily       _________________________________________________________________________________  CARDIAC HISTORY  CAD:  1 Cath [Minimal CAD] - 8/2004   2 Pleuritic Chest Pain - 12/2004   3 Atypical Chest Pain [Normal MPI] - 4/28/2009   4 Recurrent Chest Pain [Cath, No Significant CAD] - 1/2010     ARRHYTHMIA:  1 Intermittent Palpitations [Neg holters]     PVD:  1 Left Carotid Stent [Every other year u/s (even years). ] - 3/2005   2 TIA     VALVULAR:  1 AVR (#21 Deanne-Larsen) - 11/2004   2 Severe AS (Echo (EF 0.45 (45%), Mild LVH, Mild MR, Mild AR, Prosthetic AV, Mild TR, AV Peak 95 mmHg, AV Mean 64 mmHg, Est RVSP 51 mmHg, Normal RV.  Bioprosthetic aortic valve appears to have severe AS.) - 1/29/2015) - 1/2015   3 TAVR (CV Surgery (transfemerol transcatheter aortic valve Missy XT valve) - 3/5/2015) - 3/2015     RISK FACTORS:  1 Dyslipidemia   2 Hypertension   3 Peripheral Vascular Disease   4 Diabetes   5 Family History of CAD [Less than 61years of age]   6 Tobacco Use       CARDIOVASCULAR PROCEDURES  Procedure Date Results   Abd Ao Duplex 12/17/2014 Abd Ao screening normal   SUSY 11/19/2014 No evidence of arterial insufficiency at rest. Bilateral SUSY's >1.0   Carotid CTA 10/25/2018 Flow limiting blockage in the distal left common carotid artery within the stent of 80-90%   Carotid Duplex 10/21/2020 Right:  1-49% proximal ICA stenosis with mild hemodynamic significance. The vertebral artery waveforms are antegrade. Left:  Evidence of internal carotid artery in stent restenosis of 50-69% at the proximal end of the stent. The vertebral artery waveforms are antegrade. Carotid Duplex 07/08/2020 Right:  1-49% proximal ICA stenosis with mild hemodynamic significance. The vertebral artery waveforms are antegrade. Left:  Evidence of internal carotid artery in stent restenosis of 50-69% at the proximal end of the stent. The vertebral artery waveforms are antegrade. Carotid Duplex 01/03/2020 Right:  1-49% proximal ICA stenosis with mild hemodynamic significance. The vertebral artery waveforms are antegrade. Left:  >50% common carotid artery stenosis. 1-49% proximal ICA stenosis with mild hemodynamic significance. The vertebral artery waveforms are antegrade. Carotid Duplex 06/18/2019 50 - 69% Left ICA, 1 - 49% Right ICA, Vertebral: Bilateral Antegrade Flow   Carotid Duplex 10/17/2018 1 - 49% Right ICA, 70 - 99% Left ICA, Vertebral: Bilateral Antegrade Flow, Patient aware of results; CTA pending. Carotid Duplex 11/09/2016 1 - 49% Bilateral ICAs, Vertebral: Bilateral Antegrade Flow   Carotid Duplex 10/01/2014 1 - 49% Right ICA, Vertebral: Bilateral Antegrade Flow, Patent left ICA stent. No change vs 11/2013, 10/2011. Cath 11/19/2018 Symptomatic left internal carotid artery in-stent restenosis. Successful angioplasty with 5.5 x 20mm balloon. Cath 02/11/2015 EF 0.35 (35%), Minimal CAD, mild-mod MR; AoV mean 57, 0.53 cm2. Cath 01/13/2010 Normal EF, 30% RCA, No significant CAD; Trivial AI.  No change vs 8/2004. Chest CT  No PE   CV Surgery 03/05/2015 transfemerol TAVR: Missy XT valve   CV Surgery  AVR (#21 Deanne-Larsen)   Echo 06/07/2021 EF 0.50 (50%), EF range 50%-55%, Low-normal LV function. Normal RV. Mild LVH. Aortic valve is difficult to visualize (valve in valve). Mean gradient 35, c/w moderate AS. Mild MR. PAp 62. LA 4.   Echo 12/08/2020 EF range 55%-60%, Normal LV function. The LV EF was estimated to be 55%. Normal RV. Mild-to-moderate LVH. LA 4. PAp 44. Mild MR. Bioprosthetic aortic valve is difficult to visualize (\"valve-in-valve\"); no abnormal rocking or perivalvular leak; 4.5 m/s with mean gradient 53, c/w severe AS. Echo 12/17/2019 EF 0.60 (60%), Normal RV. Mild LVH. LA 4.3. PAp 46. TAVR intact with mean gradient 37, peak velocity 3.9. Echo 03/09/2016 EF 0.60 (60%), Mild MR, Mild LVH, Impaired Relaxation Diastolic Dysfunction, LA Diam 4.58 cm, AV Peak 43 mmHg, AV Mean 27 mmHg, Est RVSP 41 mmHg, Normal RV. Prosthetic valve in aortic position has appropriate function. Echo 08/19/2015 EF 0.55 (55%), Impaired Relaxation Diastolic Dysfunction, Mild TR, Mild LVH, AV Peak 38 mmHg, AV Mean 22 mmHg, AV Area 0.9 cm2, Est RVSP 38 mmHg, TAVR with appropriate function; trivial AI. Normal RV. Echo 04/02/2015 EF 0.50 (50%), Mild TR, Prosthetic AV, Mild MR, AV Peak 30 mmHg, AV Mean 19 mmHg, Est RVSP 33 mmHg, @ [facility]. Echo 03/06/2015 EF 0.30 (30%), @ [facility]. Echo 01/29/2015 EF 0.45 (45%), Mild LVH, Mild MR, Mild AR, Prosthetic AV, Mild TR, AV Peak 95 mmHg, AV Mean 64 mmHg, Est RVSP 51 mmHg, Normal RV. Bioprosthetic aortic valve appears to have severe AS. Echo 03/19/2012 EF 0.60, Mild LVH, Prosthetic AV, Mild-Moderate TR, AV Peak 26 mmHg, AV Mean 15 mmHg, AV Area 1.0 cm2, Est RVSP 48 mmHg   Echo  Normal EF, Bioprosthetic AV (Mn 18), mild TR   EKG 06/24/2021 Sinus Rhythm, LBBB   Holter  Unremarkable; No change vs 2004. MPI 12/01/2014 EF 0.48 (48%), No Ischemia, 4:26.  No change vs 3/2012, 2009. Periph Intervention  Stent: Left Carotid   GURJIT  Normal EF, Negative for Endocarditis   Venous Duplex 2014 No DVT       ACTIVE ALLERGIES:  Ingredient Reaction Comment   PHENYLPROPANOLAMINE HCL  Naldecon   PHENYLTOLOXAMINE  Naldecon   CHLORPHENIRAMINE  Naldecon   PHENYLEPHRINE HCL  Naldecon   SULFA (SULFONAMIDE ANTIBIOTICS)  Sulfonamides       PROBLEM LIST:  Problem Description Chronic   PVD Y   Benign essential HTN Y   DM type 2 Y   Mixed hyperlipidemia Y   Carotid artery disease Y   Chest pain Y       PAST MEDICAL/SURGICAL HISTORY  (Detailed)    Disease/disorder Onset Date Surgical History Date Comments     Valve Replacement       Cath       Left Carotid Stent       Angioplasty       Carpal Tunnel Release       Cataract surgery       Bilateral Cataract Surgery       Cholecystectomy     AAA       Allergy       Anxiety       Arthritis       Blood Clots       Carotid Artery Stenosis       Coranary Artery Stenosis       Diabetes       DJD       Dyslipidemia       GERD       Heart Murmur       Hypercholesterolemia       Hypertension       Intermittent Palpitations       Intermittent Vertigo       Left Carotid Disease       Neurotic Depression       Peptic Ulcer Disease       Pulmonary Valve Disease       Recurrent Back Pain       TIA       Varicose Veins         OBSTETRIC HISTORY:  Not currently pregnant.    Family History  (Detailed)  Relationship Family Member Name  Age at Death Condition Onset Age Cause of Death   Brother    Irregular Heart Rhythm     Brother    Heart Attack     Brother  N  Myocardial Infarction  N   Brother    High Blood Pressure     Father    High Blood Pressure     Father    Irregular Heart Rhythm     Father    Heart Attack     Father  Y  Myocardial Infarction  Y   Mother    Irregular Heart Rhythm     Mother    Diabetes     Mother    High Blood Pressure     Mother    Stroke     Sister    Irregular Heart Rhythm     Sister    High Blood Pressure       SOCIAL HISTORY (Detailed)  Tobacco use reviewed. Preferred language is [place]. EDUCATION/EMPLOYMENT/OCCUPATION  Employment History Status Retired Restrictions                                 MARITAL STATUS/FAMILY/SOCIAL SUPPORT  Currently . Has children:  0 son(s). 0 daughter(s). Smoking status: Former smoker. SMOKING STATUS  Use Status Type Smoking Status Usage Per Day Years Used Total Pack Years   yes  Former smoker            ALCOHOL  There is no history of alcohol use. consumed rarely. CAFFEINE  The patient uses caffeine: coffee. LIFESTYLE  Exercises occasionally. ______________________________________________________________________  Patient Active Problem List    Diagnosis Date Noted    Personal history of gastric bypass 04/28/2021    Acquired hypothyroidism 04/28/2021    Prosthetic aortic valve stenosis 02/24/2020    Poor short term memory 08/22/2019    Hyperlipidemia 07/03/2013    Depression with anxiety 07/03/2013    Vitamin D deficiency     S/P aortic valve replacement     Diabetes type 2, controlled ([address] 11/07/2009    Essential hypertension, benign 11/07/2009    Palpitations 11/07/2009        Past Medical History:   Diagnosis Date    Anemia     Atypical chest pain     Long h/o intermittent non-cardiac CP.     Depression with anxiety     Diabetes ([address]     GERD (gastroesophageal reflux disease)     Hypercholesteremia     Hyperlipidemia 7/3/2013    Hypertension     Inner ear dysfunction     S/P aortic valve replacement     Dr. Lemuel Mims yearly    Type 2 diabetes with nephropathy ([address] 7/5/2019    Vitamin D deficiency       Past Surgical History:   Procedure Laterality Date    COLONOSCOPY N/A 4/22/2019    COLONOSCOPY performed by Ryley Chopra MD at [address] HX AORTIC VALVE REPLACEMENT  1999    Bovine valve    HX CATARACT REMOVAL      HX CHOLECYSTECTOMY  1999    HX MOHS PROCEDURES Left 2014     Allergies   Allergen Reactions    Naldecon Unknown (comments)    Sulfa (Sulfonamide Antibiotics) Rash    Chlorpheniramine Other (comments)    Phenylephrine Hcl Other (comments)    Phenyltoloxamine Other (comments)      Family History   Problem Relation Age of Onset    Heart Disease Mother     Heart Failure Father     Heart Failure Sister     Stroke Sister     Diabetes Brother     Heart Disease Brother       Social History     Socioeconomic History    Marital status:      Spouse name: Not on file    Number of children: Not on file    Years of education: Not on file    Highest education level: Not on file   Occupational History    Not on file   Tobacco Use    Smoking status: Former Smoker     Quit date: 1999     Years since quittin.0    Smokeless tobacco: Never Used   Vaping Use    Vaping Use: Never used   Substance and Sexual Activity    Alcohol use: No    Drug use: No    Sexual activity: Yes     Partners: Male   Other Topics Concern    Not on file   Social History Narrative    Not on file     Social Determinants of Health     Financial Resource Strain:     Difficulty of Paying Living Expenses:    Food Insecurity:     Worried About Running Out of Food in the Last Year:     Ran Out of Food in the Last Year:    Transportation Needs:     Lack of Transportation (Medical):      Lack of Transportation (Non-Medical):    Physical Activity:     Days of Exercise per Week:     Minutes of Exercise per Session:    Stress:     Feeling of Stress :    Social Connections:     Frequency of Communication with Friends and Family:     Frequency of Social Gatherings with Friends and Family:     Attends Zoroastrianism Services:     Active Member of Clubs or Organizations:     Attends Club or Organization Meetings:     Marital Status:    Intimate Partner Violence:     Fear of Current or Ex-Partner:     Emotionally Abused:     Physically Abused:     Sexually Abused:      Current Facility-Administered Medications   Medication Dose Route Frequency    0.9% sodium chloride infusion  75 mL/hr IntraVENous CONTINUOUS          Prior to Admission Medications:  Prior to Admission medications    Medication Sig Start Date End Date Taking? Authorizing Provider   lisinopriL (PRINIVIL, ZESTRIL) 40 mg tablet Take 1 Tablet by mouth. 2/3/21   Provider, Historical   levothyroxine (SYNTHROID) 50 mcg tablet TAKE 1 TABLET BY MOUTH  DAILY BEFORE BREAKFAST 7/15/21   Tiago Jefferson MD   metFORMIN (GLUCOPHAGE) 500 mg tablet Take 500 mg by mouth two (2) times daily (with meals). Provider, Historical   sucralfate (CARAFATE) 1 gram tablet four (4) times daily. 4/17/21   Provider, Historical   glimepiride (AMARYL) 1 mg tablet TAKE 1 TABLET EVERY DAY BEFORE BREAKFAST 2/8/21   Risser, Amelia Harada, MD   PARoxetine (PAXIL) 30 mg tablet TAKE 1 TABLET EVERY DAY  Patient taking differently: two (2) times a day. TAKE 1 TABLET EVERY DAY 2/8/21   Risser, Amelia Harada, MD   pantoprazole (Protonix) 40 mg tablet Take 1 Tab by mouth daily. 2/8/21   Risser, Amelia Harada, MD   Accu-Chek Guide test strips strip CHECK SUGAR DAILY 12/31/20   Risser, Amelia Harada, MD   VIT B CMPLX #9-FA-VIT C-VIT E PO Take  by mouth. Provider, Historical   fluticasone propionate (Flonase Allergy Relief) 50 mcg/actuation nasal spray 2 Sprays by Both Nostrils route as needed. Provider, Historical   vit C/E/Zn/coppr/lutein/zeaxan (PRESERVISION AREDS-2 PO) Take  by mouth. Provider, Historical   diclofenac (VOLTAREN) 1 % gel Apply 2 g to affected area four (4) times daily. 6/10/20   Risser, Amelia Harada, MD   memantine (NAMENDA) 10 mg tablet Take  by mouth two (2) times a day. Provider, Historical   cholecalciferol (VITAMIN D3) (2,000 UNITS /50 MCG) cap capsule Take 3,000 Units by mouth two (2) times a day.     Provider, Historical   ACCU-CHEK GUIDE GLUCOSE METER misc CHECK BLOOD SUGAR EVERY DAY 5/9/19   Tiago Jefferson MD   lancets (ACCU-CHEK SOFTCLIX LANCETS) misc Check sugar daily 1/16/19   MD LEYDA Porter acidoph & paracasei- S therm- Bifido (ALVIN-Q/RISAQUAD) 8 billion cell cap cap Take 1 Cap by mouth daily. 1/9/19   Joaquín Schmidt NP   acetaminophen (TYLENOL) 325 mg tablet Take 325 mg by mouth every four (4) hours as needed for Pain. Provider, Historical   lovastatin (MEVACOR) 10 mg tablet TAKE ONE TABLET BY MOUTH NIGHTLY 3/15/18   Maisha Carlisle MD   PV W-O EDU/FERROUS FUMARATE/FA (M-VIT PO) Take 1 tablet by mouth daily. Provider, Historical        Review of Symptoms: Except as noted in HPI, patient denies recent fever or chills, nausea, vomiting, diarrhea, hemoptysis, hematemesis, dysuria, myalgias, focal neurologic symptoms, ecchymosis, angioedema, odynophagia, dysphagia, sore throat, earache,rash, melena, hematochezia, depression, GERD, cold intolerance, petechia, bleeding gums, or significant weight loss. 24 hr VS reviewed, overall VSSAF  No data recorded. No data found. No data found. No data found. No intake or output data in the 24 hours ending 09/10/21 1319      Physical Exam (complete single organ system exam)    Cons: The patient is no distress. Appears stated age. HEENT: Normal conjunctivae and palate. No xanthelasma. Neck: Flat JVP without appreciable HJR. Resp: Normal respiratory effort with clear lungs bilaterally. CV: Regular rate and rhythm. PMI not palpated. Normal S1,S2  No gallop or rubs appreciated. GERALDO murmur appreciated. Intact carotid upstroke bilaterally without appreciated bruits. Abdominal aorta not palpated; no abdominal bruit noted. Normal femoral pulses without bruits. Intact pedal pulses. No peripheral edema. GI: No abd mass noted, soft; no organomegaly noted. Bowel sounds present. Muscular:  No significant kyphosis. Strength WNL for age. Ext: No cyanosis, clubbing, or stigmata of peripheral embolization. Derm: No ulcers or stasis dermatitis of lower extremities.    Neuro: Alert and oriented x 3; Grossly non-focal. Normal mood and affect.      Ruby Rowland MD

## 2021-09-10 NOTE — PROGRESS NOTES
SHEATH PULL NOTE:    Patient informed of procedure with questions answered with review. Sheath site prepped with Chloraprep swab. 5 fr sheath in rfa pulled by TARIQ Gan RN. Hand hold and quick clot, with manual compression to site. No bleeding, no hematoma, no pain at site. Hemostasis obtained with hand hold/manual compression at site. Patient tolerated well. No change in status. Handhold for 20 minutes. No change at site. Occlusive dressing applied to site. No bleeding, no hematoma, no pain/discomfort at site. Groin instructions provided with review. Continue to monitor procedure site and patient status. *Advised patient to keep head flat and extremity flat to decrease risk of bleeding. *Recommended that patient not drink for ONE HOUR post sheath pull completion. *Recommended that patient not eat for TWO HOURS post sheath pull completion. *Instructed patient on rationale for delay of PO products to decrease risk for aspiration and if additional treatment to procedure site is required. Patient verbalized understanding of instructions with review.

## 2021-09-10 NOTE — PROGRESS NOTES
TRANSFER - IN REPORT:    Verbal report received from Marsha Ortega RN (name) on Otelia Lie  being received from [facility]. Report consisted of patients Situation, Background, Assessment and   Recommendations(SBAR). Opportunity for questions and clarification was provided.

## 2021-09-10 NOTE — Clinical Note
TRANSFER - OUT REPORT:     Verbal report given to: Fisanne-marie. Report consisted of patient's Situation, Background, Assessment and   Recommendations(SBAR). Opportunity for questions and clarification was provided. Patient transported with a Registered Nurse. Patient transported to: IVCU.

## 2021-09-10 NOTE — PROGRESS NOTES
9/10/2021 4:04 PM  Patient without complaints. Last VS:   Visit Vitals  BP (!) 130/92 (BP 1 Location: Right upper arm, BP Patient Position: At rest)   Pulse (!) 108   Temp 97.9 °F (36.6 °C)   Resp 18   LMP  (LMP Unknown)   SpO2 97%     Cath site without hematoma, bleeding or new bruit. Distal pulses at baseline. Continue current plan of care. Results of cath discussed with patient and available family members.

## 2021-09-10 NOTE — Clinical Note
Sheath #1: Sheath: left in place. Site secured by Tegaderm and suture. Sheath connected to heparin pressure bag. Hemostasis achieved.

## 2021-09-10 NOTE — PROCEDURES
Procedure: Cardiac Catheterization. Date: 9/10/2021   Moderate sedation start time: 1435  Moderate sedation stop time: 1529  Sedation used: versed/fentanyl. Sedation was administered by a cath lab nurse; I directly supervised the cath lab staff as the patient was monitored for the duration of the procedure. INDICATION/PreDx: CM; Re-do AoVR. PROCEDURES PERFORMED   1. Left heart catheterization. 2. Left ventriculography in DORANTES projection. 3. Selective coronary angiography. DESCRIPTION OF PROCEDURE: After informed consent of all potential complications, the patient was prepped and draped in the usual sterile manner. Lidocaine 1% was utilized for local anesthesia. Conscious sedation per flow sheet. The right femoral artery was entered and a 5-Mongolian sheath placed without complication using modified Seldinger technique. The sheath was flushed at all catheter exchanges and all catheters were advanced over a guidewire under direct fluoroscopic visualization. Left coronary angiography was performed in multiple views using a 5-Mongolian JL4 catheter. Right coronary angiography was performed in multiple views using a JR4 catheter. Left heart catheterization and left ventriculography was performed in DORANTES projection using a 5-Mongolian straight pigtail. No apparent complications. Estimated blood loss minimal. SPECIMENS: No specimens. No implants. FINDINGS   LEFT MAIN:   Large vessel; Angiographically normal.    LAD: Large vessel initially then medium to  small distally; Angiographically normal.  Medium D1. Small D2. Ramus: small vessel; no focal CAD. CIRCUMFLEX: Medium vessel; Small OM; 60-70% plaque in AV groove; Medium terminal OM. RCA: Large, dominant vessel; Scattered 20-30% plaque. Small PDA and moderate extensive postero-lateral vessels. CONCLUSION/post procedure Dx:   1. Mod mid circ disease. PLAN: FFR of circ on-going.     ADDEN: Neg FFR of circ; Med Rx; D/W Dr Shelbie Dooley; Optimize med Rx; future dobutamine echo to eval severity of AS IF pt felt to be candidate for repeat valve procedure. Consider BiV PM if cont CM.

## 2021-09-10 NOTE — H&P
Hospitalist Admission Note    NAME: Mario Boykin   :  1940   MRN:  [id number]     Date/Time:  9/10/2021 1:47 PM    Patient PCP: Bong Major MD  ______________________________________________________________________  Given the patient's current clinical presentation, I have a high level of concern for decompensation if discharged from the emergency department. Complex decision making was performed, which includes reviewing the patient's available past medical records, laboratory results, and x-ray films. My assessment of this patient's clinical condition and my plan of care is as follows. Assessment / Plan:    NSTEMI, POA  Exertional dyspnea, POA  Hypertension, POA  Dyslipidemia, POA  Chronic left bundle branch block, POA  Aortic valve replacement, POA  Diabetes mellitus type 2, POA  History of gastric outlet surgery  History of iron deficiency anemia  Hypothyroidism  History of peripheral artery disease status post carotid stent on the left side    Transferred from [facility] due to shortness of breath on exertion for 2 weeks with episode of chest pain this morning  Her troponin was 0.22  Hemodynamically stable  Cardiology was consulted, plans for echo and possible cath  [facility] was already on aspirin due to previous anemia and gastric outlet surgery with ulcer reported  We will defer antiplatelets and anticoagulation to cardiology  Continue home statin, lisinopril  She will need metoprolol to be added  Previous history of dementia, most of history was provided from patient's daughter at bedside  She was on glimepiride and Metformin, hold for now. Correction scale with hypoglycemia protocol  Follow lab work  It was reported that she was on room air.   She received 1 dose of COVID-19 pneumonia  Continue supportive care  Continue telemetry monitoring  Keep n.p.o. for possible cath otherwise start low-carb diet      Code Status: Full code  Surrogate Decision Maker: Have 3 daughters    DVT Prophylaxis: SCDs for now  GI Prophylaxis: not indicated    Baseline: Active, has been recovering from gastric outlet surgery in June      Subjective:   CHIEF COMPLAINT: Shortness of breath    HISTORY OF PRESENT ILLNESS:       The patient is 78years old woman with past medical history significant for aortic valve replacement, diabetes mellitus, hypertension, dyslipidemia presented to [facility] due to history of shortness of breath for 2 weeks and episode of chest pain this morning. Most of history was obtained from EMR and the patient's daughter because the patient has a history of dementia. Patient's daughter reported that she has been getting short of breath for the last 2 weeks even if she sitting watching nothing and she was complaining of epigastric chest pain today. Patient's daughter also reported that patient received 1 dose of COVID-19 vaccine last month and she could not get her current dose because she was told she she should be off antibiotics. At [facility], she was found to have elevated troponin for which she was transferred here for possible cath presumed representation was representing NSTEMI. At [facility], upon arrival she was hemodynamically stable and chest pain-free. We were asked to admit for work up and evaluation of the above problems. Past Medical History:   Diagnosis Date    Anemia     Atypical chest pain     Long h/o intermittent non-cardiac CP.     Depression with anxiety     Diabetes ([address]     GERD (gastroesophageal reflux disease)     Hypercholesteremia     Hyperlipidemia 7/3/2013    Hypertension     Inner ear dysfunction     S/P aortic valve replacement     Dr. Christiano Bazzi yearly    Type 2 diabetes with nephropathy ([facility] [address] 7/5/2019    Vitamin D deficiency         Past Surgical History:   Procedure Laterality Date    COLONOSCOPY N/A 4/22/2019    COLONOSCOPY performed by Georgiana Carrion MD at [facility] ENDOSCOPY    HX AORTIC VALVE REPLACEMENT      Bovine valve    HX CATARACT REMOVAL      HX CHOLECYSTECTOMY      HX MOHS PROCEDURES Left        Social History     Tobacco Use    Smoking status: Former Smoker     Quit date: 1999     Years since quittin.0    Smokeless tobacco: Never Used   Substance Use Topics    Alcohol use: No        Family History   Problem Relation Age of Onset    Heart Disease Mother     Heart Failure Father     Heart Failure Sister     Stroke Sister     Diabetes Brother     Heart Disease Brother      Allergies   Allergen Reactions    Naldecon Unknown (comments)    Sulfa (Sulfonamide Antibiotics) Rash    Chlorpheniramine Other (comments)    Phenylephrine Hcl Other (comments)    Phenyltoloxamine Other (comments)        Prior to Admission medications    Medication Sig Start Date End Date Taking? Authorizing Provider   lisinopriL (PRINIVIL, ZESTRIL) 40 mg tablet Take 1 Tablet by mouth. 2/3/21   Provider, Historical   levothyroxine (SYNTHROID) 50 mcg tablet TAKE 1 TABLET BY MOUTH  DAILY BEFORE BREAKFAST 7/15/21   Erica Fish MD   metFORMIN (GLUCOPHAGE) 500 mg tablet Take 500 mg by mouth two (2) times daily (with meals). Provider, Historical   sucralfate (CARAFATE) 1 gram tablet four (4) times daily. 21   Provider, Historical   glimepiride (AMARYL) 1 mg tablet TAKE 1 TABLET EVERY DAY BEFORE BREAKFAST 21   No Koo MD   PARoxetine (PAXIL) 30 mg tablet TAKE 1 TABLET EVERY DAY  Patient taking differently: two (2) times a day. TAKE 1 TABLET EVERY DAY 21   No Koo MD   pantoprazole (Protonix) 40 mg tablet Take 1 Tab by mouth daily. 21   No Koo MD   Accu-Chek Guide test strips strip CHECK SUGAR DAILY 20   No Koo MD   VIT B CMPLX #9-FA-VIT C-VIT E PO Take  by mouth.     Provider, Historical   fluticasone propionate (Flonase Allergy Relief) 50 mcg/actuation nasal spray 2 Sprays by Both Nostrils route as needed. Provider, Historical   vit C/E/Zn/coppr/lutein/zeaxan (PRESERVISION AREDS-2 PO) Take  by mouth. Provider, Historical   diclofenac (VOLTAREN) 1 % gel Apply 2 g to affected area four (4) times daily. 6/10/20   Haley Koo MD   memantine (NAMENDA) 10 mg tablet Take  by mouth two (2) times a day. Provider, Historical   cholecalciferol (VITAMIN D3) (2,000 UNITS /50 MCG) cap capsule Take 3,000 Units by mouth two (2) times a day. Provider, Historical   ACCU-CHEK GUIDE GLUCOSE METER misc CHECK BLOOD SUGAR EVERY DAY 5/9/19   Yesi Beck MD   lancets (ACCU-CHEK SOFTCLIX LANCETS) misc Check sugar daily 1/16/19   Yesi Beck MD   L. acidoph & paracasei- S therm- Bifido (ALVIN-Q/RISAQUAD) 8 billion cell cap cap Take 1 Cap by mouth daily. 1/9/19   Dori Martins NP   acetaminophen (TYLENOL) 325 mg tablet Take 325 mg by mouth every four (4) hours as needed for Pain. Provider, Historical   lovastatin (MEVACOR) 10 mg tablet TAKE ONE TABLET BY MOUTH NIGHTLY 3/15/18   Rd Escalante MD   PV W-O EDU/FERROUS FUMARATE/FA (M-VIT PO) Take 1 tablet by mouth daily. Provider, Historical       REVIEW OF SYSTEMS:     I am not able to complete the review of systems because:    The patient is intubated and sedated    The patient has altered mental status due to his acute medical problems    The patient has baseline aphasia from prior stroke(s)   x The patient has baseline dementia and is not reliable historian    The patient is in acute medical distress and unable to provide information           Total of 12 systems reviewed as follows:       POSITIVE= underlined text  Negative = text not underlined  General:  fever, chills, sweats, generalized weakness, weight loss/gain,      loss of appetite   Eyes:    blurred vision, eye pain, loss of vision, double vision  ENT:    rhinorrhea, pharyngitis   Respiratory:   cough, sputum production, SOB, WHITTEN, wheezing, pleuritic pain   Cardiology:   chest pain, palpitations, orthopnea, PND, edema, syncope   Gastrointestinal:  abdominal pain , N/V, diarrhea, dysphagia, constipation, bleeding   Genitourinary:  frequency, urgency, dysuria, hematuria, incontinence   Muskuloskeletal :  arthralgia, myalgia, back pain  Hematology:  easy bruising, nose or gum bleeding, lymphadenopathy   Dermatological: rash, ulceration, pruritis, color change / jaundice  Endocrine:   hot flashes or polydipsia   Neurological:  headache, dizziness, confusion, focal weakness, paresthesia,     Speech difficulties, memory loss, gait difficulty  Psychological: Feelings of anxiety, depression, agitation    Objective:   VITALS:    There were no vitals taken for this visit. PHYSICAL EXAM:    General:    Alert, cooperative, no distress, appears stated age. HEENT: Atraumatic, anicteric sclerae, pink conjunctivae     No oral ulcers, mucosa moist, throat clear, dentition fair  Neck:  Supple, symmetrical,  thyroid: non tender  Lungs:   Clear to auscultation bilaterally. No Wheezing or Rhonchi. No rales. Chest wall:  No tenderness  No Accessory muscle use. Heart:   Regular  rhythm,  No  murmur   No edema  Abdomen:   Soft, non-tender. Not distended. Bowel sounds normal  Extremities: No cyanosis. No clubbing,      Skin turgor normal, Capillary refill normal, Radial dial pulse 2+  Skin:     Not pale. Not Jaundiced  No rashes   Psych:  Good insight. Not depressed. Not anxious or agitated. Neurologic: EOMs intact. No facial asymmetry. No aphasia or slurred speech. Symmetrical strength, Sensation grossly intact.  Alert and oriented X 4.     _______________________________________________________________________  Care Plan discussed with:    Comments   Patient x    Family  x    RN x    Care Manager                    Consultant:      _______________________________________________________________________  Expected  Disposition:   Home with Family x   HH/PT/OT/RN SNF/LTC    CODY    ________________________________________________________________________  TOTAL TIME: 65 Minutes    Critical Care Provided     Minutes non procedure based      Comments    x Reviewed previous records   >50% of visit spent in counseling and coordination of care x Discussion with patient and/or family and questions answered       ________________________________________________________________________  Signed: Luzma Yost MD    Procedures: see electronic medical records for all procedures/Xrays and details which were not copied into this note but were reviewed prior to creation of Plan.     LAB DATA REVIEWED:    Recent Results (from the past 24 hour(s))   ECHO ADULT COMPLETE    Collection Time: 09/10/21  1:06 PM   Result Value Ref Range    IVSd 1.02 (A) 0.60 - 0.90 cm    LVIDd 4.78 3.90 - 5.30 cm    LVIDs 3.88 cm    LVOT d 1.82 cm    LVPWd 1.24 (A) 0.60 - 0.90 cm    LVOT Peak Gradient 2.96 mmHg    Left Ventricular Outflow Tract Mean Gradient 1.73 mmHg    LVOT SV 35.7 mL    LVOT Peak Velocity 85.97 cm/s    LVOT VTI 13.65 cm    RVSP 59.39 mmHg    Left Atrium Major Axis 4.00 cm    Est. RA Pressure 15.00 mmHg    Aortic Valve Area by Continuity of Peak Velocity 0.80 cm2    Aortic Valve Area by Continuity of VTI 0.78 cm2    AoV PG 31.39 mmHg    Aortic Valve Systolic Mean Gradient 22.53 mmHg    Aortic Valve Systolic Peak Velocity 740.23 cm/s    AoV VTI 45.50 cm    PISA MR Rad 0.66 cm    MVA VTI 1.84 cm2    Mitral Effective Regurgitant Orifice Area 0.14 cm2    MV regurgitant volume 24.43 mL    MV Peak Gradient 10.35 mmHg    MV Mean Gradient 5.66 mmHg    Mitral Valve Max Velocity 160.90 cm/s    Mitral Valve Annulus Velocity Time Integral 19.38 cm    Mitral Regurgitant PISA Peak Velocity 580.01 cm/s    Mitral Regurgitant Velocity Time Integral 173.89 cm    Pulmonic Valve Systolic Peak Instantaneous Gradient 2.70 mmHg    Pulmonic Valve Max Velocity 82.12 cm/s    Triscuspid Valve Regurgitation Peak Gradient 44.39 mmHg    TR Max Velocity 332.85 cm/s    Ao Root D 2.07 cm    IVC proximal 2.21 cm

## 2021-09-11 PROBLEM — I21.4 NSTEMI (NON-ST ELEVATED MYOCARDIAL INFARCTION) (HCC): Status: ACTIVE | Noted: 2021-09-11

## 2021-09-11 LAB
ALBUMIN SERPL-MCNC: 3.4 G/DL (ref 3.5–5)
ALBUMIN/GLOB SERPL: 0.9 {RATIO} (ref 1.1–2.2)
ALP SERPL-CCNC: 77 U/L (ref 45–117)
ALT SERPL-CCNC: 56 U/L (ref 12–78)
ANION GAP SERPL CALC-SCNC: 8 MMOL/L (ref 5–15)
AST SERPL-CCNC: 59 U/L (ref 15–37)
BASOPHILS # BLD: 0.1 K/UL (ref 0–0.1)
BASOPHILS NFR BLD: 1 % (ref 0–1)
BILIRUB SERPL-MCNC: 1 MG/DL (ref 0.2–1)
BUN SERPL-MCNC: 16 MG/DL (ref 6–20)
BUN/CREAT SERPL: 27 (ref 12–20)
CALCIUM SERPL-MCNC: 9.2 MG/DL (ref 8.5–10.1)
CHLORIDE SERPL-SCNC: 110 MMOL/L (ref 97–108)
CO2 SERPL-SCNC: 24 MMOL/L (ref 21–32)
CREAT SERPL-MCNC: 0.6 MG/DL (ref 0.55–1.02)
DIFFERENTIAL METHOD BLD: ABNORMAL
EOSINOPHIL # BLD: 0.1 K/UL (ref 0–0.4)
EOSINOPHIL NFR BLD: 1 % (ref 0–7)
ERYTHROCYTE [DISTWIDTH] IN BLOOD BY AUTOMATED COUNT: 15.4 % (ref 11.5–14.5)
GLOBULIN SER CALC-MCNC: 3.8 G/DL (ref 2–4)
GLUCOSE BLD STRIP.AUTO-MCNC: 130 MG/DL (ref 65–117)
GLUCOSE BLD STRIP.AUTO-MCNC: 142 MG/DL (ref 65–117)
GLUCOSE BLD STRIP.AUTO-MCNC: 180 MG/DL (ref 65–117)
GLUCOSE BLD STRIP.AUTO-MCNC: 193 MG/DL (ref 65–117)
GLUCOSE SERPL-MCNC: 128 MG/DL (ref 65–100)
HCT VFR BLD AUTO: 35 % (ref 35–47)
HGB BLD-MCNC: 10 G/DL (ref 11.5–16)
IMM GRANULOCYTES # BLD AUTO: 0 K/UL (ref 0–0.04)
IMM GRANULOCYTES NFR BLD AUTO: 0 % (ref 0–0.5)
LYMPHOCYTES # BLD: 2.4 K/UL (ref 0.8–3.5)
LYMPHOCYTES NFR BLD: 24 % (ref 12–49)
MCH RBC QN AUTO: 25.8 PG (ref 26–34)
MCHC RBC AUTO-ENTMCNC: 28.6 G/DL (ref 30–36.5)
MCV RBC AUTO: 90.4 FL (ref 80–99)
MONOCYTES # BLD: 1 K/UL (ref 0–1)
MONOCYTES NFR BLD: 10 % (ref 5–13)
NEUTS SEG # BLD: 6.2 K/UL (ref 1.8–8)
NEUTS SEG NFR BLD: 64 % (ref 32–75)
NRBC # BLD: 0 K/UL (ref 0–0.01)
NRBC BLD-RTO: 0 PER 100 WBC
PLATELET # BLD AUTO: 238 K/UL (ref 150–400)
PMV BLD AUTO: 10.5 FL (ref 8.9–12.9)
POTASSIUM SERPL-SCNC: 3.8 MMOL/L (ref 3.5–5.1)
PROT SERPL-MCNC: 7.2 G/DL (ref 6.4–8.2)
RBC # BLD AUTO: 3.87 M/UL (ref 3.8–5.2)
RBC MORPH BLD: ABNORMAL
SERVICE CMNT-IMP: ABNORMAL
SODIUM SERPL-SCNC: 142 MMOL/L (ref 136–145)
WBC # BLD AUTO: 9.8 K/UL (ref 3.6–11)

## 2021-09-11 PROCEDURE — 85025 COMPLETE CBC W/AUTO DIFF WBC: CPT

## 2021-09-11 PROCEDURE — 65660000000 HC RM CCU STEPDOWN

## 2021-09-11 PROCEDURE — 82962 GLUCOSE BLOOD TEST: CPT

## 2021-09-11 PROCEDURE — 36415 COLL VENOUS BLD VENIPUNCTURE: CPT

## 2021-09-11 PROCEDURE — 74011636637 HC RX REV CODE- 636/637: Performed by: INTERNAL MEDICINE

## 2021-09-11 PROCEDURE — 74011250637 HC RX REV CODE- 250/637: Performed by: NURSE PRACTITIONER

## 2021-09-11 PROCEDURE — 74011250637 HC RX REV CODE- 250/637: Performed by: INTERNAL MEDICINE

## 2021-09-11 PROCEDURE — 80053 COMPREHEN METABOLIC PANEL: CPT

## 2021-09-11 RX ORDER — CARVEDILOL 3.12 MG/1
3.12 TABLET ORAL 2 TIMES DAILY WITH MEALS
Status: DISCONTINUED | OUTPATIENT
Start: 2021-09-11 | End: 2021-09-12

## 2021-09-11 RX ORDER — TRAZODONE HYDROCHLORIDE 50 MG/1
100 TABLET ORAL ONCE
Status: COMPLETED | OUTPATIENT
Start: 2021-09-11 | End: 2021-09-11

## 2021-09-11 RX ORDER — SPIRONOLACTONE 25 MG/1
12.5 TABLET ORAL DAILY
Status: DISCONTINUED | OUTPATIENT
Start: 2021-09-11 | End: 2021-09-13

## 2021-09-11 RX ADMIN — CARVEDILOL 3.12 MG: 3.12 TABLET, FILM COATED ORAL at 11:47

## 2021-09-11 RX ADMIN — CARVEDILOL 3.12 MG: 3.12 TABLET, FILM COATED ORAL at 16:40

## 2021-09-11 RX ADMIN — TRAZODONE HYDROCHLORIDE 100 MG: 50 TABLET ORAL at 22:54

## 2021-09-11 RX ADMIN — INSULIN LISPRO 2 UNITS: 100 INJECTION, SOLUTION INTRAVENOUS; SUBCUTANEOUS at 11:48

## 2021-09-11 RX ADMIN — Medication 5 ML: at 06:00

## 2021-09-11 RX ADMIN — SPIRONOLACTONE 12.5 MG: 25 TABLET ORAL at 11:47

## 2021-09-11 RX ADMIN — LISINOPRIL 40 MG: 20 TABLET ORAL at 09:27

## 2021-09-11 RX ADMIN — ATORVASTATIN CALCIUM 10 MG: 10 TABLET, FILM COATED ORAL at 09:26

## 2021-09-11 RX ADMIN — PAROXETINE HYDROCHLORIDE 30 MG: 20 TABLET, FILM COATED ORAL at 09:26

## 2021-09-11 RX ADMIN — ACETAMINOPHEN 650 MG: 325 TABLET ORAL at 22:54

## 2021-09-11 RX ADMIN — PANTOPRAZOLE SODIUM 40 MG: 40 TABLET, DELAYED RELEASE ORAL at 09:26

## 2021-09-11 RX ADMIN — Medication 10 ML: at 22:00

## 2021-09-11 RX ADMIN — LEVOTHYROXINE SODIUM 50 MCG: 0.05 TABLET ORAL at 09:26

## 2021-09-11 NOTE — PROGRESS NOTES
Problem: Falls - Risk of  Goal: *Absence of Falls  Description: Document Jennifer Locket Fall Risk and appropriate interventions in the flowsheet.   Outcome: Progressing Towards Goal  Note: Fall Risk Interventions:  Mobility Interventions: Patient to call before getting OOB, Utilize walker, cane, or other assistive device         Medication Interventions: Patient to call before getting OOB, Teach patient to arise slowly

## 2021-09-11 NOTE — PROGRESS NOTES
Hospitalist Progress Note    NAME: Marlow Osler   :  1940   MRN:  [id number]       Assessment / Plan:    NSTEMI, POA  Exertional dyspnea, POA  Nonischemic cardiomyopathy, POA  Acute systolic heart failure, POA  Hypertension, POA  Dyslipidemia, POA  Chronic left bundle branch block, POA  Aortic valve replacement, POA  Diabetes mellitus type 2, POA  History of gastric outlet surgery  History of iron deficiency anemia  Hypothyroidism  History of peripheral artery disease status post carotid stent on the left side     Transferred from [facility] due to shortness of breath on exertion for 2 weeks with episode of chest pain this morning  Her troponin was 0.22 at [facility], 0.29 here  Hemodynamically stable  Cardiology was consulted, input is appreciated  She was already on aspirin due to previous anemia and gastric outlet surgery with ulcer reported  Status post cardiac catheterization on September 10 with no ischemic cardiomyopathy reported  EF 20 to 25% this admission. Previous echo in 2016 revealed normal EF  Does not look fluid overloaded on physical examination  We will defer antiplatelets and anticoagulation to cardiology  Continue home statin  Change lisinopril to Ghassan Shearer at [facility], [facility] as per cardiology recommendations  Previous history of dementia  She was on glimepiride and Metformin, hold for now. Correction scale with hypoglycemia protocol  Continue supportive care  Continue telemetry monitoring            Estimated discharge date:   Barriers: Clinical improvement    Code status: Full  Prophylaxis: SCD's  Recommended Disposition: Home w/Family     Subjective:     Patient was seen and examined. No acute events overnight. Discussed with RN overnight events. All patient's questions were answered. \"feeling better\"    Patient's daughter at bedside, all questions were answered.     Review of Systems:  Symptom Y/N Comments  Symptom Y/N Comments   Fever/Chills n   Chest Pain n    Poor Appetite    Edema     Cough n   Abdominal Pain n    Sputum    Joint Pain     SOB/WHITTEN n   Pruritis/Rash     Nausea/vomit n   Tolerating PT/OT     Diarrhea    Tolerating Diet y    Constipation    Other       Could NOT obtain due to:          Objective:     VITALS:   Last 24hrs VS reviewed since prior progress note. Most recent are:  Patient Vitals for the past 24 hrs:   Temp Pulse Resp BP SpO2   09/11/21 1147 98.2 °F (36.8 °C) (!) 116 18 106/75 98 %   09/11/21 0803 98.4 °F (36.9 °C) (!) 111 18 (!) 139/98 97 %   09/11/21 0427 97.5 °F (36.4 °C) (!) 111 18 (!) 132/100 96 %   09/10/21 2300 97.5 °F (36.4 °C) (!) 105 18 (!) 144/84 91 %   09/10/21 2230  (!) 105  (!) 138/98 95 %   09/10/21 2200  (!) 105  134/88 96 %   09/10/21 2130  (!) 104  (!) 122/94 97 %   09/10/21 2100  (!) 105  128/82 96 %   09/10/21 2030  (!) 105  130/85 98 %   09/10/21 2000  (!) 104  130/77 95 %   09/10/21 1930 98.2 °F (36.8 °C) (!) 102 20 126/87 97 %   09/10/21 1915  (!) 108  112/85 96 %   09/10/21 1900  (!) 101  (!) 115/93 98 %   09/10/21 1830  (!) 102  (!) 115/92 98 %   09/10/21 1800  (!) 102  125/84 98 %   09/10/21 1745  (!) 105  122/81 98 %   09/10/21 1730  (!) 102  103/76 98 %   09/10/21 1715  (!) 104  118/86 98 %   09/10/21 1700  (!) 102  123/80 98 %   09/10/21 1655  (!) 101  113/74 98 %   09/10/21 1650  (!) 101  109/80 97 %   09/10/21 1645  (!) 102  (!) 116/93 98 %   09/10/21 1640  (!) 101  106/75 98 %   09/10/21 1638  100  106/79 98 %   09/10/21 1630  (!) 101  101/80 97 %   09/10/21 1615  (!) 101  109/69 98 %   09/10/21 1604 97.5 °F (36.4 °C) (!) 104 18 116/83 100 %   09/10/21 1225 97.9 °F (36.6 °C) (!) 108 18 (!) 130/92 97 %     No intake or output data in the 24 hours ending 09/11/21 1219     I had a face to face encounter and independently examined this patient on 9/11/2021, as outlined below:  PHYSICAL EXAM:  General: WD, WN.  Alert, cooperative, no acute distress    EENT:  EOMI. Anicteric sclerae. MMM  Resp:  CTA bilaterally, no wheezing or rales. No accessory muscle use  CV:  Regular  rhythm,  No edema  GI:  Soft, Non distended, Non tender. +Bowel sounds  Neurologic:  Alert and oriented X 2 not to time, normal speech,   Psych:   Good insight. Not anxious nor agitated  Skin:  No rashes. No jaundice    Reviewed most current lab test results and cultures  YES  Reviewed most current radiology test results   YES  Review and summation of old records today    NO  Reviewed patient's current orders and MAR    YES  PMH/SH reviewed - no change compared to H&P  ________________________________________________________________________  Care Plan discussed with:    Comments   Patient x    Family      RN x    Care Manager     Consultant                        Multidiciplinary team rounds were held today with , nursing, pharmacist and clinical coordinator. Patient's plan of care was discussed; medications were reviewed and discharge planning was addressed. ________________________________________________________________________  Total NON critical care TIME:  35   Minutes    Total CRITICAL CARE TIME Spent:   Minutes non procedure based      Comments   >50% of visit spent in counseling and coordination of care     ________________________________________________________________________  Kelly Saldana MD     Procedures: see electronic medical records for all procedures/Xrays and details which were not copied into this note but were reviewed prior to creation of Plan. LABS:  I reviewed today's most current labs and imaging studies.   Pertinent labs include:  Recent Labs     09/11/21  0421 09/10/21  1428   WBC 9.8 8.8   HGB 10.0* 8.5*   HCT 35.0 28.6*    195     Recent Labs     09/11/21  0421 09/10/21  1428    142   K 3.8 3.7   * 109*   CO2 24 24   * 120*   BUN 16 19   CREA 0.60 0.57   CA 9.2 8.3*   ALB 3.4* 3.0*   TBILI 1.0 0.7 ALT 56 35       Signed: Elijah Mejias MD

## 2021-09-11 NOTE — PROGRESS NOTES
Cardiology Progress Note  9/11/2021     Admit Date: 9/10/2021  Admit Diagnosis: NSTEMI (non-ST elevated myocardial infarction) ([facility] [address] [I21.4]  CC: none currently   Cardiac Assessment/Plan:    1) AS: Bioprosthetic AoVR (2004) then TAVR (#23 Missy) for AS/mod LV dysfxn 2/2015; no CAD then; EF normalized. *previous intermittent non-cardiac CP/dyspnea; chronic minor WHITTEN since then. *Slowly worse mean gradient: 19 4/2015; 27 2016; 37 12/2019. 53 12/2020 but 35 6/2021. 2) DM,    3) Carotid disease (LICA stent 2/5833); *worse U/S 02/5664; PTA of LICA restenoses 65/1561; Mild GIN, moderate LICA stenosis since then (inc 10/2020). 4) HTN,   5) Dyslipidemia, (lab for PCP). 6) LBBB 6/2021: ASx       7) Gastric outlet surgery [facility], 4/2021):  Distal gastrectomy w/ Kenyatta-en-Y gastric jejunostomy;     *ulceration @ anastomosis site; Xfusion needed (4/2021). 8) Back pain; limits mobility. 9) Dementia 2018; followed by Neuro. Daughter provides most of history    Transfer from [address] [place] [address] ER after presenting with dyspnea/CP x1; chronic LBBB; trop 0.22 x2. Hg 10.1; New CM: Non-ischemic CM (probable Takotsubo)      9/11: BPs 110-140; -110s; 97% RA. No I/os. Hg 10; K 3.8; Cr 0.6 (nl TSH 9/3/21). Cardiac meds: lipitor 10; lisinopril 40. Rec: change ACEi to entresto; start coreg 3.125 bid; start aldactone 12.5 qday; Need to avoid hypotension with AS.    ______________________________________________  Echo 9/10/21:  · LV: Estimated LVEF is 20 - 25%. Mildly dilated left ventricle. Mild concentric hypertrophy. Severely and segmentally reduced systolic function. · LA: Mildly dilated left atrium. · AV: Prosthetic aortic valve. Aortic valve mean gradient is 21 mmHg. Aortic valve area is 0.8 cm2. There is a bioprosthetic aortic valve. · MV: Mitral valve non-specific thickening. Moderate mitral valve regurgitation is present.  Mod-to-severe visually; mild by ERO  · TV: Moderate tricuspid valve regurgitation is present. Right Ventricular Arterial Pressure (RVSP) is 62 mmHg. · IVC: Severely elevated central venous pressure (15 mmHg); IVC diameter is larger than 21 mm and collapses less than 50% with respiration. Cath 9/10/21: Mild-mod Dz mid circ: neg IFR: Med Rx.  ____________________________________________________________________  Alexandr Rodríguez is a 78 y.o. female presents with elevated troponin and poss NSTEMI .     The patient reports & daughter confirms: no exertional CP; 1 week of gradually worse WHITTEN; no LE edema/orthopnea; Compliant with meds; some salt in diet per pt.     No PND, orthopnea, palpitations, pre-syncope, syncope, peripheral edema. No current complaints.     ECG: sinus tachycardia; LBBB. Tele: sinus  CXR: No CHF on OSH CXR. Notable labs: Hg 10.1; Nl k/Cr; trop 0.2 x2.  _____________________________________________________________________  Notable prior cardiac history:  @ OV 6/24/21:  Ms Sg Marie has a h/o:  1) AS: Bioprosthetic AoVR (2004) then TAVR (#23 Missy) for AS/mod LV dysfxn 2/2015; no CAD then; EF normalized. *previous intermittent non-cardiac CP/dyspnea; chronic minor WHITTEN since then. *Slowly worse mean gradient: 19 4/2015; 27 2016; 37 12/2019. 53 12/2020 but 35 6/2021. 2) DM,    3) Carotid disease (LICA stent 8/3991); *worse U/S 47/9119; PTA of LICA restenoses 11/4214; Mild GIN, moderate LICA stenosis since then (inc 10/2020). 4) HTN,   5) Dyslipidemia, (lab for PCP). 6) LBBB 6/2021: ASx       7) Gastric outlet surgery [facility], 4/2021):  Distal gastrectomy w/ Kenyatta-en-Y gastric jejunostomy;     *ulceration @ anastomosis site; Xfusion needed (4/2021). 8) Back pain; limits mobility. 9) Dementia 2018; followed by Neuro. Daughter provides most of history. Retired , caregiver since 2000. No nicotine or ethanol.  No added salt.     11/2020:  Her memory is poor; she denies WHITTEN although her daughter states she does get short of breath doing daily activities; she is less active now with past; no real regular exercise. No chest pain nor palpitations.     6/2021: Many meds changed after HD hospitalization 2 months ago with gastric outlet obstruction needing surgery & subsequent ulceration. Remains off Plavix & aspirin. Off Coreg. She reports & daughter confirms no chest pain or pressure. Pt reports no dyspnea except \"when running\"; daughter states her WHITTEN is significantly < previous; walked 30 minutes yesterday without symptoms.     IMPRESSION AND PLAN  01. Nonrheumatic aortic (valve) stenosis (I35.0):  S/P bioprosthetic AoVR 2004 and TAVR 3/2015. Stable, despite slowly worsening gradient.     We discussed the signs and symptoms of unstable angina, myocardial infarction and malignant arrhythmia. The patient knows to seek immediate medical attention should they occur. ECG done   02. Hypertension (I10): Adequately controlled. Continue lisinopril 40; add back Coreg if needed. I have made no changes to the present regimen. 03. Mixed hyperlipidemia (E78.2):  Lipid labs drawn by PCP. The patient is tolerating lipid lowering therapy well.   04. 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 s/p PTA of left ICA InStent restenoses. Arterial duplex ultrasound of carotid artery revealed mild InStent restenoses. No active intervention is required at this point. Will continue current medications and risk factor modifications. 05. Gastrointestinal hemorrhage associated with gastrojejunal ulcer (K28.4):  Managed by GI; & aspirin 81 q.day when given permission by GI who she sees in a few days. Okay to stay off Plavix. 06. Dilated cardiomyopathy (I42.0):  Resolved: EF normalized after TAVR. Cont Med Rx. Her ejection fraction is greater than 35%. No congestive heart failure manifestations. does not meed the criteria for a primary prevention ICD.    07. Palpitations (R00.2):  C/W PACs/PVCs:  Holter/EVR if recurrent/progressive palpitations; patient knows to call us. 08. Body mass index [BMI]30.0-30.9, adult (Z68.30): The patient was instructed on AHA diet and regular exercise.      ORDERS:  1 ECG done     2 Carotid Duplex in 4 Months.     3 Return office visit with Stephanie Dobbins MD in 1 Year.     4 The patient was instructed on AHA diet and regular exercise.           6/24/21 MEDICATION LIST  Medication Sig Desc   glimepiride 1 mg tablet take 1 tablet by oral route  every day   levothyroxine 50 mcg capsule take 1 capsule by oral route  every day   lisinopril 40 mg tablet take 1 tablet by oral route  every day   lovastatin 10 mg tablet TAKE 1 TABLET EVERY DAY WITH THE EVENING MEAL   memantine 10 mg tablet take 1 tablet by oral route 2 times every day   metformin 500 mg tablet take 1/2  tablet by oral route 2 times every day with morning and evening meals   multivitamin tablet take 1 tablet by oral route  every day with food   pantoprazole 40 mg tablet,delayed release take 1 tablet by oral route  every day   paroxetine 30 mg tablet take 1 tablet (30MG)  by oral route  every day   Vitamin B-12 1,000 mcg tablet daily         _________________________________________________________________________________  CARDIAC HISTORY  CAD:  1 Cath [Minimal CAD] - 8/2004   2 Pleuritic Chest Pain - 12/2004   3 Atypical Chest Pain [Normal MPI] - 4/28/2009   4 Recurrent Chest Pain [Cath, No Significant CAD] - 1/2010      ARRHYTHMIA:  1 Intermittent Palpitations [Neg holters]      PVD:  1 Left Carotid Stent [Every other year u/s (even years). ] - 3/2005   2 TIA      VALVULAR:  1 AVR (#21 Deanne-Larsen) - 11/2004   2 Severe AS (Echo (EF 0.45 (45%), Mild LVH, Mild MR, Mild AR, Prosthetic AV, Mild TR, AV Peak 95 mmHg, AV Mean 64 mmHg, Est RVSP 51 mmHg, Normal RV.  Bioprosthetic aortic valve appears to have severe AS.) - 1/29/2015) - 1/2015   3 TAVR (CV Surgery (transfemerol transcatheter aortic valve Missy XT valve) - 3/5/2015) - 3/2015      RISK FACTORS:  1 Dyslipidemia   2 Hypertension   3 Peripheral Vascular Disease   4 Diabetes   5 Family History of CAD [Less than 61years of age]   6 Tobacco Use         CARDIOVASCULAR PROCEDURES  Procedure Date Results   Abd Ao Duplex 12/17/2014 Abd Ao screening normal   SUSY 11/19/2014 No evidence of arterial insufficiency at rest. Bilateral SUSY's >1.0   Carotid CTA 10/25/2018 Flow limiting blockage in the distal left common carotid artery within the stent of 80-90%   Carotid Duplex 10/21/2020 Right:  1-49% proximal ICA stenosis with mild hemodynamic significance. The vertebral artery waveforms are antegrade.     Left:  Evidence of internal carotid artery in stent restenosis of 50-69% at the proximal end of the stent. The vertebral artery waveforms are antegrade. Carotid Duplex 07/08/2020 Right:  1-49% proximal ICA stenosis with mild hemodynamic significance. The vertebral artery waveforms are antegrade.     Left:  Evidence of internal carotid artery in stent restenosis of 50-69% at the proximal end of the stent. The vertebral artery waveforms are antegrade. Carotid Duplex 01/03/2020 Right:  1-49% proximal ICA stenosis with mild hemodynamic significance. The vertebral artery waveforms are antegrade.     Left:  >50% common carotid artery stenosis. 1-49% proximal ICA stenosis with mild hemodynamic significance. The vertebral artery waveforms are antegrade. Carotid Duplex 06/18/2019 50 - 69% Left ICA, 1 - 49% Right ICA, Vertebral: Bilateral Antegrade Flow   Carotid Duplex 10/17/2018 1 - 49% Right ICA, 70 - 99% Left ICA, Vertebral: Bilateral Antegrade Flow, Patient aware of results; CTA pending. Carotid Duplex 11/09/2016 1 - 49% Bilateral ICAs, Vertebral: Bilateral Antegrade Flow   Carotid Duplex 10/01/2014 1 - 49% Right ICA, Vertebral: Bilateral Antegrade Flow, Patent left ICA stent. No change vs 11/2013, 10/2011. Cath 11/19/2018 Symptomatic left internal carotid artery in-stent restenosis.  Successful angioplasty with 5.5 x 20mm balloon. Cath 02/11/2015 EF 0.35 (35%), Minimal CAD, mild-mod MR; AoV mean 57, 0.53 cm2. Cath 01/13/2010 Normal EF, 30% RCA, No significant CAD; Trivial AI. No change vs 8/2004. Chest CT   No PE   CV Surgery 03/05/2015 transfemerol TAVR: Missy XT valve   CV Surgery   AVR (#21 Deanne-Larsen)   Echo 06/07/2021 EF 0.50 (50%), EF range 50%-55%, Low-normal LV function. Normal RV. Mild LVH. Aortic valve is difficult to visualize (valve in valve). Mean gradient 35, c/w moderate AS. Mild MR. PAp 62. LA 4.   Echo 12/08/2020 EF range 55%-60%, Normal LV function. The LV EF was estimated to be 55%. Normal RV. Mild-to-moderate LVH. LA 4. PAp 44. Mild MR. Bioprosthetic aortic valve is difficult to visualize (\"valve-in-valve\"); no abnormal rocking or perivalvular leak; 4.5 m/s with mean gradient 53, c/w severe AS. Echo 12/17/2019 EF 0.60 (60%), Normal RV. Mild LVH. LA 4.3. PAp 46. TAVR intact with mean gradient 37, peak velocity 3.9. Echo 03/09/2016 EF 0.60 (60%), Mild MR, Mild LVH, Impaired Relaxation Diastolic Dysfunction, LA Diam 4.58 cm, AV Peak 43 mmHg, AV Mean 27 mmHg, Est RVSP 41 mmHg, Normal RV. Prosthetic valve in aortic position has appropriate function. Echo 08/19/2015 EF 0.55 (55%), Impaired Relaxation Diastolic Dysfunction, Mild TR, Mild LVH, AV Peak 38 mmHg, AV Mean 22 mmHg, AV Area 0.9 cm2, Est RVSP 38 mmHg, TAVR with appropriate function; trivial AI. Normal RV. Echo 04/02/2015 EF 0.50 (50%), Mild TR, Prosthetic AV, Mild MR, AV Peak 30 mmHg, AV Mean 19 mmHg, Est RVSP 33 mmHg, @ [facility]. Echo 03/06/2015 EF 0.30 (30%), @ [facility]. Echo 01/29/2015 EF 0.45 (45%), Mild LVH, Mild MR, Mild AR, Prosthetic AV, Mild TR, AV Peak 95 mmHg, AV Mean 64 mmHg, Est RVSP 51 mmHg, Normal RV. Bioprosthetic aortic valve appears to have severe AS.    Echo 03/19/2012 EF 0.60, Mild LVH, Prosthetic AV, Mild-Moderate TR, AV Peak 26 mmHg, AV Mean 15 mmHg, AV Area 1.0 cm2, Est RVSP 48 mmHg   Echo   Normal EF, Bioprosthetic AV (Mn 18), mild TR   EKG 2021 Sinus Rhythm, LBBB   Holter   Unremarkable; No change vs . MPI 2014 EF 0.48 (48%), No Ischemia, 4:26. No change vs 3/2012, 2009. Periph Intervention   Stent: Left Carotid   GURJIT   Normal EF, Negative for Endocarditis   Venous Duplex 2014 No DVT           For other plans, see orders. Hospital problem list     Active Hospital Problems    Diagnosis Date Noted    NSTEMI (non-ST elevated myocardial infarction) ([facility] [address] 2021        Subjective: Randall Varela reports       Chest pain X none  consistent with:  Non-cardiac CP         Atypical CP     None now  On going  Anginal CP     Dyspnea X none  at rest  with exertion         improved  unchanged  worse              PND X none  overnight       Orthopnea X none  improved  unchanged  worse   Presyncope X none  improved  unchanged  worse     Ambulated in hallway without symptoms   Yes   Ambulated in room without symptoms  Yes   Objective:     Physical Exam:  Overall VSSAF;    Visit Vitals  BP (!) 139/98   Pulse (!) 111   Temp 98.4 °F (36.9 °C)   Resp 18   LMP  (LMP Unknown)   SpO2 97%     Temp (24hrs), Av.8 °F (36.6 °C), Min:97.5 °F (36.4 °C), Max:98.4 °F (36.9 °C)    Patient Vitals for the past 8 hrs:   Pulse   21 0803 (!) 111   21 0427 (!) 111     Patient Vitals for the past 8 hrs:   Resp   21 0803 18   21 0427 18     Patient Vitals for the past 8 hrs:   BP   21 0803 (!) 139/98   21 0427 (!) 132/100     No intake or output data in the 24 hours ending 21 1034  General Appearance: Well developed, well nourished, no acute distress. Elderly. Ears/Nose/Mouth/Throat:   Normal MM; anicteric. JVP: WNL   Resp:   Lungs clear to auscultation bilaterally. Nl resp effort. Cardiovascular:  RRR, S1, S2 normal, no new murmur. No gallop or rub. Abdomen:   Soft, non-tender, bowel sounds are present. Extremities: No edema bilaterally.     Skin:  Neuro: Warm and dry. A/O but pleasant dementia, grossly nonfocal       cath site intact w/o hematoma or new bruit; distal pulse unchanged  Yes   Data Review:     Telemetry independently reviewed x sinus  chronic afib  parox afib  NSVT     ECG independently reviewed  NSR  afib  no significant changes  NSST-Tw chgs     x no new ECG provided for review   Lab results reviewed as noted below. Current medications reviewed as noted below. No results for input(s): PH, PCO2, PO2 in the last 72 hours. Recent Labs     09/10/21  1428   TROIQ 0.29*     Recent Labs     09/11/21  0421 09/10/21  1428    142   K 3.8 3.7   * 109*   CO2 24 24   BUN 16 19   CREA 0.60 0.57   GFRAA >60 >60   * 120*   CA 9.2 8.3*   ALB 3.4* 3.0*   WBC 9.8 8.8   HGB 10.0* 8.5*   HCT 35.0 28.6*    195     Lab Results   Component Value Date/Time    Cholesterol, total 159 03/09/2021 09:40 AM    HDL Cholesterol 75 03/09/2021 09:40 AM    LDL, calculated 64 03/09/2021 09:40 AM    LDL, calculated 66 02/24/2020 10:15 AM    Triglyceride 113 03/09/2021 09:40 AM    CHOL/HDL Ratio 2.0 09/09/2010 10:07 AM     Recent Labs     09/11/21  0421 09/10/21  1428   AP 77 64   TP 7.2 6.3*   ALB 3.4* 3.0*   GLOB 3.8 3.3     No results for input(s): INR, PTP, APTT, INREXT in the last 72 hours.    No components found for: GLPOC    Current Facility-Administered Medications   Medication Dose Route Frequency    fluticasone propionate (FLONASE) 50 mcg/actuation nasal spray 2 Spray  2 Spray Both Nostrils DAILY    levothyroxine (SYNTHROID) tablet 50 mcg  50 mcg Oral 6am    atorvastatin (LIPITOR) tablet 10 mg  10 mg Oral DAILY    lisinopriL (PRINIVIL, ZESTRIL) tablet 40 mg  40 mg Oral DAILY    pantoprazole (PROTONIX) tablet 40 mg  40 mg Oral DAILY    PARoxetine (PAXIL) tablet 30 mg  30 mg Oral DAILY    sodium chloride (NS) flush 5-40 mL  5-40 mL IntraVENous Q8H    sodium chloride (NS) flush 5-40 mL  5-40 mL IntraVENous PRN    acetaminophen (TYLENOL) tablet 650 mg  650 mg Oral Q6H PRN    Or    acetaminophen (TYLENOL) suppository 650 mg  650 mg Rectal Q6H PRN    polyethylene glycol (MIRALAX) packet 17 g  17 g Oral DAILY PRN    promethazine (PHENERGAN) tablet 12.5 mg  12.5 mg Oral Q6H PRN    Or    ondansetron (ZOFRAN) injection 4 mg  4 mg IntraVENous Q6H PRN    insulin lispro (HUMALOG) injection   SubCUTAneous AC&HS    glucose chewable tablet 16 g  4 Tablet Oral PRN    dextrose (D50W) injection syrg 12.5-25 g  12.5-25 g IntraVENous PRN    glucagon (GLUCAGEN) injection 1 mg  1 mg IntraMUSCular PRN        Benigno Osborne MD

## 2021-09-12 LAB
GLUCOSE BLD STRIP.AUTO-MCNC: 128 MG/DL (ref 65–117)
GLUCOSE BLD STRIP.AUTO-MCNC: 151 MG/DL (ref 65–117)
GLUCOSE BLD STRIP.AUTO-MCNC: 178 MG/DL (ref 65–117)
GLUCOSE BLD STRIP.AUTO-MCNC: 210 MG/DL (ref 65–117)
GLUCOSE BLD STRIP.AUTO-MCNC: 223 MG/DL (ref 65–117)
SERVICE CMNT-IMP: ABNORMAL

## 2021-09-12 PROCEDURE — 74011250637 HC RX REV CODE- 250/637: Performed by: INTERNAL MEDICINE

## 2021-09-12 PROCEDURE — P9047 ALBUMIN (HUMAN), 25%, 50ML: HCPCS | Performed by: NURSE PRACTITIONER

## 2021-09-12 PROCEDURE — 74011636637 HC RX REV CODE- 636/637: Performed by: INTERNAL MEDICINE

## 2021-09-12 PROCEDURE — 82962 GLUCOSE BLOOD TEST: CPT

## 2021-09-12 PROCEDURE — 74011250636 HC RX REV CODE- 250/636: Performed by: NURSE PRACTITIONER

## 2021-09-12 PROCEDURE — 97116 GAIT TRAINING THERAPY: CPT

## 2021-09-12 PROCEDURE — 97161 PT EVAL LOW COMPLEX 20 MIN: CPT

## 2021-09-12 PROCEDURE — 65660000000 HC RM CCU STEPDOWN

## 2021-09-12 PROCEDURE — 2709999900 HC NON-CHARGEABLE SUPPLY

## 2021-09-12 RX ORDER — ALBUMIN HUMAN 250 G/1000ML
25 SOLUTION INTRAVENOUS ONCE
Status: COMPLETED | OUTPATIENT
Start: 2021-09-12 | End: 2021-09-12

## 2021-09-12 RX ORDER — ASPIRIN 81 MG/1
81 TABLET ORAL DAILY
Status: DISCONTINUED | OUTPATIENT
Start: 2021-09-12 | End: 2021-09-13 | Stop reason: HOSPADM

## 2021-09-12 RX ORDER — METOPROLOL SUCCINATE 25 MG/1
12.5 TABLET, EXTENDED RELEASE ORAL DAILY
Status: DISCONTINUED | OUTPATIENT
Start: 2021-09-12 | End: 2021-09-13

## 2021-09-12 RX ADMIN — PAROXETINE HYDROCHLORIDE 30 MG: 20 TABLET, FILM COATED ORAL at 10:26

## 2021-09-12 RX ADMIN — PANTOPRAZOLE SODIUM 40 MG: 40 TABLET, DELAYED RELEASE ORAL at 10:26

## 2021-09-12 RX ADMIN — Medication 10 ML: at 23:01

## 2021-09-12 RX ADMIN — Medication 10 ML: at 06:00

## 2021-09-12 RX ADMIN — ALBUMIN (HUMAN) 25 G: 0.25 INJECTION, SOLUTION INTRAVENOUS at 05:28

## 2021-09-12 RX ADMIN — SODIUM CHLORIDE 250 ML: 9 INJECTION, SOLUTION INTRAVENOUS at 05:20

## 2021-09-12 RX ADMIN — ACETAMINOPHEN 650 MG: 325 TABLET ORAL at 23:01

## 2021-09-12 RX ADMIN — INSULIN LISPRO 3 UNITS: 100 INJECTION, SOLUTION INTRAVENOUS; SUBCUTANEOUS at 17:33

## 2021-09-12 RX ADMIN — LEVOTHYROXINE SODIUM 50 MCG: 0.05 TABLET ORAL at 05:27

## 2021-09-12 RX ADMIN — ATORVASTATIN CALCIUM 10 MG: 10 TABLET, FILM COATED ORAL at 10:26

## 2021-09-12 RX ADMIN — FLUTICASONE PROPIONATE 2 SPRAY: 50 SPRAY, METERED NASAL at 10:26

## 2021-09-12 RX ADMIN — ASPIRIN 81 MG: 81 TABLET, COATED ORAL at 10:26

## 2021-09-12 NOTE — PROGRESS NOTES
Received message from patient's nurse stating:  Pt. A&O x3-4 during day time, A&O x1-2 during night time d/t Dementia. Pt. is restless and unable to sleep. Can pt. get medication to help? Discussion / orders:    Trazodone 100 mg p.o. x1           Please note that this note was dictated using Dragon computer voice recognition software. Quite often unanticipated grammatical, syntax, homophones, and other interpretive errors are inadvertently transcribed by the computer software. Please disregard these errors. Please excuse any errors that have escaped final proofreading.

## 2021-09-12 NOTE — PROGRESS NOTES
Problem: Mobility Impaired (Adult and Pediatric)  Goal: *Acute Goals and Plan of Care (Insert Text)  Description: FUNCTIONAL STATUS PRIOR TO ADMISSION: Patient was supervision level for mobility without use of an AD    HOME SUPPORT PRIOR TO ADMISSION: The patient lived with 2 daughters and grandson and has 24/7 supervision at baseline. .    Physical Therapy Goals  Initiated 9/12/2021  1. Patient will move from supine to sit and sit to supine  in bed with supervision/set-up within 7 day(s). 2.  Patient will transfer from bed to chair and chair to bed with supervision/set-up using the least restrictive device within 7 day(s). 3.  Patient will perform sit to stand with supervision/set-up within 7 day(s). 4.  Patient will ambulate with supervision/set-up for 50 feet with the least restrictive device within 7 day(s). Outcome: Not Met     PHYSICAL THERAPY EVALUATION  Patient: Jeanette Orellana (52 y.o. female)  Date: 9/12/2021  Primary Diagnosis: NSTEMI (non-ST elevated myocardial infarction) ([facility] [address] [I21.4]  Procedure(s) (LRB):  LEFT HEART CATH / CORONARY ANGIOGRAPHY (N/A)  Ultrasound Guided Vascular Access (N/A)  Fractional Flow Reserve (N/A)  Coronary Angiography (N/A) 2 Days Post-Op   Precautions:   Fall    ASSESSMENT  Based on the objective data described below, the patient presents with deficits slightly below her baseline functioning. She demonstrates generalized weakness, gait dysfunction, and balance impairment. Clinical presentation is further complicated by age, comorbidities, PLOF, and current acute medical experience. She will benefit from skilled PT services to address deficits and facilitate return to OF. Should be safe to return home at present level with continued 24/7 supervision and assist as she had before admission. Current Level of Function Impacting Discharge (mobility/balance): CGA-SBA    Functional Outcome Measure:   The patient scored 65/100 on the Barthel outcome measure which is indicative of 35% functional impairment. Other factors to consider for discharge: supervision at baseline, 24/7 availability of family, ramp entry     Patient will benefit from skilled therapy intervention to address the above noted impairments. PLAN :  Recommendations and Planned Interventions: bed mobility training, transfer training, gait training, therapeutic exercises, neuromuscular re-education, patient and family training/education, and therapeutic activities      Frequency/Duration: Patient will be followed by physical therapy:  3 times a week to address goals. Recommendation for discharge: (in order for the patient to meet his/her long term goals)  Physical therapy at least 2 days/week in the home AND ensure assist and/or supervision for safety with mobility tasks as is her baseline. This discharge recommendation:  Has been made in collaboration with the attending provider and/or case management    IF patient discharges home will need the following DME: patient owns DME required for discharge         SUBJECTIVE:   Patient stated I walk when I want to.     OBJECTIVE DATA SUMMARY:   HISTORY:    Past Medical History:   Diagnosis Date    Anemia     Atypical chest pain     Long h/o intermittent non-cardiac CP. Depression with anxiety     Diabetes ([address]     GERD (gastroesophageal reflux disease)     Hypercholesteremia     Hyperlipidemia 7/3/2013    Hypertension     Inner ear dysfunction     S/P aortic valve replacement     Dr. Jamin Miller yearly    Type 2 diabetes with nephropathy ([address] 7/5/2019    Vitamin D deficiency      Past Surgical History:   Procedure Laterality Date    COLONOSCOPY N/A 4/22/2019    COLONOSCOPY performed by Tammy Cuevas MD at [address]    Bovine valve    HX CATARACT REMOVAL      HX CHOLECYSTECTOMY  1999    HX MOHS PROCEDURES Left 2014     HPI: Patient with SOB x 2 weeks and chest pain.  Elevated troponins so was admitted for NSTEMI work up. Cardiac cath showed no ischemic cardiomyopathy. ECHO did show worse EF of 20-25%. Hospitalization has been complicated by symptomatic hyptoension. Personal factors and/or comorbidities impacting plan of care: DMII, HTN, Aortic valve replacement, depression, anxiety, short term memory loss    Home Situation  Home Environment: Private residence  Wheelchair Ramp: Yes  Living Alone: No  Support Systems: Child(allie), Other Family Member(s)  Patient Expects to be Discharged to[de-identified] House  Current DME Used/Available at Home: Cane, straight, Wheelchair    EXAMINATION/PRESENTATION/DECISION MAKING:   Critical Behavior:  Neurologic State: Alert  Orientation Level: Oriented X4  Cognition: Follows commands  Safety/Judgement: Decreased awareness of need for assistance, Decreased awareness of need for safety  Hearing:     Skin:  Visible skin intact  Edema: None noted  Range Of Motion:  AROM: Within functional limits                       Strength:    Strength: Generally decreased, functional      Generalized weakness              Tone & Sensation:   Tone: Normal              Sensation: Intact               Coordination:  Coordination: Within functional limits    Functional Mobility:  Bed Mobility:     Supine to Sit: Stand-by assistance  Sit to Supine: Stand-by assistance     Transfers:  Sit to Stand: Stand-by assistance  Stand to Sit: Stand-by assistance  Stand Pivot Transfers: Contact guard assistance     Safety concerns and slight instability                 Balance:   Sitting: Intact  Standing: Impaired  Standing - Static: Good;Occasional  Standing - Dynamic : Fair;Occasional  Ambulation/Gait Training:  Distance (ft): 15 Feet (ft)           Gait Description (WDL): Exceptions to WDL  Gait Abnormalities: Decreased step clearance; Path deviations        Base of Support: Narrowed     Speed/Mavis: Slow  Step Length: Left shortened;Right shortened        Interventions: Tactile cues; Verbal cues         Patient ambulated in room distances at [facility]. Flexed posturing and general instability requiring steadying for safety. No overt LOB occurred. Likely near her baseline as she has chronic gait dysfunction as per her daughter. Functional Measure:  Barthel Index:    Bathin  Bladder: 5  Bowels: 10  Groomin  Dressin  Feeding: 10  Mobility: 10  Stairs: 5  Toilet Use: 5  Transfer (Bed to Chair and Back): 10  Total: 65/100       The Barthel ADL Index: Guidelines  1. The index should be used as a record of what a patient does, not as a record of what a patient could do. 2. The main aim is to establish degree of independence from any help, physical or verbal, however minor and for whatever reason. 3. The need for supervision renders the patient not independent. 4. A patient's performance should be established using the best available evidence. Asking the patient, friends/relatives and nurses are the usual sources, but direct observation and common sense are also important. However direct testing is not needed. 5. Usually the patient's performance over the preceding 24-48 hours is important, but occasionally longer periods will be relevant. 6. Middle categories imply that the patient supplies over 50 per cent of the effort. 7. Use of aids to be independent is allowed. Sunny Fields., Barthel, D.W. ([id number]). Functional evaluation: the Barthel Index. [facility] (14)2. MORRIS Blount, Noris Lopez., Jessika Chris.[facility], [address] [place] [address] (). Measuring the change indisability after inpatient rehabilitation; comparison of the responsiveness of the Barthel Index and Functional [place] Measure. Journal of Neurology, Neurosurgery, and Psychiatry, 66(4), 474-940. Li Reeves, N.NORY.A, GERALDINE Tsai, & JOY ChristieA. (2004.) Assessment of post-stroke quality of life in cost-effectiveness studies: The usefulness of the Barthel Index and the EuroQoL-5D.  Quality of Life Research, 13, 165-88           Physical Therapy Evaluation Charge Determination   History Examination Presentation Decision-Making   HIGH Complexity :3+ comorbidities / personal factors will impact the outcome/ POC  LOW Complexity : 1-2 Standardized tests and measures addressing body structure, function, activity limitation and / or participation in recreation  LOW Complexity : Stable, uncomplicated  LOW Complexity : FOTO score of       Based on the above components, the patient evaluation is determined to be of the following complexity level: LOW     Pain Rating:  Denies pain    Activity Tolerance:   Good    After treatment patient left in no apparent distress:   Supine in bed, Call bell within reach, Bed / chair alarm activated, and Caregiver / family present    COMMUNICATION/EDUCATION:   The patients plan of care was discussed with: Registered nurse and Rehabilitation technician. Fall prevention education was provided and the patient/caregiver indicated understanding., Patient/family have participated as able in goal setting and plan of care. , and Patient/family agree to work toward stated goals and plan of care.     Thank you for this referral.  Carine Talbert, DPT   Time Calculation: 12 mins

## 2021-09-12 NOTE — PROGRESS NOTES
Hospitalist Progress Note    NAME: Tejinder Hdez   :  1940   MRN:  [id number]       Assessment / Plan:    NSTEMI, POA  Exertional dyspnea, POA  Nonischemic cardiomyopathy, POA  Acute systolic heart failure, POA  Hypertension, POA  Dyslipidemia, POA  Chronic left bundle branch block, POA  Aortic valve replacement, POA  Diabetes mellitus type 2, POA  History of gastric outlet surgery  History of iron deficiency anemia  Hypothyroidism  History of peripheral artery disease status post carotid stent on the left side     Transferred from [facility] due to shortness of breath on exertion for 2 weeks with episode of chest pain this morning  Her troponin was 0.22 at [facility], 0.29 here  Hemodynamically stable  Cardiology was consulted, input is appreciated  She was already on aspirin due to previous anemia and gastric outlet surgery with ulcer reported  Status post cardiac catheterization on September 10 with no ischemic cardiomyopathy reported  EF 20 to 25% this admission. Previous echo in 2016 revealed normal EF  Does not look fluid overloaded on physical examination  We will defer antiplatelets and anticoagulation to cardiology  Continue home statin  Patient developed significant hypotension with Aldactone, Coreg and Entresto. Will follow cardiology recommendations with stopping Coreg and Entresto, holding Aldactone. Trial of Toprol-XL 12.5 mg today  Previous history of dementia  She was on glimepiride and Metformin, hold for now. Correction scale with hypoglycemia protocol  Continue supportive care  Continue telemetry monitoring            Estimated discharge date:   Barriers: Clinical improvement    Code status: Full  Prophylaxis: SCD's  Recommended Disposition: Home w/Family     Subjective:     Patient was seen and examined. Episodes of hypotension overnight. Discussed with RN overnight events. All patient's questions were answered.     \"Doing much better but felt dizzy this morning\"    Patient's daughter at bedside, all questions were answered. Review of Systems:  Symptom Y/N Comments  Symptom Y/N Comments   Fever/Chills n   Chest Pain n    Poor Appetite    Edema     Cough n   Abdominal Pain n    Sputum    Joint Pain     SOB/WHITTEN n   Pruritis/Rash     Nausea/vomit n   Tolerating PT/OT     Diarrhea    Tolerating Diet y    Constipation    Other       Could NOT obtain due to:          Objective:     VITALS:   Last 24hrs VS reviewed since prior progress note. Most recent are:  Patient Vitals for the past 24 hrs:   Temp Pulse Resp BP SpO2   09/12/21 1000 98 °F (36.7 °C) 96 16 95/79 98 %   09/12/21 0739 97.8 °F (36.6 °C) 90 18 100/75 97 %   09/12/21 0700  91  98/68    09/12/21 0645  91  113/79    09/12/21 0630  87  101/69    09/12/21 0615  85  (!) 88/67    09/12/21 0600  85  91/62    09/12/21 0545  87  91/67    09/12/21 0530  90  101/68    09/12/21 0520  94  (!) 93/57    09/12/21 0500  88  (!) 80/52    09/12/21 0445  88  (!) 83/57    09/12/21 0442  89  (!) 82/55    09/12/21 0440 98.7 °F (37.1 °C) 88 16 (!) 86/57 96 %   09/11/21 2248 98.3 °F (36.8 °C) (!) 102 16 109/74 93 %   09/11/21 1934 97.7 °F (36.5 °C) (!) 103 16 112/72 96 %   09/11/21 1517 98.1 °F (36.7 °C) (!) 106 18 104/76 99 %   09/11/21 1147 98.2 °F (36.8 °C) (!) 116 18 106/75 98 %     No intake or output data in the 24 hours ending 09/12/21 1117     I had a face to face encounter and independently examined this patient on 9/12/2021, as outlined below:  PHYSICAL EXAM:  General: WD, WN. Alert, cooperative, no acute distress    EENT:  EOMI. Anicteric sclerae. MMM  Resp:  CTA bilaterally, no wheezing or rales. No accessory muscle use  CV:  Regular  rhythm,  No edema  GI:  Soft, Non distended, Non tender. +Bowel sounds  Neurologic:  Alert and oriented X 2 not to time, normal speech,   Psych:   Good insight. Not anxious nor agitated  Skin:  No rashes.   No jaundice    Reviewed most current lab test results and cultures  YES  Reviewed most current radiology test results   YES  Review and summation of old records today    NO  Reviewed patient's current orders and MAR    YES  PMH/SH reviewed - no change compared to H&P  ________________________________________________________________________  Care Plan discussed with:    Comments   Patient x    Family  x    RN x    Care Manager     Consultant                        Multidiciplinary team rounds were held today with , nursing, pharmacist and clinical coordinator. Patient's plan of care was discussed; medications were reviewed and discharge planning was addressed. ________________________________________________________________________  Total NON critical care TIME:  35   Minutes    Total CRITICAL CARE TIME Spent:   Minutes non procedure based      Comments   >50% of visit spent in counseling and coordination of care     ________________________________________________________________________  Kelly Saldana MD     Procedures: see electronic medical records for all procedures/Xrays and details which were not copied into this note but were reviewed prior to creation of Plan. LABS:  I reviewed today's most current labs and imaging studies.   Pertinent labs include:  Recent Labs     09/11/21  0421 09/10/21  1428   WBC 9.8 8.8   HGB 10.0* 8.5*   HCT 35.0 28.6*    195     Recent Labs     09/11/21  0421 09/10/21  1428    142   K 3.8 3.7   * 109*   CO2 24 24   * 120*   BUN 16 19   CREA 0.60 0.57   CA 9.2 8.3*   ALB 3.4* 3.0*   TBILI 1.0 0.7   ALT 56 35       Signed: Kelly Saldana MD

## 2021-09-12 NOTE — PROGRESS NOTES
0445 - Hypotensive, BP (left arm): 86/57, BP (right arm): 82/55. Pt. is resting in bed, asymptomatic. Pt. received Tylonol 650 mg at 2254, trazodone 100 mg at 2254, Coreg 3.125 PO at 1640, lisinopril 40 mg PO at 0927. Echo on 9/10: 20-25%. RN notified NP Purveyor. 0500 - BP: 80/52. Pt. Is resting in bed without distress. RN notified NP Purveyor. Awaiting for order/ instruction from  [address] 0520 - Administer  ml bolus and 25 mg albumin human 25% per NP Purveyor order. 0545 - BP: 91/67. Pt. Is sleeping in bed without distress. 0600 - BP: 91/62. Pt. Is sleeping in bed without distress. 0615 - BP: 88/67. Pt. Is sleeping in bed without distress. Albumin is infusing. 0630 - BP: 101/69. Pt. Is resting in bed without distress.

## 2021-09-12 NOTE — PROGRESS NOTES
Received message from patient's nurse stating:    Latest BP at 0500: 80/52. Discussion / orders:    09/10/21    ECHO ADULT COMPLETE 09/10/2021 9/10/2021    Interpretation Summary  · LV: Estimated LVEF is 20 - 25%. Mildly dilated left ventricle. Mild concentric hypertrophy. Severely and segmentally reduced systolic function. · LA: Mildly dilated left atrium. · AV: Prosthetic aortic valve. Aortic valve mean gradient is 21 mmHg. Aortic valve area is 0.8 cm2. There is a bioprosthetic aortic valve. · MV: Mitral valve non-specific thickening. Moderate mitral valve regurgitation is present. Mod-to-severe visually; mild by ERO  · TV: Moderate tricuspid valve regurgitation is present. Right Ventricular Arterial Pressure (RVSP) is 62 mmHg. · IVC: Severely elevated central venous pressure (15 mmHg); IVC diameter is larger than 21 mm and collapses less than 50% with respiration. Signed by: Karrie Sung MD on 9/10/2021  2:12 PM        · Normal saline 250 mg bolus x 1  · Albumin 25%25 g iv x 1  · Hold coreg for now           Please note that this note was dictated using Dragon computer voice recognition software. Quite often unanticipated grammatical, syntax, homophones, and other interpretive errors are inadvertently transcribed by the computer software. Please disregard these errors. Please excuse any errors that have escaped final proofreading.

## 2021-09-12 NOTE — PROGRESS NOTES
Cardiology Progress Note  9/12/2021     Admit Date: 9/10/2021  Admit Diagnosis: NSTEMI (non-ST elevated myocardial infarction) ([facility] [address] [I21.4]  CC: none currently   Cardiac Assessment/Plan:    1) AS: Bioprosthetic AoVR (2004) then TAVR (#23 Missy) for AS/mod LV dysfxn 2/2015; no CAD then; EF normalized. *previous intermittent non-cardiac CP/dyspnea; chronic minor WHITTEN since then. *Slowly worse mean gradient: 19 4/2015; 27 2016; 37 12/2019. 53 12/2020 but 35 6/2021. 2) DM,    3) Carotid disease (LICA stent 2/6034); *worse U/S 91/9823; PTA of LICA restenoses 02/4820; Mild GIN, moderate LICA stenosis since then (inc 10/2020). 4) HTN,   5) Dyslipidemia, (lab for PCP). 6) LBBB 6/2021: ASx       7) Gastric outlet surgery [facility], 4/2021):  Distal gastrectomy w/ Kenyatta-en-Y gastric jejunostomy;     *ulceration @ anastomosis site; Xfusion needed (4/2021). 8) Back pain; limits mobility. 9) Dementia 2018; followed by Neuro. Daughter provides most of history    Transfer from [address] [place] [address] ER after presenting with dyspnea/CP x1; chronic LBBB; trop 0.22 x2. Hg 10.1; New CM: Non-ischemic CM (probable Takotsubo)      9/11: BPs 110-140; -110s; 97% RA. No I/os. Hg 10; K 3.8; Cr 0.6 (nl TSH 9/3/21). Cardiac meds: lipitor 10; lisinopril 40. Rec: change ACEi to entresto; start coreg 3.125 bid; start aldactone 12.5 qday; Need to avoid hypotension with AS.    9/12: BPs 80s-110 (after 2 doses coreg), +Sx; HR 80-90s;     Cardiac meds: asa 81; lipitor 10; aldactone 12.5; coreg 3.125 bid (on hold now); entresto bid pending. Rec: will need to d/c coreg and entresto; also hold aldactone. Try toprol XL 12.5 later today; will need careful med Rx prior to d/c,    ______________________________________________  Echo 9/10/21:  · LV: Estimated LVEF is 20 - 25%. Mildly dilated left ventricle. Mild concentric hypertrophy. Severely and segmentally reduced systolic function.   · LA: Mildly dilated left atrium. · AV: Prosthetic aortic valve. Aortic valve mean gradient is 21 mmHg. Aortic valve area is 0.8 cm2. There is a bioprosthetic aortic valve. · MV: Mitral valve non-specific thickening. Moderate mitral valve regurgitation is present. Mod-to-severe visually; mild by ERO  · TV: Moderate tricuspid valve regurgitation is present. Right Ventricular Arterial Pressure (RVSP) is 62 mmHg. · IVC: Severely elevated central venous pressure (15 mmHg); IVC diameter is larger than 21 mm and collapses less than 50% with respiration. Cath 9/10/21: Mild-mod Dz mid circ: neg IFR: Med Rx.  ____________________________________________________________________  Hernan Perdomo is a 78 y.o. female presents with elevated troponin and poss NSTEMI .     The patient reports & daughter confirms: no exertional CP; 1 week of gradually worse WHITTEN; no LE edema/orthopnea; Compliant with meds; some salt in diet per pt.     No PND, orthopnea, palpitations, pre-syncope, syncope, peripheral edema. No current complaints.     ECG: sinus tachycardia; LBBB. Tele: sinus  CXR: No CHF on OSH CXR. Notable labs: Hg 10.1; Nl k/Cr; trop 0.2 x2.  _____________________________________________________________________  Notable prior cardiac history:  @ OV 6/24/21:  Ms Santana Or has a h/o:  1) AS: Bioprosthetic AoVR (2004) then TAVR (#23 Missy) for AS/mod LV dysfxn 2/2015; no CAD then; EF normalized. *previous intermittent non-cardiac CP/dyspnea; chronic minor WHITTEN since then. *Slowly worse mean gradient: 19 4/2015; 27 2016; 37 12/2019. 53 12/2020 but 35 6/2021. 2) DM,    3) Carotid disease (LICA stent 7/6602); *worse U/S 76/7007; PTA of LICA restenoses 65/3296; Mild GIN, moderate LICA stenosis since then (inc 10/2020). 4) HTN,   5) Dyslipidemia, (lab for PCP). 6) LBBB 6/2021: ASx       7) Gastric outlet surgery [facility], 4/2021):  Distal gastrectomy w/ Kenyatta-en-Y gastric jejunostomy;     *ulceration @ anastomosis site; Xfusion needed (4/2021).   8) Back pain; limits mobility. 9) Dementia 2018; followed by Neuro. Daughter provides most of history. Retired , caregiver since 2000. No nicotine or ethanol. No added salt.     11/2020:  Her memory is poor; she denies WHITTEN although her daughter states she does get short of breath doing daily activities; she is less active now with past; no real regular exercise. No chest pain nor palpitations.     6/2021: Many meds changed after HD hospitalization 2 months ago with gastric outlet obstruction needing surgery & subsequent ulceration. Remains off Plavix & aspirin. Off Coreg. She reports & daughter confirms no chest pain or pressure. Pt reports no dyspnea except \"when running\"; daughter states her WHITTEN is significantly < previous; walked 30 minutes yesterday without symptoms.     IMPRESSION AND PLAN  01. Nonrheumatic aortic (valve) stenosis (I35.0):  S/P bioprosthetic AoVR 2004 and TAVR 3/2015. Stable, despite slowly worsening gradient.     We discussed the signs and symptoms of unstable angina, myocardial infarction and malignant arrhythmia. The patient knows to seek immediate medical attention should they occur. ECG done   02. Hypertension (I10): Adequately controlled. Continue lisinopril 40; add back Coreg if needed. I have made no changes to the present regimen. 03. Mixed hyperlipidemia (E78.2):  Lipid labs drawn by PCP. The patient is tolerating lipid lowering therapy well.   04. 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 s/p PTA of left ICA InStent restenoses. Arterial duplex ultrasound of carotid artery revealed mild InStent restenoses. No active intervention is required at this point. Will continue current medications and risk factor modifications. 05. Gastrointestinal hemorrhage associated with gastrojejunal ulcer (K28.4):  Managed by GI; & aspirin 81 q.day when given permission by GI who she sees in a few days. Okay to stay off Plavix. 06.  Dilated cardiomyopathy (I42.0):  Resolved: EF normalized after TAVR. Cont Med Rx. Her ejection fraction is greater than 35%. No congestive heart failure manifestations. does not meed the criteria for a primary prevention ICD. 07. Palpitations (R00.2):  C/W PACs/PVCs:  Holter/EVR if recurrent/progressive palpitations; patient knows to call us. 08. Body mass index [BMI]30.0-30.9, adult (Z68.30): The patient was instructed on AHA diet and regular exercise.      ORDERS:  1 ECG done     2 Carotid Duplex in 4 Months.     3 Return office visit with Stephanie Dobbins MD in 1 Year.     4 The patient was instructed on AHA diet and regular exercise.           6/24/21 MEDICATION LIST  Medication Sig Desc   glimepiride 1 mg tablet take 1 tablet by oral route  every day   levothyroxine 50 mcg capsule take 1 capsule by oral route  every day   lisinopril 40 mg tablet take 1 tablet by oral route  every day   lovastatin 10 mg tablet TAKE 1 TABLET EVERY DAY WITH THE EVENING MEAL   memantine 10 mg tablet take 1 tablet by oral route 2 times every day   metformin 500 mg tablet take 1/2  tablet by oral route 2 times every day with morning and evening meals   multivitamin tablet take 1 tablet by oral route  every day with food   pantoprazole 40 mg tablet,delayed release take 1 tablet by oral route  every day   paroxetine 30 mg tablet take 1 tablet (30MG)  by oral route  every day   Vitamin B-12 1,000 mcg tablet daily         _________________________________________________________________________________  CARDIAC HISTORY  CAD:  1 Cath [Minimal CAD] - 8/2004   2 Pleuritic Chest Pain - 12/2004   3 Atypical Chest Pain [Normal MPI] - 4/28/2009   4 Recurrent Chest Pain [Cath, No Significant CAD] - 1/2010      ARRHYTHMIA:  1 Intermittent Palpitations [Neg holters]      PVD:  1 Left Carotid Stent [Every other year u/s (even years). ] - 3/2005   2 TIA      VALVULAR:  1 AVR (#21 Deanne-Larsen) - 11/2004   2 Severe AS (Echo (EF 0.45 (45%), Mild LVH, Mild MR, Mild AR, Prosthetic AV, Mild TR, AV Peak 95 mmHg, AV Mean 64 mmHg, Est RVSP 51 mmHg, Normal RV. Bioprosthetic aortic valve appears to have severe AS.) - 1/29/2015) - 1/2015   3 TAVR (CV Surgery (transfemerol transcatheter aortic valve Missy XT valve) - 3/5/2015) - 3/2015      RISK FACTORS:  1 Dyslipidemia   2 Hypertension   3 Peripheral Vascular Disease   4 Diabetes   5 Family History of CAD [Less than 61years of age]   6 Tobacco Use         CARDIOVASCULAR PROCEDURES  Procedure Date Results   Abd Ao Duplex 12/17/2014 Abd Ao screening normal   SUSY 11/19/2014 No evidence of arterial insufficiency at rest. Bilateral SUSY's >1.0   Carotid CTA 10/25/2018 Flow limiting blockage in the distal left common carotid artery within the stent of 80-90%   Carotid Duplex 10/21/2020 Right:  1-49% proximal ICA stenosis with mild hemodynamic significance. The vertebral artery waveforms are antegrade.     Left:  Evidence of internal carotid artery in stent restenosis of 50-69% at the proximal end of the stent. The vertebral artery waveforms are antegrade. Carotid Duplex 07/08/2020 Right:  1-49% proximal ICA stenosis with mild hemodynamic significance. The vertebral artery waveforms are antegrade.     Left:  Evidence of internal carotid artery in stent restenosis of 50-69% at the proximal end of the stent. The vertebral artery waveforms are antegrade. Carotid Duplex 01/03/2020 Right:  1-49% proximal ICA stenosis with mild hemodynamic significance. The vertebral artery waveforms are antegrade.     Left:  >50% common carotid artery stenosis. 1-49% proximal ICA stenosis with mild hemodynamic significance. The vertebral artery waveforms are antegrade. Carotid Duplex 06/18/2019 50 - 69% Left ICA, 1 - 49% Right ICA, Vertebral: Bilateral Antegrade Flow   Carotid Duplex 10/17/2018 1 - 49% Right ICA, 70 - 99% Left ICA, Vertebral: Bilateral Antegrade Flow, Patient aware of results; CTA pending.    Carotid Duplex 11/09/2016 1 - 49% Bilateral ICAs, Vertebral: Bilateral Antegrade Flow   Carotid Duplex 10/01/2014 1 - 49% Right ICA, Vertebral: Bilateral Antegrade Flow, Patent left ICA stent. No change vs 11/2013, 10/2011. Cath 11/19/2018 Symptomatic left internal carotid artery in-stent restenosis. Successful angioplasty with 5.5 x 20mm balloon. Cath 02/11/2015 EF 0.35 (35%), Minimal CAD, mild-mod MR; AoV mean 57, 0.53 cm2. Cath 01/13/2010 Normal EF, 30% RCA, No significant CAD; Trivial AI. No change vs 8/2004. Chest CT   No PE   CV Surgery 03/05/2015 transfemerol TAVR: Missy XT valve   CV Surgery   AVR (#21 Deanne-Larsen)   Echo 06/07/2021 EF 0.50 (50%), EF range 50%-55%, Low-normal LV function. Normal RV. Mild LVH. Aortic valve is difficult to visualize (valve in valve). Mean gradient 35, c/w moderate AS. Mild MR. PAp 62. LA 4.   Echo 12/08/2020 EF range 55%-60%, Normal LV function. The LV EF was estimated to be 55%. Normal RV. Mild-to-moderate LVH. LA 4. PAp 44. Mild MR. Bioprosthetic aortic valve is difficult to visualize (\"valve-in-valve\"); no abnormal rocking or perivalvular leak; 4.5 m/s with mean gradient 53, c/w severe AS. Echo 12/17/2019 EF 0.60 (60%), Normal RV. Mild LVH. LA 4.3. PAp 46. TAVR intact with mean gradient 37, peak velocity 3.9. Echo 03/09/2016 EF 0.60 (60%), Mild MR, Mild LVH, Impaired Relaxation Diastolic Dysfunction, LA Diam 4.58 cm, AV Peak 43 mmHg, AV Mean 27 mmHg, Est RVSP 41 mmHg, Normal RV. Prosthetic valve in aortic position has appropriate function. Echo 08/19/2015 EF 0.55 (55%), Impaired Relaxation Diastolic Dysfunction, Mild TR, Mild LVH, AV Peak 38 mmHg, AV Mean 22 mmHg, AV Area 0.9 cm2, Est RVSP 38 mmHg, TAVR with appropriate function; trivial AI. Normal RV. Echo 04/02/2015 EF 0.50 (50%), Mild TR, Prosthetic AV, Mild MR, AV Peak 30 mmHg, AV Mean 19 mmHg, Est RVSP 33 mmHg, @ [facility]. Echo 03/06/2015 EF 0.30 (30%), @ [facility].    Echo 01/29/2015 EF 0.45 (45%), Mild LVH, Mild MR, Mild AR, Prosthetic AV, Mild TR, AV Peak 95 mmHg, AV Mean 64 mmHg, Est RVSP 51 mmHg, Normal RV. Bioprosthetic aortic valve appears to have severe AS. Echo 2012 EF 0.60, Mild LVH, Prosthetic AV, Mild-Moderate TR, AV Peak 26 mmHg, AV Mean 15 mmHg, AV Area 1.0 cm2, Est RVSP 48 mmHg   Echo   Normal EF, Bioprosthetic AV (Mn 18), mild TR   EKG 2021 Sinus Rhythm, LBBB   Holter   Unremarkable; No change vs . MPI 2014 EF 0.48 (48%), No Ischemia, 4:26. No change vs 3/2012, 2009. Periph Intervention   Stent: Left Carotid   GURJIT   Normal EF, Negative for Endocarditis   Venous Duplex 2014 No DVT           For other plans, see orders.   Hospital problem list     Active Hospital Problems    Diagnosis Date Noted    NSTEMI (non-ST elevated myocardial infarction) ([facility] [address] 2021        Subjective: Margi Balls reports       Chest pain X none  consistent with:  Non-cardiac CP         Atypical CP     None now  On going  Anginal CP     Dyspnea X none  at rest  with exertion         improved  unchanged  worse              PND X none  overnight       Orthopnea X none  improved  unchanged  worse   Presyncope X none  improved  unchanged  worse     Ambulated in hallway without symptoms   Yes   Ambulated in room without symptoms  Yes   Objective:     Physical Exam:  Overall VSSAF;    Visit Vitals  /75 (BP 1 Location: Right upper arm, BP Patient Position: At rest)   Pulse 90   Temp 97.8 °F (36.6 °C)   Resp 18   LMP  (LMP Unknown)   SpO2 97%     Temp (24hrs), Av.1 °F (36.7 °C), Min:97.7 °F (36.5 °C), Max:98.7 °F (37.1 °C)    Patient Vitals for the past 8 hrs:   Pulse   21 0739 90   21 0700 91   21 0645 91   21 0630 87   21 0615 85   21 0600 85   21 0545 87   21 0530 90   21 0520 94   21 0500 88   21 0445 88   21 0442 89   21 0440 88     Patient Vitals for the past 8 hrs:   Resp   21 0739 18   21 0440 16 Patient Vitals for the past 8 hrs:   BP   09/12/21 0739 100/75   09/12/21 0700 98/68   09/12/21 0645 113/79   09/12/21 0630 101/69   09/12/21 0615 (!) 88/67   09/12/21 0600 91/62   09/12/21 0545 91/67   09/12/21 0530 101/68   09/12/21 0520 (!) 93/57   09/12/21 0500 (!) 80/52   09/12/21 0445 (!) 83/57   09/12/21 0442 (!) 82/55   09/12/21 0440 (!) 86/57     No intake or output data in the 24 hours ending 09/12/21 1009  General Appearance: Well developed, well nourished, no acute distress. Elderly. Ears/Nose/Mouth/Throat:   Normal MM; anicteric. JVP: WNL   Resp:   Lungs clear to auscultation bilaterally. Nl resp effort. Cardiovascular:  RRR, S1, S2 normal, no new murmur. No gallop or rub. Abdomen:   Soft, non-tender, bowel sounds are present. Extremities: No edema bilaterally. Skin:  Neuro: Warm and dry. A/O but pleasant dementia, grossly nonfocal       cath site intact w/o hematoma or new bruit; distal pulse unchanged x Yes   Data Review:     Telemetry independently reviewed x sinus  chronic afib  parox afib  NSVT     ECG independently reviewed  NSR  afib  no significant changes  NSST-Tw chgs     x no new ECG provided for review   Lab results reviewed as noted below. Current medications reviewed as noted below. No results for input(s): PH, PCO2, PO2 in the last 72 hours.   Recent Labs     09/10/21  1428   TROIQ 0.29*     Recent Labs     09/11/21  0421 09/10/21  1428    142   K 3.8 3.7   * 109*   CO2 24 24   BUN 16 19   CREA 0.60 0.57   GFRAA >60 >60   * 120*   CA 9.2 8.3*   ALB 3.4* 3.0*   WBC 9.8 8.8   HGB 10.0* 8.5*   HCT 35.0 28.6*    195     Lab Results   Component Value Date/Time    Cholesterol, total 159 03/09/2021 09:40 AM    HDL Cholesterol 75 03/09/2021 09:40 AM    LDL, calculated 64 03/09/2021 09:40 AM    LDL, calculated 66 02/24/2020 10:15 AM    Triglyceride 113 03/09/2021 09:40 AM    CHOL/HDL Ratio 2.0 09/09/2010 10:07 AM     Recent Labs     09/11/21  0421 09/10/21  1428   AP 77 64   TP 7.2 6.3*   ALB 3.4* 3.0*   GLOB 3.8 3.3     No results for input(s): INR, PTP, APTT, INREXT, INREXT in the last 72 hours.    No components found for: GLPOC    Current Facility-Administered Medications   Medication Dose Route Frequency    [Held by provider] carvediloL (COREG) tablet 3.125 mg  3.125 mg Oral BID WITH MEALS    spironolactone (ALDACTONE) tablet 12.5 mg  12.5 mg Oral DAILY    sacubitriL-valsartan (ENTRESTO) 24-26 mg tablet 1 Tablet  1 Tablet Oral Q12H    fluticasone propionate (FLONASE) 50 mcg/actuation nasal spray 2 Spray  2 Spray Both Nostrils DAILY    levothyroxine (SYNTHROID) tablet 50 mcg  50 mcg Oral 6am    atorvastatin (LIPITOR) tablet 10 mg  10 mg Oral DAILY    pantoprazole (PROTONIX) tablet 40 mg  40 mg Oral DAILY    PARoxetine (PAXIL) tablet 30 mg  30 mg Oral DAILY    sodium chloride (NS) flush 5-40 mL  5-40 mL IntraVENous Q8H    sodium chloride (NS) flush 5-40 mL  5-40 mL IntraVENous PRN    acetaminophen (TYLENOL) tablet 650 mg  650 mg Oral Q6H PRN    Or    acetaminophen (TYLENOL) suppository 650 mg  650 mg Rectal Q6H PRN    polyethylene glycol (MIRALAX) packet 17 g  17 g Oral DAILY PRN    promethazine (PHENERGAN) tablet 12.5 mg  12.5 mg Oral Q6H PRN    Or    ondansetron (ZOFRAN) injection 4 mg  4 mg IntraVENous Q6H PRN    insulin lispro (HUMALOG) injection   SubCUTAneous AC&HS    glucose chewable tablet 16 g  4 Tablet Oral PRN    dextrose (D50W) injection syrg 12.5-25 g  12.5-25 g IntraVENous PRN    glucagon (GLUCAGEN) injection 1 mg  1 mg IntraMUSCular PRN        Ekta Arnold MD

## 2021-09-12 NOTE — PROGRESS NOTES
Bedside shift change report given to Shandra Lin RN (oncoming nurse) by Moreno Hernandez RN (offgoing nurse). Report included the following information SBAR, Kardex, Procedure Summary, Intake/Output, MAR, Accordion, Recent Results, Med Rec Status, Cardiac Rhythm Sinus Tach, Alarm Parameters , Pre Procedure Checklist, Procedure Verification, Quality Measures and Dual Neuro Assessment.

## 2021-09-12 NOTE — PROGRESS NOTES
Bedside shift change report given to Simon Davalos RN (oncoming nurse) by Shandra Lin RN (offgoing nurse). Report included the following information SBAR, Kardex, Procedure Summary, Intake/Output, MAR, Accordion, Recent Results, Med Rec Status, Cardiac Rhythm Sinus Tach, Alarm Parameters , Pre Procedure Checklist, Procedure Verification, Quality Measures and Dual Neuro Assessment.

## 2021-09-12 NOTE — PROGRESS NOTES
Transition of Care Plan:    RUR: 9% - low  Disposition: Home with daughter and possible HH, PT/OT evals pending  Follow up appointments: PCP, Cardiology  DME needed: TBD  Transportation at Discharge: Daughter  [address] or means to access home:   Yes     IM Medicare Letter: To be provided by Unit CM  Is patient a BCPI-A Bundle:  No        Caregiver Contact: DaughterAna ([phone]  Discharge Caregiver contacted prior to discharge? Unit CM to contact daughter prior to d/c      Reason for Admission:  NSTEMI                   RUR Score:  9%                   Plan for utilizing home health:   Possible HH needs. PT/OT evals pending       PCP: First and Last name:  Homar Romeo MD   [facility] of [facility]   Are you a current patient: Yes/No: Yes   Approximate date of last visit: 9/3/21   Can you participate in a virtual visit with your PCP: With daughter's assistance                    Current Advanced Directive/Advance Care Plan: Full Code. AD on file from 2015. Healthcare Decision Maker:     Primary Decision Maker: Sukhdev Durbins - Daughter - [phone]    Primary Decision Maker: Yuridia Otto - Child - [phone]                  Transition of Care Plan:  Home with daughter and possible HH    CM spoke with patient's daughter via bedside telephone to complete initial assessment. Pt was asleep during time of assessment. CM introduced role, verified demographics, and discussed discharge planning. Pt is an 77 yo female admitted to [address] [facility] for \"NSTEMI\". PMHx includes aortic valve replacement, DM2, PAD, gastric outlet surgery (June 2021), dementia, etc. Pt, two daughters, and grandson reside together in a single level home with ramp access. Daughter reports pt to be independent with dressing, toileting, and feeding. Pt's family assists will all other daily living tasks including transportation. Daughter reports to be with pt 24/7 for supervision and assistance as needed.  Pt typically ambulates without DME and does not like using her cane. Daughter reports prior hx of HH though could not recall the name of agency. PT/OT evals are currently pending. Daughter will search for the name of the agency previously used in case [facility] is recommended for pt again. Unit CM will follow for discharge planning needs. Care Management Interventions  PCP Verified by CM: Yes (Dr. Sherwin East)  Last Visit to PCP: 09/03/21  Mode of Transport at Discharge: Other (see comment) (daughter)  Transition of Care Consult (CM Consult): Discharge Planning, [address] (anticipate home with [facility])  Discharge Durable Medical Equipment: No  Physical Therapy Consult: Yes  Occupational Therapy Consult: Yes  Speech Therapy Consult: No  Support Systems: Child(allie)  Confirm Follow Up Transport: Family  Discharge Location  Discharge Placement: Home with home health    Vaishali Lamas, [address] [place] [id number] [facility]  [phone]      --------------------------------------------------------------  Lokesh Briggs (ACP) Conversation      Date of Conversation: 9/12/2021  Conducted with: Patient with Decision Making Capacity and Healthcare Decision Maker: Next of Kin by law (only applies in absence of a Healthcare Power of  or Legal Guardian)    Healthcare Decision Maker:     Primary Decision Maker: Niles Ellington - Daughter - [phone]    Primary Decision Maker: Dwight Milian - Child - [phone]    Today we documented Decision Maker(s) consistent with Legal Next of [address]. Content/Action Overview:    Has ACP document(s) on file - reflects the patient's care preferences  Reviewed DNR/DNI and patient elects Full Code (Attempt Resuscitation)    Length of Voluntary ACP Conversation in minutes:  <16 minutes (Non-Billable)    Vaishali Lamas

## 2021-09-13 VITALS
OXYGEN SATURATION: 98 % | TEMPERATURE: 98.1 F | DIASTOLIC BLOOD PRESSURE: 75 MMHG | RESPIRATION RATE: 19 BRPM | HEART RATE: 94 BPM | SYSTOLIC BLOOD PRESSURE: 103 MMHG

## 2021-09-13 PROBLEM — I21.4 NSTEMI (NON-ST ELEVATED MYOCARDIAL INFARCTION) (HCC): Status: RESOLVED | Noted: 2021-09-11 | Resolved: 2021-09-13

## 2021-09-13 LAB
GLUCOSE BLD STRIP.AUTO-MCNC: 128 MG/DL (ref 65–117)
GLUCOSE BLD STRIP.AUTO-MCNC: 182 MG/DL (ref 65–117)
SERVICE CMNT-IMP: ABNORMAL
SERVICE CMNT-IMP: ABNORMAL

## 2021-09-13 PROCEDURE — 74011636637 HC RX REV CODE- 636/637: Performed by: INTERNAL MEDICINE

## 2021-09-13 PROCEDURE — 97165 OT EVAL LOW COMPLEX 30 MIN: CPT | Performed by: OCCUPATIONAL THERAPIST

## 2021-09-13 PROCEDURE — 74011250637 HC RX REV CODE- 250/637: Performed by: INTERNAL MEDICINE

## 2021-09-13 PROCEDURE — 82962 GLUCOSE BLOOD TEST: CPT

## 2021-09-13 PROCEDURE — 74011250637 HC RX REV CODE- 250/637: Performed by: NURSE PRACTITIONER

## 2021-09-13 PROCEDURE — 97116 GAIT TRAINING THERAPY: CPT | Performed by: PHYSICAL THERAPIST

## 2021-09-13 RX ORDER — ASPIRIN 81 MG/1
81 TABLET ORAL DAILY
Qty: 30 TABLET | Refills: 12 | Status: SHIPPED
Start: 2021-09-14

## 2021-09-13 RX ORDER — METOPROLOL SUCCINATE 25 MG/1
12.5 TABLET, EXTENDED RELEASE ORAL DAILY
Qty: 30 TABLET | Refills: 12 | Status: SHIPPED | OUTPATIENT
Start: 2021-09-13

## 2021-09-13 RX ORDER — LOSARTAN POTASSIUM 25 MG/1
12.5 TABLET ORAL EVERY EVENING
Qty: 30 TABLET | Refills: 12 | Status: SHIPPED | OUTPATIENT
Start: 2021-09-13

## 2021-09-13 RX ORDER — LOSARTAN POTASSIUM 25 MG/1
12.5 TABLET ORAL EVERY EVENING
Status: DISCONTINUED | OUTPATIENT
Start: 2021-09-13 | End: 2021-09-13 | Stop reason: HOSPADM

## 2021-09-13 RX ORDER — LANOLIN ALCOHOL/MO/W.PET/CERES
5 CREAM (GRAM) TOPICAL
Status: DISCONTINUED | OUTPATIENT
Start: 2021-09-13 | End: 2021-09-13 | Stop reason: HOSPADM

## 2021-09-13 RX ORDER — METOPROLOL SUCCINATE 25 MG/1
12.5 TABLET, EXTENDED RELEASE ORAL DAILY
Status: DISCONTINUED | OUTPATIENT
Start: 2021-09-13 | End: 2021-09-13 | Stop reason: HOSPADM

## 2021-09-13 RX ADMIN — LEVOTHYROXINE SODIUM 50 MCG: 0.05 TABLET ORAL at 05:51

## 2021-09-13 RX ADMIN — Medication 10 ML: at 05:51

## 2021-09-13 RX ADMIN — INSULIN LISPRO 2 UNITS: 100 INJECTION, SOLUTION INTRAVENOUS; SUBCUTANEOUS at 11:02

## 2021-09-13 RX ADMIN — FLUTICASONE PROPIONATE 2 SPRAY: 50 SPRAY, METERED NASAL at 08:35

## 2021-09-13 RX ADMIN — ASPIRIN 81 MG: 81 TABLET, COATED ORAL at 08:33

## 2021-09-13 RX ADMIN — ACETAMINOPHEN 650 MG: 325 TABLET ORAL at 10:30

## 2021-09-13 RX ADMIN — MELATONIN 4.5 MG: at 01:16

## 2021-09-13 RX ADMIN — METOPROLOL SUCCINATE 12.5 MG: 25 TABLET, EXTENDED RELEASE ORAL at 09:08

## 2021-09-13 RX ADMIN — PAROXETINE HYDROCHLORIDE 30 MG: 20 TABLET, FILM COATED ORAL at 08:33

## 2021-09-13 RX ADMIN — PANTOPRAZOLE SODIUM 40 MG: 40 TABLET, DELAYED RELEASE ORAL at 08:33

## 2021-09-13 RX ADMIN — Medication 10 ML: at 13:21

## 2021-09-13 RX ADMIN — ATORVASTATIN CALCIUM 10 MG: 10 TABLET, FILM COATED ORAL at 08:33

## 2021-09-13 NOTE — CARDIO/PULMONARY
Cardiac Rehab Note: chart review/referral     Possible NSTEMI 9/10/21, cardiac cath 9/10/21:  \"CONCLUSION/post procedure Dx:   1. Mod mid circ disease.      PLAN: FFR of circ on-going.     ADDEN: Neg FFR of circ; Med Rx; D/W Dr Maryann Koch; Optimize med Rx;  future dobutamine echo to eval severity of AS IF pt felt to be candidate for repeat valve procedure. Consider BiV PM if cont CM. \"    Smoking history assessed. Patient is a former smoker. Smoking Cessation Program link has not been added to the AVS.     Patient d/c before seen, HF materials mailed, patient lives in [place], [place], home with [facility], lives with daughter, dementia.

## 2021-09-13 NOTE — PROGRESS NOTES
Bedside shift change report given to Marie Ibarra ([facility] travel nurse) RN (oncoming nurse) by Young Jovel RN (offgoing nurse). Report included the following information SBAR, Kardex, Procedure Summary, Intake/Output, MAR, Accordion, Recent Results, Med Rec Status, Cardiac Rhythm NSR, LBBB, Alarm Parameters , Pre Procedure Checklist, Procedure Verification, Quality Measures and Dual Neuro Assessment.

## 2021-09-13 NOTE — PROGRESS NOTES
Cardiology Progress Note  9/13/2021     Admit Date: 9/10/2021  Admit Diagnosis: NSTEMI (non-ST elevated myocardial infarction) ([facility] [address] [I21.4]  CC: none currently   Cardiac Assessment/Plan:    1) AS: Bioprosthetic AoVR (2004) then TAVR (#23 Missy) for AS/mod LV dysfxn 2/2015; no CAD then; EF normalized. *previous intermittent non-cardiac CP/dyspnea; chronic minor WHITTEN since then. *Slowly worse mean gradient: 19 4/2015; 27 2016; 37 12/2019. 53 12/2020 but 35 6/2021. 2) DM,    3) Carotid disease (LICA stent 6/9885); *worse U/S 42/0519; PTA of LICA restenoses 22/8365; Mild GIN, moderate LICA stenosis since then (inc 10/2020). 4) HTN,   5) Dyslipidemia, (lab for PCP). 6) LBBB 6/2021: ASx       7) Gastric outlet surgery [facility], 4/2021):  Distal gastrectomy w/ Kenyatta-en-Y gastric jejunostomy;     *ulceration @ anastomosis site; Xfusion needed (4/2021). 8) Back pain; limits mobility. 9) Dementia 2018; followed by Neuro. Daughter provides most of history    Transfer from [address] [place] [address] ER after presenting with dyspnea/CP x1; chronic LBBB; trop 0.22 x2. Hg 10.1; New CM: Non-ischemic CM (probable Takotsubo)      9/11: BPs 110-140; -110s; 97% RA. No I/os. Hg 10; K 3.8; Cr 0.6 (nl TSH 9/3/21). Cardiac meds: lipitor 10; lisinopril 40. Rec: change ACEi to entresto; start coreg 3.125 bid; start aldactone 12.5 qday; Need to avoid hypotension with AS.    9/12: BPs 80s-110 (after 2 doses coreg), +Sx; HR 80-90s;     Cardiac meds: asa 81; lipitor 10; aldactone 12.5; coreg 3.125 bid (on hold now); entresto bid pending. Rec: will need to d/c coreg and entresto; also hold aldactone. Try toprol XL 12.5 later today; will need careful med Rx prior to d/c,    9/13: BPs 90-120s; HR 90s; 98% RA. Cardiac meds: asa; lipitor 10; toprol XL 12.5 qday;     Rec: add losartan 12.5 qpm; Not needing diuretics. Dispo per primary team: ok for D/C from cardiac standpoint if ambulating and BPs stable.   F/U with me in 2-3 weeks. Daughter to monitor BPs at home after discharge.    ______________________________________________  Echo 9/10/21:  · LV: Estimated LVEF is 20 - 25%. Mildly dilated left ventricle. Mild concentric hypertrophy. Severely and segmentally reduced systolic function. · LA: Mildly dilated left atrium. · AV: Prosthetic aortic valve. Aortic valve mean gradient is 21 mmHg. Aortic valve area is 0.8 cm2. There is a bioprosthetic aortic valve. · MV: Mitral valve non-specific thickening. Moderate mitral valve regurgitation is present. Mod-to-severe visually; mild by ERO  · TV: Moderate tricuspid valve regurgitation is present. Right Ventricular Arterial Pressure (RVSP) is 62 mmHg. · IVC: Severely elevated central venous pressure (15 mmHg); IVC diameter is larger than 21 mm and collapses less than 50% with respiration. Cath 9/10/21: Nl LAD; mild RCA Dz; Mild-mod Dz mid circ: neg IFR: Med Rx.  ____________________________________________________________________  Javon Simon is a 78 y.o. female presents with elevated troponin and poss NSTEMI .     The patient reports & daughter confirms: no exertional CP; 1 week of gradually worse WHITTEN; no LE edema/orthopnea; Compliant with meds; some salt in diet per pt.     No PND, orthopnea, palpitations, pre-syncope, syncope, peripheral edema. No current complaints.     ECG: sinus tachycardia; LBBB. Tele: sinus  CXR: No CHF on OSH CXR. Notable labs: Hg 10.1; Nl k/Cr; trop 0.2 x2.  _____________________________________________________________________  Notable prior cardiac history:  @ OV 6/24/21:  Ms Yisel Bailey has a h/o:  1) AS: Bioprosthetic AoVR (2004) then TAVR (#23 Missy) for AS/mod LV dysfxn 2/2015; no CAD then; EF normalized. *previous intermittent non-cardiac CP/dyspnea; chronic minor WHITTEN since then. *Slowly worse mean gradient: 19 4/2015; 27 2016; 37 12/2019. 53 12/2020 but 35 6/2021. 2) DM,    3) Carotid disease (LICA stent 5/9807);     *worse U/S 10/2018; PTA of LICA restenoses 97/0834; Mild GIN, moderate LICA stenosis since then (inc 10/2020). 4) HTN,   5) Dyslipidemia, (lab for PCP). 6) LBBB 6/2021: ASx       7) Gastric outlet surgery [facility], 4/2021):  Distal gastrectomy w/ Kenyatta-en-Y gastric jejunostomy;     *ulceration @ anastomosis site; Xfusion needed (4/2021). 8) Back pain; limits mobility. 9) Dementia 2018; followed by Neuro. Daughter provides most of history. Retired , caregiver since 2000. No nicotine or ethanol. No added salt.     11/2020:  Her memory is poor; she denies WHITTEN although her daughter states she does get short of breath doing daily activities; she is less active now with past; no real regular exercise. No chest pain nor palpitations.     6/2021: Many meds changed after HD hospitalization 2 months ago with gastric outlet obstruction needing surgery & subsequent ulceration. Remains off Plavix & aspirin. Off Coreg. She reports & daughter confirms no chest pain or pressure. Pt reports no dyspnea except \"when running\"; daughter states her WHITTEN is significantly < previous; walked 30 minutes yesterday without symptoms.     IMPRESSION AND PLAN  01. Nonrheumatic aortic (valve) stenosis (I35.0):  S/P bioprosthetic AoVR 2004 and TAVR 3/2015. Stable, despite slowly worsening gradient.     We discussed the signs and symptoms of unstable angina, myocardial infarction and malignant arrhythmia. The patient knows to seek immediate medical attention should they occur. ECG done   02. Hypertension (I10): Adequately controlled. Continue lisinopril 40; add back Coreg if needed. I have made no changes to the present regimen. 03. Mixed hyperlipidemia (E78.2):  Lipid labs drawn by PCP. The patient is tolerating lipid lowering therapy well.   04. 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 s/p PTA of left ICA InStent restenoses. Arterial duplex ultrasound of carotid artery revealed mild InStent restenoses.   No active intervention is required at this point. Will continue current medications and risk factor modifications. 05. Gastrointestinal hemorrhage associated with gastrojejunal ulcer (K28.4):  Managed by GI; & aspirin 81 q.day when given permission by GI who she sees in a few days. Okay to stay off Plavix. 06. Dilated cardiomyopathy (I42.0):  Resolved: EF normalized after TAVR. Cont Med Rx. Her ejection fraction is greater than 35%. No congestive heart failure manifestations. does not meed the criteria for a primary prevention ICD. 07. Palpitations (R00.2):  C/W PACs/PVCs:  Holter/EVR if recurrent/progressive palpitations; patient knows to call us. 08. Body mass index [BMI]30.0-30.9, adult (Z68.30): The patient was instructed on AHA diet and regular exercise.      ORDERS:  1 ECG done     2 Carotid Duplex in 4 Months.     3 Return office visit with Tricia Mc.  Shilpi ZAPIEN in 1 Year.     4 The patient was instructed on AHA diet and regular exercise.           6/24/21 MEDICATION LIST  Medication Sig Desc   glimepiride 1 mg tablet take 1 tablet by oral route  every day   levothyroxine 50 mcg capsule take 1 capsule by oral route  every day   lisinopril 40 mg tablet take 1 tablet by oral route  every day   lovastatin 10 mg tablet TAKE 1 TABLET EVERY DAY WITH THE EVENING MEAL   memantine 10 mg tablet take 1 tablet by oral route 2 times every day   metformin 500 mg tablet take 1/2  tablet by oral route 2 times every day with morning and evening meals   multivitamin tablet take 1 tablet by oral route  every day with food   pantoprazole 40 mg tablet,delayed release take 1 tablet by oral route  every day   paroxetine 30 mg tablet take 1 tablet (30MG)  by oral route  every day   Vitamin B-12 1,000 mcg tablet daily         _________________________________________________________________________________  CARDIAC HISTORY  CAD:  1 Cath [Minimal CAD] - 8/2004   2 Pleuritic Chest Pain - 12/2004   3 Atypical Chest Pain [Normal MPI] - 4/28/2009   4 Recurrent Chest Pain [Cath, No Significant CAD] - 1/2010      ARRHYTHMIA:  1 Intermittent Palpitations [Neg holters]      PVD:  1 Left Carotid Stent [Every other year u/s (even years). ] - 3/2005   2 TIA      VALVULAR:  1 AVR (#21 Deanne-Larsen) - 11/2004   2 Severe AS (Echo (EF 0.45 (45%), Mild LVH, Mild MR, Mild AR, Prosthetic AV, Mild TR, AV Peak 95 mmHg, AV Mean 64 mmHg, Est RVSP 51 mmHg, Normal RV. Bioprosthetic aortic valve appears to have severe AS.) - 1/29/2015) - 1/2015   3 TAVR (CV Surgery (transfemerol transcatheter aortic valve Missy XT valve) - 3/5/2015) - 3/2015      RISK FACTORS:  1 Dyslipidemia   2 Hypertension   3 Peripheral Vascular Disease   4 Diabetes   5 Family History of CAD [Less than 61years of age]   6 Tobacco Use         CARDIOVASCULAR PROCEDURES  Procedure Date Results   Abd Ao Duplex 12/17/2014 Abd Ao screening normal   SUSY 11/19/2014 No evidence of arterial insufficiency at rest. Bilateral SUSY's >1.0   Carotid CTA 10/25/2018 Flow limiting blockage in the distal left common carotid artery within the stent of 80-90%   Carotid Duplex 10/21/2020 Right:  1-49% proximal ICA stenosis with mild hemodynamic significance. The vertebral artery waveforms are antegrade.     Left:  Evidence of internal carotid artery in stent restenosis of 50-69% at the proximal end of the stent. The vertebral artery waveforms are antegrade. Carotid Duplex 07/08/2020 Right:  1-49% proximal ICA stenosis with mild hemodynamic significance. The vertebral artery waveforms are antegrade.     Left:  Evidence of internal carotid artery in stent restenosis of 50-69% at the proximal end of the stent. The vertebral artery waveforms are antegrade. Carotid Duplex 01/03/2020 Right:  1-49% proximal ICA stenosis with mild hemodynamic significance. The vertebral artery waveforms are antegrade.     Left:  >50% common carotid artery stenosis. 1-49% proximal ICA stenosis with mild hemodynamic significance.  The vertebral artery waveforms are antegrade. Carotid Duplex 06/18/2019 50 - 69% Left ICA, 1 - 49% Right ICA, Vertebral: Bilateral Antegrade Flow   Carotid Duplex 10/17/2018 1 - 49% Right ICA, 70 - 99% Left ICA, Vertebral: Bilateral Antegrade Flow, Patient aware of results; CTA pending. Carotid Duplex 11/09/2016 1 - 49% Bilateral ICAs, Vertebral: Bilateral Antegrade Flow   Carotid Duplex 10/01/2014 1 - 49% Right ICA, Vertebral: Bilateral Antegrade Flow, Patent left ICA stent. No change vs 11/2013, 10/2011. Cath 11/19/2018 Symptomatic left internal carotid artery in-stent restenosis. Successful angioplasty with 5.5 x 20mm balloon. Cath 02/11/2015 EF 0.35 (35%), Minimal CAD, mild-mod MR; AoV mean 57, 0.53 cm2. Cath 01/13/2010 Normal EF, 30% RCA, No significant CAD; Trivial AI. No change vs 8/2004. Chest CT   No PE   CV Surgery 03/05/2015 transfemerol TAVR: Missy XT valve   CV Surgery   AVR (#21 Deanne-Larsen)   Echo 06/07/2021 EF 0.50 (50%), EF range 50%-55%, Low-normal LV function. Normal RV. Mild LVH. Aortic valve is difficult to visualize (valve in valve). Mean gradient 35, c/w moderate AS. Mild MR. PAp 62. LA 4.   Echo 12/08/2020 EF range 55%-60%, Normal LV function. The LV EF was estimated to be 55%. Normal RV. Mild-to-moderate LVH. LA 4. PAp 44. Mild MR. Bioprosthetic aortic valve is difficult to visualize (\"valve-in-valve\"); no abnormal rocking or perivalvular leak; 4.5 m/s with mean gradient 53, c/w severe AS. Echo 12/17/2019 EF 0.60 (60%), Normal RV. Mild LVH. LA 4.3. PAp 46. TAVR intact with mean gradient 37, peak velocity 3.9. Echo 03/09/2016 EF 0.60 (60%), Mild MR, Mild LVH, Impaired Relaxation Diastolic Dysfunction, LA Diam 4.58 cm, AV Peak 43 mmHg, AV Mean 27 mmHg, Est RVSP 41 mmHg, Normal RV. Prosthetic valve in aortic position has appropriate function.    Echo 08/19/2015 EF 0.55 (55%), Impaired Relaxation Diastolic Dysfunction, Mild TR, Mild LVH, AV Peak 38 mmHg, AV Mean 22 mmHg, AV Area 0.9 cm2, Est RVSP 38 mmHg, TAVR with appropriate function; trivial AI. Normal RV. Echo 2015 EF 0.50 (50%), Mild TR, Prosthetic AV, Mild MR, AV Peak 30 mmHg, AV Mean 19 mmHg, Est RVSP 33 mmHg, @ [facility]. Echo 2015 EF 0.30 (30%), @ [facility]. Echo 2015 EF 0.45 (45%), Mild LVH, Mild MR, Mild AR, Prosthetic AV, Mild TR, AV Peak 95 mmHg, AV Mean 64 mmHg, Est RVSP 51 mmHg, Normal RV. Bioprosthetic aortic valve appears to have severe AS. Echo 2012 EF 0.60, Mild LVH, Prosthetic AV, Mild-Moderate TR, AV Peak 26 mmHg, AV Mean 15 mmHg, AV Area 1.0 cm2, Est RVSP 48 mmHg   Echo   Normal EF, Bioprosthetic AV (Mn 18), mild TR   EKG 2021 Sinus Rhythm, LBBB   Holter   Unremarkable; No change vs . MPI 2014 EF 0.48 (48%), No Ischemia, 4:26. No change vs 3/2012, 2009. Periph Intervention   Stent: Left Carotid   GURJIT   Normal EF, Negative for Endocarditis   Venous Duplex 2014 No DVT           For other plans, see orders.   Hospital problem list     Active Hospital Problems    Diagnosis Date Noted    NSTEMI (non-ST elevated myocardial infarction) ([facility] [address] 2021        Subjective: Jeanette Orellana reports       Chest pain X none  consistent with:  Non-cardiac CP         Atypical CP     None now  On going  Anginal CP     Dyspnea X none  at rest  with exertion         improved  unchanged  worse              PND X none  overnight       Orthopnea X none  improved  unchanged  worse   Presyncope X none  improved  unchanged  worse     Ambulated in hallway without symptoms   Yes   Ambulated in room without symptoms  Yes   Objective:     Physical Exam:  Overall VSSAF;    Visit Vitals  BP 90/75 (BP 1 Location: Right upper arm, BP Patient Position: At rest)   Pulse 90   Temp 97.3 °F (36.3 °C)   Resp 15   LMP  (LMP Unknown)   SpO2 98%     Temp (24hrs), Av.6 °F (36.4 °C), Min:97.3 °F (36.3 °C), Max:98 °F (36.7 °C)    Patient Vitals for the past 8 hrs:   Pulse   21 0733 90 09/13/21 0400 93   09/13/21 0201 98     Patient Vitals for the past 8 hrs:   Resp   09/13/21 0733 15   09/13/21 0400 16     Patient Vitals for the past 8 hrs:   BP   09/13/21 0733 90/75   09/13/21 0400 99/66   09/13/21 0201 110/74       Intake/Output Summary (Last 24 hours) at 9/13/2021 0835  Last data filed at 9/13/2021 0441  Gross per 24 hour   Intake 580 ml   Output 200 ml   Net 380 ml     General Appearance: Well developed, well nourished, no acute distress. Elderly. Ears/Nose/Mouth/Throat:   Normal MM; anicteric. JVP: WNL   Resp:   Lungs clear to auscultation bilaterally. Nl resp effort. Cardiovascular:  RRR, S1, S2 normal, no new murmur. No gallop or rub. Abdomen:   Soft, non-tender, bowel sounds are present. Extremities: No edema bilaterally. Skin:  Neuro: Warm and dry. A/O but pleasant dementia, grossly nonfocal       cath site intact w/o hematoma or new bruit; distal pulse unchanged x Yes   Data Review:     Telemetry independently reviewed x sinus  chronic afib  parox afib  NSVT     ECG independently reviewed  NSR  afib  no significant changes  NSST-Tw chgs     x no new ECG provided for review   Lab results reviewed as noted below. Current medications reviewed as noted below. No results for input(s): PH, PCO2, PO2 in the last 72 hours.   Recent Labs     09/10/21  1428   TROIQ 0.29*     Recent Labs     09/11/21  0421 09/10/21  1428    142   K 3.8 3.7   * 109*   CO2 24 24   BUN 16 19   CREA 0.60 0.57   GFRAA >60 >60   * 120*   CA 9.2 8.3*   ALB 3.4* 3.0*   WBC 9.8 8.8   HGB 10.0* 8.5*   HCT 35.0 28.6*    195     Lab Results   Component Value Date/Time    Cholesterol, total 159 03/09/2021 09:40 AM    HDL Cholesterol 75 03/09/2021 09:40 AM    LDL, calculated 64 03/09/2021 09:40 AM    LDL, calculated 66 02/24/2020 10:15 AM    Triglyceride 113 03/09/2021 09:40 AM    CHOL/HDL Ratio 2.0 09/09/2010 10:07 AM     Recent Labs     09/11/21  0421 09/10/21  1428   AP 77 64 TP 7.2 6.3*   ALB 3.4* 3.0*   GLOB 3.8 3.3     No results for input(s): INR, PTP, APTT, INREXT, INREXT in the last 72 hours.    No components found for: GLPOC    Current Facility-Administered Medications   Medication Dose Route Frequency    melatonin tablet 4.5 mg  4.5 mg Oral QHS PRN    metoprolol succinate (TOPROL-XL) XL tablet 12.5 mg  12.5 mg Oral DAILY    aspirin delayed-release tablet 81 mg  81 mg Oral DAILY    [Held by provider] spironolactone (ALDACTONE) tablet 12.5 mg  12.5 mg Oral DAILY    fluticasone propionate (FLONASE) 50 mcg/actuation nasal spray 2 Spray  2 Spray Both Nostrils DAILY    levothyroxine (SYNTHROID) tablet 50 mcg  50 mcg Oral 6am    atorvastatin (LIPITOR) tablet 10 mg  10 mg Oral DAILY    pantoprazole (PROTONIX) tablet 40 mg  40 mg Oral DAILY    PARoxetine (PAXIL) tablet 30 mg  30 mg Oral DAILY    sodium chloride (NS) flush 5-40 mL  5-40 mL IntraVENous Q8H    sodium chloride (NS) flush 5-40 mL  5-40 mL IntraVENous PRN    acetaminophen (TYLENOL) tablet 650 mg  650 mg Oral Q6H PRN    Or    acetaminophen (TYLENOL) suppository 650 mg  650 mg Rectal Q6H PRN    polyethylene glycol (MIRALAX) packet 17 g  17 g Oral DAILY PRN    promethazine (PHENERGAN) tablet 12.5 mg  12.5 mg Oral Q6H PRN    Or    ondansetron (ZOFRAN) injection 4 mg  4 mg IntraVENous Q6H PRN    insulin lispro (HUMALOG) injection   SubCUTAneous AC&HS    glucose chewable tablet 16 g  4 Tablet Oral PRN    dextrose (D50W) injection syrg 12.5-25 g  12.5-25 g IntraVENous PRN    glucagon (GLUCAGEN) injection 1 mg  1 mg IntraMUSCular PRN        Alfreda Givens MD

## 2021-09-13 NOTE — PROGRESS NOTES
OCCUPATIONAL THERAPY EVALUATION/DISCHARGE  Patient: Kenneth Major (81 y.o. female)  Date: 9/13/2021  Primary Diagnosis: NSTEMI (non-ST elevated myocardial infarction) ([facility] [address] [I21.4]  Procedure(s) (LRB):  LEFT HEART CATH / CORONARY ANGIOGRAPHY (N/A)  Ultrasound Guided Vascular Access (N/A)  Fractional Flow Reserve (N/A)  Coronary Angiography (N/A) 3 Days Post-Op   Precautions:   Fall    ASSESSMENT  Based on the objective data described below, the patient presents with decreased standing balance, chronic back pain, SOB and dementia related cognitive impairments at baseline. Patient is presently demonstrating the ability to perform ADLs and functional mobility at her supervision/setup to SBA baseline. She is ambulating holding onto walls and furniture and she becomes easily SOB during functional activity, as she does at baseline. She is taking rest breaks appropriately and she did not experience any LOB during this session. At this time the patient is without further OT needs, acutely and after discharge. Patient's daughter was present t/o evaluation and she is in agreement that the patient is at, or very near her baseline. Functional Outcome Measure: The patient scored 60/100 on the Barthel Index outcome measure which is indicative of a 40% decline in function. PLAN :  Recommendation for discharge: (in order for the patient to meet his/her long term goals)  No skilled occupational therapy/ follow up rehabilitation needs identified at this time. Equipment recommendations for successful discharge: None       OBJECTIVE DATA SUMMARY:   HISTORY:   Past Medical History:   Diagnosis Date    Anemia     Atypical chest pain     Long h/o intermittent non-cardiac CP.     Depression with anxiety     Diabetes ([facility] [address]     GERD (gastroesophageal reflux disease)     Hypercholesteremia     Hyperlipidemia 7/3/2013    Hypertension     Inner ear dysfunction     S/P aortic valve replacement     Dr. Melissa Salter yearly    [facility] 7/[facility]    Vitamin D deficiency      Past Surgical History:   Procedure Laterality Date    COLONOSCOPY N/A 4/22/2019    COLONOSCOPY performed by Tammy Cuevas MD at [address] HX AORTIC VALVE REPLACEMENT  1999    Bovine valve    HX CATARACT REMOVAL      HX CHOLECYSTECTOMY  1999    HX MOHS PROCEDURES Left 2014       Prior Level of Function/Environment/Context: Patient lives with her daughter who provides 24 hour supervision. Patient performs ADLs and ambulates holding onto furniture at a supervision/setup level. Patient is SOB with minimal activity, requiring frequent rest breaks at baseline. She also has chronic back pain  Expanded or extensive additional review of patient history:   Home Situation  Home Environment: Private residence  Wheelchair Ramp: Yes  Living Alone: No  Support Systems: Child(allie)  Patient Expects to be Discharged to[de-identified] House  Current DME Used/Available at Home: Cane, straight, Wheelchair, shower chair      Hand dominance: Right    EXAMINATION OF PERFORMANCE DEFICITS:  Cognitive/Behavioral Status:  Neurologic State: Alert; Appropriate for age;Eyes open spontaneously;Confused (dementia)  Orientation Level: Oriented to person;Oriented to place; Disoriented to situation;Disoriented to time  Cognition: Follows commands;Memory loss             Hearing:   WFL    Vision/Perceptual:     Impaired vision 2/2 macular degeneration. Range of Motion:  AROM: Within functional limits    Strength:  Strength:  Within functional limits  Coordination:  Coordination: Within functional limits            Tone & Sensation:  Tone: Normal  Sensation: Intact    Balance:  Sitting: Intact  Standing: Impaired  Standing - Static: Good  Standing - Dynamic : Fair    Functional Mobility and Transfers for ADLs:  Bed Mobility:  Supine to Sit: Stand-by assistance  Sit to Supine: Stand-by assistance    Transfers:  Sit to Stand: Stand-by assistance  Stand to Sit: Stand-by assistance  Bed to Chair: Supervision  Bathroom Mobility: Stand-by assistance (ambulating without an AD)  Toilet Transfer : Supervision    ADL Assessment:  Feeding: Independent    Oral Facial Hygiene/Grooming: Supervision    Bathing: Supervision;Setup (sits on shower seat)    Upper Body Dressing: Supervision;Setup    Lower Body Dressing: Supervision;Setup    Toileting: Supervision           Functional Measure:  Barthel Index:    Bathin  Bladder: 5  Bowels: 10  Groomin  Dressin  Feeding: 10  Mobility: 10  Stairs: 5  Toilet Use: 5  Transfer (Bed to Chair and Back): 10  Total: 60/100        The Barthel ADL Index: Guidelines  1. The index should be used as a record of what a patient does, not as a record of what a patient could do. 2. The main aim is to establish degree of independence from any help, physical or verbal, however minor and for whatever reason. 3. The need for supervision renders the patient not independent. 4. A patient's performance should be established using the best available evidence. Asking the patient, friends/relatives and nurses are the usual sources, but direct observation and common sense are also important. However direct testing is not needed. 5. Usually the patient's performance over the preceding 24-48 hours is important, but occasionally longer periods will be relevant. 6. Middle categories imply that the patient supplies over 50 per cent of the effort. 7. Use of aids to be independent is allowed. Oswald Hall., Barthel, D.W. ([id number]). Functional evaluation: the Barthel Index. [facility] (14)2. MORRIS Muse, Peg Jalloh., Fiorella Dash., [place], [address] [place] [address] (). Measuring the change indisability after inpatient rehabilitation; comparison of the responsiveness of the Barthel Index and Functional [place] Measure. Journal of Neurology, Neurosurgery, and Psychiatry, 66(4), 898-415.   Shari Ruth, [place].BRET, GERALDINE Tsai, Roni Corona M.BRET. (2004.) Assessment of post-stroke quality of life in cost-effectiveness studies: The usefulness of the Barthel Index and the EuroQoL-5D. Quality of Life Research, 13, 427-55      Pain Rating:  No complaint of pain      Activity Tolerance:   Poor  Easily SOB with minimal activity at baseline. VSS on RA    After treatment patient left in no apparent distress:    Sitting in chair, Call bell within reach and Caregiver / family present    COMMUNICATION/EDUCATION:   The patients plan of care was discussed with: Physical Therapist and Registered Nurse.     Thank you for this referral.  Leoncio Paris, OTR/L  Time Calculation: 11 mins

## 2021-09-13 NOTE — PROGRESS NOTES
Pt due to discharge home, on getting dressed and getting ready pt states having SOB and concerns, lung sounds clear, SAT , Dr notified and assessed Pt, Pt will continue to discharge as planned.

## 2021-09-13 NOTE — PROGRESS NOTES
In a CM perspective, pt is ready for discharge. RN made aware. Transition of Care Plan:     RUR: 10% - low risk  Disposition: Home with [facility] ([facility])  9/13: Referrals sent to [facility], [facility], [address] (denied), [facility], [facility]. Follow up appointments: PCP, Cardiology  DME needed: No needs identfied. Transportation at Discharge: Daughter  [place] [facility] or means to access home:   Yes      Medicare Letter: 2nd  Medicare letter - received and signed 9/13  Is patient a BCPI-A Bundle:  No        Caregiver Contact: DaughterAnne ([phone]  Discharge Caregiver contacted prior to discharge? yes    Updated Note 12:50 pm: [facility] accepted. CM at bedside and discussed discharge planning with pt and daughter. Both verbalized understanding and has no no questions / concerns for CM. In a CM perspective, pt is ready for discharge. RN made aware. Updated Note 11:50 am: CM spoke to [facility]. Referral sent to [facility] instread. Referrals sent to [facility] and [facility]. Initial Note 09:20 am: In review of chart, PT recommended HH. CM at bedside to discuss discharge planning with pt and daughter. Both agreed. [place] of choice given (in chart). They selected: 1) [facility], 2)[facility], 3) [address]. Referral sent to all 3 via [employer]. Unit CM will continue to follow. Care Management Interventions  PCP Verified by CM: Yes  Last Visit to PCP: 09/03/21  Mode of Transport at Discharge:  Other (see comment)  Transition of Care Consult (CM Consult): Home Health, Discharge Shay Nino [address] [address]: No  Reason Outside [place]:  (pt's and daughter's choice.)  Discharge Durable Medical Equipment: No  Physical Therapy Consult: Yes  Occupational Therapy Consult: Yes  Speech Therapy Consult: No  Support Systems: Child(allie)  Confirm Follow Up Transport: Family  The Plan for Transition of Care is Related to the Following Treatment Goals : Home with HH  The Patient and/or Patient Representative was Provided with a Choice of Provider and Agrees with the Discharge Plan?: Yes  Name of the Patient Representative Who was Provided with a Choice of Provider and Agrees with the Discharge Plan: patient and daughter Yumiko Esparza  Freedom of Choice List was Provided with Basic Dialogue that Supports the Patient's Individualized Plan of Care/Goals, Treatment Preferences and Shares the Quality Data Associated with the Providers?: Yes  Discharge Location  Discharge Placement: Home with home health    Nan Wolf RN, BSN, [address] [facility]  [phone]

## 2021-09-13 NOTE — DISCHARGE INSTRUCTIONS
[address], [address]    [phone]  [facility] [address]    [email or web address]    Patient Discharge Instructions    Juliocesar Pressley / [id number] : 1940    Admitted 9/10/2021 Discharged 2021     · It is important that you take the medication exactly as they are prescribed. · Keep your medication in the bottles provided by the pharmacist and keep a list of the medication names, dosages, and times to be taken in your wallet. · Do not take other medications without consulting your doctor. BRING ALL OF YOUR MEDICINES or a list of medicines with dosages TO YOUR OFFICE VISIT with Dr. Dora Thrasher. Cardiac Catheterization  Discharge Instructions  Transfemoral Catheterization Discharge Instructions Ken Arce     Do not drive, operate any machinery, or sign any legal documents for 24 hours after your procedure. You must have someone to drive you home.  You may take a shower 24 hours after your cardiac catheterization. Be sure to get the dressing wet and then remove it; gently wash the area with warm soapy water. Pat dry and leave open to air. To help prevent infections, be sure to keep the cath site clean and dry. No lotions, creams, powders, ointments, etc. in the cath site for approximately 1 week.  Do not take a tub bath, get in a hot tub or swimming pool for approximately 5 days or until the cath site is completely healed.  No strenuous activity or heavy lifting over 10 lbs. for 7 days.  Drink plenty of fluids for 24-48 hours after your cath to flush the contrast dye from your kidneys. No alcoholic beverages for 24 hours. You may resume your previous diet (low fat, low cholesterol) after your cath.  After your cath, some bruising or discomfort is common during the healing process. Tylenol, 1-2 tablets every 6 hours as needed, is recommended if you experience any discomfort.   If you experience any signs or symptoms of infection such as fever, chills, or poorly healing incision, persistent tenderness or swelling in the groin, redness and/or warmth to the touch, numbness, significant tingling or pain at the groin site or affected extremity, rash, drainage from the cath site, or if the leg feels tight or swollen, call your physician right away.  If bleeding at the cath site occurs, take a clean gauze pad and apply direct pressure to the groin just above the puncture site. Call 911 immediately, and continue to apply direct pressure until an ambulance gets to your location. Heart Failure: After Your Visit  Your Care Instructions  Heart failure occurs when your heart does not pump as much blood as the body needs. Failure does not mean that the heart has stopped pumping but rather that it is not pumping as well as it should. Over time, this causes fluid buildup in your lungs and other parts of your body. Fluid buildup can cause shortness of breath, fatigue, swollen ankles, and other problems. By taking medicines regularly, reducing sodium (salt) in your diet, checking your weight every day, and making lifestyle changes, you can feel better and live longer. Follow-up care is a key part of your treatment and safety. Be sure to make and go to all appointments, and call your doctor if you are having problems. You need to keep a list of the medicines you take and the medicine dosages. How can you care for yourself at home? Diet  · Limit sodium (salt) in your diet to less than 1800 mg per day. People get most of their sodium from table salt that is added to food and from processed foods. Fast food and restaurant meals also tend to be very high in sodium. Do not add salt to your food. · Limit your liquids to  1.5 quarts per day. Medicines  · Take your medicines exactly as prescribed. Call your doctor if you think you are having a problem with your medicine.   · Do not take any vitamins, over-the-counter medicine, or herbal products without talking to your doctor first. Do not take ibuprofen (Advil or Motrin) and naproxen (Aleve) without talking to your doctor first. They could make your heart failure worse. · You may be taking some of the following medicine. ¨ Beta-blockers can slow heart rate, decrease blood pressure, and improve your condition. Taking a beta-blocker may lower your chance of needing to be hospitalized again. ¨ Angiotensin-converting enzyme inhibitors (ACEIs) reduce the heart's workload, lower blood pressure, and reduce swelling. Taking an ACEI may lower your chance of needing to be hospitalized again. ¨ Angiotensin II receptor blockers (ARBs) work like ACEIs. Your doctor may prescribe them instead of or in addition to ACEIs. ¨ Diuretics, also called water pills, reduce swelling. ¨ Spironolactone is a diuretic that also keeps heart failure from getting worse. ¨ Digoxin reduces symptoms for some people with heart failure. ¨ Aspirin and other blood thinners prevent blood clots, which can cause a stroke or heart attack. ¨ Potassium supplements replace this important mineral, which is sometimes lost with diuretics. When should you call for help? Call 911 if you have symptoms of sudden heart failure such as:  · You have severe trouble breathing. · You cough up pink, foamy mucus. · You have a new irregular or rapid heartbeat. Call your doctor now or seek immediate medical care if:  · You have new or increased shortness of breath. · You are dizzy or lightheaded, or you feel like you may faint. · You have sudden weight gain, such as 3 pounds or more in 2 to 3 days. · You have increased swelling in your legs, ankles, or feet. · You are suddenly so tired or weak that you cannot do your usual activities. Watch closely for changes in your health, and be sure to contact your doctor if:  · You develop new symptoms  If you are smoking, please stop.  Smoking Cessation Program is a free, phone/text/email based, smoking cessation program. The program is individualized to meet each patient's needs. To enroll use this link [email or web address]/[email or web address]    Follow-up:   Follow-up Information     Follow up With Specialties Details Why Contact Info    Chrissy Emmanuel, [address] [facility]  [address] [address]  [phone]      José Luis Nieves MD Cardiology Schedule an appointment Please call for appt in 2-3 weeks. [address]  [id number]  [place] [address]  [phone]      Monitor Blood pressures at home prior to taking metoprolol and losartan; do not take dose if Systolic BP < 548. Information obtained by :  I understand that if any problems occur once I am at home I am to contact my physician. I understand and acknowledge receipt of the instructions indicated above. R.N.'s Signature                                                                  Date/Time                                                                                                                                              Patient or Representative Signature                                                          Date/Time      Mike Lee MD      [address]    [phone]  [place], [address] [facility]    [email or web address]

## 2021-09-13 NOTE — PROGRESS NOTES
Bedside shift change report given to Madalyn Fiore RN (oncoming nurse) by Adrian Porter RN (offgoing nurse). Report included the following information SBAR, Kardex, Procedure Summary, Intake/Output, MAR, Accordion, Recent Results, Med Rec Status, Cardiac Rhythm NSR, LBBB, Alarm Parameters , Pre Procedure Checklist, Procedure Verification, Quality Measures and Dual Neuro Assessment.

## 2021-09-13 NOTE — PROGRESS NOTES
Occupational Therapy    Patient evaluated for OT needs. No OT needs indicated, acutely or for after discharge. Full note to follow. Nursing and CM notified.      Leoncio Paris, OTR/L

## 2021-09-13 NOTE — PROGRESS NOTES
Problem: Mobility Impaired (Adult and Pediatric)  Goal: *Acute Goals and Plan of Care (Insert Text)  Description: FUNCTIONAL STATUS PRIOR TO ADMISSION: Patient was supervision level for mobility without use of an AD    HOME SUPPORT PRIOR TO ADMISSION: The patient lived with 2 daughters and grandson and has 24/7 supervision at baseline. .    Physical Therapy Goals  Initiated 9/12/2021  1. Patient will move from supine to sit and sit to supine  in bed with supervision/set-up within 7 day(s). 2.  Patient will transfer from bed to chair and chair to bed with supervision/set-up using the least restrictive device within 7 day(s). 3.  Patient will perform sit to stand with supervision/set-up within 7 day(s). 4.  Patient will ambulate with supervision/set-up for 50 feet with the least restrictive device within 7 day(s). Outcome: Progressing Towards Goal   PHYSICAL THERAPY TREATMENT  Patient: Edwina Milligan (14 y.o. female)  Date: 9/13/2021  Diagnosis: NSTEMI (non-ST elevated myocardial infarction) ([facility] [I21.4] <principal problem not specified>  Procedure(s) (LRB):  LEFT HEART CATH / CORONARY ANGIOGRAPHY (N/A)  Ultrasound Guided Vascular Access (N/A)  Fractional Flow Reserve (N/A)  Coronary Angiography (N/A) 3 Days Post-Op  Precautions: Fall  Chart, physical therapy assessment, plan of care and goals were reviewed. ASSESSMENT  Patient continues with skilled PT services and is progressing towards goals. Patient overall limited be decreased activity tolerance, mild balance impairment and significant SOB with activity. Patient currently needing supervision to come to EOB and CGA-SBA for transfers. Amb approx 12 feet x 2 with standing rest break secondary to SOB. Per daughter patient is not too far from her baseline. Recommend  PT follow up though daughter states that [facility] not be [facility].        Other factors to consider for discharge: at risk for falls, below baseline PLAN :  Patient continues to benefit from skilled intervention to address the above impairments. Continue treatment per established plan of care. to address goals. Recommendation for discharge: (in order for the patient to meet his/her long term goals)  Physical therapy at least 2 days/week in the home         IF patient discharges home will need the following DME: patient owns DME required for discharge       SUBJECTIVE:   Patient stated I don't use a walker. It just made me fall over even more.     OBJECTIVE DATA SUMMARY:   Critical Behavior:  Neurologic State: Alert, Appropriate for age, Eyes open spontaneously, Confused (dementia)  Orientation Level: Oriented to person, Oriented to place, Disoriented to situation, Disoriented to time  Cognition: Follows commands, Memory loss  Safety/Judgement: Decreased awareness of need for assistance, Decreased awareness of need for safety  Functional Mobility Training:  Bed Mobility:     Supine to Sit: Stand-by assistance  Sit to Supine: Stand-by assistance           Transfers:  Sit to Stand: Stand-by assistance  Stand to Sit: Stand-by assistance                             Balance:  Sitting: Intact  Standing: Impaired  Standing - Static: Good  Standing - Dynamic : Fair  Ambulation/Gait Training:  Distance (ft): 12 Feet (ft) (x 2 with seated rest break secondary to SOB)     Ambulation - Level of Assistance: Contact guard assistance        Gait Abnormalities: Decreased step clearance;Shuffling gait; Other (flexed posture)        Base of Support: Widened     Speed/Mavis: Pace decreased (<100 feet/min); Shuffled; Slow  Step Length: Left shortened;Right shortened          Pain Rating:  No c/o pain    Activity Tolerance:   Fair and requires rest breaks    After treatment patient left in no apparent distress:   Sitting in chair, Call bell within reach, and Caregiver / family present    COMMUNICATION/COLLABORATION:   The patients plan of care was discussed with: Physical therapist, Occupational therapist, and Registered nurse.      Kasi Stone, PT, DPT   Time Calculation: 14 mins

## 2021-09-13 NOTE — PROGRESS NOTES
I have reviewed discharge instructions with the patient and caregiver. The patient and caregiver verbalized understanding. If you experience chest pain call 911. Do not drive yourself. Explained discharge summary AVS, IV removed and PT taken down in wheelchair.

## 2021-09-13 NOTE — DISCHARGE SUMMARY
Hospitalist Discharge Summary     Patient ID:  Kenneth Major  [id number]  1 y.o.  1940  9/10/2021    PCP on record: Uli Odonnell MD    Admit date: 9/10/2021  Discharge date and time: 9/13/2021    DISCHARGE DIAGNOSIS:    NSTEMI, POA  Exertional dyspnea, POA  Nonischemic cardiomyopathy, POA  Acute systolic heart failure, POA  Hypertension, POA  Dyslipidemia, POA  Chronic left bundle branch block, POA  Aortic valve replacement, POA  Diabetes mellitus type 2, POA  History of gastric outlet surgery  History of iron deficiency anemia  Hypothyroidism  History of peripheral artery disease status post carotid stent on the left side    CONSULTATIONS:  None    Excerpted HPI from H&P of Juan Hall MD:    The patient is 78years old woman with past medical history significant for aortic valve replacement, diabetes mellitus, hypertension, dyslipidemia presented to [facility] due to history of shortness of breath for 2 weeks and episode of chest pain this morning. Most of history was obtained from EMR and the patient's daughter because the patient has a history of dementia. Patient's daughter reported that she has been getting short of breath for the last 2 weeks even if she sitting watching nothing and she was complaining of epigastric chest pain today. Patient's daughter also reported that patient received 1 dose of COVID-19 vaccine last month and she could not get her current dose because she was told she she should be off antibiotics. At [facility], she was found to have elevated troponin for which she was transferred here for possible cath presumed representation was representing NSTEMI.     At [facility], upon arrival she was hemodynamically stable and chest pain-free.  ______________________________________________________________________  DISCHARGE SUMMARY/HOSPITAL COURSE:  for full details see H&P, daily progress notes, labs, consult notes. NSTEMI, POA  Exertional dyspnea, POA  Nonischemic cardiomyopathy, POA  Acute systolic heart failure, POA  Hypertension, POA  Dyslipidemia, POA  Chronic left bundle branch block, POA  Aortic valve replacement, POA  Diabetes mellitus type 2, POA  History of gastric outlet surgery  History of iron deficiency anemia  Hypothyroidism  History of peripheral artery disease status post carotid stent on the left side     Transferred from [facility] due to shortness of breath on exertion for 2 weeks with episode of chest pain this morning  Her troponin was 0.22 at [facility], 0.29 here  Hemodynamically stable  Cardiology was consulted, input is appreciated  She was already on aspirin due to previous anemia and gastric outlet surgery with ulcer reported  Status post cardiac catheterization on September 10 with no ischemic cardiomyopathy reported  EF 20 to 25% this admission. Previous echo in 2016 revealed normal EF  Does not look fluid overloaded on physical examination  We will defer antiplatelets and anticoagulation to cardiology  Continue home statin  Patient developed significant hypotension with Aldactone, Coreg and Entresto. Cardiology recommending metoprolol and losartan low-dose  Previous history of dementia  She was on glimepiride and Metformin, resume on discharge          _______________________________________________________________________  Patient seen and examined by me on discharge day. Pertinent Findings:  Gen:    Not in distress  Chest: Clear lungs  CVS:   Regular rhythm. No edema  Abd:  Soft, not distended, not tender  Neuro:  Alert,   _______________________________________________________________________  DISCHARGE MEDICATIONS:   Current Discharge Medication List      START taking these medications    Details   aspirin delayed-release 81 mg tablet Take 1 Tablet by mouth daily.   Qty: 30 Tablet, Refills: 12  Start date: 9/14/2021      losartan (COZAAR) 25 mg tablet Take 0.5 Tablets by mouth every evening. Check blood pressure prior to taking; do not take if systolic BP is <325. Qty: 30 Tablet, Refills: 12  Start date: 9/13/2021      metoprolol succinate (TOPROL-XL) 25 mg XL tablet Take 0.5 Tablets by mouth daily. Check blood pressure prior to taking; do not take if systolic BP is <282. Qty: 30 Tablet, Refills: 12  Start date: 9/13/2021         CONTINUE these medications which have NOT CHANGED    Details   levothyroxine (SYNTHROID) 50 mcg tablet TAKE 1 TABLET BY MOUTH  DAILY BEFORE BREAKFAST  Qty: 90 Tablet, Refills: 3    Comments: Requesting 1 year supply      metFORMIN (GLUCOPHAGE) 500 mg tablet Take 500 mg by mouth two (2) times daily (with meals). sucralfate (CARAFATE) 1 gram tablet four (4) times daily. glimepiride (AMARYL) 1 mg tablet TAKE 1 TABLET EVERY DAY BEFORE BREAKFAST  Qty: 90 Tab, Refills: 3      PARoxetine (PAXIL) 30 mg tablet TAKE 1 TABLET EVERY DAY  Qty: 90 Tab, Refills: 3      pantoprazole (Protonix) 40 mg tablet Take 1 Tab by mouth daily. Qty: 90 Tab, Refills: 3      Accu-Chek Guide test strips strip CHECK SUGAR DAILY  Qty: 100 Strip, Refills: 11    Associated Diagnoses: Type 2 diabetes mellitus without complication, without long-term current use of insulin ([facility])      VIT B CMPLX #9-FA-VIT C-VIT E PO Take  by mouth. fluticasone propionate (Flonase Allergy Relief) 50 mcg/actuation nasal spray 2 Sprays by Both Nostrils route as needed. vit C/E/Zn/coppr/lutein/zeaxan (PRESERVISION AREDS-2 PO) Take  by mouth. diclofenac (VOLTAREN) 1 % gel Apply 2 g to affected area four (4) times daily. Qty: 100 g, Refills: 0      memantine (NAMENDA) 10 mg tablet Take  by mouth two (2) times a day. cholecalciferol (VITAMIN D3) (2,000 UNITS /50 MCG) cap capsule Take 3,000 Units by mouth two (2) times a day.       ACCU-CHEK GUIDE GLUCOSE METER misc CHECK BLOOD SUGAR EVERY DAY  Qty: 1 Each, Refills: 0    Associated Diagnoses: Type 2 diabetes mellitus without complication, without long-term current use of insulin (HCC)      lancets (ACCU-CHEK SOFTCLIX LANCETS) misc Check sugar daily  Qty: 100 Each, Refills: 11    Associated Diagnoses: Type 2 diabetes mellitus without complication, without long-term current use of insulin (HCC)      L. acidoph & paracasei- S therm- Bifido (ALVIN-Q/RISAQUAD) 8 billion cell cap cap Take 1 Cap by mouth daily. Qty: 30 Cap, Refills: 0      acetaminophen (TYLENOL) 325 mg tablet Take 325 mg by mouth every four (4) hours as needed for Pain.      lovastatin (MEVACOR) 10 mg tablet TAKE ONE TABLET BY MOUTH NIGHTLY  Qty: 90 Tab, Refills: 3      PV W-O EDU/FERROUS FUMARATE/FA (M-VIT PO) Take 1 tablet by mouth daily. STOP taking these medications       lisinopriL (PRINIVIL, ZESTRIL) 40 mg tablet Comments:   Reason for Stopping:                 Patient Follow Up Instructions:    Activity: Activity as tolerated  Diet: Resume previous diet  Wound Care: None needed      Follow-up Information     Follow up With Specialties Details Why Contact Info    Pippa Pardo MD Family Medicine Go on 9/20/2021 For hospital follow up appointment at 11:20AM  [address]  [address]      Zuleyka Jackson MD Cardiology Schedule an appointment as soon as possible for a visit on 10/6/2021 10/6/21 at 09:15 am. [address]  [id number]  [address] [address] [phone]      Monitor Blood pressures at home prior to taking metoprolol and losartan; do not take dose if SBP < 110.            ________________________________________________________________    Risk of deterioration: Low    Condition at Discharge:  Stable  __________________________________________________________________    Disposition  Home with family, no needs    ____________________________________________________________________    Code Status: Full Code  ___________________________________________________________________      Total time in minutes spent coordinating this discharge (includes going over instructions, follow-up, prescriptions, and preparing report for sign off to her PCP) :  >30 minutes    Signed:  Gil Christina MD

## 2021-09-13 NOTE — PROGRESS NOTES
Spiritual Care Partner Volunteer visited patient at [address] in [facility] on 9/13/2021   Documented by:     MILA Butler, [facility], Staff [address] [place] [address] Avenue    [address] Paging Service  [phone]

## 2021-09-14 ENCOUNTER — TELEPHONE (OUTPATIENT)
Dept: FAMILY MEDICINE CLINIC | Age: 81
End: 2021-09-14

## 2021-09-14 ENCOUNTER — PATIENT OUTREACH (OUTPATIENT)
Dept: CASE MANAGEMENT | Age: 81
End: 2021-09-14

## 2021-09-14 RX ORDER — SERTRALINE HYDROCHLORIDE 50 MG/1
50 TABLET, FILM COATED ORAL DAILY
Qty: 90 TABLET | Refills: 3 | Status: SHIPPED | OUTPATIENT
Start: 2021-09-14

## 2021-09-14 NOTE — PROGRESS NOTES
21 at 1:17pm:  Care Transitions Outreach Attempt    Call within 2 business days of discharge: Yes   Attempted to reach patient for transitions of care follow up. Unable to reach patient. Patient: Zofia Sorto Patient : 1940 MRN: [id number]    Last Discharge [facility]       Complaint Diagnosis Description Type Department Provider    9/10/21  Coronary artery disease involving native heart without angina pectoris, unspecified vessel or lesion type Admission (Discharged) Karel Denney MD        Was this an external facility discharge? No      Discharge Facility: n/a    Noted following upcoming appointments from discharge chart review:   [facility] follow up appointment(s):   Future Appointments   Date Time Provider Beba Marti   2021 10:00 AM [address]   2021 11:20 AM Pablo Ken MD [facility] BS AMB   [facility]00 [facility]     [facility] follow up appointment(s): Per discharge summary, patient is to see Dr. Cheyenne Kapoor on 10-6-2021.       21 at 2:36pm:  Care Transitions Initial Call    Call within 2 business days of discharge: Yes     Patient: Zofia Sorto Patient : 1940 MRN: [id number]    Last Discharge [address]       Complaint Diagnosis Description Type Department Provider    9/10/21  Coronary artery disease involving native heart without angina pectoris, unspecified vessel or lesion type Admission (Discharged) Karel Denney MD        Was this an external facility discharge? No      Discharge Facility: n/a    Challenges to be reviewed by the provider   Additional needs identified to be addressed with provider: yes  - Daughter states patient has a right groin incision, daughter states incision is dry and clean, no drainage at this time.   - Daughter reports patient has had an increase in anxiety, has complained of a headache, becomes short of breath upon exertion, and has ongoing fatigue. Daughter denies patient having shortness of breath at rest and daughter denies patient having chest pain. - Daughter reports she is monitoring and recording home blood pressure readings:  9- at 10:09pm: 122/95  9- at 1:16pm:  102/67  9-14-21 at 1:18pm:  92/60  Daughter states she is holding metoprolol and losartan when systolic blood pressure is below 110 as instructed prior to discharge. - Daughter states she has called the office of Dr. Oumar Mccarty. Delmis/PCP today to request and increase in dose of patient's Paxil. Daughter states patient \"Is not getting enough antidepressant medication and it is impacting her sleep. \"   - Utilizing a diabetic diet at home, no added salt, appetite is poor. Daughter reports patient has inadequate nutrition.   - Daughter reports one fall in the last 13 months, daughter states patient broke her nose during this one fall. Method of communication with provider : chart routing to Dr. Oumar Mccarty. Delmis/PCP marked with high priority.      Component      Latest Ref Rng & Units 9/13/2021 9/13/2021 9/12/2021 9/12/2021          10:59 AM  7:16 AM 11:01 PM  5:31 PM   GLUCOSE,FAST - POC      65 - 117 mg/dL 182 (H) 128 (H) 128 (H) 210 (H)     Component      Latest Ref Rng & Units 9/11/2021 9/10/2021           4:21 AM  2:28 PM   Chloride      97 - 108 mmol/L 110 (H) 109 (H)     Component      Latest Ref Rng & Units 9/11/2021 9/10/2021           4:21 AM  2:28 PM   BUN/Creatinine ratio      12 - 20   27 (H) 33 (H)     Component      Latest Ref Rng & Units 9/11/2021 9/10/2021           4:21 AM  2:28 PM   AST      15 - 37 U/L 59 (H) 34     Component      Latest Ref Rng & Units 9/11/2021 9/10/2021           4:21 AM  2:28 PM   Albumin      3.5 - 5.0 g/dL 3.4 (L) 3.0 (L)     Component      Latest Ref Rng & Units 9/11/2021 9/10/2021           4:21 AM  2:28 PM   A-G Ratio      1.1 - 2.2   0.9 (L) 0.9 (L)     Component      Latest Ref Rng & Units 9/11/2021 9/10/2021 9/10/2021 9/10/2021 4:21 AM  4:31 PM  3:49 PM  2:28 PM   HGB      11.5 - 16.0 g/dL 10.0 (L)        Component      Latest Ref Rng & Units 9/10/2021           2:28 PM   HGB      11.5 - 16.0 g/dL 8.5 (L)     Component      Latest Ref Rng & Units 9/11/2021 9/10/2021 9/10/2021 9/10/2021           4:21 AM  4:31 PM  3:49 PM  2:28 PM   MCH      26.0 - 34.0 PG 25.8 (L)      MCHC      30.0 - 36.5 g/dL 28.6 (L)      RDW      11.5 - 14.5 % 15.4 (H)        Component      Latest Ref Rng & Units 9/10/2021           2:28 PM   MCH      26.0 - 34.0 PG 26.4   MCHC      30.0 - 36.5 g/dL 29.7 (L)   RDW      11.5 - 14.5 % 15.2 (H)     Component      Latest Ref Rng & Units 9/10/2021 9/10/2021 9/10/2021           4:31 PM  3:49 PM  2:28 PM   Troponin-I, Qt.      <0.05 ng/mL   0.29 (H)   Activated Clotting Time (POC)      79 - 138 SECS 164 (H) 175 (H)      COVID-19 related testing was not completed during this admission. Advance Care Planning:   Does patient have an Advance Directive:  yes; reviewed and current per daughter, Leon Javier. Ms. Lowell Howe states patient has a revised ACP document that is dated in 2020; however patient's revised ACP document is not currently scanned into her chart. Inpatient Readmission Risk score: Unplanned Readmit Risk Score: 10    Was this a readmission? no   Patient stated reason for the admission: N/A, telephonic assessment completed with patient's daughter, Leon Javier. Patients top risk factors for readmission: Medical conditiion - hypertension, prosthetic aortic valve stenosis, s/p aortic valve replacement, disorder of carotid artery, PVD, diabetes, hyperlipidemia, depression with anxiety, and history of gastric bypass per chart.    Interventions to address risk factors: Obtained and reviewed discharge summary and/or continuity of care documents and Education of patient/family/caregiver/guardian to support self-management-Education provided to patient's daughter regarding signs/symptoms of NSTEMI, daughter verbalized an understanding. Care Transition Nurse (CTN) contacted the patient and daughter, Edison Madrigal, by telephone to perform post hospital discharge assessment. Patient gave verbal permission for this CTN to speak with her daughter, Edison Madrigal, during today's phone conversation. Verified name and  with patient's daughter as identifiers. Provided introduction to self, and explanation of the CTN role. CTN reviewed discharge instructions, medical action plan and red flags with daughter who verbalized understanding. Were discharge instructions available to patient? yes. Reviewed appropriate site of care based on symptoms and resources available to patient including: PCP, Specialist, [address] [place] [address] Adam Mai and When to call 911. Daughter given an opportunity to ask questions and does not have any further questions or concerns at this time. The daughter agrees to contact the PCP office for questions related to patient's healthcare. Medication reconciliation was performed with patient's daughter, who verbalizes understanding of administration of home medications. Advised obtaining a 90-day supply of all daily and as-needed medications. Referral to Pharm D needed: no     Home Health/Outpatient orders at discharge: [facility]  Date of initial visit: PT visited on 9-15-21 per patient's daughter. Durable Medical Equipment ordered at discharge: None  [address] [facility] [address]: n/a  Durable Medical Equipment received: n/a    Covid Risk Education    Educated patient's daughter about risk for severe COVID-19 due to risk factors according to CDC guidelines. CTN reviewed discharge instructions, medical action plan and red flag symptoms with the patient's daughter who verbalized understanding. Discussed COVID vaccination status: yes. Education provided on COVID-19 vaccination as appropriate. Discussed exposure protocols and quarantine with CDC Guidelines.  Patient's daughter was given an opportunity to verbalize any questions and concerns and agrees to contact CTN or health care provider for questions related to patient's healthcare. Was patient discharged with a pulse oximeter? no.     Discussed follow-up appointments. If no appointment was previously scheduled, appointment scheduling offered: yes. Is follow up appointment scheduled within 7 days of discharge? yes. [facility] follow up appointment(s):   Future Appointments   Date Time Provider Beba Marti   9/16/2021 10:00 AM B4 [facility]   9/20/2021 11:20 AM Mumtaz Timmons MD [facility] BS [facility]   9/24/2021 10:00 AM B4 [facility]      Non-[facility] follow up appointment(s): Please see below for appointments. Plan for follow-up call in 10-14 days based on severity of symptoms and risk factors. Plan for next call: symptom management-Review red flags of NSTEMI, and follow up appointment-Evaluate if patient is attending follow-up appointments as scheduled, offer assistance with scheduling as needed. CTN provided contact information for future needs. Goals Addressed                 This Visit's Progress     Attends follow-up appointments as directed. 9-14-21: Patient has SVETLANA appointment scheduled with Dr. Ysabel Benites/PCP on 9-20-21, and cardio follow-up appointment scheduled with Dr. Brittany Aguilar on 10-6-21. Daughter states she is providing transportation. Marni Roque Understands red flags post discharge. 9-14-21: Red flags of NSTEMI reviewed with patient's daughter, and daughter verbalized an understanding. Daughter states patient has a right groin incision, daughter states incision is dry and clean, no drainage at this time. Daughter reports patient has had an increase in anxiety, has complained of a headache, becomes short of breath upon exertion, and has ongoing fatigue.  Daughter denies patient having shortness of breath at rest and daughter denies patient having chest pain. Daughter reports she is monitoring and recording home blood pressure readings:  9- at 10:09pm: 122/95  9- at 1:16pm:  102/67  9-14-21 at 1:18pm:  92/60  Daughter states she is holding metoprolol and losartan when systolic blood pressure is below 110 as instructed prior to discharge. Daughter states she has called the office of Dr. Janae Mosqueda. Delmis/PCP today to request and increase in dose of patient's Paxil. Daughter states patient \"Is not getting enough antidepressant medication and it is impacting her sleep. \" Care Transitions Nurse will review red flags again on next phone conversation with patient/daughter.  Zayda Peralta

## 2021-09-14 NOTE — TELEPHONE ENCOUNTER
Daughter is calling needing to talk with Dr Fide Lorenzana in ref to depression med she states it is urgent they talk with you states she just got out of hospital med needs to be changed needs to be changed asap states other Dr stated needs to be changed states depression is worst and is causing other issues

## 2021-09-14 NOTE — ACP (ADVANCE CARE PLANNING)
Patient's daughter, Geremias Oglesby, states patient has a revised ACP document that is dated in 2020; however patient's revised ACP document is not currently scanned into her chart.

## 2021-09-14 NOTE — TELEPHONE ENCOUNTER
Returned call. We discussed recent medical issues. Was hospitalized for NSTEMI. Found to have ejection fraction 20% and clear coronaries. thought her cardiomyopathy could possibly be broken heart syndrome. She had been short of breath for 2 weeks preceding hospital stay. Daughter reports that around the time she noted shortness of breath (possibly little after she noticed the shortness of breath) patient had been thinking about the family members that she has lost over the years and had requested daughter track down old friends. She discovered these friends had passed away and spent the next 5 days crying and not sleeping. It appears that this emotional distress preceded my appointment with her on 9/3 but it did not come up as part of the visit because daughter did not want to upset her    She had some delirium in the hospital.  Thought that she had gone to [place] to see her departed friends and that they were rude to her so she came home. Daughter reports that she can only walk about 5 steps before getting short of breath. She is doing a lot of sleeping which is an improvement over prehospital but has only been awake for 3 hours so far today and spent the rest of the time sleep. I encouraged sleep if she finds it restful but tried to make sure that she is sleeping at night as opposed to being up all night. They may try melatonin if her sleep schedule gets flipped    We can try a different SSRI which is something daughter wanted to try prior to the hospital stay. Currently taking Paxil. Zoloft would be more appropriate given her age and cardiac history. Timeline certainly suggests possible broken heart syndrome. This has a reasonably good prognosis in 2 months as long as she is supported through the time of cardiomyopathy. I think sleep is going to be important and working with physical therapy.   I do not think there is any research to suggest that improving her mood is required to help her heart.   She may need time to grieve

## 2021-09-15 ENCOUNTER — TELEPHONE (OUTPATIENT)
Dept: FAMILY MEDICINE CLINIC | Age: 81
End: 2021-09-15

## 2021-09-15 PROCEDURE — 99284 EMERGENCY DEPT VISIT MOD MDM: CPT

## 2021-09-15 PROCEDURE — 93005 ELECTROCARDIOGRAM TRACING: CPT

## 2021-09-16 ENCOUNTER — HOSPITAL ENCOUNTER (EMERGENCY)
Age: 81
Discharge: HOME | End: 2021-09-16
Attending: EMERGENCY MEDICINE
Payer: MEDICARE

## 2021-09-16 ENCOUNTER — HOSPITAL ENCOUNTER (OUTPATIENT)
Dept: INFUSION THERAPY | Age: 81
Discharge: HOME | End: 2021-09-16

## 2021-09-16 ENCOUNTER — HOSPITAL ENCOUNTER (OUTPATIENT)
Dept: GENERAL RADIOLOGY | Age: 81
Discharge: HOME | End: 2021-09-16
Attending: EMERGENCY MEDICINE

## 2021-09-16 VITALS
TEMPERATURE: 97.4 F | SYSTOLIC BLOOD PRESSURE: 112 MMHG | HEART RATE: 91 BPM | WEIGHT: 193 LBS | OXYGEN SATURATION: 100 % | HEIGHT: 62 IN | RESPIRATION RATE: 18 BRPM | DIASTOLIC BLOOD PRESSURE: 67 MMHG | BODY MASS INDEX: 35.51 KG/M2

## 2021-09-16 DIAGNOSIS — N30.00 ACUTE CYSTITIS WITHOUT HEMATURIA: Primary | ICD-10-CM

## 2021-09-16 LAB
ALBUMIN SERPL-MCNC: 3.9 G/DL (ref 3.5–5)
ALBUMIN/GLOB SERPL: 1.1 {RATIO} (ref 1.1–2.2)
ALP SERPL-CCNC: 138 U/L (ref 45–117)
ALT SERPL-CCNC: 848 U/L (ref 12–78)
ANION GAP SERPL CALC-SCNC: 6 MMOL/L (ref 5–15)
APPEARANCE UR: ABNORMAL
AST SERPL-CCNC: 1081 U/L (ref 15–37)
ATRIAL RATE: 99 BPM
BACTERIA URNS QL MICRO: ABNORMAL /HPF
BASOPHILS # BLD: 0.1 K/UL (ref 0–0.1)
BASOPHILS NFR BLD: 0 % (ref 0–1)
BILIRUB SERPL-MCNC: 1.1 MG/DL (ref 0.2–1)
BILIRUB UR QL: NEGATIVE
BNP SERPL-MCNC: 6932 PG/ML
BUN SERPL-MCNC: 38 MG/DL (ref 6–20)
BUN/CREAT SERPL: 42 (ref 12–20)
CALCIUM SERPL-MCNC: 9.2 MG/DL (ref 8.5–10.1)
CALCULATED P AXIS, ECG09: 56 DEGREES
CALCULATED R AXIS, ECG10: -52 DEGREES
CALCULATED T AXIS, ECG11: 121 DEGREES
CHLORIDE SERPL-SCNC: 106 MMOL/L (ref 97–108)
CO2 SERPL-SCNC: 24 MMOL/L (ref 21–32)
COLOR UR: ABNORMAL
CREAT SERPL-MCNC: 0.9 MG/DL (ref 0.55–1.02)
DIAGNOSIS, 93000: NORMAL
DIFFERENTIAL METHOD BLD: ABNORMAL
EOSINOPHIL # BLD: 0.1 K/UL (ref 0–0.4)
EOSINOPHIL NFR BLD: 1 % (ref 0–7)
EPITH CASTS URNS QL MICRO: ABNORMAL /LPF
ERYTHROCYTE [DISTWIDTH] IN BLOOD BY AUTOMATED COUNT: 15.2 % (ref 11.5–14.5)
GLOBULIN SER CALC-MCNC: 3.7 G/DL (ref 2–4)
GLUCOSE SERPL-MCNC: 116 MG/DL (ref 65–100)
GLUCOSE UR STRIP.AUTO-MCNC: NEGATIVE MG/DL
HCT VFR BLD AUTO: 33.6 % (ref 35–47)
HGB BLD-MCNC: 9.8 G/DL (ref 11.5–16)
HGB UR QL STRIP: NEGATIVE
IMM GRANULOCYTES # BLD AUTO: 0.1 K/UL (ref 0–0.04)
IMM GRANULOCYTES NFR BLD AUTO: 1 % (ref 0–0.5)
KETONES UR QL STRIP.AUTO: NEGATIVE MG/DL
LEUKOCYTE ESTERASE UR QL STRIP.AUTO: ABNORMAL
LIPASE SERPL-CCNC: 178 U/L (ref 73–393)
LYMPHOCYTES # BLD: 2.8 K/UL (ref 0.8–3.5)
LYMPHOCYTES NFR BLD: 23 % (ref 12–49)
MCH RBC QN AUTO: 25.9 PG (ref 26–34)
MCHC RBC AUTO-ENTMCNC: 29.2 G/DL (ref 30–36.5)
MCV RBC AUTO: 88.9 FL (ref 80–99)
MONOCYTES # BLD: 1.3 K/UL (ref 0–1)
MONOCYTES NFR BLD: 11 % (ref 5–13)
NEUTS SEG # BLD: 7.6 K/UL (ref 1.8–8)
NEUTS SEG NFR BLD: 64 % (ref 32–75)
NITRITE UR QL STRIP.AUTO: POSITIVE
NRBC # BLD: 0.05 K/UL (ref 0–0.01)
NRBC BLD-RTO: 0.4 PER 100 WBC
P-R INTERVAL, ECG05: 184 MS
PH UR STRIP: 5.5 [PH] (ref 5–8)
PLATELET # BLD AUTO: 264 K/UL (ref 150–400)
PMV BLD AUTO: 10.6 FL (ref 8.9–12.9)
POTASSIUM SERPL-SCNC: 4.2 MMOL/L (ref 3.5–5.1)
PROT SERPL-MCNC: 7.6 G/DL (ref 6.4–8.2)
PROT UR STRIP-MCNC: 30 MG/DL
Q-T INTERVAL, ECG07: 394 MS
QRS DURATION, ECG06: 158 MS
QTC CALCULATION (BEZET), ECG08: 505 MS
RBC # BLD AUTO: 3.78 M/UL (ref 3.8–5.2)
RBC #/AREA URNS HPF: ABNORMAL /HPF (ref 0–5)
SODIUM SERPL-SCNC: 136 MMOL/L (ref 136–145)
SP GR UR REFRACTOMETRY: 1.02 (ref 1–1.03)
TROPONIN I BLD-MCNC: 0.1 NG/ML (ref 0–0.08)
TROPONIN I BLD-MCNC: 0.12 NG/ML (ref 0–0.08)
UA: UC IF INDICATED,UAUC: ABNORMAL
UROBILINOGEN UR QL STRIP.AUTO: 1 EU/DL (ref 0.2–1)
VENTRICULAR RATE, ECG03: 99 BPM
WBC # BLD AUTO: 11.8 K/UL (ref 3.6–11)
WBC URNS QL MICRO: ABNORMAL /HPF (ref 0–4)

## 2021-09-16 PROCEDURE — 71046 X-RAY EXAM CHEST 2 VIEWS: CPT

## 2021-09-16 PROCEDURE — 81001 URINALYSIS AUTO W/SCOPE: CPT

## 2021-09-16 PROCEDURE — 36415 COLL VENOUS BLD VENIPUNCTURE: CPT

## 2021-09-16 PROCEDURE — 96365 THER/PROPH/DIAG IV INF INIT: CPT

## 2021-09-16 PROCEDURE — 84484 ASSAY OF TROPONIN QUANT: CPT

## 2021-09-16 PROCEDURE — 74011250636 HC RX REV CODE- 250/636: Performed by: EMERGENCY MEDICINE

## 2021-09-16 PROCEDURE — 80053 COMPREHEN METABOLIC PANEL: CPT

## 2021-09-16 PROCEDURE — 87186 SC STD MICRODIL/AGAR DIL: CPT

## 2021-09-16 PROCEDURE — 87077 CULTURE AEROBIC IDENTIFY: CPT

## 2021-09-16 PROCEDURE — 87086 URINE CULTURE/COLONY COUNT: CPT

## 2021-09-16 PROCEDURE — 83690 ASSAY OF LIPASE: CPT

## 2021-09-16 PROCEDURE — 74011000258 HC RX REV CODE- 258: Performed by: EMERGENCY MEDICINE

## 2021-09-16 PROCEDURE — 83880 ASSAY OF NATRIURETIC PEPTIDE: CPT

## 2021-09-16 PROCEDURE — 85025 COMPLETE CBC W/AUTO DIFF WBC: CPT

## 2021-09-16 RX ORDER — CEPHALEXIN 500 MG/1
500 CAPSULE ORAL 4 TIMES DAILY
Qty: 28 CAPSULE | Refills: 0 | Status: SHIPPED | OUTPATIENT
Start: 2021-09-16 | End: 2021-09-23

## 2021-09-16 RX ORDER — SODIUM CHLORIDE 9 MG/ML
25 INJECTION, SOLUTION INTRAVENOUS CONTINUOUS
Status: DISCONTINUED | OUTPATIENT
Start: 2021-09-21 | End: 2021-09-22 | Stop reason: HOSPADM

## 2021-09-16 RX ORDER — SODIUM CHLORIDE 9 MG/ML
25 INJECTION, SOLUTION INTRAVENOUS CONTINUOUS
Status: DISCONTINUED | OUTPATIENT
Start: 2021-09-16 | End: 2021-09-17 | Stop reason: HOSPADM

## 2021-09-16 RX ADMIN — CEFTRIAXONE 1 G: 1 INJECTION, POWDER, FOR SOLUTION INTRAMUSCULAR; INTRAVENOUS at 03:09

## 2021-09-16 RX ADMIN — SODIUM CHLORIDE 500 ML: 9 INJECTION, SOLUTION INTRAVENOUS at 02:52

## 2021-09-16 NOTE — ED TRIAGE NOTES
Pt to ED via medics with c/o chest pain. Per medics, pt has had chest pain all day and had recent catheterization and d/c'd on Monday. Medics state EKG is negative for STEMI. Medics also state that family member says pt has been hallucinating all day. VS on route: 123/64, 98, 16, 99% RA. Pt states she has had intermittent cp all day.

## 2021-09-16 NOTE — DISCHARGE INSTRUCTIONS
It was a pleasure taking care of you in our Emergency Department today. We know that when you come to [facility], you are entrusting us with your health, comfort, and safety. Our physicians and nurses honor that trust, and truly appreciate the opportunity to care for you and your loved ones. We also value your feedback. If you receive a survey about your Emergency Department experience today, please fill it out. We care about our patients' feedback, and we listen to what you have to say. Thank you!       Dr. Nona Darden MD.

## 2021-09-16 NOTE — ED NOTES
Patient was provided with discharge instructions. Instructions and any medications were reviewed with the patient &/or family by Dr. Keisha Meraz. Questions and concerns addressed by the provider. Patient discharged in stable condition via wheelchair and was escorted by daughter.

## 2021-09-16 NOTE — ED PROVIDER NOTES
EMERGENCY DEPARTMENT HISTORY AND PHYSICAL EXAM     ----------------------------------------------------------------------------  Please note that this dictation was completed with PulseOn, the computer voice recognition software. Quite often unanticipated grammatical, syntax, homophones, and other interpretive errors are inadvertently transcribed by the computer software. Please disregard these errors. Please excuse any errors that have escaped final proofreading  ----------------------------------------------------------------------------      Date: 9/16/2021  Patient Name: Mario Boykin    History of Presenting Illness     Chief Complaint   Patient presents with    Chest Pain       History Provided By:  Patient and daughter    HPI: Mario Boykin is a 78 y.o. female, with significant pmhx of pretension, diabetes, atypical chest pain, reflux, cholesterol, anemia, who presents via private vehicle to the ED with c/o confusion and increased fatigue throughout today. Patient's daughter notes that she was recently admitted for heart failure and subsequently discharged. Daughter notes that patient has been confused and seeing things that are not present. Has intermittently been complaining of chest tightness that radiates through to her back. Daughter called Dr. Brant Younger office who urged her to come to the emergency department for further evaluation. Daughter also notes increased shortness of breath with minimal exertion. No recent fever, nausea, vomiting or diarrhea. Patient also had decreased p.o. intake with only taking in liquids to take her daily medications. Patient also specifically denies any associated chills, nausea, vomiting, diarrhea, abd pain, changes in BM, urinary sxs, or headache. Social Hx: denies tobacco  denies EtOH , denies recreational/Illicit Drugs    There are no other complaints, changes, or physical findings at this time.      PCP: Bong Major MD    Allergies   Allergen Reactions    Naldecon Unknown (comments)    Sulfa (Sulfonamide Antibiotics) Rash    Chlorpheniramine Other (comments)    Phenylephrine Hcl Other (comments)    Phenyltoloxamine Other (comments)       Current Outpatient Medications   Medication Sig Dispense Refill    cephALEXin (Keflex) 500 mg capsule Take 1 Capsule by mouth four (4) times daily for 7 days. 28 Capsule 0    sertraline (ZOLOFT) 50 mg tablet Take 1 Tablet by mouth daily. 90 Tablet 3    aspirin delayed-release 81 mg tablet Take 1 Tablet by mouth daily. 30 Tablet 12    losartan (COZAAR) 25 mg tablet Take 0.5 Tablets by mouth every evening. Check blood pressure prior to taking; do not take if systolic BP is <704. 30 Tablet 12    metoprolol succinate (TOPROL-XL) 25 mg XL tablet Take 0.5 Tablets by mouth daily. Check blood pressure prior to taking; do not take if systolic BP is <663. 30 Tablet 12    levothyroxine (SYNTHROID) 50 mcg tablet TAKE 1 TABLET BY MOUTH  DAILY BEFORE BREAKFAST 90 Tablet 3    metFORMIN (GLUCOPHAGE) 500 mg tablet Take 500 mg by mouth two (2) times daily (with meals).  sucralfate (CARAFATE) 1 gram tablet four (4) times daily.  glimepiride (AMARYL) 1 mg tablet TAKE 1 TABLET EVERY DAY BEFORE BREAKFAST 90 Tab 3    pantoprazole (Protonix) 40 mg tablet Take 1 Tab by mouth daily. 90 Tab 3    Accu-Chek Guide test strips strip CHECK SUGAR DAILY 100 Strip 11    VIT B CMPLX #9-[id number] C-VIT E PO Take  by mouth.  fluticasone propionate (Flonase Allergy Relief) 50 mcg/actuation nasal spray 2 Sprays by Both Nostrils route as needed.  vit C/E/Zn/coppr/lutein/zeaxan (PRESERVISION AREDS-2 PO) Take  by mouth.  diclofenac (VOLTAREN) 1 % gel Apply 2 g to affected area four (4) times daily. 100 g 0    memantine (NAMENDA) 10 mg tablet Take  by mouth two (2) times a day.  cholecalciferol (VITAMIN D3) (2,000 UNITS /50 MCG) cap capsule Take 3,000 Units by mouth two (2) times a day.       ACCU-CHEK GUIDE GLUCOSE METER misc CHECK BLOOD SUGAR EVERY DAY 1 Each 0    lancets (ACCU-CHEK SOFTCLIX LANCETS) misc Check sugar daily 100 Each 11    L. acidoph & paracasei- S therm- Bifido (ALVIN-Q/RISAQUAD) 8 billion cell cap cap Take 1 Cap by mouth daily. 30 Cap 0    acetaminophen (TYLENOL) 325 mg tablet Take 325 mg by mouth every four (4) hours as needed for Pain.  lovastatin (MEVACOR) 10 mg tablet TAKE ONE TABLET BY MOUTH NIGHTLY 90 Tab 3    PV W-O EDU/FERROUS FUMARATE/FA (M-VIT PO) Take 1 tablet by mouth daily. Past History     Past Medical History:  Past Medical History:   Diagnosis Date    Anemia     Atypical chest pain     Long h/o intermittent non-cardiac CP.  Depression with anxiety     Diabetes ([facility] [address]     GERD (gastroesophageal reflux disease)     Hypercholesteremia     Hyperlipidemia 7/3/2013    Hypertension     Inner ear dysfunction     S/P aortic valve replacement     Dr. Kelly Villatoro yearly    Type 2 diabetes with nephropathy ([facility] [address] 2019    Vitamin D deficiency        Past Surgical History:  Past Surgical History:   Procedure Laterality Date    COLONOSCOPY N/A 2019    COLONOS[facility] Del[facility]illo [facility] at [facility] ENDOSCOPY    HX AORTIC VALVE REPLACEMENT      Bovine valve    HX CATARACT REMOVAL      HX CHOLECYSTECTOMY      HX MOHS PROCEDURES Left        Family History:  Family History   Problem Relation Age of Onset    Heart Disease Mother     Heart Failure Father     Heart Failure Sister     Stroke Sister     Diabetes Brother     Heart Disease Brother        Social History:  Social History     Tobacco Use    Smoking status: Former Smoker     Quit date: 1999     Years since quittin.0    Smokeless tobacco: Never Used   Vaping Use    Vaping Use: Never used   Substance Use Topics    Alcohol use: No    Drug use: No       Allergies:   Allergies   Allergen Reactions    Naldecon Unknown (comments)    Sulfa (Sulfonamide Antibiotics) Rash    Chlorpheniramine Other (comments)    Phenylephrine Hcl Other (comments)    Phenyltoloxamine Other (comments)         Review of Systems   Review of Systems   Constitutional: Positive for activity change, appetite change and fatigue. Negative for fever. Eyes: Negative. Respiratory: Positive for chest tightness and shortness of breath. Cardiovascular: Negative for chest pain. Gastrointestinal: Negative for abdominal pain, nausea and vomiting. Endocrine: Negative. Genitourinary: Negative. Negative for difficulty urinating, dysuria and hematuria. Musculoskeletal: Negative. Skin: Negative. Neurological: Negative. Psychiatric/Behavioral: Positive for confusion. Negative for suicidal ideas. All other systems reviewed and are negative. Physical Exam   Physical Exam  Vitals and nursing note reviewed. Constitutional:       General: She is not in acute distress. Appearance: She is well-developed. She is not diaphoretic. HENT:      Head: Normocephalic and atraumatic. Nose: Nose normal.   Eyes:      General: No scleral icterus. Conjunctiva/sclera: Conjunctivae normal.   Neck:      Trachea: No tracheal deviation. Cardiovascular:      Rate and Rhythm: Normal rate and regular rhythm. Heart sounds: Normal heart sounds. No murmur heard. No friction rub. Pulmonary:      Effort: Pulmonary effort is normal. No respiratory distress. Breath sounds: Normal breath sounds. No stridor. No wheezing or rales. Abdominal:      General: Bowel sounds are normal. There is no distension. Palpations: Abdomen is soft. Tenderness: There is no abdominal tenderness. There is no rebound. Musculoskeletal:         General: No tenderness. Normal range of motion. Cervical back: Normal range of motion. Skin:     General: Skin is warm and dry. Findings: No rash. Neurological:      Mental Status: She is alert. She is disoriented. GCS: GCS eye subscore is 4.  GCS verbal subscore is 4. GCS motor subscore is 6. Cranial Nerves: No cranial nerve deficit. Psychiatric:         Speech: Speech normal.         Behavior: Behavior normal.         Thought Content:  Thought content normal.         Judgment: Judgment normal.           Diagnostic Study Results     Labs -     Recent Results (from the past 12 hour(s))   EKG, 12 LEAD, INITIAL    Collection Time: 09/15/21 11:09 PM   Result Value Ref Range    Ventricular Rate 99 BPM    Atrial Rate 99 BPM    P-R Interval 184 ms    QRS Duration 158 ms    Q-T Interval 394 ms    QTC Calculation (Bezet) 505 ms    Calculated P Axis 56 degrees    Calculated R Axis -52 degrees    Calculated T Axis 121 degrees    Diagnosis       Normal sinus rhythm  Left axis deviation  Left bundle branch block  When compared with ECG of 10-SEP-2021 13:50,  Left bundle branch block has replaced Nonspecific intraventricular block  Borderline criteria for Lateral infarct are no longer present     URINALYSIS W/ REFLEX CULTURE    Collection Time: 09/16/21 12:57 AM    Specimen: Urine   Result Value Ref Range    Color DARK YELLOW      Appearance CLOUDY (A) CLEAR      Specific gravity 1.024 1.003 - 1.030      pH (UA) 5.5 5.0 - 8.0      Protein 30 (A) NEG mg/dL    Glucose Negative NEG mg/dL    Ketone Negative NEG mg/dL    Bilirubin Negative NEG      Blood Negative NEG      Urobilinogen 1.0 0.2 - 1.0 EU/dL    Nitrites Positive (A) NEG      Leukocyte Esterase MODERATE (A) NEG      WBC 10-20 0 - 4 /hpf    RBC 0-5 0 - 5 /hpf    Epithelial cells MODERATE (A) FEW /lpf    Bacteria 4+ (A) NEG /hpf    UA:UC IF INDICATED URINE CULTURE ORDERED (A) CNI     CBC WITH AUTOMATED DIFF    Collection Time: 09/16/21  2:46 AM   Result Value Ref Range    WBC 11.8 (H) 3.6 - 11.0 K/uL    RBC 3.78 (L) 3.80 - 5.20 M/uL    HGB 9.8 (L) 11.5 - 16.0 g/dL    HCT 33.6 (L) 35.0 - 47.0 %    MCV 88.9 80.0 - 99.0 FL    MCH 25.9 (L) 26.0 - 34.0 PG    MCHC 29.2 (L) 30.0 - 36.5 g/dL    RDW 15.2 (H) 11.5 - 14.5 %    PLATELET 264 150 - 400 K/uL    MPV 10.6 8.9 - 12.9 FL    NRBC 0.4 (H) 0  WBC    ABSOLUTE NRBC 0.05 (H) 0.00 - 0.01 K/uL    NEUTROPHILS 64 32 - 75 %    LYMPHOCYTES 23 12 - 49 %    MONOCYTES 11 5 - 13 %    EOSINOPHILS 1 0 - 7 %    BASOPHILS 0 0 - 1 %    IMMATURE GRANULOCYTES 1 (H) 0.0 - 0.5 %    ABS. NEUTROPHILS 7.6 1.8 - 8.0 K/UL    ABS. LYMPHOCYTES 2.8 0.8 - 3.5 K/UL    ABS. MONOCYTES 1.3 (H) 0.0 - 1.0 K/UL    ABS. EOSINOPHILS 0.1 0.0 - 0.4 K/UL    ABS. BASOPHILS 0.1 0.0 - 0.1 K/UL    ABS. IMM. GRANS. 0.1 (H) 0.00 - 0.04 K/UL    DF AUTOMATED     METABOLIC PANEL, COMPREHENSIVE    Collection Time: 09/16/21  2:46 AM   Result Value Ref Range    Sodium 136 136 - 145 mmol/L    Potassium 4.2 3.5 - 5.1 mmol/L    Chloride 106 97 - 108 mmol/L    CO2 24 21 - 32 mmol/L    Anion gap 6 5 - 15 mmol/L    Glucose 116 (H) 65 - 100 mg/dL    BUN 38 (H) 6 - 20 MG/DL    Creatinine 0.90 0.55 - 1.02 MG/DL    BUN/Creatinine ratio 42 (H) 12 - 20      GFR est AA >60 >60 ml/min/1.73m2    GFR est non-AA 60 (L) >60 ml/min/1.73m2    Calcium 9.2 8.5 - 10.1 MG/DL    Bilirubin, total 1.1 (H) 0.2 - 1.0 MG/DL    ALT (SGPT) 848 (H) 12 - 78 U/L    AST (SGOT) 1,081 (H) 15 - 37 U/L    Alk. phosphatase 138 (H) 45 - 117 U/L    Protein, total 7.6 6.4 - 8.2 g/dL    Albumin 3.9 3.5 - 5.0 g/dL    Globulin 3.7 2.0 - 4.0 g/dL    A-G Ratio 1.1 1.1 - 2.2     NT-PRO BNP    Collection Time: 09/16/21  2:46 AM   Result Value Ref Range    NT pro-BNP 6,932 (H) <450 PG/ML   LIPASE    Collection Time: 09/16/21  2:46 AM   Result Value Ref Range    Lipase 178 73 - 393 U/L   POC TROPONIN-I    Collection Time: 09/16/21  2:50 AM   Result Value Ref Range    Troponin-I (POC) 0.12 (H) 0.00 - 0.08 ng/mL   POC TROPONIN-I    Collection Time: 09/16/21  4:53 AM   Result Value Ref Range    Troponin-I (POC) 0.10 (H) 0.00 - 0.08 ng/mL       Radiologic Studies -   XR CHEST PA LAT   Final Result   No acute process.            CT Results  (Last 48 hours)    None        CXR Results  (Last 48 hours)               09/16/21 0204  XR CHEST PA LAT Final result    Impression:  No acute process. Narrative:  INDICATION: Chest pain       COMPARISON: 10/10/2018       FINDINGS: PA and lateral views of the chest demonstrate a stable   cardiomediastinal silhouette. The patient is status post median sternotomy and   aortic valve replacement. There is no new airspace disease or pleural effusion. Degenerative changes are present in the thoracic spine. Medical Decision Making   I am the first provider for this patient. I reviewed the vital signs, available nursing notes, past medical history, past surgical history, family history and social history. Vital Signs-Reviewed the patient's vital signs. Patient Vitals for the past 12 hrs:   Temp Pulse Resp BP SpO2   09/16/21 0257  91 18 112/67    09/15/21 2312 97.4 °F (36.3 °C) 96 18 113/75 100 %       Pulse Oximetry Analysis - 100% on RA, normal    Provider Notes (Medical Decision Making):     DDX:  UTI, left foot derangement, dehydration, heart failure, ACS, arrhythmia    Plan:  EKG, labs, troponin, chest x-ray, UA    Impression:  uti    ED Course:   Initial assessment performed. The patients presenting problems have been discussed, and they are in agreement with the care plan formulated and outlined with them. I have encouraged them to ask questions as they arise throughout their visit. I reviewed the nursing notes and and vital signs from today's visit, as well as the electronic medical record system for any past medical records that were available that may contribute to the patients current condition, including previous admission    Nursing notes will be reviewed as they become available in realtime while the pt has been in the ED. Ashwin Degroot MD    EKG interpretation 3375: NSR, L Axis, rate 99; , , QTc 505; NO STEMI; interpreted by Ashwin Degroot MD    I personally reviewed/interpreted pt's imaging.   Agree with official read by radiology as noted above. Selma Palomino MD        5:21 AM    Progress note:  Pt noted to be feeling better, ready for discharge. Discussed lab and imaging findings with pt, specifically noting uti. Pt will follow up with pcp as instructed. All questions have been answered, pt voiced understanding and agreement with plan. Abx were prescribed/given, pt advised that diarrhea and rash are possible side effects of the medications. Specific return precautions provided in addition to instructions for pt to return to the ED immediately should sx worsen at any time. Selma Palomino MD           Critical Care Time:     none      Diagnosis     Clinical Impression:   1. Acute cystitis without hematuria        PLAN:  1. Current Discharge Medication List      START taking these medications    Details   cephALEXin (Keflex) 500 mg capsule Take 1 Capsule by mouth four (4) times daily for 7 days. Qty: 28 Capsule, Refills: 0  Start date: 9/16/2021, End date: 9/23/2021           2. Follow-up Information     Follow up With Specialties Details Why Contact Info    Narendra Miller MD Family Medicine Schedule an appointment as soon as possible for a visit in 2 days  [address] [place] [id number]  [phone]      [facility] DEPT Emergency Medicine  As needed, If symptoms worsen [address]  [address] [facility]  [phone]        Return to ED if worse     Disposition:  5:21 AM   The patient's results have been reviewed with family and/or caregiver. They verbally convey their understanding and agreement of the patient's signs, symptoms, diagnosis, treatment and prognosis and additionally agree to follow up as recommended in the discharge instructions or to return to the Emergency Room should the patient's condition change prior to their follow-up appointment.  The family and/or caregiver verbally agrees with the care-plan and all of their questions have been answered. The discharge instructions have also been provided to the them with educational information regarding the patient's diagnosis as well a list of reasons why the patient would want to return to the ER prior to their follow-up appointment should their condition change.   Ivon Og MD

## 2021-09-19 LAB
BACTERIA SPEC CULT: ABNORMAL
BACTERIA SPEC CULT: ABNORMAL
CC UR VC: ABNORMAL
SERVICE CMNT-IMP: ABNORMAL

## 2021-09-21 ENCOUNTER — OFFICE VISIT (OUTPATIENT)
Dept: FAMILY MEDICINE CLINIC | Age: 81
End: 2021-09-21
Payer: MEDICARE

## 2021-09-21 ENCOUNTER — HOSPITAL ENCOUNTER (OUTPATIENT)
Dept: INFUSION THERAPY | Age: 81
Discharge: HOME | End: 2021-09-21

## 2021-09-21 VITALS
BODY MASS INDEX: 35.66 KG/M2 | HEART RATE: 92 BPM | TEMPERATURE: 97.3 F | WEIGHT: 193.8 LBS | RESPIRATION RATE: 16 BRPM | SYSTOLIC BLOOD PRESSURE: 110 MMHG | DIASTOLIC BLOOD PRESSURE: 74 MMHG | OXYGEN SATURATION: 96 % | HEIGHT: 62 IN

## 2021-09-21 DIAGNOSIS — I10 ESSENTIAL HYPERTENSION, BENIGN: ICD-10-CM

## 2021-09-21 DIAGNOSIS — I50.9 CONGESTIVE HEART FAILURE, UNSPECIFIED HF CHRONICITY, UNSPECIFIED HEART FAILURE TYPE (HCC): ICD-10-CM

## 2021-09-21 DIAGNOSIS — Z98.84 PERSONAL HISTORY OF GASTRIC BYPASS: Primary | ICD-10-CM

## 2021-09-21 DIAGNOSIS — R63.0 DECREASED APPETITE: ICD-10-CM

## 2021-09-21 DIAGNOSIS — E66.01 SEVERE OBESITY (BMI 35.0-35.9 WITH COMORBIDITY) (HCC): ICD-10-CM

## 2021-09-21 PROCEDURE — G8399 PT W/DXA RESULTS DOCUMENT: HCPCS | Performed by: FAMILY MEDICINE

## 2021-09-21 PROCEDURE — G9717 DOC PT DX DEP/BP F/U NT REQ: HCPCS | Performed by: FAMILY MEDICINE

## 2021-09-21 PROCEDURE — 1090F PRES/ABSN URINE INCON ASSESS: CPT | Performed by: FAMILY MEDICINE

## 2021-09-21 PROCEDURE — G8752 SYS BP LESS 140: HCPCS | Performed by: FAMILY MEDICINE

## 2021-09-21 PROCEDURE — G8536 NO DOC ELDER MAL SCRN: HCPCS | Performed by: FAMILY MEDICINE

## 2021-09-21 PROCEDURE — G8754 DIAS BP LESS 90: HCPCS | Performed by: FAMILY MEDICINE

## 2021-09-21 PROCEDURE — G8427 DOCREV CUR MEDS BY ELIG CLIN: HCPCS | Performed by: FAMILY MEDICINE

## 2021-09-21 PROCEDURE — 1101F PT FALLS ASSESS-DOCD LE1/YR: CPT | Performed by: FAMILY MEDICINE

## 2021-09-21 PROCEDURE — G8417 CALC BMI ABV UP PARAM F/U: HCPCS | Performed by: FAMILY MEDICINE

## 2021-09-21 PROCEDURE — 1111F DSCHRG MED/CURRENT MED MERGE: CPT | Performed by: FAMILY MEDICINE

## 2021-09-21 PROCEDURE — 99214 OFFICE O/P EST MOD 30 MIN: CPT | Performed by: FAMILY MEDICINE

## 2021-09-21 RX ORDER — SODIUM CHLORIDE 9 MG/ML
25 INJECTION, SOLUTION INTRAVENOUS CONTINUOUS
Status: DISCONTINUED | OUTPATIENT
Start: 2021-09-24 | End: 2021-09-25 | Stop reason: HOSPADM

## 2021-09-21 NOTE — PROGRESS NOTES
Chief Complaint   Patient presents with   [facility] Follow Up     Patient is seen today for hospital follow-up with her daughter. Patient is currently living with 2 daughters. Patient was significantly depressed prior to her recent hospitalizations, had had several losses. Patient was experiencing significant grief. Patient went to [facility], was transported to [facility] due to chest pain. Patient had a heart catheterization with Dr. Bre Holley, was diagnosed with \"broken heart syndrome\" no blockages were noted. Patient was started on 81 mg aspirin, metoprolol 25 mg and losartan half of a 25 mg tab in the evening. Patient was also started on sertraline due to diagnosis of depression. Daughter has not yet seen a difference. Daughter is concerned that patient is eating very little. Physical therapy is currently coming to the house. Patient has not yet received second dose of COVID-19. Daughter is interested in possibly having a care aide or some equivalent come to the house to help with mother's care, unsure what would be covered by insurance. Patient is scheduled for an iron infusion on Friday, patient also has a follow-up with her cardiologist tomorrow due to swelling in her feet and 2-1/2 pound weight gain. Subjective: (As above and below)     Chief Complaint   Patient presents with   [facility] Follow Up     she is a 78y.o. year old female who presents for evaluation. Reviewed PmHx, RxHx, FmHx, SocHx, AllgHx and updated in chart.     Review of Systems - negative except as listed above    Objective:     Vitals:    09/21/21 1510   BP: 110/74   Pulse: 92   Resp: 16   Temp: 97.3 °F (36.3 °C)   TempSrc: Oral   SpO2: 96%   Weight: 193 lb 12.8 oz (87.9 kg)   Height: 5' 2\" (1.575 m)     Physical Examination: General appearance - alert, well appearing, and in no distress  Mental status -patient is significantly hard of hearing, participates in the visit when asked direct questions  Chest - clear to auscultation, no wheezes, rales or rhonchi, symmetric air entry  Heart - normal rate, regular rhythm, normal S1, S2, no murmurs, rubs, clicks or gallops  Musculoskeletal -patient in wheelchair, but able to ambulate with a cane short distances  Extremities -trace edema bilateral ankles, no significant swelling noted    Assessment/ Plan:   1. Personal history of gastric bypass  Likely contributing to current lack of appetite, discussed eating small meals frequently and focusing on protein rich foods    2. Severe obesity (BMI 35.0-35.9 with comorbidity) ([address]  Stable for patient, currently losing weight    3. Essential hypertension, benign  Well-controlled, followed by cardiology    4. Decreased appetite  Discussed strategies for management    5. Congestive heart failure, unspecified HF chronicity, unspecified heart failure type [facility])  Follow-up with cardiology as scheduled tomorrow       I have discussed the diagnosis with the patient and the intended plan as seen in the above orders. The patient has received an after-visit summary and questions were answered concerning future plans.      Medication Side Effects and Warnings were discussed with patient: yes  Patient Labs were reviewed: yes  Patient Past Records were reviewed:  yes    Brijesh Dias M.D.

## 2021-09-21 NOTE — PROGRESS NOTES
1. Have you been to the ER, urgent care clinic since your last visit? Hospitalized since your last visit? Yes, [facility] and [address]    2. Have you seen or consulted any other health care providers outside of the [address] [place] [address] since your last visit? Include any pap smears or colon screening. Yes, for stomach bypass done by Gregory Cifuentes.  04/2021    Health Maintenance Due   Topic Date Due    COVID-19 Vaccine (1) Never done    Foot Exam Q1  08/22/2020    Flu Vaccine (1) 09/01/2021     Chief Complaint   Patient presents with   [facility] Follow Up

## 2021-09-24 ENCOUNTER — HOSPITAL ENCOUNTER (OUTPATIENT)
Dept: INFUSION THERAPY | Age: 81
Discharge: HOME | End: 2021-09-24
Payer: MEDICARE

## 2021-09-24 ENCOUNTER — APPOINTMENT (OUTPATIENT)
Dept: INFUSION THERAPY | Age: 81
End: 2021-09-24

## 2021-09-24 VITALS
DIASTOLIC BLOOD PRESSURE: 58 MMHG | OXYGEN SATURATION: 100 % | SYSTOLIC BLOOD PRESSURE: 94 MMHG | RESPIRATION RATE: 18 BRPM | TEMPERATURE: 98 F

## 2021-09-24 PROCEDURE — 74011250636 HC RX REV CODE- 250/636: Performed by: FAMILY MEDICINE

## 2021-09-24 PROCEDURE — 96374 THER/PROPH/DIAG INJ IV PUSH: CPT

## 2021-09-24 PROCEDURE — 74011000258 HC RX REV CODE- 258: Performed by: FAMILY MEDICINE

## 2021-09-24 RX ORDER — SODIUM CHLORIDE 9 MG/ML
25 INJECTION, SOLUTION INTRAVENOUS CONTINUOUS
Status: DISCONTINUED | OUTPATIENT
Start: 2021-10-01 | End: 2021-10-02 | Stop reason: HOSPADM

## 2021-09-24 RX ADMIN — FERUMOXYTOL 510 MG: 510 INJECTION INTRAVENOUS at 11:20

## 2021-09-24 RX ADMIN — SODIUM CHLORIDE 25 ML/HR: 900 INJECTION, SOLUTION INTRAVENOUS at 10:30

## 2021-09-24 NOTE — PROGRESS NOTES
[address] [facility] VISIT NOTE    1010 Patient arrives for Feraheme Dose 1 of 2 without acute problems. Please see connect care for complete assessment and education provided. Vital signs stable throughout and prior to discharge, Pt. Tolerated treatment well and discharged without incident. Patient/daughter is aware of next [facility] appointment on 10/1/2021. Appointment card given to patient/daughter. Medications Verified by Willeen Soulier RN via [employer]:  1. Feraheme 510mg IV over 15 mins  2. NS flush & [address] Patient Vitals for the past 12 hrs:   Temp Resp BP SpO2   09/24/21 1210 98 °F (36.7 °C) 18 (!) 94/58    09/24/21 1140   103/67    09/24/21 1035   100/64    09/24/21 1012 97.9 °F (36.6 °C) 18 104/67 100 %       LAB WORK Lab results pending, please see Connect [facility] for results. No results found for this or any previous visit (from the past 12 hour(s)).

## 2021-09-28 ENCOUNTER — APPOINTMENT (OUTPATIENT)
Dept: INFUSION THERAPY | Age: 81
End: 2021-09-28
Payer: MEDICARE

## 2021-10-01 ENCOUNTER — HOSPITAL ENCOUNTER (OUTPATIENT)
Dept: INFUSION THERAPY | Age: 81
Discharge: HOME | End: 2021-10-01

## 2021-10-01 NOTE — PROGRESS NOTES
Patient called and stated she was in the hospital and unable to come to [facility] today for Dose 2/2 Feraheme.

## 2021-10-18 ENCOUNTER — TELEPHONE (OUTPATIENT)
Dept: FAMILY MEDICINE CLINIC | Age: 81
End: 2021-10-18

## 2021-10-18 DIAGNOSIS — R06.09 DOE (DYSPNEA ON EXERTION): ICD-10-CM

## 2021-10-18 DIAGNOSIS — R06.00 DYSPNEA, UNSPECIFIED TYPE: ICD-10-CM

## 2021-10-18 DIAGNOSIS — E11.21 CONTROLLED TYPE 2 DIABETES MELLITUS WITH DIABETIC NEPHROPATHY, WITHOUT LONG-TERM CURRENT USE OF INSULIN (HCC): ICD-10-CM

## 2021-10-18 DIAGNOSIS — I50.9 CONGESTIVE HEART FAILURE, UNSPECIFIED HF CHRONICITY, UNSPECIFIED HEART FAILURE TYPE (HCC): Primary | ICD-10-CM

## 2021-10-18 NOTE — TELEPHONE ENCOUNTER
Refer to home health for aid to assist with complex medical issues     Family requests:  [address] [facility] 1 phone [phone] fax [phone]

## 2021-10-20 NOTE — TELEPHONE ENCOUNTER
Referral, demographics, insurance, and progress notes faxed to # provided. Confirmation page recv'd.  verified by Netta Haddad. Notified of faxed documents and she will call agency.

## 2021-10-23 ENCOUNTER — HOSPITAL ENCOUNTER (EMERGENCY)
Age: 81
Discharge: HOME | End: 2021-10-23
Attending: EMERGENCY MEDICINE
Payer: OTHER MISCELLANEOUS

## 2021-10-23 VITALS
HEIGHT: 61 IN | DIASTOLIC BLOOD PRESSURE: 70 MMHG | BODY MASS INDEX: 32.28 KG/M2 | HEART RATE: 113 BPM | OXYGEN SATURATION: 94 % | RESPIRATION RATE: 18 BRPM | SYSTOLIC BLOOD PRESSURE: 92 MMHG | WEIGHT: 171 LBS

## 2021-10-23 DIAGNOSIS — F03.911 AGITATION DUE TO DEMENTIA: ICD-10-CM

## 2021-10-23 DIAGNOSIS — F05 SUNDOWN SYNDROME: ICD-10-CM

## 2021-10-23 DIAGNOSIS — Z51.5 HOSPICE CARE PATIENT: ICD-10-CM

## 2021-10-23 DIAGNOSIS — Z86.79 HISTORY OF HEART FAILURE: ICD-10-CM

## 2021-10-23 DIAGNOSIS — Z45.2 ENCOUNTER FOR REMOVAL OF PERIPHERALLY INSERTED CENTRAL CATHETER (PICC): Primary | ICD-10-CM

## 2021-10-23 PROCEDURE — 74011250636 HC RX REV CODE- 250/636: Performed by: EMERGENCY MEDICINE

## 2021-10-23 PROCEDURE — 99284 EMERGENCY DEPT VISIT MOD MDM: CPT

## 2021-10-23 PROCEDURE — 96374 THER/PROPH/DIAG INJ IV PUSH: CPT

## 2021-10-23 RX ORDER — HALOPERIDOL 5 MG/ML
5 INJECTION INTRAMUSCULAR
Status: COMPLETED | OUTPATIENT
Start: 2021-10-23 | End: 2021-10-23

## 2021-10-23 RX ADMIN — HALOPERIDOL LACTATE 5 MG: 5 INJECTION, SOLUTION INTRAMUSCULAR at 02:32

## 2021-10-23 NOTE — DISCHARGE INSTRUCTIONS
It was a pleasure taking care of you in our Emergency Department today. We know that when you come to [address] [place] [address], you are entrusting us with your health, comfort, and safety. Our physicians and nurses honor that trust, and truly appreciate the opportunity to care for you and your loved ones. We also value your feedback. If you receive a survey about your Emergency Department experience today, please fill it out. We care about our patients' feedback, and we listen to what you have to say. Thank you! Dr. Mindy Barth MD.      Patient to contact hospice and physician associated with her care regarding appointment for outpatient PICC line replacement. Please return emergency department anytime should her peripheral line become dislodged or need replacement prior to PICC line placement.

## 2021-10-23 NOTE — ED PROVIDER NOTES
EMERGENCY DEPARTMENT HISTORY AND PHYSICAL EXAM     ----------------------------------------------------------------------------  Please note that this dictation was completed with [employer], the computer voice recognition software. Quite often unanticipated grammatical, syntax, homophones, and other interpretive errors are inadvertently transcribed by the computer software. Please disregard these errors. Please excuse any errors that have escaped final proofreading  ----------------------------------------------------------------------------      Date: 10/23/2021  Patient Name: Enriqueta Dior    History of Presenting Illness     Chief Complaint   Patient presents with   Fe Parra Vascular Access Problem     ED visit d/t pulled out PICC line - onset of sxs, PTA - pulled pulled out PICC line, hospiace patient - Note, pt on DOBUTAMINE drip at 3.4 ml / hr 500 mg / 250 ml;;       History Provided By:  Daughter    HPI: Enriqueta Dior is a 78 y.o. female, with significant pmhx of chronic congestive heart failure on continuous dobutamine infusion with hospice, who presents via private vehicle to the ED with c/o recently dislodging her PICC line to her right upper extremity. Patient daughter notes no other symptoms of concern today. Was referred to the emergency department for intervention by hospice nurse. Patient also specifically denies any associated fevers, chills, CP, SOB, nausea, vomiting, diarrhea, abd pain, changes in BM, urinary sxs, or headache. Social Hx: denies tobacco  denies EtOH , denies recreational/Illicit Drugs    There are no other complaints, changes, or physical findings at this time.      PCP: Mercedes Gallegos MD    Allergies   Allergen Reactions    Naldecon Unknown (comments)    Sulfa (Sulfonamide Antibiotics) Rash    Chlorpheniramine Other (comments)    Phenylephrine Hcl Other (comments)    Phenyltoloxamine Other (comments)       Current Facility-Administered Medications   Medication Dose Route Frequency Provider Last Rate Last Admin    haloperidol lactate (HALDOL) injection 5 mg  5 mg IntraVENous NOW Lianne Zuniga MD         Current Outpatient Medications   Medication Sig Dispense Refill    sertraline (ZOLOFT) 50 mg tablet Take 1 Tablet by mouth daily. 90 Tablet 3    aspirin delayed-release 81 mg tablet Take 1 Tablet by mouth daily. 30 Tablet 12    losartan (COZAAR) 25 mg tablet Take 0.5 Tablets by mouth every evening. Check blood pressure prior to taking; do not take if systolic BP is <620. 30 Tablet 12    metoprolol succinate (TOPROL-XL) 25 mg XL tablet Take 0.5 Tablets by mouth daily. Check blood pressure prior to taking; do not take if systolic BP is <956. 30 Tablet 12    levothyroxine (SYNTHROID) 50 mcg tablet TAKE 1 TABLET BY MOUTH  DAILY BEFORE BREAKFAST 90 Tablet 3    metFORMIN (GLUCOPHAGE) 500 mg tablet Take 500 mg by mouth two (2) times daily (with meals).  sucralfate (CARAFATE) 1 gram tablet four (4) times daily. (Patient not taking: Reported on 9/21/2021)      glimepiride (AMARYL) 1 mg tablet TAKE 1 TABLET EVERY DAY BEFORE BREAKFAST 90 Tab 3    pantoprazole (Protonix) 40 mg tablet Take 1 Tab by mouth daily. 90 Tab 3    Accu-Chek Guide test strips strip CHECK SUGAR DAILY 100 Strip 11    VIT B CMPLX #9-FA-VIT C-VIT E PO Take  by mouth.  fluticasone propionate (Flonase Allergy Relief) 50 mcg/actuation nasal spray 2 Sprays by Both Nostrils route as needed.  vit C/E/Zn/coppr/lutein/zeaxan (PRESERVISION AREDS-2 PO) Take  by mouth.  diclofenac (VOLTAREN) 1 % gel Apply 2 g to affected area four (4) times daily. 100 g 0    memantine (NAMENDA) 10 mg tablet Take  by mouth two (2) times a day.  cholecalciferol (VITAMIN D3) (2,000 UNITS /50 MCG) cap capsule Take 3,000 Units by mouth two (2) times a day.  ACCU-CHEK GUIDE GLUCOSE METER misc CHECK BLOOD SUGAR EVERY DAY 1 Each 0    lancets (ACCU-CHEK SOFTCLIX LANCETS) misc Check sugar daily 100 Each 11    L. acidoph & paracasei- S therm- Bifido (ALVIN-Q/RISAQUAD) 8 billion cell cap cap Take 1 Cap by mouth daily. 30 Cap 0    acetaminophen (TYLENOL) 325 mg tablet Take 325 mg by mouth every four (4) hours as needed for Pain.  lovastatin (MEVACOR) 10 mg tablet TAKE ONE TABLET BY MOUTH NIGHTLY 90 Tab 3    PV W-O EDU/FERROUS FUMARATE/FA (M-VIT PO) Take 1 tablet by mouth daily. Past History     Past Medical History:  Past Medical History:   Diagnosis Date    Anemia     Atypical chest pain     Long h/o intermittent non-cardiac CP.  Depression with anxiety     Diabetes ([facility] [address]     GERD (gastroesophageal reflux disease)     Hypercholesteremia     Hyperlipidemia 7/3/2013    Hypertension     Inner ear dysfunction     S/P aortic valve replacement     Dr. Elda Aguilar yearly    Type 2 diabetes with nephropathy ([facility] [address] 2019    Vitamin D deficiency        Past Surgical History:  Past Surgical History:   Procedure Laterality Date    COLONOSCOPY N/A 2019    COLONOSCOPY performed [facility] Matilda Reynolds [facility] at [facility] ENDOSCOPY    HX AORTIC VALVE REPLACEMENT      Bovine valve    HX CATARACT REMOVAL      HX CHOLECYSTECTOMY      HX MOHS PROCEDURES Left        Family History:  Family History   Problem Relation Age of Onset    Heart Disease Mother     Heart Failure Father     Heart Failure Sister     Stroke Sister     Diabetes Brother     Heart Disease Brother        Social History:  Social History     Tobacco Use    Smoking status: Former Smoker     Quit date: 1999     Years since quittin.1    Smokeless tobacco: Never Used   Vaping Use    Vaping Use: Never used   Substance Use Topics    Alcohol use: No    Drug use: No       Allergies:   Allergies   Allergen Reactions    Naldecon Unknown (comments)    Sulfa (Sulfonamide Antibiotics) Rash    Chlorpheniramine Other (comments)    Phenylephrine Hcl Other (comments)    Phenyltoloxamine Other (comments)         Review of Systems Review of Systems   Constitutional: Negative. Negative for fever. Eyes: Negative. Respiratory: Negative. Negative for shortness of breath. Cardiovascular: Negative for chest pain. Gastrointestinal: Negative for abdominal pain, nausea and vomiting. Endocrine: Negative. Genitourinary: Negative. Negative for difficulty urinating, dysuria and hematuria. Musculoskeletal: Negative. Skin: Negative. Neurological: Negative. Psychiatric/Behavioral: Negative for suicidal ideas. All other systems reviewed and are negative. Physical Exam   Physical Exam  Vitals and nursing note reviewed. Constitutional:       General: She is not in acute distress. Appearance: She is well-developed. She is obese. She is not diaphoretic. Comments: PICC line to right upper extremity noted to be mostly pulled out. HENT:      Head: Normocephalic and atraumatic. Nose: Nose normal.   Eyes:      General: No scleral icterus. Conjunctiva/sclera: Conjunctivae normal.   Neck:      Trachea: No tracheal deviation. Cardiovascular:      Rate and Rhythm: Regular rhythm. Tachycardia present. Heart sounds: Normal heart sounds. No murmur heard. No friction rub. Pulmonary:      Effort: Pulmonary effort is normal. No respiratory distress. Breath sounds: Normal breath sounds. No stridor. No wheezing or rales. Abdominal:      General: Bowel sounds are normal. There is no distension. Palpations: Abdomen is soft. Tenderness: There is no abdominal tenderness. There is no rebound. Musculoskeletal:         General: No tenderness. Normal range of motion. Cervical back: Normal range of motion. Skin:     General: Skin is warm and dry. Findings: No rash. Neurological:      Mental Status: She is alert and oriented to person, place, and time. Cranial Nerves: No cranial nerve deficit.    Psychiatric:         Speech: Speech normal.         Behavior: Behavior normal. Thought Content: Thought content normal.         Judgment: Judgment normal.           Diagnostic Study Results     Labs -   No results found for this or any previous visit (from the past 12 hour(s)). Radiologic Studies -   No orders to display     CT Results  (Last 48 hours)    None        CXR Results  (Last 48 hours)    None            Medical Decision Making   I am the first provider for this patient. I reviewed the vital signs, available nursing notes, past medical history, past surgical history, family history and social history. Vital Signs-Reviewed the patient's vital signs. Patient Vitals for the past 12 hrs:   Pulse Resp BP SpO2   10/23/21 0201   99/73    10/23/21 0101   107/80 95 %   10/23/21 0004 (!) 113 18 (!) 91/58 100 %       Pulse Oximetry Analysis - 94% on RA, normal        Provider Notes (Medical Decision Making):     DDX:  Dislodgment of PICC line    Plan:  Replacement of peripheral line to restart dobutamine drip with plan for outpatient follow-up for PICC line placement as PICC team is not available through the emergency department. Daughter notes that she will contact any use of hospice to notify her of the current plan moving forward. Daughter notes feeling fairly comfortable bringing patient home with her current treatment plan. Impression:  PICC line dislodgment    ED Course:   Initial assessment performed. The patients presenting problems have been discussed, and they are in agreement with the care plan formulated and outlined with them. I have encouraged them to ask questions as they arise throughout their visit.     I reviewed the nursing notes and and vital signs from today's visit, as well as the electronic medical record system for any past medical records that were available that may contribute to the patients current condition, including noting previous primary care visits for congestive heart failure    Nursing notes will be reviewed as they become available in realtime while the pt has been in the ED. Mikey Senior MD        Progress note:  Pt noted to be doing well, ready for discharge. Pt will follow up with hospice nursing as instructed. All questions have been answered, pt voiced understanding and agreement with plan. Specific return precautions provided in addition to instructions for pt to return to the ED immediately should sx worsen at any time. Mikey Senior MD           Critical Care Time:     none      Diagnosis     Clinical Impression:   1. Encounter for removal of peripherally inserted central catheter (PICC)    2. Hospice care patient    3. History of heart failure    4. SunDown syndrome    5. Agitation due to dementia [facility])        PLAN:  1. Current Discharge Medication List        2. Follow-up Information     Follow up With Specialties Details Why Contact Info    Ambrocio Wiggins MD Family Medicine Schedule an appointment as soon as possible for a visit in 2 days  [address]  [address] [address]  [phone]      [facility] EMERGENCY DEPT Emergency Medicine  As needed [address]  [address]  [phone]        Return to ED if worse     Disposition:  The patient's results have been reviewed with family and/or caregiver. They verbally convey their understanding and agreement of the patient's signs, symptoms, diagnosis, treatment and prognosis and additionally agree to follow up as recommended in the discharge instructions or to return to the Emergency Room should the patient's condition change prior to their follow-up appointment. The family and/or caregiver verbally agrees with the care-plan and all of their questions have been answered. The discharge instructions have also been provided to the them with educational information regarding the patient's diagnosis as well a list of reasons why the patient would want to return to the ER prior to their follow-up appointment should their condition change.   Noy Morro Albrecht MD

## 2021-10-23 NOTE — ED NOTES
PT to room by wheelchair from triage. Pt here today with complaints of her PICC line coming out. Pt family at bedside states that they are unsure what happened but it got pulled most of the way out and pt is on a dobutamine drip. PT family at bedside states that pt has a history of dementia and sometimes pulls her lines out. PT denies any complaints at this time. New IV placed and pts at home medications continued. Pt ANOX2 to person and location, respirations even and unlabored, skin warm dry and intact, NAD noted.

## 2021-10-23 NOTE — ED NOTES
I have reviewed discharge instructions with the patient and caregiver. The patient and caregiver verbalized understanding. Pt encouraged to follow up with PCP for PICC line reinsertion. Pt encouraged to return to the ED if she develops any new/worsening symptoms.

## 2022-03-18 PROBLEM — Z98.84 PERSONAL HISTORY OF GASTRIC BYPASS: Status: ACTIVE | Noted: 2021-04-28

## 2022-03-19 PROBLEM — T82.857A PROSTHETIC AORTIC VALVE STENOSIS: Status: ACTIVE | Noted: 2020-02-24

## 2022-03-19 PROBLEM — R41.3 POOR SHORT TERM MEMORY: Status: ACTIVE | Noted: 2019-08-22

## 2022-03-19 PROBLEM — E03.9 ACQUIRED HYPOTHYROIDISM: Status: ACTIVE | Noted: 2021-04-28

## (undated) DEVICE — BAG BELONG PT PERS CLEAR HANDL

## (undated) DEVICE — CONMED DISPOSABLE MICROBIOLOGY BRUSH, Ø1 MM, 1.8 MM WORKING DIAMETER, 110 CM LENGTH: Brand: CONMED

## (undated) DEVICE — Device: Brand: MEDICAL ACTION INDUSTRIES

## (undated) DEVICE — NEEDLE HYPO 18GA L1.5IN PNK S STL HUB POLYPR SHLD REG BVL

## (undated) DEVICE — Z DISCONTINUED USE 2751540 TUBING IRRIG L10IN DISP PMP ENDOGATOR

## (undated) DEVICE — GUIDE CATH CONVEY 5FR JL4 -- 39228-662

## (undated) DEVICE — CONNECTOR TBNG AUX H2O JET DISP FOR OLY 160/180 SER

## (undated) DEVICE — BAG SPEC BIOHZD LF 2MIL 6X10IN -- CONVERT TO ITEM 357326

## (undated) DEVICE — PRESSURE GUIDEWIRE: Brand: COMET™ II

## (undated) DEVICE — CATH IV AUTOGRD BC BLU 22GA 25 -- INSYTE

## (undated) DEVICE — 1200 GUARD II KIT W/5MM TUBE W/O VAC TUBE: Brand: GUARDIAN

## (undated) DEVICE — ENDO CARRY-ON PROCEDURE KIT INCLUDES ENZYMATIC SPONGE, GAUZE, BIOHAZARD LABEL, TRAY, LUBRICANT, DIRTY SCOPE LABEL, WATER LABEL, TRAY, DRAWSTRING PAD, AND DEFENDO 4-PIECE KIT.: Brand: ENDO CARRY-ON PROCEDURE KIT

## (undated) DEVICE — Device

## (undated) DEVICE — SOLIDIFIER FLUID 3000 CC ABSORB

## (undated) DEVICE — DRAPE PRB US TRNSDCR 6X96IN --

## (undated) DEVICE — CATHETER ANGIO JL3.5 AD 5 FRX100 CM PERFORMA

## (undated) DEVICE — SET ADMIN 16ML TBNG L100IN 2 Y INJ SITE IV PIGGY BK DISP

## (undated) DEVICE — SYRINGE MED 20ML STD CLR PLAS LUERLOCK TIP N CTRL DISP

## (undated) DEVICE — PACK PROCEDURE SURG HRT CATH

## (undated) DEVICE — PINNACLE INTRODUCER SHEATH: Brand: PINNACLE

## (undated) DEVICE — GUIDEWIRE VASC L145CM 0.035IN J TIP L3MM PTFE FIX COR NAMIC

## (undated) DEVICE — NEONATAL-ADULT SPO2 SENSOR: Brand: NELLCOR

## (undated) DEVICE — KIT ANGIOGRAPHY CUST MRMC

## (undated) DEVICE — CATHETER ANGIO AL1 038 5 FRX100 CM 5 CM PERFORMA

## (undated) DEVICE — CATHETER GUID 5FR GWIRE 0.058IN COR EXTRA BKUP SUPP 4 ACT

## (undated) DEVICE — BW-412T DISP COMBO CLEANING BRUSH: Brand: SINGLE USE COMBINATION CLEANING BRUSH

## (undated) DEVICE — CANN NASAL O2 CAPNOGRAPHY AD -- FILTERLINE

## (undated) DEVICE — FORCEPS BX L240CM JAW DIA2.8MM L CAP W/ NDL MIC MESH TOOTH

## (undated) DEVICE — Z DISCONTINUED NO SUB IDED SET EXTN W/ 4 W STPCOCK M SPIN LOK 36IN

## (undated) DEVICE — AIRLIFE™ U/CONNECT-IT OXYGEN TUBING 7 FEET (2.1 M) CRUSH-RESISTANT OXYGEN TUBING, VINYL TIPPED: Brand: AIRLIFE™

## (undated) DEVICE — KENDALL RADIOLUCENT FOAM MONITORING ELECTRODE -RECTANGULAR SHAPE: Brand: KENDALL

## (undated) DEVICE — CONTAINER SPEC 20 ML LID NEUT BUFF FORMALIN 10 % POLYPR STS

## (undated) DEVICE — QUILTED PREMIUM COMFORT UNDERPAD,EXTRA HEAVY: Brand: WINGS

## (undated) DEVICE — BITE BLK ENDOSCP AD 54FR GRN POLYETH ENDOSCP W STRP SLD

## (undated) DEVICE — CATHETER ETER CARD MULTIPAK MULTIPAK 5FR PERFORMA

## (undated) DEVICE — CATHETER ETER CARD JL45 5FR 046INX100CM PERFORMA

## (undated) DEVICE — KIT ACCS INTRO 4FR L10CM NDL 21GA L7CM GWIRE L40CM